# Patient Record
Sex: MALE | Race: WHITE | NOT HISPANIC OR LATINO | Employment: OTHER | URBAN - METROPOLITAN AREA
[De-identification: names, ages, dates, MRNs, and addresses within clinical notes are randomized per-mention and may not be internally consistent; named-entity substitution may affect disease eponyms.]

---

## 2017-02-02 DIAGNOSIS — I10 ESSENTIAL HYPERTENSION, BENIGN: Primary | ICD-10-CM

## 2017-02-02 DIAGNOSIS — E11.9 TYPE 2 DIABETES MELLITUS WITHOUT COMPLICATION, WITHOUT LONG-TERM CURRENT USE OF INSULIN (H): ICD-10-CM

## 2017-02-02 NOTE — TELEPHONE ENCOUNTER
losartan      Last Written Prescription Date: 12/29/16  Last Fill Quantity: 30, # refills: 0  Last Office Visit with G, P or [facility] prescribing provider: 6/23/16   Next 5 appointments (look out 90 days)     Feb 09, 2017 10:00 AM   PHYSICAL with Jolynn Phillips MD   [facility]    [address] [facility] [address] [address]   [phone]                   POTASSIUM   Date Value Ref Range Status   08/10/2016 4.3 3.4 - 5.3 mmol/L Final     CREATININE   Date Value Ref Range Status   08/10/2016 0.91 0.66 - 1.25 mg/dL Final     BP Readings from Last 3 Encounters:   06/23/16 136/97   09/22/15 128/73   06/19/15 132/80   Routing refill request to provider for review/approval because:  Juliet given x1 and patient did not follow up, please advise

## 2017-02-03 RX ORDER — LOSARTAN POTASSIUM 50 MG/1
50 TABLET ORAL DAILY
Qty: 60 TABLET | Refills: 0 | Status: SHIPPED | OUTPATIENT
Start: 2017-02-03 | End: 2017-02-09

## 2017-02-03 NOTE — TELEPHONE ENCOUNTER
I saw him last 6/2016 - due for diabetes visit.    I will fill his losartan x 2 months and he should make a clinic appointment to see me    melib

## 2017-02-09 ENCOUNTER — OFFICE VISIT (OUTPATIENT)
Dept: FAMILY MEDICINE | Facility: CLINIC | Age: 62
End: 2017-02-09
Payer: COMMERCIAL

## 2017-02-09 ENCOUNTER — RADIANT APPOINTMENT (OUTPATIENT)
Dept: GENERAL RADIOLOGY | Facility: CLINIC | Age: 62
End: 2017-02-09
Attending: FAMILY MEDICINE
Payer: COMMERCIAL

## 2017-02-09 VITALS
BODY MASS INDEX: 37.52 KG/M2 | HEIGHT: 69 IN | TEMPERATURE: 97.7 F | WEIGHT: 253.3 LBS | DIASTOLIC BLOOD PRESSURE: 85 MMHG | SYSTOLIC BLOOD PRESSURE: 131 MMHG | HEART RATE: 77 BPM

## 2017-02-09 DIAGNOSIS — I10 ESSENTIAL HYPERTENSION, BENIGN: ICD-10-CM

## 2017-02-09 DIAGNOSIS — Z13.220 NEED FOR LIPID SCREENING: ICD-10-CM

## 2017-02-09 DIAGNOSIS — M25.512 ACUTE PAIN OF LEFT SHOULDER: ICD-10-CM

## 2017-02-09 DIAGNOSIS — E11.9 TYPE 2 DIABETES MELLITUS WITHOUT COMPLICATION, WITHOUT LONG-TERM CURRENT USE OF INSULIN (H): ICD-10-CM

## 2017-02-09 DIAGNOSIS — Z11.59 NEED FOR HEPATITIS C SCREENING TEST: ICD-10-CM

## 2017-02-09 DIAGNOSIS — E78.2 MIXED HYPERLIPIDEMIA: ICD-10-CM

## 2017-02-09 DIAGNOSIS — Z12.11 SCREEN FOR COLON CANCER: ICD-10-CM

## 2017-02-09 DIAGNOSIS — M67.912 DYSFUNCTION OF LEFT ROTATOR CUFF: ICD-10-CM

## 2017-02-09 DIAGNOSIS — Z00.01 ENCOUNTER FOR ROUTINE ADULT MEDICAL EXAM WITH ABNORMAL FINDINGS: Primary | ICD-10-CM

## 2017-02-09 DIAGNOSIS — J44.1 OBSTRUCTIVE CHRONIC BRONCHITIS WITH EXACERBATION (H): ICD-10-CM

## 2017-02-09 LAB
CHOLEST SERPL-MCNC: 164 MG/DL
CREAT UR-MCNC: 348 MG/DL
HBA1C MFR BLD: 6.7 % (ref 4.3–6)
HDLC SERPL-MCNC: 53 MG/DL
LDLC SERPL CALC-MCNC: 84 MG/DL
MICROALBUMIN UR-MCNC: 23 MG/L
MICROALBUMIN/CREAT UR: 6.67 MG/G CR (ref 0–17)
NONHDLC SERPL-MCNC: 111 MG/DL
TRIGL SERPL-MCNC: 137 MG/DL

## 2017-02-09 PROCEDURE — 83036 HEMOGLOBIN GLYCOSYLATED A1C: CPT | Performed by: FAMILY MEDICINE

## 2017-02-09 PROCEDURE — 82043 UR ALBUMIN QUANTITATIVE: CPT | Performed by: FAMILY MEDICINE

## 2017-02-09 PROCEDURE — 36415 COLL VENOUS BLD VENIPUNCTURE: CPT | Performed by: FAMILY MEDICINE

## 2017-02-09 PROCEDURE — 99213 OFFICE O/P EST LOW 20 MIN: CPT | Mod: 25 | Performed by: FAMILY MEDICINE

## 2017-02-09 PROCEDURE — 86803 HEPATITIS C AB TEST: CPT | Performed by: FAMILY MEDICINE

## 2017-02-09 PROCEDURE — 99396 PREV VISIT EST AGE 40-64: CPT | Performed by: FAMILY MEDICINE

## 2017-02-09 PROCEDURE — 80061 LIPID PANEL: CPT | Performed by: FAMILY MEDICINE

## 2017-02-09 PROCEDURE — 73030 X-RAY EXAM OF SHOULDER: CPT | Mod: LT

## 2017-02-09 RX ORDER — ALBUTEROL SULFATE 90 UG/1
2 AEROSOL, METERED RESPIRATORY (INHALATION) EVERY 6 HOURS PRN
Qty: 1 INHALER | Refills: 3 | Status: SHIPPED | OUTPATIENT
Start: 2017-02-09 | End: 2018-01-03

## 2017-02-09 RX ORDER — METFORMIN HCL 500 MG
500 TABLET, EXTENDED RELEASE 24 HR ORAL DAILY
Qty: 90 TABLET | Refills: 3 | Status: SHIPPED | OUTPATIENT
Start: 2017-02-09 | End: 2018-02-27

## 2017-02-09 RX ORDER — LOSARTAN POTASSIUM 100 MG/1
100 TABLET ORAL DAILY
Qty: 90 TABLET | Refills: 1 | Status: SHIPPED | OUTPATIENT
Start: 2017-02-09 | End: 2017-10-03

## 2017-02-09 RX ORDER — ATORVASTATIN CALCIUM 20 MG/1
20 TABLET, FILM COATED ORAL DAILY
Qty: 90 TABLET | Refills: 3 | Status: SHIPPED | OUTPATIENT
Start: 2017-02-09 | End: 2018-02-27

## 2017-02-09 NOTE — PROGRESS NOTES
Answers for HPI/ROS submitted by the patient on 2/8/2017   Annual Exam:  Getting at least 3 servings of Calcium per day:: Yes  Bi-annual eye exam:: NO  Dental care twice a year:: NO  Sleep apnea or symptoms of sleep apnea:: None  Diet:: Low salt, Low fat/cholesterol, Diabetic, Carbohydrate counting  Frequency of exercise:: 1 day/week  Duration of exercise:: 15-30 minutes  Taking medications regularly:: Yes  Medication side effects:: None  Additional concerns today:: No  PHQ-2 Depressed: Not at all, Not at all  PHQ-2 Score: 0

## 2017-02-09 NOTE — MR AVS SNAPSHOT
After Visit Summary   2/9/2017    Joe Mas    MRN: [id number]           Patient Information     Date Of Birth          1955        Visit Information        Provider Department      2/9/2017 10:00 AM Jolynn Phillips MD [facility]        Today's Diagnoses     Encounter for routine adult medical exam with abnormal findings    -  1     Type 2 diabetes mellitus without complication, without long-term current use of insulin (H)         Mixed hyperlipidemia         BENIGN HYPERTENSION         Screen for colon cancer         Need for hepatitis C screening test         Need for lipid screening         Obstructive chronic bronchitis with exacerbation (H)         Acute pain of left shoulder         Dysfunction of left rotator cuff           Care Instructions      Preventive Health Recommendations  Male Ages 50 - 64    Yearly exam:             See your health care provider every year in order to  o   Review health changes.   o   Discuss preventive care.    o   Review your medicines if your doctor has prescribed any.     Have a cholesterol test every 5 years, or more frequently if you are at risk for high cholesterol/heart disease.     Have a diabetes test (fasting glucose) every three years. If you are at risk for diabetes, you should have this test more often.     Have a colonoscopy at age 50, or have a yearly FIT test (stool test). These exams will check for colon cancer.      Talk with your health care provider about whether or not a prostate cancer screening test (PSA) is right for you.    You should be tested each year for STDs (sexually transmitted diseases), if you re at risk.     Shots: Get a flu shot each year. Get a tetanus shot every 10 years.     Nutrition:    Eat at least 5 servings of fruits and vegetables daily.     Eat whole-grain bread, whole-wheat pasta and brown rice instead of white grains and rice.     Talk to your provider about Calcium and Vitamin D.      Lifestyle    Exercise for at least 150 minutes a week (30 minutes a day, 5 days a week). This will help you control your weight and prevent disease.     Limit alcohol to one drink per day.     No smoking.     Wear sunscreen to prevent skin cancer.     See your dentist every six months for an exam and cleaning.     See your eye doctor every 1 to 2 years.            Follow-ups after your visit        Additional Services     GASTROENTEROLOGY ADULT REF PROCEDURE ONLY       Last Lab Result: CREATININE (mg/dL)       Date                     Value                 08/10/2016               0.91             ----------  Body mass index is 37.69 kg/(m^2).     Needed:  No  Language:  English    Patient will be contacted to schedule procedure.     Please be aware that coverage of these services is subject to the terms and limitations of your health insurance plan.  Call member services at your health plan with any benefit or coverage questions.  Any procedures must be performed at a [facility] facility OR coordinated by your clinic's referral office.    Please bring the following with you to your appointment:    (1) Any X-Rays, CTs or MRIs which have been performed.  Contact the facility where they were done to arrange for  prior to your scheduled appointment.    (2) List of current medications   (3) This referral request   (4) Any documents/labs given to you for this referral            [facility] PT, HAND, AND CHIROPRACTIC REFERRAL       **This order will print in the [facility] Scheduling Office**    Physical Therapy, Hand Therapy and Chiropractic Care are available through:    *[facility]  *[facility]  *[facility]    Call one number to schedule at any of the above locations: [phone].    Your provider has referred you to: Physical Therapy at [facility] or [facility]    Indication/Reason for Referral: Shoulder Pain  Onset of Illness: one month  Therapy Orders: Evaluate and  Treat  Special Programs: None  Special Request: None    Doris George      Additional Comments for the Therapist or Chiropractor:     Please be aware that coverage of these services is subject to the terms and limitations of your health insurance plan.  Call member services at your health plan with any benefit or coverage questions.      Please bring the following to your appointment:    *Your personal calendar for scheduling future appointments  *Comfortable clothing                  Future tests that were ordered for you today     Open Future Orders        Priority Expected Expires Ordered    XR Shoulder Left 2 Views Routine 2/9/2017 2/9/2018 2/9/2017            Who to contact     Normal or non-critical lab and imaging results will be communicated to you by [employer], letter or phone within 4 business days after the clinic has received the results. If you do not hear from us within 7 days, please contact the clinic through [employer] or phone. If you have a critical or abnormal lab result, we will notify you by phone as soon as possible.  Submit refill requests through [employer] or call your pharmacy and they will forward the refill request to us. Please allow 3 business days for your refill to be completed.          If you need to speak with a  for additional information , please call: [phone]             Additional Information About Your Visit        [employer] Information     [employer] gives you secure access to your electronic health record. If you see a primary care provider, you can also send messages to your care team and make appointments. If you have questions, please call your primary care clinic.  If you do not have a primary care provider, please call [phone] and they will assist you.        Care EveryWhere ID     This is your Care EveryWhere ID. This could be used by other organizations to access your [facility] medical records  [id number]        Your Vitals Were     Pulse Temperature  "Height BMI (Body Mass Index)          77 97.7  F (36.5  C) (Tympanic) 5' 8.75\" (1.746 m) 37.69 kg/m2         Blood Pressure from Last 3 Encounters:   02/09/17 131/85   06/23/16 136/97   09/22/15 128/73    Weight from Last 3 Encounters:   02/09/17 253 lb 4.8 oz (114.896 kg)   06/23/16 247 lb 1.6 oz (112.084 kg)   09/22/15 245 lb 1.6 oz (111.177 kg)              We Performed the Following     Albumin Random Urine Quantitative     GASTROENTEROLOGY ADULT REF PROCEDURE ONLY     HEMOGLOBIN A1C     Hepatitis C Screen Reflex to HCV RNA Quant and Genotype     [facility] PT, HAND, AND CHIROPRACTIC REFERRAL     Lipid panel reflex to direct LDL          Today's Medication Changes          These changes are accurate as of: 2/9/17 10:52 AM.  If you have any questions, ask your nurse or doctor.               These medicines have changed or have updated prescriptions.        Dose/Directions    losartan 100 MG tablet   Commonly known as:  COZAAR   This may have changed:  Another medication with the same name was removed. Continue taking this medication, and follow the directions you see here.   Used for:  Essential hypertension, benign   Changed by:  Jolynn Phillips MD        Dose:  100 mg   Take 1 tablet (100 mg) by mouth daily   Quantity:  90 tablet   Refills:  1            Where to get your medicines      These medications were sent to [facility] [address] [facility]  [address] [facility] [address]     Phone:  [phone]    - albuterol 108 (90 BASE) MCG/ACT Inhaler  - atorvastatin 20 MG tablet  - losartan 100 MG tablet  - metFORMIN 500 MG 24 hr tablet             Primary Care Provider Office Phone # Fax #    Jolynn Phillips [phone]       [facility] [id number] [place] [address]        Thank you!     Thank you for choosing [facility]  for your care. Our goal is always to provide you with excellent care. Hearing back from our patients is " one way we can continue to improve our services. Please take a few minutes to complete the written survey that you may receive in the mail after your visit with us. Thank you!             Your Updated Medication List - Protect others around you: Learn how to safely use, store and throw away your medicines at www.disposemymeds.org.          This list is accurate as of: 2/9/17 10:52 AM.  Always use your most recent med list.                   Brand Name Dispense Instructions for use    albuterol 108 (90 BASE) MCG/ACT Inhaler    PROAIR HFA/PROVENTIL HFA/VENTOLIN HFA    1 Inhaler    Inhale 2 puffs into the lungs every 6 hours as needed for shortness of breath / dyspnea       aspirin 81 MG tablet     100    ONE DAILY       atorvastatin 20 MG tablet    LIPITOR    90 tablet    Take 1 tablet (20 mg) by mouth daily       blood glucose monitoring lancets     3 Box    1 each daily       blood glucose monitoring test strip    ACCU-CHEK SMARTVIEW    300 each    Test blood sugars daily       losartan 100 MG tablet    COZAAR    90 tablet    Take 1 tablet (100 mg) by mouth daily       metFORMIN 500 MG 24 hr tablet    GLUCOPHAGE-XR    90 tablet    Take 1 tablet (500 mg) by mouth daily

## 2017-02-09 NOTE — NURSING NOTE
"Initial /85 mmHg  Pulse 77  Temp(Src) 97.7  F (36.5  C) (Tympanic)  Ht 5' 8.75\" (1.746 m)  Wt 253 lb 4.8 oz (114.896 kg)  BMI 37.69 kg/m2 Estimated body mass index is 37.69 kg/(m^2) as calculated from the following:    Height as of this encounter: 5' 8.75\" (1.746 m).    Weight as of this encounter: 253 lb 4.8 oz (114.896 kg). .    "

## 2017-02-09 NOTE — PROGRESS NOTES
SUBJECTIVE:     CC: Joe Mas is an 61 year old male who presents for preventative health visit.   Answers for HPI/ROS submitted by the patient on 2/8/2017   Annual Exam:  Getting at least 3 servings of Calcium per day:: Yes  Bi-annual eye exam:: NO  Dental care twice a year:: NO  Sleep apnea or symptoms of sleep apnea:: None  Diet:: Low salt, Low fat/cholesterol, Diabetic, Carbohydrate counting  Frequency of exercise:: 1 day/week  Duration of exercise:: 15-30 minutes - watches grandson 3 d /week   Taking medications regularly:: Yes  Medication side effects:: None  Additional concerns today:: No  PHQ-2 Depressed: Not at all, Not at all  PHQ-2 Score: 0        Diabetes Follow-up      Patient is checking blood sugars: rarely.  Results range from 105 to 135    Diabetic concerns: None     Symptoms of hypoglycemia (low blood sugar): none     Paresthesias (numbness or burning in feet) or sores: No     Date of last diabetic eye exam: 12/2016         Hasn't been monitoring very often   On metformin 500 daily       Hyperlipidemia Follow-Up      Rate your low fat/cholesterol diet?: fair    Taking statin?  Yes, no muscle aches from statin    Other lipid medications/supplements?:  Omega 3     Hypertension Follow-up      Outpatient blood pressures are not being checked.    Low Salt Diet: not monitoring salt     Having left shoulder pain  No injury  Pain off and on for 4 years - worsened for about a month  Ice helps  It is getting better overall     Today's PHQ-2 Score: 0  PHQ-2 ( 1999 [employer]) 2/8/2017 6/17/2014   Q1: Little interest or pleasure in doing things - 0   Q2: Feeling down, depressed or hopeless - 0   PHQ-2 Score - 0   Little interest or pleasure in doing things Not at all -   Feeling down, depressed or hopeless Not at all -   PHQ-2 Score 0 -       Abuse: Current or Past(Physical, Sexual or Emotional)- No  Do you feel safe in your environment - Yes    Social History   Substance Use Topics     Smoking status: Former  "Smoker -- 1.00 packs/day for 10 years     Types: Cigarettes     Smokeless tobacco: Never Used      Comment: quit smoking 2010     Alcohol Use: 0.0 oz/week     0 Standard drinks or equivalent per week      Comment: 2-5 beers per month     The patient does not drink >3 drinks per day nor >7 drinks per week.    Last PSA: No results found for: PSA    Recent Labs   Lab Test  03/15/16   0850  02/17/15   0759  06/17/14   0857   CHOL  158  203*  163   HDL  45  65  42   LDL  81  99  101   TRIG  162*  193*  100   CHOLHDLRATIO   --   3.1  4.0   NHDL  113   --    --        Reviewed orders with patient. Reviewed health maintenance and updated orders accordingly - Yes    All Histories reviewed and updated in Epic.      ROS:  C: NEGATIVE for fever, chills, change in weight  I: NEGATIVE for worrisome rashes, moles or lesions  E: NEGATIVE for vision changes or irritation  ENT: NEGATIVE for ear, mouth and throat problems  R: NEGATIVE for significant cough or SOB  CV: NEGATIVE for chest pain, palpitations or peripheral edema  GI: NEGATIVE for nausea, abdominal pain, heartburn, or change in bowel habits   male: negative for dysuria, hematuria, decreased urinary stream, erectile dysfunction, urethral discharge  MUSCULOSKELETAL:left shoulder pain   N: NEGATIVE for weakness, dizziness or paresthesias  P: NEGATIVE for changes in mood or affect      OBJECTIVE:     /85 mmHg  Pulse 77  Temp(Src) 97.7  F (36.5  C) (Tympanic)  Ht 5' 8.75\" (1.746 m)  Wt 253 lb 4.8 oz (114.896 kg)  BMI 37.69 kg/m2  EXAM:  GENERAL: healthy, alert and no distress  EYES: Eyes grossly normal to inspection, PERRL and conjunctivae and sclerae normal  HENT: ear canals and TM's normal, nose and mouth without ulcers or lesions  NECK: no adenopathy, no asymmetry, masses, or scars and thyroid normal to palpation  RESP: lungs clear to auscultation - no rales, rhonchi or wheezes  CV: regular rate and rhythm, normal S1 S2, no S3 or S4, no murmur, click or rub, no " peripheral edema and peripheral pulses strong  ABDOMEN: soft, nontender, no hepatosplenomegaly, no masses and bowel sounds normal   (male): normal male genitalia without lesions or urethral discharge, no hernia  MS: no gross musculoskeletal defects noted, no edema  SKIN: no suspicious lesions or rashes  NEURO: Normal strength and tone, mentation intact and speech normal  PSYCH: mentation appears normal, affect normal/bright    Left shoulder xray  I independently visualized the xray and my findings were mild joint arthritis, otw normal .   Radiology review pending.      ASSESSMENT/PLAN:     (Z00.01) Encounter for routine adult medical exam with abnormal findings  (primary encounter diagnosis)  Comment: We discussed self testicular exams, exercise 30mins/day, and calcium with vitamin D at 1200mg/day, preferably from dietary sources.  Diet, Weight loss, and Exercise were discussed as well.       Discussed that yearly prostate screening ( psa) is not recommended by USPSTF  Discussed risks/benefits of screening including that no screening could miss cancer and that screening could cause unnecessary procedures.   He understands and agrees and chooses not to do psa testing.    Plan:     (E11.9) Type 2 diabetes mellitus without complication, without long-term current use of insulin (H)  Comment: meds refilled. Try to exercise daily and diet discussed. A1c rising but still within goal range.   Plan: HEMOGLOBIN A1C, metFORMIN (GLUCOPHAGE-XR) 500         MG 24 hr tablet, Albumin Random Urine         Quantitative            (E78.2) Mixed hyperlipidemia  Comment: meds refilled. Labs today   Plan: Lipid panel reflex to direct LDL, atorvastatin         (LIPITOR) 20 MG tablet            (I10) BENIGN HYPERTENSION  Comment:   Plan: losartan (COZAAR) 100 MG tablet            (Z12.11) Screen for colon cancer  Comment:   Plan: GASTROENTEROLOGY ADULT REF PROCEDURE ONLY            (Z11.59) Need for hepatitis C screening test  Comment:  "  Plan: Hepatitis C Screen Reflex to HCV RNA Quant and         Genotype            (Z13.220) Need for lipid screening  Comment:   Plan:     (J44.1) Obstructive chronic bronchitis with exacerbation (H)  Comment:   Plan: albuterol (PROAIR HFA/PROVENTIL HFA/VENTOLIN         HFA) 108 (90 BASE) MCG/ACT Inhaler            (M25.512) Acute pain of left shoulder  Comment:   Plan: XR Shoulder Left 2 Views            (M67.912) Dysfunction of left rotator cuff  Comment: discussed diagnosis and treatment. Referral to PT given. Consider shoulder injection if symptoms persist. The patient indicates understanding of these issues and agrees with the plan.   Plan: [facility] PT, HAND, AND CHIROPRACTIC REFERRAL              COUNSELING:  Reviewed preventive health counseling, as reflected in patient instructions       Regular exercise       Healthy diet/nutrition    BP Screening:   Last 3 BP Readings:    BP Readings from Last 3 Encounters:   02/09/17 131/85   06/23/16 136/97   09/22/15 128/73       The following was recommended to the patient:  Re-screen BP within a year and recommended lifestyle modifications       reports that he has quit smoking. His smoking use included Cigarettes. He has a 10 pack-year smoking history. He has never used smokeless tobacco.    Estimated body mass index is 36.47 kg/(m^2) as calculated from the following:    Height as of 6/23/16: 5' 9\" (1.753 m).    Weight as of 6/23/16: 247 lb 1.6 oz (112.084 kg).   Weight management plan: Discussed healthy diet and exercise guidelines and patient will follow up in 3 months in clinic to re-evaluate.    Counseling Resources:  ATP IV Guidelines  Pooled Cohorts Equation Calculator  FRAX Risk Assessment  ICSI Preventive Guidelines  Dietary Guidelines for Americans, 2010  USDA's MyPlate  ASA Prophylaxis  Lung CA Screening    Jolynn Phillips MD  [facility]  "

## 2017-02-10 LAB — HCV AB SERPL QL IA: NORMAL

## 2017-03-03 ENCOUNTER — OFFICE VISIT (OUTPATIENT)
Dept: FAMILY MEDICINE | Facility: CLINIC | Age: 62
End: 2017-03-03
Payer: COMMERCIAL

## 2017-03-03 VITALS
WEIGHT: 255 LBS | HEART RATE: 80 BPM | TEMPERATURE: 97.4 F | BODY MASS INDEX: 37.77 KG/M2 | SYSTOLIC BLOOD PRESSURE: 145 MMHG | HEIGHT: 69 IN | DIASTOLIC BLOOD PRESSURE: 83 MMHG | OXYGEN SATURATION: 94 %

## 2017-03-03 DIAGNOSIS — R05.9 COUGH: ICD-10-CM

## 2017-03-03 DIAGNOSIS — J44.1 OBSTRUCTIVE CHRONIC BRONCHITIS WITH EXACERBATION (H): Primary | ICD-10-CM

## 2017-03-03 PROCEDURE — 99213 OFFICE O/P EST LOW 20 MIN: CPT | Performed by: FAMILY MEDICINE

## 2017-03-03 RX ORDER — CODEINE PHOSPHATE AND GUAIFENESIN 10; 100 MG/5ML; MG/5ML
1 SOLUTION ORAL EVERY 4 HOURS PRN
Qty: 240 ML | Refills: 0 | Status: SHIPPED | OUTPATIENT
Start: 2017-03-03 | End: 2017-08-01

## 2017-03-03 RX ORDER — PREDNISONE 20 MG/1
40 TABLET ORAL DAILY
Qty: 10 TABLET | Refills: 0 | Status: SHIPPED | OUTPATIENT
Start: 2017-03-03 | End: 2017-03-08

## 2017-03-03 RX ORDER — AZITHROMYCIN 250 MG/1
TABLET, FILM COATED ORAL
Qty: 6 TABLET | Refills: 0 | Status: SHIPPED | OUTPATIENT
Start: 2017-03-03 | End: 2017-04-14

## 2017-03-03 NOTE — MR AVS SNAPSHOT
"              After Visit Summary   3/3/2017    Joe Mas    MRN: [id number]           Patient Information     Date Of Birth          1955        Visit Information        Provider Department      3/3/2017 1:00 PM Betty Dubon MD [facility]        Today's Diagnoses     Obstructive chronic bronchitis with exacerbation (H)    -  1    Cough           Follow-ups after your visit        Follow-up notes from your care team     Return if symptoms worsen or fail to improve.      Who to contact     Normal or non-critical lab and imaging results will be communicated to you by [employer], letter or phone within 4 business days after the clinic has received the results. If you do not hear from us within 7 days, please contact the clinic through [employer] or phone. If you have a critical or abnormal lab result, we will notify you by phone as soon as possible.  Submit refill requests through [employer] or call your pharmacy and they will forward the refill request to us. Please allow 3 business days for your refill to be completed.          If you need to speak with a  for additional information , please call: [phone]             Additional Information About Your Visit        MyChart Information     [employer] gives you secure access to your electronic health record. If you see a primary care provider, you can also send messages to your care team and make appointments. If you have questions, please call your primary care clinic.  If you do not have a primary care provider, please call [phone] and they will assist you.        Care EveryWhere ID     This is your Care EveryWhere ID. This could be used by other organizations to access your [place] medical records  [id number]        Your Vitals Were     Pulse Temperature Height Pulse Oximetry BMI (Body Mass Index)       80 97.4  F (36.3  C) (Tympanic) 5' 8.75\" (1.746 m) 94% 37.93 kg/m2        Blood Pressure from Last 3 Encounters:   03/03/17 " 145/83   02/09/17 131/85   06/23/16 (!) 136/97    Weight from Last 3 Encounters:   03/03/17 255 lb (115.7 kg)   02/09/17 253 lb 4.8 oz (114.9 kg)   06/23/16 247 lb 1.6 oz (112.1 kg)              Today, you had the following     No orders found for display         Today's Medication Changes          These changes are accurate as of: 3/3/17  1:31 PM.  If you have any questions, ask your nurse or doctor.               Start taking these medicines.        Dose/Directions    azithromycin 250 MG tablet   Commonly known as:  ZITHROMAX   Used for:  Obstructive chronic bronchitis with exacerbation (H)   Started by:  Betty Dubon MD        Two tablets first day, then one tablet daily for four days.   Quantity:  6 tablet   Refills:  0       guaiFENesin-codeine 100-10 MG/5ML Soln solution   Commonly known as:  ROBITUSSIN AC   Used for:  Cough   Started by:  Betty Dubon MD        Dose:  1 tsp.   Take 5 mLs by mouth every 4 hours as needed for cough   Quantity:  240 mL   Refills:  0       predniSONE 20 MG tablet   Commonly known as:  DELTASONE   Used for:  Obstructive chronic bronchitis with exacerbation (H)   Started by:  Betty Dubon MD        Dose:  40 mg   Take 2 tablets (40 mg) by mouth daily for 5 days   Quantity:  10 tablet   Refills:  0            Where to get your medicines      These medications were sent to [facility] [address]  [address] [facility] [address]     Phone:  [phone]     azithromycin 250 MG tablet    predniSONE 20 MG tablet         Some of these will need a paper prescription and others can be bought over the counter.  Ask your nurse if you have questions.     Bring a paper prescription for each of these medications     guaiFENesin-codeine 100-10 MG/5ML Soln solution                Primary Care Provider Office Phone # Fax #    Jolynn Phillips [phone]       [facility] [address] [facility]  [address]        Thank you!     Thank you for choosing [facility]  for your care. Our goal is always to provide you with excellent care. Hearing back from our patients is one way we can continue to improve our services. Please take a few minutes to complete the written survey that you may receive in the mail after your visit with us. Thank you!             Your Updated Medication List - Protect others around you: Learn how to safely use, store and throw away your medicines at www.disposemymeds.org.          This list is accurate as of: 3/3/17  1:31 PM.  Always use your most recent med list.                   Brand Name Dispense Instructions for use    albuterol 108 (90 BASE) MCG/ACT Inhaler    PROAIR HFA/PROVENTIL HFA/VENTOLIN HFA    1 Inhaler    Inhale 2 puffs into the lungs every 6 hours as needed for shortness of breath / dyspnea       aspirin 81 MG tablet     100    ONE DAILY       atorvastatin 20 MG tablet    LIPITOR    90 tablet    Take 1 tablet (20 mg) by mouth daily       azithromycin 250 MG tablet    ZITHROMAX    6 tablet    Two tablets first day, then one tablet daily for four days.       blood glucose monitoring lancets     3 Box    1 each daily       blood glucose monitoring test strip    ACCU-CHEK SMARTVIEW    300 each    Test blood sugars daily       guaiFENesin-codeine 100-10 MG/5ML Soln solution    ROBITUSSIN AC    240 mL    Take 5 mLs by mouth every 4 hours as needed for cough       losartan 100 MG tablet    COZAAR    90 tablet    Take 1 tablet (100 mg) by mouth daily       metFORMIN 500 MG 24 hr tablet    GLUCOPHAGE-XR    90 tablet    Take 1 tablet (500 mg) by mouth daily       predniSONE 20 MG tablet    DELTASONE    10 tablet    Take 2 tablets (40 mg) by mouth daily for 5 days

## 2017-03-03 NOTE — PROGRESS NOTES
SUBJECTIVE:                                                    Joe Mas is a 61 year old male who presents to clinic today for the following health issues:    Chief Complaint   Patient presents with     Cough     cold in the second week of feb, still having cough, can't sleep at night. Having SOB at times, Feel congestion in chest.      Problem list and histories reviewed & adjusted, as indicated.  Additional history: as above he has been coughing for a while he has been not able to sleep   He has been coughing for a few weeks no w he is not sleeping he cont to cough   Lots of sputum that is from the lungs he has a history of  Copd he has been using the inhaler more and this does help some         Patient Active Problem List   Diagnosis     Obstructive chronic bronchitis with exacerbation (H)     Essential hypertension, benign     Advanced directives, counseling/discussion     Anxiety     Moderate major depression (H)     Type 2 diabetes mellitus without complications (H)     CARDIOVASCULAR SCREENING; LDL GOAL LESS THAN 100     Hyperlipidemia with target LDL less than 100     Hypertension goal BP (blood pressure) < 140/90     Obesity (BMI 30-39.9)     Past Surgical History   Procedure Laterality Date     Hernia repair, umbilical  2001     Knee surgery  1960's     Left     Colonoscopy  11/12/2004     Follow up colonoscopy in 5 years     Abdomen surgery         Social History   Substance Use Topics     Smoking status: Former Smoker     Packs/day: 1.00     Years: 10.00     Types: Cigarettes     Smokeless tobacco: Never Used      Comment: quit smoking 2010     Alcohol use 0.0 oz/week      Comment: 2-5 beers per month     Family History   Problem Relation Age of Onset     OSTEOPOROSIS Mother      HEART DISEASE Mother      Neurologic Disorder Father      passed away from a brain tumor age 48     Neurologic Disorder Maternal Grandmother      parkinsons     C.A.D. Maternal Grandmother      DIABETES Maternal Grandmother   "    Alcohol/Drug Maternal Grandfather      CANCER Maternal Grandfather      esphogus     CANCER Paternal Grandfather      lung     DIABETES Sister      Hypertension Sister      Asthma No family hx of      Hypertension No family hx of      CEREBROVASCULAR DISEASE No family hx of      Breast Cancer No family hx of      Cancer - colorectal No family hx of          Everyone in the house is sick   ROS:  Constitutional, HEENT, cardiovascular, pulmonary, gi and gu systems are negative, except as otherwise noted.    OBJECTIVE:                                                    /83 (BP Location: Right arm, Cuff Size: Adult Large)  Pulse 80  Temp 97.4  F (36.3  C) (Tympanic)  Ht 5' 8.75\" (1.746 m)  Wt 255 lb (115.7 kg)  SpO2 94%  BMI 37.93 kg/m2 Body mass index is 37.93 kg/(m^2).   GENERAL APPEARANCE: alert and no distress  HENT: ear canals and TM's with clear fluid bilaterally ormal and nose and mouth without ulcers or lesions  NECK: no adenopathy, no asymmetry, masses, or scars and thyroid normal to palpation  RESP: rhonchi bibasilar  CV: regular rates and rhythm, normal S1 S2, no S3 or S4 and no murmur, click or rub  ABDOMEN: soft, nontender, without hepatosplenomegaly or masses, bowel sounds normal and obese  SKIN: no suspicious lesions or rashes       ASSESSMENT/PLAN:                                                      1. Obstructive chronic bronchitis with exacerbation (H)    - azithromycin (ZITHROMAX) 250 MG tablet; Two tablets first day, then one tablet daily for four days.  Dispense: 6 tablet; Refill: 0  - predniSONE (DELTASONE) 20 MG tablet; Take 2 tablets (40 mg) by mouth daily for 5 days  Dispense: 10 tablet; Refill: 0    2. Cough    - guaiFENesin-codeine (ROBITUSSIN AC) 100-10 MG/5ML SOLN solution; Take 5 mLs by mouth every 4 hours as needed for cough  Dispense: 240 mL; Refill: 0     reports that he has quit smoking. His smoking use included Cigarettes. He has a 10.00 pack-year smoking history. He has " never used smokeless tobacco.      Weight management plan: Discussed healthy diet and exercise guidelines and patient will follow up in 12 months in clinic to re-evaluate.  Patient instructed to return to the office in 3-5 days for reevaluation unless improving. Signs and symptoms of worsening are reviewed with the patient. If symptoms acutely change and condition worsens patient is instructed to seek medical evaluation through the office or urgent care department or if during non business hours through the emergency department.    Betty Dubon M.D.  [facility]

## 2017-03-24 ENCOUNTER — TELEPHONE (OUTPATIENT)
Dept: FAMILY MEDICINE | Facility: CLINIC | Age: 62
End: 2017-03-24

## 2017-03-24 NOTE — TELEPHONE ENCOUNTER
Panel Management Review      Patient has the following on his problem list:     Depression / Dysthymia review  PHQ-9 SCORE 10/29/2013 1/15/2015 6/23/2016   Total Score 0 0 -   Total Score - - 0      Patient is due for:  None    Diabetes    ASA: Passed    Last A1C  Lab Results   Component Value Date    A1C 6.7 02/09/2017    A1C 6.5 06/23/2016    A1C 6.5 03/15/2016    A1C 6.4 09/22/2015    A1C 6.2 02/17/2015     A1C tested: Passed    Last LDL:    Lab Results   Component Value Date    CHOL 164 02/09/2017     Lab Results   Component Value Date    HDL 53 02/09/2017     Lab Results   Component Value Date    LDL 84 02/09/2017     Lab Results   Component Value Date    TRIG 137 02/09/2017     Lab Results   Component Value Date    CHOLHDLRATIO 3.1 02/17/2015     Lab Results   Component Value Date    NHDL 111 02/09/2017       Is the patient on a Statin? YES             Is the patient on Aspirin? YES    Medications     HMG CoA Reductase Inhibitors    atorvastatin (LIPITOR) 20 MG tablet    Salicylates    ASPIRIN 81 MG OR TABS          Last three blood pressure readings:  BP Readings from Last 3 Encounters:   03/03/17 145/83   02/09/17 131/85   06/23/16 (!) 136/97       Date of last diabetes office visit: 06/2016     Tobacco History:     History   Smoking Status     Former Smoker     Packs/day: 1.00     Years: 10.00     Types: Cigarettes   Smokeless Tobacco     Never Used     Comment: quit smoking 2010         Hypertension   Last three blood pressure readings:  BP Readings from Last 3 Encounters:   03/03/17 145/83   02/09/17 131/85   06/23/16 (!) 136/97     Blood pressure: FAILED    HTN Guidelines:  Age 18-59 BP range:  Less than 140/90  Age 60-85 with Diabetes:  Less than 140/90  Age 60-85 without Diabetes:  less than 150/90      Composite cancer screening  Chart review shows that this patient is due/due soon for the following Colonoscopy  Summary:    Patient is due/failing the following:   BP CHECK and COLONOSCOPY    Action  needed:   Patient needs nurse only appointment. and to make colonoscopy appoitment     Type of outreach:    Sent letter.    Questions for provider review:    None                                                                                                                                    Natasha Riley LPN       Chart routed to none .

## 2017-04-03 DIAGNOSIS — I10 ESSENTIAL HYPERTENSION, BENIGN: ICD-10-CM

## 2017-04-03 DIAGNOSIS — E11.9 TYPE 2 DIABETES MELLITUS WITHOUT COMPLICATION, WITHOUT LONG-TERM CURRENT USE OF INSULIN (H): ICD-10-CM

## 2017-04-03 RX ORDER — LOSARTAN POTASSIUM 50 MG/1
TABLET ORAL
Qty: 1 TABLET | Refills: 0 | Status: SHIPPED | OUTPATIENT
Start: 2017-04-03 | End: 2017-04-14

## 2017-04-03 NOTE — TELEPHONE ENCOUNTER
losartan (COZAAR) 100 MG tablet      Last Written Prescription Date: 2/9/17  Last Fill Quantity: 90, # refills: 1  Last Office Visit with G, P or [facility] prescribing provider: 3/3/17       Potassium   Date Value Ref Range Status   08/10/2016 4.3 3.4 - 5.3 mmol/L Final     Creatinine   Date Value Ref Range Status   08/10/2016 0.91 0.66 - 1.25 mg/dL Final     BP Readings from Last 3 Encounters:   03/03/17 145/83   02/09/17 131/85   06/23/16 (!) 136/97

## 2017-07-05 DIAGNOSIS — E11.9 DIABETES MELLITUS (H): Primary | ICD-10-CM

## 2017-07-05 NOTE — TELEPHONE ENCOUNTER
Insurance no longer covers the accu chek test strips so we need to change to a covered product which is now lalo contour next     [facility]   [phone]

## 2017-07-05 NOTE — TELEPHONE ENCOUNTER
Prescription approved per [facility] Refill Protocol.    Yazmin Souza, PharmD  [facility]  [phone]

## 2017-08-01 ENCOUNTER — OFFICE VISIT (OUTPATIENT)
Dept: FAMILY MEDICINE | Facility: CLINIC | Age: 62
End: 2017-08-01
Payer: COMMERCIAL

## 2017-08-01 VITALS
SYSTOLIC BLOOD PRESSURE: 127 MMHG | DIASTOLIC BLOOD PRESSURE: 88 MMHG | HEIGHT: 69 IN | WEIGHT: 257.6 LBS | TEMPERATURE: 96.9 F | BODY MASS INDEX: 38.16 KG/M2 | HEART RATE: 73 BPM | OXYGEN SATURATION: 94 %

## 2017-08-01 DIAGNOSIS — E78.5 HYPERLIPIDEMIA WITH TARGET LDL LESS THAN 100: ICD-10-CM

## 2017-08-01 DIAGNOSIS — L57.0 ACTINIC KERATOSIS: ICD-10-CM

## 2017-08-01 DIAGNOSIS — I10 ESSENTIAL HYPERTENSION, BENIGN: ICD-10-CM

## 2017-08-01 DIAGNOSIS — E11.9 TYPE 2 DIABETES MELLITUS WITHOUT COMPLICATION, WITHOUT LONG-TERM CURRENT USE OF INSULIN (H): Primary | ICD-10-CM

## 2017-08-01 LAB
ANION GAP SERPL CALCULATED.3IONS-SCNC: 6 MMOL/L (ref 3–14)
BUN SERPL-MCNC: 16 MG/DL (ref 7–30)
CALCIUM SERPL-MCNC: 9.3 MG/DL (ref 8.5–10.1)
CHLORIDE SERPL-SCNC: 102 MMOL/L (ref 94–109)
CO2 SERPL-SCNC: 29 MMOL/L (ref 20–32)
CREAT SERPL-MCNC: 0.99 MG/DL (ref 0.66–1.25)
GFR SERPL CREATININE-BSD FRML MDRD: 77 ML/MIN/1.7M2
GLUCOSE SERPL-MCNC: 150 MG/DL (ref 70–99)
HBA1C MFR BLD: 6.8 % (ref 4.3–6)
POTASSIUM SERPL-SCNC: 4.3 MMOL/L (ref 3.4–5.3)
SODIUM SERPL-SCNC: 137 MMOL/L (ref 133–144)

## 2017-08-01 PROCEDURE — 99214 OFFICE O/P EST MOD 30 MIN: CPT | Mod: 25 | Performed by: FAMILY MEDICINE

## 2017-08-01 PROCEDURE — 83036 HEMOGLOBIN GLYCOSYLATED A1C: CPT | Performed by: FAMILY MEDICINE

## 2017-08-01 PROCEDURE — 17110 DESTRUCTION B9 LES UP TO 14: CPT | Performed by: FAMILY MEDICINE

## 2017-08-01 PROCEDURE — 80048 BASIC METABOLIC PNL TOTAL CA: CPT | Performed by: FAMILY MEDICINE

## 2017-08-01 PROCEDURE — 36415 COLL VENOUS BLD VENIPUNCTURE: CPT | Performed by: FAMILY MEDICINE

## 2017-08-01 NOTE — MR AVS SNAPSHOT
After Visit Summary   8/1/2017    Joe Mas    MRN: [id number]           Patient Information     Date Of Birth          1955        Visit Information        Provider Department      8/1/2017 10:00 AM [facility]        Today's Diagnoses     Type 2 diabetes mellitus without complication, without long-term current use of insulin (H)    -  1    Actinic keratosis           Follow-ups after your visit        Your next 10 appointments already scheduled     Aug 11, 2017   Procedure with Trevin Menendez MD   [facility]    [address] [facility] [address]   [phone]           The medical center is located at [address] [facility]. (between [address] in [place], [place] north of [place]).              Future tests that were ordered for you today     Open Future Orders        Priority Expected Expires Ordered    Basic metabolic panel Routine  8/1/2018 8/1/2017    Hemoglobin A1c Routine  8/1/2018 8/1/2017            Who to contact     Normal or non-critical lab and imaging results will be communicated to you by ozukehart, letter or phone within 4 business days after the clinic has received the results. If you do not hear from us within 7 days, please contact the clinic through [employer] or phone. If you have a critical or abnormal lab result, we will notify you by phone as soon as possible.  Submit refill requests through [employer] or call your pharmacy and they will forward the refill request to us. Please allow 3 business days for your refill to be completed.          If you need to speak with a  for additional information , please call: [phone]             Additional Information About Your Visit        [employer]a Information     [employer] gives you secure access to your electronic health record. If you see a primary care provider, you can also send messages to your care team and make  "appointments. If you have questions, please call your primary care clinic.  If you do not have a primary care provider, please call [phone] and they will assist you.        Care EveryWhere ID     This is your Care EveryWhere ID. This could be used by other organizations to access your [place] medical records  [id number]        Your Vitals Were     Pulse Temperature Height Pulse Oximetry BMI (Body Mass Index)       73 96.9  F (36.1  C) (Tympanic) 5' 8.75\" (1.746 m) 94% 38.32 kg/m2        Blood Pressure from Last 3 Encounters:   08/01/17 127/88   03/03/17 145/83   02/09/17 131/85    Weight from Last 3 Encounters:   08/01/17 257 lb 9.6 oz (116.8 kg)   03/03/17 255 lb (115.7 kg)   02/09/17 253 lb 4.8 oz (114.9 kg)              We Performed the Following     DESTRUCT BENIGN LESION, UP TO 14          Today's Medication Changes          These changes are accurate as of: 8/1/17 10:40 AM.  If you have any questions, ask your nurse or doctor.               Stop taking these medicines if you haven't already. Please contact your care team if you have questions.     guaiFENesin-codeine 100-10 MG/5ML Soln solution   Commonly known as:  ROBITUSSIN AC   Stopped by:  Jolynn Phillips MD                    Primary Care Provider Office Phone # Fax #    Jolynn Phillips [phone]       [facility] [address] [facility] [address]        Equal Access to Services     ANDREW MOROCHO AH: Hadii chetna apple hadasho Soomaali, waaxda luqadaha, qaybta kaalmada viadl, catherine patel. So [facility] [phone].    ATENCIÓN: Si habla español, tiene a rhodes disposición servicios gratuitos de asistencia lingüística. Llame al [phone].    We comply with applicable federal civil rights laws and [place] laws. We do not discriminate on the basis of race, color, national origin, age, disability sex, sexual orientation or gender identity.            Thank you!     Thank you for choosing " [facility]  for your care. Our goal is always to provide you with excellent care. Hearing back from our patients is one way we can continue to improve our services. Please take a few minutes to complete the written survey that you may receive in the mail after your visit with us. Thank you!             Your Updated Medication List - Protect others around you: Learn how to safely use, store and throw away your medicines at www.disposemymeds.org.          This list is accurate as of: 8/1/17 10:40 AM.  Always use your most recent med list.                   Brand Name Dispense Instructions for use Diagnosis    albuterol 108 (90 BASE) MCG/ACT Inhaler    PROAIR HFA/PROVENTIL HFA/VENTOLIN HFA    1 Inhaler    Inhale 2 puffs into the lungs every 6 hours as needed for shortness of breath / dyspnea    Obstructive chronic bronchitis with exacerbation (H)       aspirin 81 MG tablet     100    ONE DAILY    Mixed hyperlipidemia       atorvastatin 20 MG tablet    LIPITOR    90 tablet    Take 1 tablet (20 mg) by mouth daily    Mixed hyperlipidemia       blood glucose monitoring lancets     3 Box    1 each daily    Type 2 diabetes, HbA1c goal < 7% (H)       blood glucose monitoring meter device kit     1 kit    Use to test blood sugar daily    Diabetes mellitus (H)       blood glucose monitoring test strip    ACCU-CHEK SMARTVIEW    300 each    Test blood sugars daily    Type 2 diabetes, HbA1c goal < 7% (H)       losartan 100 MG tablet    COZAAR    90 tablet    Take 1 tablet (100 mg) by mouth daily    Essential hypertension, benign       metFORMIN 500 MG 24 hr tablet    GLUCOPHAGE-XR    90 tablet    Take 1 tablet (500 mg) by mouth daily    Type 2 diabetes mellitus without complication, without long-term current use of insulin (H)

## 2017-08-01 NOTE — NURSING NOTE
"Chief Complaint   Patient presents with     Dibetes     follow up        Initial /88 (BP Location: Right arm, Patient Position: Sitting, Cuff Size: Adult Large)  Pulse 73  Temp 96.9  F (36.1  C) (Tympanic)  Ht 5' 8.75\" (1.746 m)  Wt 257 lb 9.6 oz (116.8 kg)  SpO2 94%  BMI 38.32 kg/m2 Estimated body mass index is 38.32 kg/(m^2) as calculated from the following:    Height as of this encounter: 5' 8.75\" (1.746 m).    Weight as of this encounter: 257 lb 9.6 oz (116.8 kg).  Medication Reconciliation: complete   Natasha Riley LPN    "

## 2017-08-01 NOTE — PROGRESS NOTES
SUBJECTIVE:                                                    Joe Mas is a 61 year old male who presents to clinic today for the following health issues:      Diabetes Follow-up      Patient is checking blood sugars: not at all    Diabetic concerns: None     Symptoms of hypoglycemia (low blood sugar): none     Paresthesias (numbness or burning in feet) or sores: No     Date of last diabetic eye exam: feb 2017     Lab Results   Component Value Date    A1C 6.7 02/09/2017    A1C 6.5 06/23/2016    A1C 6.5 03/15/2016    A1C 6.4 09/22/2015    A1C 6.2 02/17/2015     Wt Readings from Last 4 Encounters:   08/01/17 257 lb 9.6 oz (116.8 kg)   03/03/17 255 lb (115.7 kg)   02/09/17 253 lb 4.8 oz (114.9 kg)   06/23/16 247 lb 1.6 oz (112.1 kg)       Hyperlipidemia Follow-Up      Rate your low fat/cholesterol diet?: fair    Taking statin?  No    Other lipid medications/supplements?:  None    LDL Cholesterol Calculated   Date Value Ref Range Status   02/09/2017 84 <100 mg/dL Final     Comment:     Desirable:       <100 mg/dl   ]      Hypertension Follow-up      Outpatient blood pressures are being checked at home.  Results are 120/90 today typically 125/85     Low Salt Diet: low salt    BP Readings from Last 6 Encounters:   08/01/17 127/88   03/03/17 145/83   02/09/17 131/85   06/23/16 (!) 136/97   09/22/15 128/73   06/19/15 132/80           Amount of exercise or physical activity: 2-3 days/week for an average of 30-45 minutes    Problems taking medications regularly: No    Medication side effects: none  Diet: regular (no restrictions)    Spot on left forearm, slightly flaky  No itch/bleed   No increase in size     Review of systems:  No polydipsia or polyuria  No f/c   Normal appetite and energy level  No n/v   No d/c     Problem list and histories reviewed & adjusted, as indicated.  Additional history: as documented    Patient Active Problem List   Diagnosis     Obstructive chronic bronchitis with exacerbation (H)      "Essential hypertension, benign     Advanced directives, counseling/discussion     Anxiety     Moderate major depression (H)     Type 2 diabetes mellitus without complications (H)     CARDIOVASCULAR SCREENING; LDL GOAL LESS THAN 100     Hyperlipidemia with target LDL less than 100     Hypertension goal BP (blood pressure) < 140/90     Obesity (BMI 30-39.9)     Current Outpatient Prescriptions   Medication     metFORMIN (GLUCOPHAGE-XR) 500 MG 24 hr tablet     atorvastatin (LIPITOR) 20 MG tablet     losartan (COZAAR) 100 MG tablet     albuterol (PROAIR HFA/PROVENTIL HFA/VENTOLIN HFA) 108 (90 BASE) MCG/ACT Inhaler     ASPIRIN 81 MG OR TABS     blood glucose monitoring ([employer] CONTOUR NEXT MONITOR) meter device kit     blood glucose monitoring (ACCU-CHEK SMARTVIEW) test strip     blood glucose monitoring (ACCU-CHEK FASTCLIX) lancets     No current facility-administered medications for this visit.         Allergies   Allergen Reactions     Lisinopril Cough     /88 (BP Location: Right arm, Patient Position: Sitting, Cuff Size: Adult Large)  Pulse 73  Temp 96.9  F (36.1  C) (Tympanic)  Ht 5' 8.75\" (1.746 m)  Wt 257 lb 9.6 oz (116.8 kg)  SpO2 94%  BMI 38.32 kg/m2    GENERAL - Pt is alert and oriented in no acute distress.  Affect is appropriate. Good eye contact.  NECK - Neck is supple w/o LA or thyromegaly  RESPIRATORY - Clear to auscultation bilaterally.  No wheezing noted  CV - RRR, no murmurs, rubs, gallops.   Skin - on the left forearm there is a flat erythematous scaling lesion approximately 1cm in diameter   Feet - no wounds. Normal capillary refill. Warm to touch. Intact microfilament testing. Normal DP and PT pulses    Procedure - With his permission, and after discussion of possible side effects including infection, hypo and hyperpigmentation, and lesion persistence, the lesion was frozen using liquid nitrogen and qtips.  She tolerated the procedure well.  After-cares were discussed.      Assessment/Plan " -     (E11.9) Type 2 diabetes mellitus without complication, without long-term current use of insulin (H)  (primary encounter diagnosis)  Comment: He is doing well overall. He will work on getting more exercise.  meds are up to date.   Plan: Basic metabolic panel, Hemoglobin A1c,                   (L57.0) Actinic keratosis  Comment: if lesion persist, he will return for biopsy.   Plan: DESTRUCT BENIGN LESION, UP TO 14           (I10) Essential hypertension, benign  Comment: stable  Plan:     (E78.5) Hyperlipidemia with target LDL less than 100  Comment: stable   Plan:         TESFAYE Phillips MD

## 2017-08-09 ENCOUNTER — ANESTHESIA EVENT (OUTPATIENT)
Dept: GASTROENTEROLOGY | Facility: CLINIC | Age: 62
End: 2017-08-09
Payer: COMMERCIAL

## 2017-08-09 ASSESSMENT — LIFESTYLE VARIABLES: TOBACCO_USE: 1

## 2017-08-09 ASSESSMENT — COPD QUESTIONNAIRES: COPD: 1

## 2017-08-09 NOTE — ANESTHESIA PREPROCEDURE EVALUATION
Anesthesia Evaluation     . Pt has had prior anesthetic. Type: MAC and General           ROS/MED HX    ENT/Pulmonary:     (+)tobacco use, Past use COPD, , . .    Neurologic:       Cardiovascular:     (+) Dyslipidemia, hypertension----. : . . . :. .       METS/Exercise Tolerance:     Hematologic:         Musculoskeletal:         GI/Hepatic:         Renal/Genitourinary:         Endo:     (+) type II DM Last HgA1c: 6.7 date: 2/2017 Obesity, .      Psychiatric:     (+) psychiatric history depression and anxiety      Infectious Disease:         Malignancy:         Other:                     Physical Exam  Normal systems: cardiovascular, pulmonary and dental    Airway   Mallampati: II  TM distance: >3 FB  Neck ROM: full    Dental     Cardiovascular       Pulmonary                     Anesthesia Plan      History & Physical Review  History and physical reviewed and following examination; no interval change.    ASA Status:  3 .    NPO Status:  > 8 hours    Plan for MAC with Propofol and Intravenous induction. Reason for MAC:  Deep or markedly invasive procedure (G8)  PONV prophylaxis:  Ondansetron (or other 5HT-3) and Dexamethasone or Solumedrol       Postoperative Care  Postoperative pain management:  IV analgesics, Oral pain medications and Multi-modal analgesia.      Consents  Anesthetic plan, risks, benefits and alternatives discussed with:  Patient..                          .

## 2017-08-11 ENCOUNTER — ANESTHESIA (OUTPATIENT)
Dept: GASTROENTEROLOGY | Facility: CLINIC | Age: 62
End: 2017-08-11
Payer: COMMERCIAL

## 2017-08-11 ENCOUNTER — HOSPITAL ENCOUNTER (OUTPATIENT)
Facility: CLINIC | Age: 62
Discharge: HOME | End: 2017-08-11
Attending: SURGERY | Admitting: SURGERY
Payer: COMMERCIAL

## 2017-08-11 ENCOUNTER — SURGERY (OUTPATIENT)
Age: 62
End: 2017-08-11

## 2017-08-11 VITALS
RESPIRATION RATE: 16 BRPM | WEIGHT: 257 LBS | OXYGEN SATURATION: 94 % | TEMPERATURE: 98.5 F | BODY MASS INDEX: 38.06 KG/M2 | SYSTOLIC BLOOD PRESSURE: 128 MMHG | HEIGHT: 69 IN | DIASTOLIC BLOOD PRESSURE: 93 MMHG

## 2017-08-11 LAB
COLONOSCOPY: NORMAL
GLUCOSE BLDC GLUCOMTR-MCNC: 143 MG/DL (ref 70–99)

## 2017-08-11 PROCEDURE — 25000125 ZZHC RX 250: Performed by: SURGERY

## 2017-08-11 PROCEDURE — 45385 COLONOSCOPY W/LESION REMOVAL: CPT | Performed by: SURGERY

## 2017-08-11 PROCEDURE — 25000128 H RX IP 250 OP 636: Performed by: NURSE ANESTHETIST, CERTIFIED REGISTERED

## 2017-08-11 PROCEDURE — 88305 TISSUE EXAM BY PATHOLOGIST: CPT | Performed by: SURGERY

## 2017-08-11 PROCEDURE — 37000009 ZZH ANESTHESIA TECHNICAL FEE, EACH ADDTL 15 MIN: Performed by: SURGERY

## 2017-08-11 PROCEDURE — 82962 GLUCOSE BLOOD TEST: CPT

## 2017-08-11 PROCEDURE — 25000128 H RX IP 250 OP 636: Performed by: SURGERY

## 2017-08-11 PROCEDURE — 45385 COLONOSCOPY W/LESION REMOVAL: CPT | Mod: PT | Performed by: SURGERY

## 2017-08-11 PROCEDURE — 88305 TISSUE EXAM BY PATHOLOGIST: CPT | Mod: 26 | Performed by: SURGERY

## 2017-08-11 PROCEDURE — 25000125 ZZHC RX 250: Performed by: NURSE ANESTHETIST, CERTIFIED REGISTERED

## 2017-08-11 PROCEDURE — 37000008 ZZH ANESTHESIA TECHNICAL FEE, 1ST 30 MIN: Performed by: SURGERY

## 2017-08-11 RX ORDER — LIDOCAINE 40 MG/G
CREAM TOPICAL
Status: DISCONTINUED | OUTPATIENT
Start: 2017-08-11 | End: 2017-08-11 | Stop reason: HOSPADM

## 2017-08-11 RX ORDER — LIDOCAINE HYDROCHLORIDE 10 MG/ML
INJECTION, SOLUTION INFILTRATION; PERINEURAL PRN
Status: DISCONTINUED | OUTPATIENT
Start: 2017-08-11 | End: 2017-08-11

## 2017-08-11 RX ORDER — PROPOFOL 10 MG/ML
INJECTION, EMULSION INTRAVENOUS CONTINUOUS PRN
Status: DISCONTINUED | OUTPATIENT
Start: 2017-08-11 | End: 2017-08-11

## 2017-08-11 RX ORDER — PROPOFOL 10 MG/ML
INJECTION, EMULSION INTRAVENOUS PRN
Status: DISCONTINUED | OUTPATIENT
Start: 2017-08-11 | End: 2017-08-11

## 2017-08-11 RX ORDER — SODIUM CHLORIDE, SODIUM LACTATE, POTASSIUM CHLORIDE, CALCIUM CHLORIDE 600; 310; 30; 20 MG/100ML; MG/100ML; MG/100ML; MG/100ML
INJECTION, SOLUTION INTRAVENOUS CONTINUOUS
Status: DISCONTINUED | OUTPATIENT
Start: 2017-08-11 | End: 2017-08-11 | Stop reason: HOSPADM

## 2017-08-11 RX ORDER — ONDANSETRON 2 MG/ML
4 INJECTION INTRAMUSCULAR; INTRAVENOUS
Status: DISCONTINUED | OUTPATIENT
Start: 2017-08-11 | End: 2017-08-11 | Stop reason: HOSPADM

## 2017-08-11 RX ADMIN — LIDOCAINE HYDROCHLORIDE 50 MG: 10 INJECTION, SOLUTION INFILTRATION; PERINEURAL at 11:20

## 2017-08-11 RX ADMIN — LIDOCAINE HYDROCHLORIDE 1 ML: 10 INJECTION, SOLUTION EPIDURAL; INFILTRATION; INTRACAUDAL; PERINEURAL at 10:09

## 2017-08-11 RX ADMIN — SODIUM CHLORIDE, POTASSIUM CHLORIDE, SODIUM LACTATE AND CALCIUM CHLORIDE: 600; 310; 30; 20 INJECTION, SOLUTION INTRAVENOUS at 10:09

## 2017-08-11 RX ADMIN — PROPOFOL 100 MCG/KG/MIN: 10 INJECTION, EMULSION INTRAVENOUS at 11:20

## 2017-08-11 RX ADMIN — PROPOFOL 100 MG: 10 INJECTION, EMULSION INTRAVENOUS at 11:20

## 2017-08-11 NOTE — H&P
61 year old year old male here for colonoscopy for screening.    Patient Active Problem List   Diagnosis     Obstructive chronic bronchitis with exacerbation (H)     Essential hypertension, benign     Advanced directives, counseling/discussion     Anxiety     Moderate major depression (H)     Type 2 diabetes mellitus without complications (H)     CARDIOVASCULAR SCREENING; LDL GOAL LESS THAN 100     Hyperlipidemia with target LDL less than 100     Hypertension goal BP (blood pressure) < 140/90     Obesity (BMI 30-39.9)       Past Medical History:   Diagnosis Date     COPD (chronic obstructive pulmonary disease) (H)      Diabetes (H)      Hypertension      NO ACTIVE PROBLEMS        Past Surgical History:   Procedure Laterality Date     ABDOMEN SURGERY       COLONOSCOPY  11/12/2004    Follow up colonoscopy in 5 years     HERNIA REPAIR, UMBILICAL  2001     KNEE SURGERY  1960's    Left       [unfilled]    No current outpatient prescriptions on file.       Allergies   Allergen Reactions     Lisinopril Cough       Pt reports that he has quit smoking. His smoking use included Cigarettes. He has a 10.00 pack-year smoking history. He has never used smokeless tobacco. He reports that he drinks alcohol. He reports that he does not use illicit drugs.    Exam:  There were no vitals taken for this visit.    Awake, Alert OX3  Lungs - CTA bilaterally  CV - RRR, no murmurs, distal pulses intact  Abd - soft, non-distended, non-tender, +BS  Extr - No cyanosis or edema    A/P 61 year old year old male in need of colonoscopy for screening. Risks, benefits, alternatives, and complications were discussed including the possibility of perforation and the patient agreed to proceed    Trevin Menendez MD

## 2017-08-11 NOTE — BRIEF OP NOTE
[facility]   Brief Operative Note    Pre-operative diagnosis: screening   Post-operative diagnosis multiple polyps   Procedure: Procedure(s):  Colonoscopy - Wound Class: II-Clean Contaminated   Surgeon(s): Surgeon(s) and Role:     * Trevin Menendez MD - Primary   Estimated blood loss: * No values recorded between 8/11/2017 12:00 AM and 8/11/2017 11:55 AM *    Specimens:   ID Type Source Tests Collected by Time Destination   A : colon polyps at 20cm Polyp Colon SURGICAL PATHOLOGY EXAM Trevin Menendez MD 8/11/2017 11:40 AM    B : ascending colon polyp Polyp Large Intestine, Right/Ascending SURGICAL PATHOLOGY EXAM Trevin Menendez MD 8/11/2017 11:41 AM       Findings: 1. Multiple polyps noted (ascending colon and 20 cm)  2. Mild diverticulosis  3. Colon otherwise normal

## 2017-08-11 NOTE — ANESTHESIA CARE TRANSFER NOTE
Patient: Joe Mas    Procedure(s):  Colonoscopy - Wound Class: II-Clean Contaminated    Diagnosis: screening  Diagnosis Additional Information: No value filed.    Anesthesia Type:   MAC     Note:  Airway :Room Air  Patient transferred to:Phase II        Vitals: (Last set prior to Anesthesia Care Transfer)    CRNA VITALS  8/11/2017 1126 - 8/11/2017 1157      8/11/2017             Pulse: 66    SpO2: 94 %                Electronically Signed By: J LUIS Lockett CRNA  August 11, 2017  11:57 AM

## 2017-08-11 NOTE — ANESTHESIA POSTPROCEDURE EVALUATION
Patient: Joe Mas    Procedure(s):  Colonoscopy - Wound Class: II-Clean Contaminated    Diagnosis:screening  Diagnosis Additional Information: No value filed.    Anesthesia Type:  MAC    Note:  Anesthesia Post Evaluation    Patient location during evaluation: Phase 2 and Bedside  Patient participation: Able to fully participate in evaluation  Level of consciousness: awake and alert  Pain management: adequate  Airway patency: patent  Cardiovascular status: acceptable and hemodynamically stable  Respiratory status: acceptable and room air  Hydration status: acceptable  PONV: none     Anesthetic complications: None          Last vitals:  Vitals:    08/11/17 0947   BP: 122/89   Resp: 16   Temp: 36.9  C (98.5  F)   SpO2: 94%         Electronically Signed By: J LUIS Lockett CRNA  August 11, 2017  11:57 AM

## 2017-08-15 LAB — COPATH REPORT: NORMAL

## 2017-08-16 PROBLEM — D12.6 TUBULAR ADENOMA OF COLON: Status: ACTIVE | Noted: 2017-08-16

## 2017-10-03 DIAGNOSIS — I10 ESSENTIAL HYPERTENSION, BENIGN: ICD-10-CM

## 2017-10-03 RX ORDER — LOSARTAN POTASSIUM 100 MG/1
TABLET ORAL
Qty: 90 TABLET | Refills: 2 | Status: SHIPPED | OUTPATIENT
Start: 2017-10-03 | End: 2018-06-19

## 2017-10-03 NOTE — TELEPHONE ENCOUNTER
LOSARTAN POTASSIUM 100MG TABS        Last Written Prescription Date: 2/9/17  Last Fill Quantity: 90, # refills: 1  Last Office Visit with G, P or [facility] prescribing provider: 8/1/17       Potassium   Date Value Ref Range Status   08/01/2017 4.3 3.4 - 5.3 mmol/L Final     Creatinine   Date Value Ref Range Status   08/01/2017 0.99 0.66 - 1.25 mg/dL Final     BP Readings from Last 3 Encounters:   08/11/17 (!) 128/93   08/01/17 127/88   03/03/17 145/83

## 2017-12-12 ENCOUNTER — TRANSFERRED RECORDS (OUTPATIENT)
Dept: HEALTH INFORMATION MANAGEMENT | Facility: CLINIC | Age: 62
End: 2017-12-12

## 2018-01-03 DIAGNOSIS — J44.1 OBSTRUCTIVE CHRONIC BRONCHITIS WITH EXACERBATION (H): ICD-10-CM

## 2018-01-03 NOTE — TELEPHONE ENCOUNTER
VENTOLIN HFA 108MCG/ACT AERS        Last Written Prescription Date:  2/9/17  Last Fill Quantity: 1,   # refills: 3  Last Office Visit: 8/1/17  Future Office visit:       Requested Prescriptions   Pending Prescriptions Disp Refills     VENTOLIN  (90 BASE) MCG/ACT Inhaler [Pharmacy Med Name: VENTOLIN HFA 108MCG/ACT AERS] 18 g 3     Sig: INHALE TWO PUFFS BY MOUTH EVERY 6 HOURS AS NEEDED FOR SHORTNESS OF BREATH    Asthma Maintenance Inhalers - Anticholinergics Passed    1/3/2018  9:34 AM       Passed - Patient is age 12 years or older       Passed - Recent or future visit with authorizing provider's specialty    Patient had office visit in the last year or has a visit in the next 30 days with authorizing provider.  See chart review.

## 2018-01-05 RX ORDER — ALBUTEROL SULFATE 90 UG/1
AEROSOL, METERED RESPIRATORY (INHALATION)
Qty: 18 G | Refills: 7 | Status: SHIPPED | OUTPATIENT
Start: 2018-01-05 | End: 2018-09-19

## 2018-01-31 ENCOUNTER — TRANSFERRED RECORDS (OUTPATIENT)
Dept: HEALTH INFORMATION MANAGEMENT | Facility: CLINIC | Age: 63
End: 2018-01-31

## 2018-02-14 ENCOUNTER — TELEPHONE (OUTPATIENT)
Dept: FAMILY MEDICINE | Facility: CLINIC | Age: 63
End: 2018-02-14

## 2018-02-14 DIAGNOSIS — F32.1 MODERATE MAJOR DEPRESSION (H): Primary | ICD-10-CM

## 2018-02-14 NOTE — LETTER
My Depression Action Plan  Name: Joe Mas   Date of Birth 1955  Date: 2/14/2018    My doctor: Jolynn Phillips   My clinic: [address] [address] [phone]          GREEN    ZONE   Good Control    What it looks like:     Things are going generally well. You have normal up s and down s. You may even feel depressed from time to time, but bad moods usually last less than a day.   What you need to do:  1. Continue to care for yourself (see self care plan)  2. Check your depression survival kit and update it as needed  3. Follow your physician s recommendations including any medication.  4. Do not stop taking medication unless you consult with your physician first.           YELLOW         ZONE Getting Worse    What it looks like:     Depression is starting to interfere with your life.     It may be hard to get out of bed; you may be starting to isolate yourself from others.    Symptoms of depression are starting to last most all day and this has happened for several days.     You may have suicidal thoughts but they are not constant.   What you need to do:     1. Call your care team, your response to treatment will improve if you keep your care team informed of your progress. Yellow periods are signs an adjustment may need to be made.     2. Continue your self-care, even if you have to fake it!    3. Talk to someone in your support network    4. Open up your depression survival kit           RED    ZONE Medical Alert - Get Help    What it looks like:     Depression is seriously interfering with your life.     You may experience these or other symptoms: You can t get out of bed most days, can t work or engage in other necessary activities, you have trouble taking care of basic hygiene, or basic responsibilities, thoughts of suicide or death that will not go away, self-injurious behavior.     What you need to do:  1. Call your care team and request  a same-day appointment. If they are not available (weekends or after hours) call your local crisis line, emergency room or 911.          Depression Self Care Plan / Survival Kit    Self-Care for Depression  Here s the deal. Your body and mind are really not as separate as most people think.  What you do and think affects how you feel and how you feel influences what you do and think. This means if you do things that people who feel good do, it will help you feel better.  Sometimes this is all it takes.  There is also a place for medication and therapy depending on how severe your depression is, so be sure to consult with your medical provider and/ or Behavioral Health Consultant if your symptoms are worsening or not improving.     In order to better manage my stress, I will:    Exercise  Get some form of exercise, every day. This will help reduce pain and release endorphins, the  feel good  chemicals in your brain. This is almost as good as taking antidepressants!  This is not the same as joining a gym and then never going! (they count on that by the way ) It can be as simple as just going for a walk or doing some gardening, anything that will get you moving.      Hygiene   Maintain good hygiene (Get out of bed in the morning, Make your bed, Brush your teeth, Take a shower, and Get dressed like you were going to work, even if you are unemployed).  If your clothes don't fit try to get ones that do.    Diet  I will strive to eat foods that are good for me, drink plenty of water, and avoid excessive sugar, caffeine, alcohol, and other mood-altering substances.  Some foods that are helpful in depression are: complex carbohydrates, B vitamins, flaxseed, fish or fish oil, fresh fruits and vegetables.    Psychotherapy  I agree to participate in Individual Therapy (if recommended).    Medication  If prescribed medications, I agree to take them.  Missing doses can result in serious side effects.  I understand that drinking  alcohol, or other illicit drug use, may cause potential side effects.  I will not stop my medication abruptly without first discussing it with my provider.    Staying Connected With Others  I will stay in touch with my friends, family members, and my primary care provider/team.    Use your imagination  Be creative.  We all have a creative side; it doesn t matter if it s oil painting, sand castles, or mud pies! This will also kick up the endorphins.    Witness Beauty  (AKA stop and smell the roses) Take a look outside, even in mid-winter. Notice colors, textures. Watch the squirrels and birds.     Service to others  Be of service to others.  There is always someone else in need.  By helping others we can  get out of ourselves  and remember the really important things.  This also provides opportunities for practicing all the other parts of the program.    Humor  Laugh and be silly!  Adjust your TV habits for less news and crime-drama and more comedy.    Control your stress  Try breathing deep, massage therapy, biofeedback, and meditation. Find time to relax each day.     My support system    Clinic Contact:  Phone number:    Contact 1:  Phone number:    Contact 2:  Phone number:    Hindu/:  Phone number:    Therapist:  Phone number:    Local crisis center:    Phone number:    Other community support:  Phone number:

## 2018-02-14 NOTE — TELEPHONE ENCOUNTER
Panel Management Review      Patient has the following on his problem list:     Depression / Dysthymia review    Measure:  Needs PHQ-9 score of 4 or less during index window.  Administer PHQ-9 and if score is 5 or more, send encounter to provider for next steps.    5 - 7 month window range: none    PHQ-9 SCORE 10/29/2013 1/15/2015 6/23/2016   Total Score 0 0 -   Total Score - - 0       If PHQ-9 recheck is 5 or more, route to provider for next steps.    Patient is due for:  PHQ9    Diabetes    ASA: Passed    Last A1C  Lab Results   Component Value Date    A1C 6.8 08/01/2017    A1C 6.7 02/09/2017    A1C 6.5 06/23/2016    A1C 6.5 03/15/2016    A1C 6.4 09/22/2015     A1C tested: Passed, but is due now for another A1c    Last LDL:    Lab Results   Component Value Date    CHOL 164 02/09/2017     Lab Results   Component Value Date    HDL 53 02/09/2017     Lab Results   Component Value Date    LDL 84 02/09/2017     Lab Results   Component Value Date    TRIG 137 02/09/2017     Lab Results   Component Value Date    CHOLHDLRATIO 3.1 02/17/2015     Lab Results   Component Value Date    NHDL 111 02/09/2017       Is the patient on a Statin? YES             Is the patient on Aspirin? YES    Medications     HMG CoA Reductase Inhibitors    atorvastatin (LIPITOR) 20 MG tablet    Salicylates    ASPIRIN 81 MG OR TABS          Last three blood pressure readings:  BP Readings from Last 3 Encounters:   08/11/17 (!) 128/93   08/01/17 127/88   03/03/17 145/83       Date of last diabetes office visit: 8/1/17, due for visit     Tobacco History:     History   Smoking Status     Former Smoker     Packs/day: 1.00     Years: 10.00     Types: Cigarettes   Smokeless Tobacco     Never Used     Comment: quit smoking 2010         Hypertension   Last three blood pressure readings:  BP Readings from Last 3 Encounters:   08/11/17 (!) 128/93   08/01/17 127/88   03/03/17 145/83     Blood pressure: FAILED    HTN Guidelines:  Age 18-59 BP range:  Less than  140/90  Age 60-85 with Diabetes:  Less than 140/90  Age 60-85 without Diabetes:  less than 150/90      Composite cancer screening  Chart review shows that this patient is due/due soon for the following None  Summary:    Patient is due/failing the following:   Foot exam, DM eye exam, A1c, lipid, microalbumin, PHQ9, DAP    Action needed:   Patient needs office visit for DM w/ fasting labs and depression f/u  Referral for DM eye exam.    Type of outreach:    Sent letter. with DAP letter    Questions for provider review:    None                                                                                                                                    Mraisel ZAPATA       Chart routed to none .

## 2018-02-14 NOTE — LETTER
February 14, 2018      Joe Mas  [address] [place] [id number]        Dear Joe,     As part of [facility]'s commitment to health and wellness we have recently reviewed your chart and your medical record indicates that you are due for one or more of the following:    -- A diabetic / depression follow up appointment with fasting blood work. The last diabetic lab results we have for you were from 8/1/17. We need to see you every 6 months to be sure we are doing everything we can to keep your diabetes under control and refill medications. Please call to schedule an appointment. Also, plan to come fasting at least 8-10 hours prior to your appointment for lab work.    -- Diabetic eye exam. Blood sugar levels can negatively affect your eyesight, this is why it is recommended to have an eye exam once a year. Please call your eye clinic to schedule an appointment. If you have already completed your eye exam for the year please call us with the date so we can update your chart.     -- Depression Action Plan. Enclosed you will see a Depression Action Plan. Please read through it and refer back to it if you need to. We typically give you this once a year.     Please try to schedule and/or complete the tests above within the next 2-4 weeks.   The number to call to schedule an appointment at [facility] is [phone].    While we work hard to maintain accurate records on all our patients, it is always possible that this notice does not accurately reflect tests that you may have had. To ensure that we do not continue to send you notices please verify, at your next visit or by a [employer] message, that we have accurate dates of your tests, even if these were done many years ago.     Working together for your health is our goal.    Thank you for choosing [facility] for your healthcare needs.      Sincerely,     Jolynn Phillips MD / rickey

## 2018-02-27 ENCOUNTER — ANTICOAGULATION THERAPY VISIT (OUTPATIENT)
Dept: ANTICOAGULATION | Facility: CLINIC | Age: 63
End: 2018-02-27

## 2018-02-27 ENCOUNTER — TELEPHONE (OUTPATIENT)
Dept: ANTICOAGULATION | Facility: CLINIC | Age: 63
End: 2018-02-27

## 2018-02-27 ENCOUNTER — OFFICE VISIT (OUTPATIENT)
Dept: FAMILY MEDICINE | Facility: CLINIC | Age: 63
End: 2018-02-27
Payer: COMMERCIAL

## 2018-02-27 ENCOUNTER — TELEPHONE (OUTPATIENT)
Dept: FAMILY MEDICINE | Facility: CLINIC | Age: 63
End: 2018-02-27

## 2018-02-27 VITALS
BODY MASS INDEX: 38.42 KG/M2 | OXYGEN SATURATION: 95 % | WEIGHT: 259.4 LBS | SYSTOLIC BLOOD PRESSURE: 140 MMHG | TEMPERATURE: 96.8 F | HEIGHT: 69 IN | DIASTOLIC BLOOD PRESSURE: 84 MMHG | HEART RATE: 65 BPM

## 2018-02-27 DIAGNOSIS — Z79.01 LONG TERM CURRENT USE OF ANTICOAGULANT THERAPY: ICD-10-CM

## 2018-02-27 DIAGNOSIS — I48.91 ATRIAL FIBRILLATION, UNSPECIFIED TYPE (H): ICD-10-CM

## 2018-02-27 DIAGNOSIS — E78.2 MIXED HYPERLIPIDEMIA: ICD-10-CM

## 2018-02-27 DIAGNOSIS — J44.1 OBSTRUCTIVE CHRONIC BRONCHITIS WITH EXACERBATION (H): ICD-10-CM

## 2018-02-27 DIAGNOSIS — E66.9 OBESITY (BMI 30-39.9): ICD-10-CM

## 2018-02-27 DIAGNOSIS — Z71.89 ADVANCED DIRECTIVES, COUNSELING/DISCUSSION: ICD-10-CM

## 2018-02-27 DIAGNOSIS — I49.9 IRREGULAR HEART RATE: ICD-10-CM

## 2018-02-27 DIAGNOSIS — I48.91 A-FIB (H): Primary | ICD-10-CM

## 2018-02-27 DIAGNOSIS — E11.9 TYPE 2 DIABETES MELLITUS WITHOUT COMPLICATION, WITHOUT LONG-TERM CURRENT USE OF INSULIN (H): Primary | ICD-10-CM

## 2018-02-27 DIAGNOSIS — I10 HYPERTENSION GOAL BP (BLOOD PRESSURE) < 140/90: ICD-10-CM

## 2018-02-27 DIAGNOSIS — I10 ESSENTIAL HYPERTENSION, BENIGN: ICD-10-CM

## 2018-02-27 DIAGNOSIS — I48.91 ATRIAL FIBRILLATION, UNSPECIFIED TYPE (H): Primary | ICD-10-CM

## 2018-02-27 DIAGNOSIS — F32.1 MODERATE MAJOR DEPRESSION (H): ICD-10-CM

## 2018-02-27 DIAGNOSIS — E78.5 HYPERLIPIDEMIA WITH TARGET LDL LESS THAN 100: ICD-10-CM

## 2018-02-27 PROBLEM — E66.01 MORBID OBESITY (H): Status: ACTIVE | Noted: 2018-02-27

## 2018-02-27 LAB
ANION GAP SERPL CALCULATED.3IONS-SCNC: 7 MMOL/L (ref 3–14)
BUN SERPL-MCNC: 20 MG/DL (ref 7–30)
CALCIUM SERPL-MCNC: 9.2 MG/DL (ref 8.5–10.1)
CHLORIDE SERPL-SCNC: 102 MMOL/L (ref 94–109)
CHOLEST SERPL-MCNC: 156 MG/DL
CO2 SERPL-SCNC: 29 MMOL/L (ref 20–32)
CREAT SERPL-MCNC: 0.98 MG/DL (ref 0.66–1.25)
CREAT UR-MCNC: 297 MG/DL
GFR SERPL CREATININE-BSD FRML MDRD: 77 ML/MIN/1.7M2
GLUCOSE SERPL-MCNC: 161 MG/DL (ref 70–99)
HBA1C MFR BLD: 6.9 % (ref 4.3–6)
HDLC SERPL-MCNC: 53 MG/DL
LDLC SERPL CALC-MCNC: 69 MG/DL
MICROALBUMIN UR-MCNC: 30 MG/L
MICROALBUMIN/CREAT UR: 10 MG/G CR (ref 0–17)
NONHDLC SERPL-MCNC: 103 MG/DL
POTASSIUM SERPL-SCNC: 3.9 MMOL/L (ref 3.4–5.3)
SODIUM SERPL-SCNC: 138 MMOL/L (ref 133–144)
TRIGL SERPL-MCNC: 168 MG/DL
TSH SERPL DL<=0.005 MIU/L-ACNC: 2.66 MU/L (ref 0.4–4)

## 2018-02-27 PROCEDURE — 93000 ELECTROCARDIOGRAM COMPLETE: CPT | Performed by: FAMILY MEDICINE

## 2018-02-27 PROCEDURE — 84443 ASSAY THYROID STIM HORMONE: CPT | Performed by: FAMILY MEDICINE

## 2018-02-27 PROCEDURE — 99207 ZZC NO CHARGE NURSE ONLY: CPT

## 2018-02-27 PROCEDURE — 99215 OFFICE O/P EST HI 40 MIN: CPT | Performed by: FAMILY MEDICINE

## 2018-02-27 PROCEDURE — 36415 COLL VENOUS BLD VENIPUNCTURE: CPT | Performed by: FAMILY MEDICINE

## 2018-02-27 PROCEDURE — 83036 HEMOGLOBIN GLYCOSYLATED A1C: CPT | Performed by: FAMILY MEDICINE

## 2018-02-27 PROCEDURE — 82043 UR ALBUMIN QUANTITATIVE: CPT | Performed by: FAMILY MEDICINE

## 2018-02-27 PROCEDURE — 80061 LIPID PANEL: CPT | Performed by: FAMILY MEDICINE

## 2018-02-27 PROCEDURE — 80048 BASIC METABOLIC PNL TOTAL CA: CPT | Performed by: FAMILY MEDICINE

## 2018-02-27 RX ORDER — METOPROLOL SUCCINATE 50 MG/1
50 TABLET, EXTENDED RELEASE ORAL DAILY
Qty: 90 TABLET | Refills: 3 | Status: SHIPPED | OUTPATIENT
Start: 2018-02-27 | End: 2019-01-29

## 2018-02-27 RX ORDER — ATORVASTATIN CALCIUM 20 MG/1
20 TABLET, FILM COATED ORAL DAILY
Qty: 90 TABLET | Refills: 3 | Status: SHIPPED | OUTPATIENT
Start: 2018-02-27 | End: 2019-01-29

## 2018-02-27 RX ORDER — WARFARIN SODIUM 5 MG/1
5 TABLET ORAL DAILY
Qty: 30 TABLET | Refills: 1 | Status: SHIPPED | OUTPATIENT
Start: 2018-02-27 | End: 2018-03-01

## 2018-02-27 RX ORDER — CHLORTHALIDONE 25 MG/1
12.5 TABLET ORAL DAILY
Qty: 45 TABLET | Refills: 1 | Status: SHIPPED | OUTPATIENT
Start: 2018-02-27 | End: 2018-02-27

## 2018-02-27 RX ORDER — METFORMIN HCL 500 MG
500 TABLET, EXTENDED RELEASE 24 HR ORAL DAILY
Qty: 90 TABLET | Refills: 3 | Status: SHIPPED | OUTPATIENT
Start: 2018-02-27 | End: 2018-12-02

## 2018-02-27 ASSESSMENT — ANXIETY QUESTIONNAIRES
2. NOT BEING ABLE TO STOP OR CONTROL WORRYING: NOT AT ALL
GAD7 TOTAL SCORE: 0
5. BEING SO RESTLESS THAT IT IS HARD TO SIT STILL: NOT AT ALL
7. FEELING AFRAID AS IF SOMETHING AWFUL MIGHT HAPPEN: NOT AT ALL
1. FEELING NERVOUS, ANXIOUS, OR ON EDGE: NOT AT ALL
3. WORRYING TOO MUCH ABOUT DIFFERENT THINGS: NOT AT ALL
6. BECOMING EASILY ANNOYED OR IRRITABLE: NOT AT ALL

## 2018-02-27 ASSESSMENT — PATIENT HEALTH QUESTIONNAIRE - PHQ9: 5. POOR APPETITE OR OVEREATING: NOT AT ALL

## 2018-02-27 NOTE — TELEPHONE ENCOUNTER
"Message        Joe Mas is due for an INR test on 2/27/18.   \"INR goal of 2-3\"     Above message was sent by Lizbet Paiz RN      I checked with Maura in lab and INR cannot be processed from the lab that was drawn this morning.   Vern Conley RN    "

## 2018-02-27 NOTE — MR AVS SNAPSHOT
After Visit Summary   2/27/2018    Joe Mas    MRN: [id number]           Patient Information     Date Of Birth          1955        Visit Information        Provider Department      2/27/2018 10:30 AM Jolynn Phillips MD [facility]        Today's Diagnoses     Type 2 diabetes mellitus without complication, without long-term current use of insulin (H)    -  1    Atrial fibrillation, unspecified type (H)        Hyperlipidemia with target LDL less than 100        Hypertension goal BP (blood pressure) < 140/90        Essential hypertension, benign        Irregular heart rate        Obstructive chronic bronchitis with exacerbation (H)        Moderate major depression (H)        Obesity (BMI 30-39.9)        Advanced directives, counseling/discussion        Mixed hyperlipidemia           Follow-ups after your visit        Additional Services     CARDIOLOGY EVAL ADULT REFERRAL       Preferred location:  [facility] [phone]   [email or web address]    Please be aware that coverage of these services is subject to the terms and limitations of your health insurance plan.  Call member services at your health plan with any benefit or coverage questions.      Please bring the following to your appointment:  Any x-rays, CTs or MRIs which have been performed. Contact the facility where they were done to arrange for  prior to your scheduled appointment.    List of current medications  This referral request   Any documents/labs given to you for this referral            INR CLINIC REFERRAL       Your provider has referred you to INR Services.    Please be aware that coverage of these services is subject to the terms and limitations of your health insurance plan.  Call member services at your health plan with any benefit or coverage questions.    Indication for Anticoagulation: Atrial Fibrillation  If nonstandard  "INR is desired, indicate goal range and explanation:   Expected Duration of Therapy: Other: unsure                  Future tests that were ordered for you today     Open Future Orders        Priority Expected Expires Ordered    Echocardiogram Complete Routine  2/27/2019 2/27/2018    **Basic metabolic panel FUTURE 1yr Routine 1/28/2019 2/27/2019 2/27/2018            Who to contact     Normal or non-critical lab and imaging results will be communicated to you by [employer], letter or phone within 4 business days after the clinic has received the results. If you do not hear from us within 7 days, please contact the clinic through [employer] or phone. If you have a critical or abnormal lab result, we will notify you by phone as soon as possible.  Submit refill requests through [employer] or call your pharmacy and they will forward the refill request to us. Please allow 3 business days for your refill to be completed.          If you need to speak with a  for additional information , please call: [phone]             Additional Information About Your Visit        [employer] Information     [employer] gives you secure access to your electronic health record. If you see a primary care provider, you can also send messages to your care team and make appointments. If you have questions, please call your primary care clinic.  If you do not have a primary care provider, please call [phone] and they will assist you.        Care EveryWhere ID     This is your Care EveryWhere ID. This could be used by other organizations to access your [place] medical records  [id number]        Your Vitals Were     Pulse Temperature Height Pulse Oximetry BMI (Body Mass Index)       65 96.8  F (36  C) (Tympanic) 5' 8.75\" (1.746 m) 95% 38.59 kg/m2        Blood Pressure from Last 3 Encounters:   02/27/18 140/84   08/11/17 (!) 128/93   08/01/17 127/88    Weight from Last 3 Encounters:   02/27/18 259 lb 6.4 oz (117.7 kg)   08/11/17 257 lb " (116.6 kg)   08/01/17 257 lb 9.6 oz (116.8 kg)              We Performed the Following     Albumin Random Urine Quantitative with Creat Ratio     Basic metabolic panel     CARDIOLOGY EVAL ADULT REFERRAL     EKG 12-lead complete w/read - Clinics     Hemoglobin A1c     INR CLINIC REFERRAL     Lipid panel reflex to direct LDL Fasting     TSH with free T4 reflex          Today's Medication Changes          These changes are accurate as of 2/27/18 12:20 PM.  If you have any questions, ask your nurse or doctor.               Start taking these medicines.        Dose/Directions    metoprolol succinate 50 MG 24 hr tablet   Commonly known as:  TOPROL-XL   Used for:  Atrial fibrillation, unspecified type (H)   Started by:  Jolynn Phillips MD        Dose:  50 mg   Take 1 tablet (50 mg) by mouth daily   Quantity:  90 tablet   Refills:  3       warfarin 5 MG tablet   Commonly known as:  COUMADIN   Used for:  Atrial fibrillation, unspecified type (H)   Started by:  Jolynn Phillips MD        Dose:  5 mg   Take 1 tablet (5 mg) by mouth daily   Quantity:  30 tablet   Refills:  1            Where to get your medicines      These medications were sent to [facility] [place], [place] - [address]  [address] [facility] [address]     Phone:  [phone]     atorvastatin 20 MG tablet    metFORMIN 500 MG 24 hr tablet    metoprolol succinate 50 MG 24 hr tablet    warfarin 5 MG tablet                Primary Care Provider Office Phone # Fax #    Jolynn Phillips [phone] [address] [facility] [address]        Equal Access to Services     [facility] Center[facility]ARGARET AH: Hadii aad ku hadasho Soomaali, waaxda luqadaha, qaybta kaalmada adeegyada, catherine galeas hayadonis burciaga . So [facility] [phone].    ATENCIÓN: Si habla jes, tiene a rhodes disposición servicios gratuitos de asistencia lingüística. Rosie al [phone].    We comply with applicable federal civil rights laws  and [place] laws. We do not discriminate on the basis of race, color, national origin, age, disability, sex, sexual orientation, or gender identity.            Thank you!     Thank you for choosing [facility]  for your care. Our goal is always to provide you with excellent care. Hearing back from our patients is one way we can continue to improve our services. Please take a few minutes to complete the written survey that you may receive in the mail after your visit with us. Thank you!             Your Updated Medication List - Protect others around you: Learn how to safely use, store and throw away your medicines at www.disposemymeds.org.          This list is accurate as of 2/27/18 12:20 PM.  Always use your most recent med list.                   Brand Name Dispense Instructions for use Diagnosis    aspirin 81 MG tablet     100    ONE DAILY    Mixed hyperlipidemia       atorvastatin 20 MG tablet    LIPITOR    90 tablet    Take 1 tablet (20 mg) by mouth daily    Mixed hyperlipidemia       blood glucose monitoring lancets     3 Box    1 each daily    Type 2 diabetes, HbA1c goal < 7% (H)       blood glucose monitoring meter device kit     1 kit    Use to test blood sugar daily    Diabetes mellitus (H)       blood glucose monitoring test strip    ACCU-CHEK SMARTVIEW    300 each    Test blood sugars daily    Type 2 diabetes, HbA1c goal < 7% (H)       losartan 100 MG tablet    COZAAR    90 tablet    TAKE ONE TABLET BY MOUTH EVERY DAY    Essential hypertension, benign       metFORMIN 500 MG 24 hr tablet    GLUCOPHAGE-XR    90 tablet    Take 1 tablet (500 mg) by mouth daily    Type 2 diabetes mellitus without complication, without long-term current use of insulin (H)       metoprolol succinate 50 MG 24 hr tablet    TOPROL-XL    90 tablet    Take 1 tablet (50 mg) by mouth daily    Atrial fibrillation, unspecified type (H)       VENTOLIN  (90 BASE) MCG/ACT Inhaler   Generic drug:  albuterol     18 g     INHALE TWO PUFFS BY MOUTH EVERY 6 HOURS AS NEEDED FOR SHORTNESS OF BREATH    Obstructive chronic bronchitis with exacerbation (H)       warfarin 5 MG tablet    COUMADIN    30 tablet    Take 1 tablet (5 mg) by mouth daily    Atrial fibrillation, unspecified type (H)

## 2018-02-27 NOTE — PROGRESS NOTES
ANTICOAGULATION FOLLOW-UP CLINIC VISIT    Patient Name:  Joe Mas  Date:  2/27/2018  Contact Type:  Telephone/ writer spoke with Joe on phone    SUBJECTIVE:     Patient Findings     Positives Initiation of therapy (2-27-18 for atrial fib)    Comments New patient consult scheduled on 3-1-18 at [facility]. He might want future INRs drawn at [facility]. Please discuss further at 3-1 visit.           OBJECTIVE    No results found for: INR, [id number], [id number], 2AGN, F2    ASSESSMENT / PLAN  No question data found.  Anticoagulation Summary as of 2/27/2018     INR goal 2.0-3.0   Today's INR No new INR was available at the time of this encounter.   Maintenance plan No maintenance plan   Full instructions 2/27: 5 mg; 2/28: 5 mg   Plan last modified Halie Birmingham RN (2/27/2018)   Next INR check 3/1/2018   Target end date     Indications   Atrial fibrillation  unspecified type (H) [I48.91]  Long term current use of anticoagulant therapy [Z79.01]         Anticoagulation Episode Summary     INR check location     Preferred lab     Send INR reminders to [facility]    Comments *      Anticoagulation Care Providers     Provider Role Specialty Phone number    Jolynn Phillips MD Responsible Family Practice [phone]            See the Encounter Report to view Anticoagulation Flowsheet and Dosing Calendar (Go to Encounters tab in chart review, and find the Anticoagulation Therapy Visit)        Halie Birmingham RN

## 2018-02-27 NOTE — TELEPHONE ENCOUNTER
Dr. Phillips and Vern,    Thank you for the updated contact numbers. This patient needs a face to face education visit with the [facility] if he is to be enrolled in our services and managed by the [facility] via your referral. He needs to meet with a [facility] RN not just go to lab in the begining. The ACC establishes care and assessment by these face to face visit during the initiation and titration of warfarin dosing. This is standard per the [facility] Agreement. Writer placed a call to the patient asking for a return call to set up this appt. If he contacts you first please have him call [phone].    Thank you,  Maria Elena Richard. RN

## 2018-02-27 NOTE — TELEPHONE ENCOUNTER
Writer spoke with patient. He will have new patient consult at [facility] on 3-1-18.    SABINO GomezN, RN

## 2018-02-27 NOTE — TELEPHONE ENCOUNTER
He is just starting coumadin today.   INR test is for Thursday. I will make a future order :)   TESFAYE Phillips MD

## 2018-02-27 NOTE — TELEPHONE ENCOUNTER
Attempted to call patient at the number provided [phone] and a male answered and stated it was the wrong number. Writer attempted to then call the daughter who is listed under contacts and that number is not working as well.     Lizbet Paiz

## 2018-02-27 NOTE — TELEPHONE ENCOUNTER
Called patient and left non-detailed message to call the [facility] to set up an appt.    Maria Elena Richard RN

## 2018-02-27 NOTE — TELEPHONE ENCOUNTER
We contacted his sister and got his correct number : [phone]  Will update his demographics too    Thanks, TESFAYE Phillips MD

## 2018-02-27 NOTE — NURSING NOTE
"Chief Complaint   Patient presents with     Diabetes       Initial /84 (BP Location: Left arm, Patient Position: Sitting, Cuff Size: Adult Large)  Pulse 65  Temp 96.8  F (36  C) (Tympanic)  Ht 5' 8.75\" (1.746 m)  Wt 259 lb 6.4 oz (117.7 kg)  SpO2 95%  BMI 38.59 kg/m2 Estimated body mass index is 38.59 kg/(m^2) as calculated from the following:    Height as of this encounter: 5' 8.75\" (1.746 m).    Weight as of this encounter: 259 lb 6.4 oz (117.7 kg).  Medication Reconciliation: complete   Natasha Riley LPN    "

## 2018-02-27 NOTE — PROGRESS NOTES
SUBJECTIVE:   Joe Mas is a 62 year old male who presents to clinic today for the following health issues:      Diabetes Follow-up      Patient is checking blood sugars: sometimes. They have been higher. Has been juicing more. 130-150s.     Diabetic concerns: None     Symptoms of hypoglycemia (low blood sugar): none     Paresthesias (numbness or burning in feet) or sores: No     Date of last diabetic eye exam: January 2018    Hyperlipidemia Follow-Up      Rate your low fat/cholesterol diet?: fair    Taking statin?  Yes, no muscle aches from statin    Other lipid medications/supplements?:  None. Eating more fish.     Hypertension Follow-up      Outpatient blood pressures are being checked at home.  Results are 120s/80-90s.    Low Salt Diet: watches salt intake     On losartan 100mg only for blood pressure  Control     Blood pressure  Running 130-140/80-90 at home       BP Readings from Last 2 Encounters:   08/11/17 (!) 128/93   08/01/17 127/88     Hemoglobin A1C (%)   Date Value   08/01/2017 6.8 (H)   02/09/2017 6.7 (H)     LDL Cholesterol Calculated (mg/dL)   Date Value   02/09/2017 84   03/15/2016 81       Amount of exercise or physical activity: 1 day/week for an average of 30-45 minutes - walking     Problems taking medications regularly: No    Medication side effects: none    Diet: trying to eat low calorie    Walking two miles 1-3x/week  Plans to increase when weather improves     Trying to eat 1200kcal/day - says this is bmr    Drinks every other night - water with whiskey -4-5 shots     Has lost 5-6pounds since tanner   Frustrated not to be losing weight faster       Wt Readings from Last 10 Encounters:   02/27/18 259 lb 6.4 oz (117.7 kg)   08/11/17 257 lb (116.6 kg)   08/01/17 257 lb 9.6 oz (116.8 kg)   03/03/17 255 lb (115.7 kg)   02/09/17 253 lb 4.8 oz (114.9 kg)   06/23/16 247 lb 1.6 oz (112.1 kg)   09/22/15 245 lb 1.6 oz (111.2 kg)   06/19/15 244 lb 14.4 oz (111.1 kg)   02/17/15 255 lb (115.7 kg)  "  06/17/14 241 lb 11.2 oz (109.6 kg)     ROS -   No fever/chills  No weight change  Normal appetite and energy level  No malaise or fatigue  No runny nose, sore throat  No trouble chewing, talking, swallowing  No neck pain or stiffness  No SOB, wheezing, cough  No chest pain or pressure  No dysphagia, nausea, vomiting  No diarrhea, constipation  No dysuria, urgency, frequency   No arthalgias or joint swelling   No myalgias  No easy bleeding or bruising   No neuro sxs  No rashes  No depression/anxiety  No dizziness/lightheadedness         Problem list and histories reviewed & adjusted, as indicated.  Additional history: as documented    Patient Active Problem List   Diagnosis     Obstructive chronic bronchitis with exacerbation (H)     Essential hypertension, benign     Advanced directives, counseling/discussion     Anxiety     Moderate major depression (H)     Type 2 diabetes mellitus without complications (H)     CARDIOVASCULAR SCREENING; LDL GOAL LESS THAN 100     Hyperlipidemia with target LDL less than 100     Hypertension goal BP (blood pressure) < 140/90     Obesity (BMI 30-39.9)     Tubular adenoma of colon     Current Outpatient Prescriptions   Medication     VENTOLIN  (90 BASE) MCG/ACT Inhaler     losartan (COZAAR) 100 MG tablet     metFORMIN (GLUCOPHAGE-XR) 500 MG 24 hr tablet     atorvastatin (LIPITOR) 20 MG tablet     ASPIRIN 81 MG OR TABS     blood glucose monitoring (BELA CONTOUR NEXT MONITOR) meter device kit     blood glucose monitoring (ACCU-CHEK SMARTVIEW) test strip     blood glucose monitoring (ACCU-CHEK FASTCLIX) lancets     No current facility-administered medications for this visit.         Allergies   Allergen Reactions     Lisinopril Cough       /84 (BP Location: Left arm, Patient Position: Sitting, Cuff Size: Adult Large)  Pulse 65  Temp 96.8  F (36  C) (Tympanic)  Ht 5' 8.75\" (1.746 m)  Wt 259 lb 6.4 oz (117.7 kg)  SpO2 95%  BMI 38.59 kg/m2  GENERAL - Pt is alert and " oriented in no acute distress.  Affect is appropriate. Good eye contact.  HEET - Head is normocephalic, atraumatic.    Oropharynx free of masses and lesions, no tonsillar exudate or petechiae.    NECK - Neck is supple w/o LA or thyromegaly  RESPIRATORY - Clear to auscultation bilaterally.  No wheezing noted  CV - irreg irreg rate   EXTREM - No edema.  Feet - no wounds. Normal capillary refill. Warm to touch. Intact microfilament testing. Normal DP and PT pulses        Results for orders placed or performed in visit on 02/27/18   Hemoglobin A1c   Result Value Ref Range    Hemoglobin A1C 6.9 (H) 4.3 - 6.0 %     Component      Latest Ref Rng & Units 6/23/2016 2/9/2017 8/1/2017 2/27/2018   Hemoglobin A1C      4.3 - 6.0 % 6.5 (H) 6.7 (H) 6.8 (H) 6.9 (H)      I personally ordered and reviewed the ECG and found a-fib       Assessment/Plan -      (E11.9) Type 2 diabetes mellitus without complication, without long-term current use of insulin (H)  (primary encounter diagnosis)  Comment: continue current dose of metformin. Eyes examined in jan 2018. Feet look good. Add in metoprolol for blood pressure control. The patient indicates understanding of these issues and agrees with the plan.   Plan: Albumin Random Urine Quantitative with Creat         Ratio, Hemoglobin A1c, TSH with free T4 reflex,        **Basic metabolic panel FUTURE 1yr, metFORMIN         (GLUCOPHAGE-XR) 500 MG 24 hr tablet,         DISCONTINUED: chlorthalidone (HYGROTON) 25 MG         tablet            (I48.91) Atrial fibrillation, unspecified type (H)  Comment: NEW diagnosis today. Discussed at length. Reviewed CHADs2 score and warfarin vs aspirin. Since CHADS score is 2, we discussed starting coumadin. INR clinic consult placed. Check INR in 2 days. We discussed rate control and will start metoprolol 50mg/day.  We reviewed risks/benefits/side effects of this med.  Echo ordered today.  If symptoms arise , mahin chest pain/sob/or other new symptoms, he needs to  go to ER immediately. He understands. Cardiology consult ordered. The patient indicates understanding of these issues and agrees with the plan.   Plan: Echocardiogram Complete, INR CLINIC REFERRAL,         warfarin (COUMADIN) 5 MG tablet, metoprolol         succinate (TOPROL-XL) 50 MG 24 hr tablet,         CARDIOLOGY EVAL ADULT REFERRAL            (E78.5) Hyperlipidemia with target LDL less than 100  Comment:  Plan: Lipid panel reflex to direct LDL Fasting            (I10) Hypertension goal BP (blood pressure) < 140/90  Comment:   Plan: Basic metabolic panel, **Basic metabolic panel         FUTURE 1yr, DISCONTINUED: chlorthalidone         (HYGROTON) 25 MG tablet            (I10) Essential hypertension, benign  Comment: started metoprolol today due to afib, and that should help with blood pressure as well  Plan:     (I49.9) Irregular heart rate  Comment:   Plan: EKG 12-lead complete w/read - Clinics            (J44.1) Obstructive chronic bronchitis with exacerbation (H)  Comment:   Plan:     (F32.1) Moderate major depression (H)  Comment:   Plan:     (E66.9) Obesity (BMI 30-39.9)  Comment: Discussed increasing fruits/vegies/fiber/water. Three meals a day - with reasonable portion sizes. Do not eat within 3 hours of bed. Increase activity to at least 30 minutes/day. He is using TAKO pal. I recommend cutting out etoh and nightime snacking    Plan:     (Z71.89) Advanced directives, counseling/discussion  Comment:   Plan:     (E78.2) Mixed hyperlipidemia  Comment: refilled med   Plan: atorvastatin (LIPITOR) 20 MG tablet            TESFAYE Phillips MD

## 2018-02-27 NOTE — MR AVS SNAPSHOT
Joe Mas   2/27/2018   Anticoagulation Therapy Visit    Description:  62 year old male   Provider:  Halie Birmingham, RN   [facility]           INR as of 2/27/2018     Today's INR No new INR was available at the time of this encounter.      Anticoagulation Summary as of 2/27/2018     INR goal 2.0-3.0   Today's INR No new INR was available at the time of this encounter.   Full instructions 2/27: 5 mg; 2/28: 5 mg   Next INR check 3/1/2018    Indications   Atrial fibrillation  unspecified type (H) [I48.91]  Long term current use of anticoagulant therapy [Z79.01]         Your next Anticoagulation Clinic appointment(s)     Mar 01, 2018  2:45 PM CST   Anticoagulation Visit with [place] ANTI COAG   [facility]    [address] [address]   [phone]              February 2018 Details    Sun Mon Tue Wed Thu Fri Sat         1               2               3                 4               5               6               7               8               9               10                 11               12               13               14               15               16               17                 18               19               20               21               22               23               24                 25               26               27      5 mg   See details      28      5 mg             Date Details   02/27 This INR check               How to take your warfarin dose     To take:  5 mg Take 1 of the 5 mg tablets.           March 2018 Details    Sun Mon Tue Wed Thu Fri Sat         1            2               3                 4               5               6               7               8               9               10                 11               12               13               14               15               16               17                 18               19               20               21                22               23               24                 25               26               27               28               29               30               31                Date Details   No additional details    Date of next INR:  3/1/2018

## 2018-02-28 ASSESSMENT — PATIENT HEALTH QUESTIONNAIRE - PHQ9: SUM OF ALL RESPONSES TO PHQ QUESTIONS 1-9: 0

## 2018-02-28 ASSESSMENT — ANXIETY QUESTIONNAIRES: GAD7 TOTAL SCORE: 0

## 2018-03-01 ENCOUNTER — ANTICOAGULATION THERAPY VISIT (OUTPATIENT)
Dept: ANTICOAGULATION | Facility: CLINIC | Age: 63
End: 2018-03-01
Payer: COMMERCIAL

## 2018-03-01 DIAGNOSIS — Z79.01 LONG TERM CURRENT USE OF ANTICOAGULANT THERAPY: ICD-10-CM

## 2018-03-01 DIAGNOSIS — I48.91 ATRIAL FIBRILLATION, UNSPECIFIED TYPE (H): ICD-10-CM

## 2018-03-01 LAB — INR POINT OF CARE: 1.2 (ref 0.86–1.14)

## 2018-03-01 PROCEDURE — 99207 ZZC NO CHARGE NURSE ONLY: CPT

## 2018-03-01 PROCEDURE — 85610 PROTHROMBIN TIME: CPT | Mod: QW

## 2018-03-01 PROCEDURE — 36416 COLLJ CAPILLARY BLOOD SPEC: CPT

## 2018-03-01 RX ORDER — WARFARIN SODIUM 5 MG/1
TABLET ORAL
Qty: 30 TABLET | Refills: 1 | COMMUNITY
Start: 2018-03-01 | End: 2018-03-22

## 2018-03-01 NOTE — PROGRESS NOTES
ANTICOAGULATION INITIAL CLINIC VISIT    Patient Name:  Joe Mas  Date:  3/1/2018  Referred by: Dr. Jolynn Phillips  Contact Type:  Face to Face    SUBJECTIVE:  Coumadin education was completed today.  Topics covered include:  -Introduction to coumadin  -Proper Administration  -INR Testing  -Sign/Symptoms of Bleeding  -Signs/Symptoms of Clot Formation or Stroke  -Dietary Intake of Vitamin K  -Drug Interactions  -Anticoagulation Identification (bracelet, necklace or wallet card)  -Future Surgery  -Effects of Alcohol, Tobacco, and Exercise on Coumadin    Coumadin Education Booklet and Coumadin Identification Wallet Card were given to the patient.       Patient Findings     Positives Initiation of therapy (pt started Coumadin on 2/27/18 (has had 2 doses))          OBJECTIVE    INR Protime   Date Value Ref Range Status   03/01/2018 1.2 (A) 0.86 - 1.14 Final       ASSESSMENT / PLAN  INR assessment SUB    Recheck INR In: 4 DAYS    INR Location Outside lab      Anticoagulation Summary as of 3/1/2018     INR goal 2.0-3.0   Today's INR 1.2!   Maintenance plan No maintenance plan   Full instructions 3/1: 7.5 mg; 3/2: 7.5 mg; 3/3: 5 mg; 3/4: 5 mg   Plan last modified Halie Birmingham, RN (2/27/2018)   Next INR check 3/5/2018   Target end date     Indications   Atrial fibrillation  unspecified type (H) [I48.91]  Long term current use of anticoagulant therapy [Z79.01]         Anticoagulation Episode Summary     INR check location     Preferred lab     Send INR reminders to [facility]    Comments *      Anticoagulation Care Providers     Provider Role Specialty Phone number    Jolynn Phillips MD [facility] [phone]            See the Encounter Report to view Anticoagulation Flowsheet and Dosing Calendar (Go to Encounters tab in chart review, and find the Anticoagulation Therapy Visit)    Dosage adjustment made based on physician directed care plan.    Marisel Bahena RP

## 2018-03-01 NOTE — MR AVS SNAPSHOT
Joe Mas   3/1/2018 2:45 PM   Anticoagulation Therapy Visit    Description:  62 year old male   Provider:  [facility]   Department:  [facility]           INR as of 3/1/2018     Today's INR 1.2!      Anticoagulation Summary as of 3/1/2018     INR goal 2.0-3.0   Today's INR 1.2!   Full instructions 3/1: 7.5 mg; 3/2: 7.5 mg; 3/3: 5 mg; 3/4: 5 mg   Next INR check 3/5/2018    Indications   Atrial fibrillation  unspecified type (H) [I48.91]  Long term current use of anticoagulant therapy [Z79.01]         Description     Take 7.5 mg on Thurs & Fri and 5 mg on Sat & Sun. Recheck INR on Monday, 3/5 at [facility] before 10 am. Someone from the Anticoagulation Clinic will call you Monday afternoon with the results and with further dosing instructions.      Contact Numbers     Please call [phone] with any problems or questions regarding your therapy.    If you need to cancel and/or reschedule your appointment please call one of the following numbers:  [facility] - [phone]  [facility] [phone]  [place] - [phone]  [facility] [phone]  [place] - [phone]            March 2018 Details    Sun Mon Tue Wed Thu Fri Sat         1      7.5 mg   See details      2      7.5 mg         3      5 mg           4      5 mg         5            6               7               8               9               10                 11               12               13               14               15               16               17                 18               19               20               21               22               23               24                 25               26               27               28               29               30               31                Date Details   03/01 This INR check       Date of next INR:  3/5/2018         How to take your warfarin dose     To take:  5 mg Take 1 of the 5 mg tablets.    To take:  7.5 mg Take 1.5 of the 5 mg tablets.

## 2018-03-05 ENCOUNTER — ANTICOAGULATION THERAPY VISIT (OUTPATIENT)
Dept: ANTICOAGULATION | Facility: CLINIC | Age: 63
End: 2018-03-05
Payer: COMMERCIAL

## 2018-03-05 DIAGNOSIS — Z79.01 LONG TERM CURRENT USE OF ANTICOAGULANT THERAPY: ICD-10-CM

## 2018-03-05 DIAGNOSIS — I48.91 ATRIAL FIBRILLATION, UNSPECIFIED TYPE (H): ICD-10-CM

## 2018-03-05 LAB — INR PPP: 2.97 (ref 0.86–1.14)

## 2018-03-05 PROCEDURE — 99207 ZZC NO CHARGE NURSE ONLY: CPT

## 2018-03-05 PROCEDURE — 85610 PROTHROMBIN TIME: CPT | Performed by: FAMILY MEDICINE

## 2018-03-05 PROCEDURE — 36415 COLL VENOUS BLD VENIPUNCTURE: CPT | Performed by: FAMILY MEDICINE

## 2018-03-05 NOTE — MR AVS SNAPSHOT
Joe Mas   3/5/2018   Anticoagulation Therapy Visit    Description:  62 year old male   Provider:  Lizbet Paiz, RN   Department:  [facility]           INR as of 3/5/2018     Today's INR 2.97      Anticoagulation Summary as of 3/5/2018     INR goal 2.0-3.0   Today's INR 2.97   Full instructions 3/5: 2.5 mg; 3/6: 5 mg; 3/7: 2.5 mg   Next INR check 3/8/2018    Indications   Atrial fibrillation  unspecified type (H) [I48.91]  Long term current use of anticoagulant therapy [Z79.01]         Your next Anticoagulation Clinic appointment(s)     Mar 08, 2018  9:15 AM CST   [facility]agulation Visit with [facility]   [facility]    [address] [address]   [phone]              March 2018 Details    Sun Mon Tue Wed Thu Fri Sat         1               2               3                 4               5      2.5 mg   See details      6      5 mg         7      2.5 mg         8            9               10                 11               12               13               14               15               16               17                 18               19               20               21               22               23               24                 25               26               27               28               29               30               31                Date Details   03/05 This INR check       Date of next INR:  3/8/2018         How to take your warfarin dose     To take:  2.5 mg Take 0.5 of a 5 mg tablet.    To take:  5 mg Take 1 of the 5 mg tablets.

## 2018-03-05 NOTE — PROGRESS NOTES
ANTICOAGULATION FOLLOW-UP CLINIC VISIT    Patient Name:  Joe Mas  Date:  3/5/2018  Contact Type:  Telephone/ Detailed VM    SUBJECTIVE:     Patient Findings     Comments Left detailed VM on Joe's phone. Instructed him to take 2.5 mg today, 5 mg tomorrow, and 2.5 mg on Wednesday. Recheck will be on Thursday and I scheduled him at 915 after his ECHO. Stated for patient to call [facility] back if that time does not work.            OBJECTIVE    INR   Date Value Ref Range Status   03/05/2018 2.97 (H) 0.86 - 1.14 Final       ASSESSMENT / PLAN  No question data found.  Anticoagulation Summary as of 3/5/2018     INR goal 2.0-3.0   Today's INR 2.97   Maintenance plan No maintenance plan   Full instructions 3/5: 2.5 mg; 3/6: 5 mg; 3/7: 2.5 mg   Plan last modified Halie Birmingham RN (2/27/2018)   Next INR check 3/8/2018   Target end date     Indications   Atrial fibrillation  unspecified type (H) [I48.91]  Long term current use of anticoagulant therapy [Z79.01]         Anticoagulation Episode Summary     INR check location     Preferred lab     Send INR reminders to [facility]    Comments *      Anticoagulation Care Providers     Provider Role Specialty Phone number    Jolynn Phillips MD [facility] [phone]            See the Encounter Report to view Anticoagulation Flowsheet and Dosing Calendar (Go to Encounters tab in chart review, and find the Anticoagulation Therapy Visit)        Lizbet Paiz RN

## 2018-03-08 ENCOUNTER — HOSPITAL ENCOUNTER (OUTPATIENT)
Dept: CARDIOLOGY | Facility: CLINIC | Age: 63
Discharge: HOME | End: 2018-03-08
Attending: FAMILY MEDICINE | Admitting: FAMILY MEDICINE
Payer: COMMERCIAL

## 2018-03-08 ENCOUNTER — ANTICOAGULATION THERAPY VISIT (OUTPATIENT)
Dept: ANTICOAGULATION | Facility: CLINIC | Age: 63
End: 2018-03-08
Payer: COMMERCIAL

## 2018-03-08 ENCOUNTER — OFFICE VISIT (OUTPATIENT)
Dept: CARDIOLOGY | Facility: CLINIC | Age: 63
End: 2018-03-08
Attending: FAMILY MEDICINE
Payer: COMMERCIAL

## 2018-03-08 VITALS
BODY MASS INDEX: 38.23 KG/M2 | DIASTOLIC BLOOD PRESSURE: 81 MMHG | HEART RATE: 68 BPM | SYSTOLIC BLOOD PRESSURE: 118 MMHG | OXYGEN SATURATION: 93 % | WEIGHT: 257 LBS

## 2018-03-08 DIAGNOSIS — I48.91 ATRIAL FIBRILLATION, UNSPECIFIED TYPE (H): ICD-10-CM

## 2018-03-08 DIAGNOSIS — I48.0 PAROXYSMAL ATRIAL FIBRILLATION (H): Primary | ICD-10-CM

## 2018-03-08 DIAGNOSIS — Z79.01 LONG TERM CURRENT USE OF ANTICOAGULANT THERAPY: ICD-10-CM

## 2018-03-08 LAB — INR POINT OF CARE: 2.2 (ref 0.86–1.14)

## 2018-03-08 PROCEDURE — 36416 COLLJ CAPILLARY BLOOD SPEC: CPT

## 2018-03-08 PROCEDURE — 99207 ZZC NO CHARGE NURSE ONLY: CPT

## 2018-03-08 PROCEDURE — 25500064 ZZH RX 255 OP 636: Performed by: FAMILY MEDICINE

## 2018-03-08 PROCEDURE — 85610 PROTHROMBIN TIME: CPT | Mod: QW

## 2018-03-08 PROCEDURE — 99204 OFFICE O/P NEW MOD 45 MIN: CPT | Mod: 25 | Performed by: INTERNAL MEDICINE

## 2018-03-08 PROCEDURE — 93306 TTE W/DOPPLER COMPLETE: CPT | Mod: 26 | Performed by: INTERNAL MEDICINE

## 2018-03-08 PROCEDURE — 40000264 ECHO COMPLETE WITH OPTISON

## 2018-03-08 RX ADMIN — HUMAN ALBUMIN MICROSPHERES AND PERFLUTREN 4 ML: 10; .22 INJECTION, SOLUTION INTRAVENOUS at 08:18

## 2018-03-08 NOTE — MR AVS SNAPSHOT
oJe Mas   3/8/2018 9:15 AM   Anticoagulation Therapy Visit    Description:  62 year old male   Provider:  [facility]   Department:  [facility]           INR as of 3/8/2018     Today's INR 2.2      Anticoagulation Summary as of 3/8/2018     INR goal 2.0-3.0   Today's INR 2.2   Full instructions 3/8: 7.5 mg; 3/9: 5 mg; 3/10: 7.5 mg; 3/11: 5 mg   Next INR check 3/12/2018    Indications   Atrial fibrillation  unspecified type (H) [I48.91]  Long term current use of anticoagulant therapy [Z79.01]         Your next Anticoagulation Clinic appointment(s)     Mar 08, 2018  9:15 AM CST   [facility]agulation Visit with [facility]   [facility]    [address] [address]   [phone]            Mar 12, 2018  9:30 AM CDT   Anticoagulation Visit with [facility]   [facility]    [address] [address]   [phone]              Contact Numbers     Please call [phone] with any problems or questions regarding your therapy.    If you need to cancel and/or reschedule your appointment please call one of the following numbers:  [facility] - [phone]  [place] - [phone]  [place] - [phone]  [facility] [phone]  [place] - [phone]            March 2018 Details    Sun Mon Tue Wed Thu Fri Sat         1               2               3                 4               5               6               7               8      7.5 mg   See details      9      5 mg         10      7.5 mg           11      5 mg         12            13               14               15               16               17                 18               19               20               21               22               23               24                 25               26               27               28               29               30               31                Date Details   03/08 This  INR check       Date of next INR:  3/12/2018         How to take your warfarin dose     To take:  5 mg Take 1 of the 5 mg tablets.    To take:  7.5 mg Take 1.5 of the 5 mg tablets.

## 2018-03-08 NOTE — MR AVS SNAPSHOT
After Visit Summary   3/8/2018    Joe Mas    MRN: [id number]           Patient Information     Date Of Birth          1955        Visit Information        Provider Department      3/8/2018 1:00 PM Breezy Daniel MD [facility]        Today's Diagnoses     Paroxysmal atrial fibrillation (H)    -  1      Care Instructions    Zio patch  Will order a Zio monitor.  This is a heart monitor that continuously records all your heartbeats.  If you have any symptoms, there is a button you can push that records when you had your symptoms.  The monitor is returned to the company in the mail and the report is sent to us.  This will show what your average heart rate is and if there are any rhythm issues with the heart including when you may have reported any symptoms.  You are encouraged to do all your regular activities while wearing this monitor.    Recommend meeting with sleep medicine in [facility] to evaluate for sleep apnea.  This has been linked to afib.  Their number is [phone].          Follow-ups after your visit        Additional Services     SLEEP EVALUATION & MANAGEMENT REFERRAL - ADULT -[facility]  [phone] (Age 2 and up)       Please be aware that coverage of these services is subject to the terms and limitations of your health insurance plan.  Call member services at your health plan with any benefit or coverage questions.      Please bring the following to your appointment:    >>   List of current medications   >>   This referral request   >>   Any documents/labs given to you for this referral                Follow-Up with Cardiologist                 Your next 10 appointments already scheduled     Mar 12, 2018  9:30 AM CDT   Anticoagulation Visit with [facility]   [facility]    [address] [address] [phone]              [facility]  tests that were ordered for you today     Open Future Orders        Priority Expected Expires Ordered    Follow-Up with Cardiologist Routine 4/7/2018 3/8/2019 3/8/2018    Zio Patch Monitor Routine 3/15/2018 3/8/2019 3/8/2018    SLEEP EVALUATION & MANAGEMENT REFERRAL - ADULT [facility]  [phone] (Age 2 and up) Routine 3/15/2018 3/8/2019 3/8/2018    ECHO COMPLETE WITH [facility] Routine  2/27/2019 2/27/2018            Who to contact     If you have questions or need follow up information about today's clinic visit or your schedule please contact [facility] directly at [phone].  Normal or non-critical lab and imaging results will be communicated to you by MyChart, letter or phone within 4 business days after the clinic has received the results. If you do not hear from us within 7 days, please contact the clinic through [employer] or phone. If you have a critical or abnormal lab result, we will notify you by phone as soon as possible.  Submit refill requests through [email or web address] or call your pharmacy and they will forward the refill request to us. Please allow 3 business days for your refill to be completed.          Additional Information About Your Visit        [employer] Information     [email or web address] gives you secure access to your electronic health record. If you see a primary care provider, you can also send messages to your care team and make appointments. If you have questions, please call your primary care clinic.  If you do not have a primary care provider, please call [phone] and they will assist you.        Care EveryWhere ID     This is your Care EveryWhere ID. This could be used by other organizations to access your [facility] medical records  [id number]        Your Vitals Were     Pulse Pulse Oximetry BMI (Body Mass Index)             68 93% 38.23 kg/m2          Blood Pressure from Last 3 Encounters:   03/08/18 118/81   02/27/18 140/84   08/11/17  (!) 128/93    Weight from Last 3 Encounters:   03/08/18 116.6 kg (257 lb)   02/27/18 117.7 kg (259 lb 6.4 oz)   08/11/17 116.6 kg (257 lb)               Primary Care Provider Office Phone # Fax #    Jolynn Kenneth Phillips [phone] [address] [facility] [address]        Equal Access to Services     KEL MOROCHO : Hadii aad ku hadasho Soomaali, waaxda luqadaha, qaybta kaalmada adeegyada, waxay idiin hayaan adeeg kharash la'shaynen . So [facility] [phone].    ATENCIÓN: Si herila jes, tiene a rhodes disposición servicios gratuitos de asistencia lingüística. Yuniorame al [phone].    We comply with applicable federal civil rights laws and [place] laws. We do not discriminate on the basis of race, color, national origin, age, disability, sex, sexual orientation, or gender identity.            Thank you!     Thank you for choosing [facility]  for your care. Our goal is always to provide you with excellent care. Hearing back from our patients is one way we can continue to improve our services. Please take a few minutes to complete the written survey that you may receive in the mail after your visit with us. Thank you!             Your Updated Medication List - Protect others around you: Learn how to safely use, store and throw away your medicines at www.disposemymeds.org.          This list is accurate as of 3/8/18  1:19 PM.  Always use your most recent med list.                   Brand Name Dispense Instructions for use Diagnosis    aspirin 81 MG tablet     100    ONE DAILY    Mixed hyperlipidemia       atorvastatin 20 MG tablet    LIPITOR    90 tablet    Take 1 tablet (20 mg) by mouth daily    Mixed hyperlipidemia       blood glucose monitoring lancets     3 Box    1 each daily    Type 2 diabetes, HbA1c goal < 7% (H)       blood glucose monitoring meter device kit     1 kit    Use to test blood sugar daily    Diabetes mellitus (H)       blood glucose  monitoring test strip    ACCU-CHEK SMARTVIEW    300 each    Test blood sugars daily    Type 2 diabetes, HbA1c goal < 7% (H)       losartan 100 MG tablet    COZAAR    90 tablet    TAKE ONE TABLET BY MOUTH EVERY DAY    Essential hypertension, benign       metFORMIN 500 MG 24 hr tablet    GLUCOPHAGE-XR    90 tablet    Take 1 tablet (500 mg) by mouth daily    Type 2 diabetes mellitus without complication, without long-term current use of insulin (H)       metoprolol succinate 50 MG 24 hr tablet    TOPROL-XL    90 tablet    Take 1 tablet (50 mg) by mouth daily    Atrial fibrillation, unspecified type (H)       VENTOLIN  (90 BASE) MCG/ACT Inhaler   Generic drug:  albuterol     18 g    INHALE TWO PUFFS BY MOUTH EVERY 6 HOURS AS NEEDED FOR SHORTNESS OF BREATH    Obstructive chronic bronchitis with exacerbation (H)       warfarin 5 MG tablet    COUMADIN    30 tablet    Take 5 mg by mouth daily or as directed by Anticoagulation Clinic (maintenance dose not yet established)    Atrial fibrillation, unspecified type (H)

## 2018-03-08 NOTE — PATIENT INSTRUCTIONS
Zio patch  Will order a Zio monitor.  This is a heart monitor that continuously records all your heartbeats.  If you have any symptoms, there is a button you can push that records when you had your symptoms.  The monitor is returned to the company in the mail and the report is sent to us.  This will show what your average heart rate is and if there are any rhythm issues with the heart including when you may have reported any symptoms.  You are encouraged to do all your regular activities while wearing this monitor.    Recommend meeting with sleep medicine in [facility] to evaluate for sleep apnea.  This has been linked to afib.  Their number is [phone].

## 2018-03-08 NOTE — LETTER
3/8/2018    Jolynn Phillips MD  [address] [address]    RE: Joe Chandleruse       Dear Colleague,    I had the pleasure of seeing Joe Mas in the [facility].    CARDIOLOGY CONSULT    REASON FOR CONSULT: afib    PRIMARY CARE PHYSICIAN:  Jolynn Phillips    HISTORY OF PRESENT ILLNESS:  62-year-old male seen for new A. fib.  He has hypertension, type 2 diabetes, and dyslipidemia.     He has a history of hypertension and has been on medications for about one year.  Systolic blood pressure has been borderline controlled.  In the past few months he has been increasing exercise trying to weight lose weight.  He's been walking on a treadmill up to 3 miles per hour at a 2-3% incline 5 days per week, he has some minimal dyspnea, but no chest pain.  He denies any palpitations, lightheadedness, dizziness, or lower extremity edema.    A. fib was diagnosed in February 2018 at a primary care appointment, HR of 97.  Warfarin and metoprolol were started.     In the past few weeks heart rate has been between 60 and 90 with blood pressure between 110 and 140 on his home machine.    Echo 3/2018 (sinus rhythm) showed EF 60%, mild LVH, normal RV, normal LA size, no valve disease.    PAST MEDICAL HISTORY:  Past Medical History:   Diagnosis Date     A-fib (H) 2/27/2018     COPD (chronic obstructive pulmonary disease) (H)      Diabetes (H)      Hypertension      NO ACTIVE PROBLEMS        MEDICATIONS:  Current Outpatient Prescriptions   Medication     warfarin (COUMADIN) 5 MG tablet     metFORMIN (GLUCOPHAGE-XR) 500 MG 24 hr tablet     atorvastatin (LIPITOR) 20 MG tablet     metoprolol succinate (TOPROL-XL) 50 MG 24 hr tablet     VENTOLIN  (90 BASE) MCG/ACT Inhaler     losartan (COZAAR) 100 MG tablet     blood glucose monitoring (BELA CONTOUR NEXT MONITOR) meter device kit     blood glucose monitoring (ACCU-CHEK SMARTVIEW) test strip     blood glucose monitoring (ACCU-CHEK  FASTCLIX) lancets     ASPIRIN 81 MG OR TABS     No current facility-administered medications for this visit.      ALLERGIES:  Allergies   Allergen Reactions     Lisinopril Cough     SOCIAL HISTORY:  I have reviewed this patient's social history and updated it with pertinent information if needed. Joe Mas  reports that he quit smoking about 18 years ago. His smoking use included Cigarettes. He started smoking about 43 years ago. He has a 25.00 pack-year smoking history. He has never used smokeless tobacco. He reports that he drinks alcohol. He reports that he does not use illicit drugs.    FAMILY HISTORY:  I have reviewed this patient's family history and updated it with pertinent information if needed.   Family History   Problem Relation Age of Onset     OSTEOPOROSIS Mother      HEART DISEASE Mother      Neurologic Disorder Father      passed away from a brain tumor age 48     Neurologic Disorder Maternal Grandmother      parkinsons     C.A.D. Maternal Grandmother      DIABETES Maternal Grandmother      Alcohol/Drug Maternal Grandfather      CANCER Maternal Grandfather      esphogus     CANCER Paternal Grandfather      lung     DIABETES Sister      Hypertension Sister      Asthma Daughter      Asthma No family hx of      CEREBROVASCULAR DISEASE No family hx of      Breast Cancer No family hx of      Cancer - colorectal No family hx of        REVIEW OF SYSTEMS:  Constitutional:  No weight loss, fever, chills, weakness or fatigue.  HEENT:  Eyes:  No visual loss, blurred vision, double vision or yellow sclerae. No hearing loss, sneezing, congestion, runny nose or sore throat.  Skin:  No rash or itching.  Cardiovascular: per HPI  Respiratory: per HPI  GI:  No anorexia, nausea, vomiting or diarrhea. No abdominal pain or blood.  :  No dysurea, hematuria  Neurologic:  No headache, dizziness, syncope, paralysis, ataxia, numbness or tingling in the extremities. No change in bowel or bladder control.  Musculoskeletal:   No muscle, back pain, joint pain or stiffness.  Hematologic:  No anemia, bleeding or bruising.  Lymphatics:  No enlarged nodes. No history of splenectomy.  Psychiatric:  No history of depression or anxiety.  Endocrine:  No reports of sweating, cold or heat intolerance. No polyuria or polydipsia.  Allergies:  No history of asthma, hives, eczema or rhinitis.    PHYSICAL EXAM:  /81  Pulse 68  Wt 116.6 kg (257 lb)  SpO2 93%  BMI 38.23 kg/m2  Constitutional: awake, alert, no distress  Eyes: PERRL, sclera nonicteric  ENT: trachea midline  Respiratory: Lungs clear  Cardiovascular: Regular rate and rhythm, no murmurs  GI: nondistended, nontender, bowel sounds present  Lymph/Hematologic: no lymphadenopathy  Skin: dry, no rash  Musculoskeletal: good muscle tone, strength 5/5 in upper and lower extremities  Neurologic: no focal deficits  Neuropsychiatric: appropriate affact    ASSESSMENT:  62-year-old male seen for newly diagnosed atrial fibrillation.  He is back in sinus rhythm today.  The duration and frequency of the A. fib is unknown based on history and symptoms.  His heart rate does not seem to vary that much between sinus and A. fib, therefore it is hard to tell from his home readings.  7 day Zio patch will be ordered to hopefully capture any paroxysmal A. fib.    He's been started on metoprolol and warfarin, which will be continued for now.  It is hard to say how long he will need to be on warfarin until we have more information.  Echo today was normal.  Sleep apnea referral was recommended, he is quite hesitant, but will think about it.    RECOMMENDATIONS:  1.  Paroxysmal atrial fibrillation   - 7 day Zio patch  - continue warfarin and metoprolol  - Further recommendations based on results of Zio patch  - Recommended referral to sleep medicine    Follow-up in about 4 weeks.    Breezy Daniel MD  Cardiology - [facility]  Pager:  [phone]  Text Page  March 8, 2018      Thank you for allowing me to  participate in the care of your patient.    Sincerely,     Breezy Daniel MD     [facility]

## 2018-03-08 NOTE — PROGRESS NOTES
CARDIOLOGY CONSULT    REASON FOR CONSULT: afib    PRIMARY CARE PHYSICIAN:  Jolynn Phillips    HISTORY OF PRESENT ILLNESS:  62-year-old male seen for new A. fib.  He has hypertension, type 2 diabetes, and dyslipidemia.     He has a history of hypertension and has been on medications for about one year.  Systolic blood pressure has been borderline controlled.  In the past few months he has been increasing exercise trying to weight lose weight.  He's been walking on a treadmill up to 3 miles per hour at a 2-3% incline 5 days per week, he has some minimal dyspnea, but no chest pain.  He denies any palpitations, lightheadedness, dizziness, or lower extremity edema.    A. fib was diagnosed in February 2018 at a primary care appointment, HR of 97.  Warfarin and metoprolol were started.     In the past few weeks heart rate has been between 60 and 90 with blood pressure between 110 and 140 on his home machine.    Echo 3/2018 (sinus rhythm) showed EF 60%, mild LVH, normal RV, normal LA size, no valve disease.    PAST MEDICAL HISTORY:  Past Medical History:   Diagnosis Date     A-fib (H) 2/27/2018     COPD (chronic obstructive pulmonary disease) (H)      Diabetes (H)      Hypertension      NO ACTIVE PROBLEMS        MEDICATIONS:  Current Outpatient Prescriptions   Medication     warfarin (COUMADIN) 5 MG tablet     metFORMIN (GLUCOPHAGE-XR) 500 MG 24 hr tablet     atorvastatin (LIPITOR) 20 MG tablet     metoprolol succinate (TOPROL-XL) 50 MG 24 hr tablet     VENTOLIN  (90 BASE) MCG/ACT Inhaler     losartan (COZAAR) 100 MG tablet     blood glucose monitoring (BELA CONTOUR NEXT MONITOR) meter device kit     blood glucose monitoring (ACCU-CHEK SMARTVIEW) test strip     blood glucose monitoring (ACCU-CHEK FASTCLIX) lancets     ASPIRIN 81 MG OR TABS     No current facility-administered medications for this visit.      ALLERGIES:  Allergies   Allergen Reactions     Lisinopril Cough     SOCIAL HISTORY:  I have reviewed this  patient's social history and updated it with pertinent information if needed. Joe Mas  reports that he quit smoking about 18 years ago. His smoking use included Cigarettes. He started smoking about 43 years ago. He has a 25.00 pack-year smoking history. He has never used smokeless tobacco. He reports that he drinks alcohol. He reports that he does not use illicit drugs.    FAMILY HISTORY:  I have reviewed this patient's family history and updated it with pertinent information if needed.   Family History   Problem Relation Age of Onset     OSTEOPOROSIS Mother      HEART DISEASE Mother      Neurologic Disorder Father      passed away from a brain tumor age 48     Neurologic Disorder Maternal Grandmother      parkinsons     C.A.D. Maternal Grandmother      DIABETES Maternal Grandmother      Alcohol/Drug Maternal Grandfather      CANCER Maternal Grandfather      esphogus     CANCER Paternal Grandfather      lung     DIABETES Sister      Hypertension Sister      Asthma Daughter      Asthma No family hx of      CEREBROVASCULAR DISEASE No family hx of      Breast Cancer No family hx of      Cancer - colorectal No family hx of        REVIEW OF SYSTEMS:  Constitutional:  No weight loss, fever, chills, weakness or fatigue.  HEENT:  Eyes:  No visual loss, blurred vision, double vision or yellow sclerae. No hearing loss, sneezing, congestion, runny nose or sore throat.  Skin:  No rash or itching.  Cardiovascular: per HPI  Respiratory: per HPI  GI:  No anorexia, nausea, vomiting or diarrhea. No abdominal pain or blood.  :  No dysurea, hematuria  Neurologic:  No headache, dizziness, syncope, paralysis, ataxia, numbness or tingling in the extremities. No change in bowel or bladder control.  Musculoskeletal:  No muscle, back pain, joint pain or stiffness.  Hematologic:  No anemia, bleeding or bruising.  Lymphatics:  No enlarged nodes. No history of splenectomy.  Psychiatric:  No history of depression or anxiety.  Endocrine:   No reports of sweating, cold or heat intolerance. No polyuria or polydipsia.  Allergies:  No history of asthma, hives, eczema or rhinitis.    PHYSICAL EXAM:  /81  Pulse 68  Wt 116.6 kg (257 lb)  SpO2 93%  BMI 38.23 kg/m2  Constitutional: awake, alert, no distress  Eyes: PERRL, sclera nonicteric  ENT: trachea midline  Respiratory: Lungs clear  Cardiovascular: Regular rate and rhythm, no murmurs  GI: nondistended, nontender, bowel sounds present  Lymph/Hematologic: no lymphadenopathy  Skin: dry, no rash  Musculoskeletal: good muscle tone, strength 5/5 in upper and lower extremities  Neurologic: no focal deficits  Neuropsychiatric: appropriate affact    ASSESSMENT:  62-year-old male seen for newly diagnosed atrial fibrillation.  He is back in sinus rhythm today.  The duration and frequency of the A. fib is unknown based on history and symptoms.  His heart rate does not seem to vary that much between sinus and A. fib, therefore it is hard to tell from his home readings.  7 day Zio patch will be ordered to hopefully capture any paroxysmal A. fib.    He's been started on metoprolol and warfarin, which will be continued for now.  It is hard to say how long he will need to be on warfarin until we have more information.  Echo today was normal.  Sleep apnea referral was recommended, he is quite hesitant, but will think about it.    RECOMMENDATIONS:  1.  Paroxysmal atrial fibrillation   - 7 day Zio patch  - continue warfarin and metoprolol  - Further recommendations based on results of Zio patch  - Recommended referral to sleep medicine    Follow-up in about 4 weeks.    Breezy Daniel MD  Cardiology - [facility]  Pager:  [phone]  Text Page  March 8, 2018

## 2018-03-08 NOTE — PROGRESS NOTES
ANTICOAGULATION FOLLOW-UP CLINIC VISIT    Patient Name:  Joe Mas  Date:  3/8/2018  Contact Type:  Face to Face    SUBJECTIVE:     Patient Findings     Positives No Problem Findings    Comments Patient had 35 mg in the last 7 days, will adjust dose to to keep weekly mg total the same by next INR check on Monday.  No changes in medications, activity, or diet noted. No bleeding or increased bruising noted. Took warfarin as prescribed.  Patient verbalizes understanding and agrees to plan. No further questions or concerns.             OBJECTIVE    INR Protime   Date Value Ref Range Status   03/08/2018 2.2 (A) 0.86 - 1.14 Final       ASSESSMENT / PLAN  INR assessment THER    Recheck INR In: 4 DAYS    INR Location Clinic      Anticoagulation Summary as of 3/8/2018     INR goal 2.0-3.0   Today's INR 2.2   Maintenance plan No maintenance plan   Full instructions 3/8: 7.5 mg; 3/9: 5 mg; 3/10: 7.5 mg; 3/11: 5 mg   Plan last modified Halie Birmingham RN (2/27/2018)   Next INR check 3/12/2018   Priority INR   Target end date     Indications   Atrial fibrillation  unspecified type (H) [I48.91]  Long term current use of anticoagulant therapy [Z79.01]         Anticoagulation Episode Summary     INR check location     Preferred lab     Send INR reminders to [facility]    Comments *      Anticoagulation Care Providers     Provider Role Specialty Phone number    Jolynn Phillips MD [facility] [phone]            See the Encounter Report to view Anticoagulation Flowsheet and Dosing Calendar (Go to Encounters tab in chart review, and find the Anticoagulation Therapy Visit)      Sofia Tai RN

## 2018-03-09 ENCOUNTER — HOSPITAL ENCOUNTER (OUTPATIENT)
Dept: CARDIOLOGY | Facility: CLINIC | Age: 63
Discharge: HOME | End: 2018-03-09
Attending: INTERNAL MEDICINE | Admitting: INTERNAL MEDICINE
Payer: COMMERCIAL

## 2018-03-09 DIAGNOSIS — I48.0 PAROXYSMAL ATRIAL FIBRILLATION (H): ICD-10-CM

## 2018-03-09 PROCEDURE — 0296T ZIO PATCH HOLTER: CPT

## 2018-03-09 PROCEDURE — 0298T ZZC EXT ECG > 48HR TO 21 DAY REVIEW AND INTERPRETATN: CPT | Performed by: INTERNAL MEDICINE

## 2018-03-12 ENCOUNTER — ANTICOAGULATION THERAPY VISIT (OUTPATIENT)
Dept: ANTICOAGULATION | Facility: CLINIC | Age: 63
End: 2018-03-12
Payer: COMMERCIAL

## 2018-03-12 DIAGNOSIS — I48.91 ATRIAL FIBRILLATION, UNSPECIFIED TYPE (H): ICD-10-CM

## 2018-03-12 DIAGNOSIS — Z79.01 LONG TERM CURRENT USE OF ANTICOAGULANT THERAPY: ICD-10-CM

## 2018-03-12 LAB — INR POINT OF CARE: 3.8 (ref 0.86–1.14)

## 2018-03-12 PROCEDURE — 85610 PROTHROMBIN TIME: CPT | Mod: QW

## 2018-03-12 PROCEDURE — 36416 COLLJ CAPILLARY BLOOD SPEC: CPT

## 2018-03-12 PROCEDURE — 99207 ZZC NO CHARGE NURSE ONLY: CPT

## 2018-03-12 NOTE — PROGRESS NOTES
ANTICOAGULATION FOLLOW-UP CLINIC VISIT    Patient Name:  Joe Mas  Date:  3/12/2018  Contact Type:  Face to Face    SUBJECTIVE:     Patient Findings     Positives Change in diet/appetite (Pt reports he had 4 whisky and rodriges last night)    Comments Pt denies changes in medications, activity or health, reports taking warfarin as directed.  Writer instructed pt regarding limiting alcohol to 2 4 oz drinks in 24 hours, with understanding voiced. Writer instructed pt to hold warfarin today, kept pt at 35 mg warfarin over 7 days, will recheck in 3 days.              OBJECTIVE    INR Protime   Date Value Ref Range Status   03/12/2018 3.8 (A) 0.86 - 1.14 Final       ASSESSMENT / PLAN  INR assessment SUPRA    Recheck INR In: 3 DAYS    INR Location Clinic      Anticoagulation Summary as of 3/12/2018     INR goal 2.0-3.0   Today's INR 3.8!   Maintenance plan No maintenance plan   Full instructions 3/12: Hold; 3/13: 5 mg; 3/14: 5 mg   Plan last modified Halie Birmingham RN (2/27/2018)   Next INR check 3/15/2018   Priority INR   Target end date     Indications   Atrial fibrillation  unspecified type (H) [I48.91]  Long term current use of anticoagulant therapy [Z79.01]         Anticoagulation Episode Summary     INR check location     Preferred lab     Send INR reminders to [facility]    Comments *      Anticoagulation Care Providers     Provider Role Specialty Phone number    Jolynn Phillips MD [facility] [phone]            See the Encounter Report to view Anticoagulation Flowsheet and Dosing Calendar (Go to Encounters tab in chart review, and find the Anticoagulation Therapy Visit)        Dara Hodges RN

## 2018-03-12 NOTE — MR AVS SNAPSHOT
Joe Mas   3/12/2018 9:30 AM   Anticoagulation Therapy Visit    Description:  62 year old male   Provider:  [facility]   Department:  [facility]           INR as of 3/12/2018     Today's INR 3.8!      Anticoagulation Summary as of 3/12/2018     INR goal 2.0-3.0   Today's INR 3.8!   Full instructions 3/12: Hold; 3/13: 5 mg; 3/14: 5 mg   Next INR check 3/15/2018    Indications   Atrial fibrillation  unspecified type (H) [I48.91]  Long term current use of anticoagulant therapy [Z79.01]         Your next Anticoagulation Clinic appointment(s)     Mar 15, 2018  9:00 AM CDT   [facility]agulation Visit with [facility]   [facility]    [address] [address] [phone]              Contact Numbers     Please call [phone] with any problems or questions regarding your therapy.    If you need to cancel and/or reschedule your appointment please call one of the following numbers:  [facility] [phone]  [place] - [phone]  [place] - [phone]  [facility] [phone]  [place] - [phone]            March 2018 Details    Sun Mon Tue Wed Thu Fri Sat         1               2               3                 4               5               6               7               8               9               10                 11               12      Hold   See details      13      5 mg         14      5 mg         15            16               17                 18               19               20               21               22               23               24                 25               26               27               28               29               30               31                Date Details   03/12 This INR check       Date of next INR:  3/15/2018         How to take your warfarin dose     To take:  5 mg Take 1 of the 5 mg tablets.    Hold Do not take your warfarin dose. See the Details table to the right for  additional instructions.

## 2018-03-15 ENCOUNTER — ANTICOAGULATION THERAPY VISIT (OUTPATIENT)
Dept: ANTICOAGULATION | Facility: CLINIC | Age: 63
End: 2018-03-15
Payer: COMMERCIAL

## 2018-03-15 DIAGNOSIS — Z79.01 LONG TERM CURRENT USE OF ANTICOAGULANT THERAPY: ICD-10-CM

## 2018-03-15 DIAGNOSIS — I48.91 ATRIAL FIBRILLATION, UNSPECIFIED TYPE (H): ICD-10-CM

## 2018-03-15 LAB — INR POINT OF CARE: 2.8 (ref 0.86–1.14)

## 2018-03-15 PROCEDURE — 36416 COLLJ CAPILLARY BLOOD SPEC: CPT

## 2018-03-15 PROCEDURE — 85610 PROTHROMBIN TIME: CPT | Mod: QW

## 2018-03-15 PROCEDURE — 99207 ZZC NO CHARGE NURSE ONLY: CPT

## 2018-03-15 NOTE — MR AVS SNAPSHOT
Joe Mas   3/15/2018 9:00 AM   Anticoagulation Therapy Visit    Description:  62 year old male   Provider:  [facility]   Department:  [facility]           INR as of 3/15/2018     Today's INR 2.8      Anticoagulation Summary as of 3/15/2018     INR goal 2.0-3.0   Today's INR 2.8   Full instructions 3/15: 5 mg; 3/16: 5 mg; 3/17: 5 mg; 3/18: 5 mg; 3/19: 5 mg; 3/20: 5 mg; 3/21: 5 mg   Next INR check 3/22/2018    Indications   Atrial fibrillation  unspecified type (H) [I48.91]  Long term current use of anticoagulant therapy [Z79.01]         Your next Anticoagulation Clinic appointment(s)     Mar 22, 2018  9:00 AM CDT   [facility] Visit with [facility] [facility]   [facility]    [address] [id number] [phone]              Contact Numbers     Please call [phone] with any problems or questions regarding your therapy.    If you need to cancel and/or reschedule your appointment please call one of the following numbers:  [facility] - [phone]  [place] - [phone]  [place] - [phone]  [facility] [phone]  [place] - [phone]            March 2018 Details    Sun Mon Tue Wed Thu Fri Sat         1               2               3                 4               5               6               7               8               9               10                 11               12               13               14               15      5 mg   See details      16      5 mg         17      5 mg           18      5 mg         19      5 mg         20      5 mg         21      5 mg         22            23               24                 25               26               27               28               29               30               31                Date Details   03/15 This INR check       Date of next INR:  3/22/2018         How to take your warfarin dose     To take:  5 mg Take 1 of the 5 mg tablets.

## 2018-03-15 NOTE — PROGRESS NOTES
ANTICOAGULATION FOLLOW-UP CLINIC VISIT    Patient Name:  Joe Mas  Date:  3/15/2018  Contact Type:  Face to Face    SUBJECTIVE:     Patient Findings     Positives No Problem Findings    Comments Patient denies any changes. Denies any alcohol use. Instructed to stay at 5 mg daily. Patient will follow up in 1 week.           OBJECTIVE    INR Protime   Date Value Ref Range Status   03/15/2018 2.8 (A) 0.86 - 1.14 Final       ASSESSMENT / PLAN  INR assessment THER    Recheck INR In: 1 WEEK    INR Location Clinic      Anticoagulation Summary as of 3/15/2018     INR goal 2.0-3.0   Today's INR 2.8   Maintenance plan No maintenance plan   Full instructions 3/15: 5 mg; 3/16: 5 mg; 3/17: 5 mg; 3/18: 5 mg; 3/19: 5 mg; 3/20: 5 mg; 3/21: 5 mg   Plan last modified Halie Birmingham RN (2/27/2018)   Next INR check 3/22/2018   Priority INR   Target end date     Indications   Atrial fibrillation  unspecified type (H) [I48.91]  Long term current use of anticoagulant therapy [Z79.01]         Anticoagulation Episode Summary     INR check location     Preferred lab     Send INR reminders to [facility]    Comments *      Anticoagulation Care Providers     Provider Role Specialty Phone number    Jolynn Phillips MD [facility] [phone]            See the Encounter Report to view Anticoagulation Flowsheet and Dosing Calendar (Go to Encounters tab in chart review, and find the Anticoagulation Therapy Visit)        Lizbet Paiz RN

## 2018-03-22 ENCOUNTER — ANTICOAGULATION THERAPY VISIT (OUTPATIENT)
Dept: ANTICOAGULATION | Facility: CLINIC | Age: 63
End: 2018-03-22
Payer: COMMERCIAL

## 2018-03-22 DIAGNOSIS — I48.91 ATRIAL FIBRILLATION, UNSPECIFIED TYPE (H): ICD-10-CM

## 2018-03-22 DIAGNOSIS — Z79.01 LONG TERM CURRENT USE OF ANTICOAGULANT THERAPY: ICD-10-CM

## 2018-03-22 LAB — INR POINT OF CARE: 3.5 (ref 0.86–1.14)

## 2018-03-22 PROCEDURE — 36416 COLLJ CAPILLARY BLOOD SPEC: CPT

## 2018-03-22 PROCEDURE — 85610 PROTHROMBIN TIME: CPT | Mod: QW

## 2018-03-22 PROCEDURE — 99207 ZZC NO CHARGE NURSE ONLY: CPT

## 2018-03-22 RX ORDER — WARFARIN SODIUM 5 MG/1
TABLET ORAL
Qty: 30 TABLET | Refills: 1 | Status: SHIPPED | OUTPATIENT
Start: 2018-03-22 | End: 2018-03-29

## 2018-03-22 NOTE — MR AVS SNAPSHOT
Joe Mas   3/22/2018 9:00 AM   Anticoagulation Therapy Visit    Description:  62 year old male   Provider:  [facility]   Department:  [facility]           INR as of 3/22/2018     Today's INR 3.5!      Anticoagulation Summary as of 3/22/2018     INR goal 2.0-3.0   Today's INR 3.5!   Full instructions 3/22: 2.5 mg; 3/23: 5 mg; 3/24: 5 mg; Otherwise 2.5 mg on Mon, Thu; 5 mg all other days   Next INR check 3/29/2018    Indications   Atrial fibrillation  unspecified type (H) [I48.91]  Long term current use of anticoagulant therapy [Z79.01]         Your next Anticoagulation Clinic appointment(s)     Mar 29, 2018 11:00 AM CDT   An[facility]agulation Visit with [facility]   [facility]    [address] [address] [phone]              Contact Numbers     Please call [phone] with any problems or questions regarding your therapy.    If you need to cancel and/or reschedule your appointment please call one of the following numbers:  [facility] [phone]  [place] - [phone]  [facility] [phone]  [facility] [phone]  [place] - [phone]            March 2018 Details    Sun Mon Tue Wed Thu Fri Sat         1               2               3                 4               5               6               7               8               9               10                 11               12               13               14               15               16               17                 18               19               20               21               22      2.5 mg   See details      23      5 mg         24      5 mg           25      5 mg         26      2.5 mg         27      5 mg         28      5 mg         29            30               31                Date Details   03/22 This INR check       Date of next INR:  3/29/2018         How to take your warfarin dose     To take:  2.5 mg Take 0.5 of a 5 mg tablet.    To  take:  5 mg Take 1 of the 5 mg tablets.

## 2018-03-22 NOTE — PROGRESS NOTES
ANTICOAGULATION FOLLOW-UP CLINIC VISIT    Patient Name:  Joe Mas  Date:  3/22/2018  Contact Type:  Face to Face    SUBJECTIVE:     Patient Findings     Positives Initiation of therapy, No Problem Findings    Comments Pt denies changes in diet, activity, meds or health, reports taking warfarin as instructed, states he had not had any alcohol this past week. Pt reports he occasionally drinks about once/week, usually on the weekend. Writer reviewed recommended limits of alcohol when on warfarin with understanding voiced.    Patient had 35 mg in the previous 7 days, will decrease dose to 30 mg by next INR check in 7 days (~14% decrease).             OBJECTIVE    INR Protime   Date Value Ref Range Status   03/22/2018 3.5 (A) 0.86 - 1.14 Final       ASSESSMENT / PLAN  INR assessment SUPRA    Recheck INR In: 1 WEEK    INR Location Clinic      Anticoagulation Summary as of 3/22/2018     INR goal 2.0-3.0   Today's INR 3.5!   Maintenance plan 2.5 mg (5 mg x 0.5) on Mon, Thu; 5 mg (5 mg x 1) all other days   Full instructions 2.5 mg on Mon, Thu; 5 mg all other days   Weekly total 30 mg   Plan last modified Dara Hodges RN (3/22/2018)   Next INR check 3/29/2018   Priority INR   Target end date     Indications   Atrial fibrillation  unspecified type (H) [I48.91]  Long term current use of anticoagulant therapy [Z79.01]         Anticoagulation Episode Summary     INR check location     Preferred lab     Send INR reminders to [facility]    Comments *      Anticoagulation Care Providers     Provider Role Specialty Phone number    Jolynn Phillips MD [facility] [phone]            See the Encounter Report to view Anticoagulation Flowsheet and Dosing Calendar (Go to Encounters tab in chart review, and find the Anticoagulation Therapy Visit)        Dara Hodges, TRINI

## 2018-03-28 ENCOUNTER — TELEPHONE (OUTPATIENT)
Dept: CARDIOLOGY | Facility: CLINIC | Age: 63
End: 2018-03-28

## 2018-03-28 NOTE — TELEPHONE ENCOUNTER
He needs a f/u, no urgency.  For now cont metoprolol and warfarin.    Breezy Daniel MD  Cardiology - [facility]  Pager: [phone]  Text Page  March 28, 2018    ADDENDUM: Noted above. Message out to patient to return call. Marisel Cardenas RN Cardiology at [facility] March 28, 2018, 4:09 PM  ADDENDUM: Discussed result with patient. Return appt made 4/19/18. Marisel Cardenas RN Cardiology at [facility] March 29, 2018, 8:05 AM

## 2018-03-28 NOTE — TELEPHONE ENCOUNTER
Received message below. Result reviewed.   5 day zio patch monitor result as follows: Preliminary Findings: Predominant underlying rhythm=Sinus Rhythm. Avg HR=77 bpm (Min=36; Max=203) Final Read: 1. Paroxysmal AF with periods of RVR. AF burden=~20% 2. Occasional PVC's burden ~1.2%.  Reviewed Dr. Daniel consult note 3/8/18. Pt referred to cardiology with new Dx: AF. Was in NSR when presented for cardiology consult. Pt is on warfarin and Toprol XL 50 mg QD. Pt was referred to sleep medicine, no appointment pending. Pt was to follow up first part of April with Dr. Daniel, no appt pending. Will have Dr. Daniel review the result and advise. Marisel Cardenas RN Cardiology at [facility] March 28, 2018, 2:19 PM      ----- Message from Christine Dorsey sent at 3/28/2018  1:15 PM CDT -----  Please review the ziopatch results -  Something happened and a message was sent to review results- but there were no results at that time.     Christine

## 2018-03-29 ENCOUNTER — ANTICOAGULATION THERAPY VISIT (OUTPATIENT)
Dept: ANTICOAGULATION | Facility: CLINIC | Age: 63
End: 2018-03-29
Payer: COMMERCIAL

## 2018-03-29 DIAGNOSIS — I48.91 ATRIAL FIBRILLATION, UNSPECIFIED TYPE (H): ICD-10-CM

## 2018-03-29 DIAGNOSIS — Z79.01 LONG TERM CURRENT USE OF ANTICOAGULANT THERAPY: ICD-10-CM

## 2018-03-29 LAB — INR POINT OF CARE: 3.7 (ref 0.86–1.14)

## 2018-03-29 PROCEDURE — 36416 COLLJ CAPILLARY BLOOD SPEC: CPT

## 2018-03-29 PROCEDURE — 85610 PROTHROMBIN TIME: CPT | Mod: QW

## 2018-03-29 PROCEDURE — 99207 ZZC NO CHARGE NURSE ONLY: CPT

## 2018-03-29 RX ORDER — WARFARIN SODIUM 5 MG/1
TABLET ORAL
Qty: 30 TABLET | Refills: 1 | COMMUNITY
Start: 2018-03-29 | End: 2018-05-17

## 2018-03-29 NOTE — PROGRESS NOTES
ANTICOAGULATION FOLLOW-UP CLINIC VISIT    Patient Name:  Joe Mas  Date:  3/29/2018  Contact Type:  Face to Face    SUBJECTIVE:     Patient Findings     Positives Change in diet/appetite (Pt states that he most likely is eating less greens)    Comments No changes in medications, activity, or diet noted. No bleeding or increased bruising noted. Took warfarin as prescribed.  Patient had 30 mg in the last 7 days, will adjust dose to 25 mg by next INR check in 1 week (~16.7% decrease).  Also discussed vitamin K foods, pt considered increasing vitamin K and will notify [facility] if so. Pt discussed adding spinach back into his daily smoothies - pt informed that if so, coumadin maintenance dose may need to be adjusted.  Patient verbalizes understanding and agrees to plan. No further questions or concerns.           OBJECTIVE    INR Protime   Date Value Ref Range Status   03/29/2018 3.7 (A) 0.86 - 1.14 Final       ASSESSMENT / PLAN  INR assessment SUPRA    Recheck INR In: 1 WEEK    INR Location Clinic      Anticoagulation Summary as of 3/29/2018     INR goal 2.0-3.0   Today's INR 3.7!   Maintenance plan 5 mg (5 mg x 1) on Mon, Wed, Fri; 2.5 mg (5 mg x 0.5) all other days   Full instructions 5 mg on Mon, Wed, Fri; 2.5 mg all other days   Weekly total 25 mg   Plan last modified Sofia Tai, RN (3/29/2018)   Next INR check 4/5/2018   Priority INR   Target end date     Indications   Atrial fibrillation  unspecified type (H) [I48.91]  Long term current use of anticoagulant therapy [Z79.01]         Anticoagulation Episode Summary     INR check location     Preferred lab     Send INR reminders to [facility]    Comments *      Anticoagulation Care Providers     Provider Role Specialty Phone number    Jolynn Phillips MD [facility] [phone]            See the Encounter Report to view Anticoagulation Flowsheet and Dosing Calendar (Go to Encounters tab in chart review, and find the  Anticoagulation Therapy Visit)        Sofia Tai RN

## 2018-03-29 NOTE — MR AVS SNAPSHOT
Joe Mas   3/29/2018 11:00 AM   Anticoagulation Therapy Visit    Description:  62 year old male   Provider:  [facility]   Department:  [facility]           INR as of 3/29/2018     Today's INR 3.7!      Anticoagulation Summary as of 3/29/2018     INR goal 2.0-3.0   Today's INR 3.7!   Full instructions 5 mg on Mon, Wed, Fri; 2.5 mg all other days   Next INR check 4/5/2018    Indications   Atrial fibrillation  unspecified type (H) [I48.91]  Long term current use of anticoagulant therapy [Z79.01]         Your next Anticoagulation Clinic appointment(s)     Apr 05, 2018 10:00 AM CDT   Anticoagulation Visit with [facility]   [facility]    [address] [address] [phone]              Contact Numbers     Please call [phone] with any problems or questions regarding your therapy.    If you need to cancel and/or reschedule your appointment please call one of the following numbers:  [facility] [phone]  [place] - [phone]  [place] -[phone]  [facility] [phone]  [place] - [phone]            March 2018 Details    Sun Mon Tue Wed Thu Fri Sat         1               2               3                 4               5               6               7               8               9               10                 11               12               13               14               15               16               17                 18               19               20               21               22               23               24                 25               26               27               28               29      2.5 mg   See details      30      5 mg         31      2.5 mg          Date Details   03/29 This INR check               How to take your warfarin dose     To take:  2.5 mg Take 0.5 of a 5 mg tablet.    To take:  5 mg Take 1 of the 5 mg tablets.           April 2018 Details    Sun Mon Tue Wed  Thu Fri Sat     1      2.5 mg         2      5 mg         3      2.5 mg         4      5 mg         5            6               7                 8               9               10               11               12               13               14                 15               16               17               18               19               20               21                 22               23               24               25               26               27               28                 29               30                     Date Details   No additional details    Date of next INR:  4/5/2018         How to take your warfarin dose     To take:  2.5 mg Take 0.5 of a 5 mg tablet.    To take:  5 mg Take 1 of the 5 mg tablets.

## 2018-04-05 ENCOUNTER — TELEPHONE (OUTPATIENT)
Dept: FAMILY MEDICINE | Facility: CLINIC | Age: 63
End: 2018-04-05

## 2018-04-05 ENCOUNTER — ANTICOAGULATION THERAPY VISIT (OUTPATIENT)
Dept: ANTICOAGULATION | Facility: CLINIC | Age: 63
End: 2018-04-05
Payer: COMMERCIAL

## 2018-04-05 DIAGNOSIS — I48.91 ATRIAL FIBRILLATION, UNSPECIFIED TYPE (H): ICD-10-CM

## 2018-04-05 DIAGNOSIS — Z79.01 LONG TERM CURRENT USE OF ANTICOAGULANT THERAPY: ICD-10-CM

## 2018-04-05 LAB — INR POINT OF CARE: 2 (ref 0.86–1.14)

## 2018-04-05 PROCEDURE — 36416 COLLJ CAPILLARY BLOOD SPEC: CPT

## 2018-04-05 PROCEDURE — 99207 ZZC NO CHARGE NURSE ONLY: CPT

## 2018-04-05 PROCEDURE — 85610 PROTHROMBIN TIME: CPT | Mod: QW

## 2018-04-05 NOTE — MR AVS SNAPSHOT
Joe Mas   4/5/2018 10:00 AM   Anticoagulation Therapy Visit    Description:  62 year old male   Provider:  [facility]   Department:  [facility]           INR as of 4/5/2018     Today's INR 2.0      Anticoagulation Summary as of 4/5/2018     INR goal 2.0-3.0   Today's INR 2.0   Full instructions 5 mg on Mon, Wed, Fri; 2.5 mg all other days   Next INR check 4/12/2018    Indications   Atrial fibrillation  unspecified type (H) [I48.91]  Long term current use of anticoagulant therapy [Z79.01]         Your next Anticoagulation Clinic appointment(s)     Apr 12, 2018 10:00 AM CDT   Anticoagulation Visit with [facility] [facility]   [facility]    [address] [address] [phone]              Contact Numbers     Please call [phone] with any problems or questions regarding your therapy.    If you need to cancel and/or reschedule your appointment please call one of the following numbers:  [facility] [phone]  [place] - [phone]  [place] - [phone]  hospitals [phone]  [place] - [phone]            April 2018 Details    Sun Mon Tue Wed Thu Fri Sat     1               2               3               4               5      2.5 mg   See details      6      5 mg         7      2.5 mg           8      2.5 mg         9      5 mg         10      2.5 mg         11      5 mg         12            13               14                 15               16               17               18               19               20               21                 22               23               24               25               26               27               28                 29               30                     Date Details   04/05 This INR check       Date of next INR:  4/12/2018         How to take your warfarin dose     To take:  2.5 mg Take 0.5 of a 5 mg tablet.    To take:  5 mg Take 1 of the 5 mg tablets.

## 2018-04-05 NOTE — PROGRESS NOTES
ANTICOAGULATION FOLLOW-UP CLINIC VISIT    Patient Name:  Joe Mas  Date:  4/5/2018  Contact Type:  Face to Face    SUBJECTIVE:     Patient Findings     Positives No Problem Findings    Comments Pt denies changes in medications, diet, activity or health, reports taking warfarin as directed.     Pt reports he had 4 glasses of champagne on Sunday, but no other alcohol during the week.             OBJECTIVE    INR Protime   Date Value Ref Range Status   04/05/2018 2.0 (A) 0.86 - 1.14 Final       ASSESSMENT / PLAN  INR assessment THER    Recheck INR In: 1 WEEK    INR Location Clinic      Anticoagulation Summary as of 4/5/2018     INR goal 2.0-3.0   Today's INR 2.0   Maintenance plan 5 mg (5 mg x 1) on Mon, Wed, Fri; 2.5 mg (5 mg x 0.5) all other days   Full instructions 5 mg on Mon, Wed, Fri; 2.5 mg all other days   Weekly total 25 mg   No change documented Dara Hodges RN   Plan last modified Sofia Tai RN (3/29/2018)   Next INR check 4/12/2018   Priority INR   Target end date     Indications   Atrial fibrillation  unspecified type (H) [I48.91]  Long term current use of anticoagulant therapy [Z79.01]         Anticoagulation Episode Summary     INR check location     Preferred lab     Send INR reminders to [facility]    Comments *      Anticoagulation Care Providers     Provider Role Specialty Phone number    Jolynn Phillips MD [facility] [phone]            See the Encounter Report to view Anticoagulation Flowsheet and Dosing Calendar (Go to Encounters tab in chart review, and find the Anticoagulation Therapy Visit)        Dara Hodges RN

## 2018-04-05 NOTE — TELEPHONE ENCOUNTER
Patient was seen on 2/27/18. He was prescribed chlorthalidone and it was discontinued as well. For some reason, he received the prescription and had been taking 1/2 tablet since then. He takes this in addition to metoprolol 50 mg. His BP have been averaging 120/80 P 60-70. He is wondering if he should still be on this medication. Advised patient that the chlorthalidone was discontinued and he should not be on it. Please advise if you would like to have patient continue or stop medication. Thank you.  Mila Roland RN

## 2018-04-05 NOTE — TELEPHONE ENCOUNTER
Reason for Call:  Other prescription    Detailed comments: Joe was in to see Dr. Phillips on 2/27/18.  She had prescribed him 3 medications -  Metformin, atrovastatin and metoprolol.  When picking up scripts at pharmacy there was also Clorthalidone 25mg and he states that wasn't on his list of medications.  Wondering if he is suppose to take that one also?  Please call and assess.  Thank you..Reba Henning    Phone Number Patient can be reached at: Home number on file [phone] (home)    Best Time: any time    Can we leave a detailed message on this number? YES    Call taken on 4/5/2018 at 10:34 AM by Reba Henning

## 2018-04-12 ENCOUNTER — ANTICOAGULATION THERAPY VISIT (OUTPATIENT)
Dept: ANTICOAGULATION | Facility: CLINIC | Age: 63
End: 2018-04-12
Payer: COMMERCIAL

## 2018-04-12 DIAGNOSIS — Z79.01 LONG TERM CURRENT USE OF ANTICOAGULANT THERAPY: ICD-10-CM

## 2018-04-12 DIAGNOSIS — I48.91 ATRIAL FIBRILLATION, UNSPECIFIED TYPE (H): ICD-10-CM

## 2018-04-12 LAB — INR POINT OF CARE: 2.2 (ref 0.86–1.14)

## 2018-04-12 PROCEDURE — 36416 COLLJ CAPILLARY BLOOD SPEC: CPT

## 2018-04-12 PROCEDURE — 85610 PROTHROMBIN TIME: CPT | Mod: QW

## 2018-04-12 PROCEDURE — 99207 ZZC NO CHARGE NURSE ONLY: CPT

## 2018-04-12 NOTE — MR AVS SNAPSHOT
Joe Mas   4/12/2018 10:00 AM   Anticoagulation Therapy Visit    Description:  62 year old male   Provider:  [facility]   Department:  [facility]           INR as of 4/12/2018     Today's INR 2.2      Anticoagulation Summary as of 4/12/2018     INR goal 2.0-3.0   Today's INR 2.2   Full instructions 5 mg on Mon, Wed, Fri; 2.5 mg all other days   Next INR check 4/19/2018    Indications   Atrial fibrillation  unspecified type (H) [I48.91]  Long term current use of anticoagulant therapy [Z79.01]         Your next Anticoagulation Clinic appointment(s)     Apr 19, 2018 11:00 AM CDT   Anticoagulation Visit with [facility]   [facility]    [address] [address] [phone]              Contact Numbers     Please call [phone] with any problems or questions regarding your therapy.    If you need to cancel and/or reschedule your appointment please call one of the following numbers:  [facility] [phone]  [place] - [phone]  [place] - [phone]  [facility] [phone]  [place] - [phone]            April 2018 Details    Sun Mon Tue Wed Thu Fri Sat     1               2               3               4               5               6               7                 8               9               10               11               12      2.5 mg   See details      13      5 mg         14      2.5 mg           15      2.5 mg         16      5 mg         17      2.5 mg         18      5 mg         19            20               21                 22               23               24               25               26               27               28                 29               30                     Date Details   04/12 This INR check       Date of next INR:  4/19/2018         How to take your warfarin dose     To take:  2.5 mg Take 0.5 of a 5 mg tablet.    To take:  5 mg Take 1 of the 5 mg tablets.

## 2018-04-12 NOTE — PROGRESS NOTES
ANTICOAGULATION FOLLOW-UP CLINIC VISIT    Patient Name:  Joe Mas  Date:  4/12/2018  Contact Type:  Face to Face    SUBJECTIVE:     Patient Findings     Positives No Problem Findings    Comments No changes in medications, diet, or activity noted. Took warfarin as instructed, denies any missed doses.    Will plan to check INR in 1 week since patient will be here anyway for cardiology. If INR is good, will extend INR checks to 2 weeks.            OBJECTIVE    INR Protime   Date Value Ref Range Status   04/12/2018 2.2 (A) 0.86 - 1.14 Final       ASSESSMENT / PLAN  INR assessment THER    Recheck INR In: 1 WEEK    INR Location Clinic      Anticoagulation Summary as of 4/12/2018     INR goal 2.0-3.0   Today's INR 2.2   Maintenance plan 5 mg (5 mg x 1) on Mon, Wed, Fri; 2.5 mg (5 mg x 0.5) all other days   Full instructions 5 mg on Mon, Wed, Fri; 2.5 mg all other days   Weekly total 25 mg   No change documented Beth De La Rosa RN   Plan last modified Sofia Tai RN (3/29/2018)   Next INR check 4/19/2018   Priority INR   Target end date     Indications   Atrial fibrillation  unspecified type (H) [I48.91]  Long term current use of anticoagulant therapy [Z79.01]         Anticoagulation Episode Summary     INR check location     Preferred lab     Send INR reminders to [facility]    Comments *      Anticoagulation Care Providers     Provider Role Specialty Phone number    Jolynn Phillips MD [facility] [phone]            See the Encounter Report to view Anticoagulation Flowsheet and Dosing Calendar (Go to Encounters tab in chart review, and find the Anticoagulation Therapy Visit)        Beth De La Rosa RN, CACP

## 2018-04-16 ENCOUNTER — OFFICE VISIT (OUTPATIENT)
Dept: SLEEP MEDICINE | Facility: CLINIC | Age: 63
End: 2018-04-16
Payer: COMMERCIAL

## 2018-04-16 VITALS
WEIGHT: 261 LBS | OXYGEN SATURATION: 93 % | BODY MASS INDEX: 38.66 KG/M2 | HEIGHT: 69 IN | SYSTOLIC BLOOD PRESSURE: 133 MMHG | DIASTOLIC BLOOD PRESSURE: 90 MMHG | HEART RATE: 78 BPM

## 2018-04-16 DIAGNOSIS — R06.83 SNORING: Primary | ICD-10-CM

## 2018-04-16 DIAGNOSIS — I10 ESSENTIAL HYPERTENSION: ICD-10-CM

## 2018-04-16 DIAGNOSIS — E66.812 CLASS 2 OBESITY DUE TO EXCESS CALORIES WITH BODY MASS INDEX (BMI) OF 35.0 TO 35.9 IN ADULT, UNSPECIFIED WHETHER SERIOUS COMORBIDITY PRESENT: ICD-10-CM

## 2018-04-16 DIAGNOSIS — E66.09 CLASS 2 OBESITY DUE TO EXCESS CALORIES WITH BODY MASS INDEX (BMI) OF 35.0 TO 35.9 IN ADULT, UNSPECIFIED WHETHER SERIOUS COMORBIDITY PRESENT: ICD-10-CM

## 2018-04-16 DIAGNOSIS — E11.8 TYPE 2 DIABETES MELLITUS WITH COMPLICATION, WITHOUT LONG-TERM CURRENT USE OF INSULIN (H): ICD-10-CM

## 2018-04-16 DIAGNOSIS — I48.0 PAROXYSMAL ATRIAL FIBRILLATION (H): ICD-10-CM

## 2018-04-16 PROCEDURE — 99205 OFFICE O/P NEW HI 60 MIN: CPT | Performed by: FAMILY MEDICINE

## 2018-04-16 NOTE — NURSING NOTE
"Chief Complaint   Patient presents with     Consult     Was referred by Dr. Phillips. Complains of A-Fib.        Initial /90  Pulse 78  Ht 1.746 m (5' 8.75\")  Wt 118.4 kg (261 lb)  SpO2 93%  BMI 38.82 kg/m2 Estimated body mass index is 38.82 kg/(m^2) as calculated from the following:    Height as of this encounter: 1.746 m (5' 8.75\").    Weight as of this encounter: 118.4 kg (261 lb).  Medication Reconciliation: complete   "

## 2018-04-16 NOTE — MR AVS SNAPSHOT
After Visit Summary   4/16/2018    Joe Mas    MRN: [id number]           Patient Information     Date Of Birth          1955        Visit Information        Provider Department      4/16/2018 11:00 AM Manan Day MD [facility]        Today's Diagnoses     Snoring    -  1    Class 2 obesity due to excess calories with body mass index (BMI) of 35.0 to 35.9 in adult, unspecified whether serious comorbidity present        Essential hypertension        Type 2 diabetes mellitus with complication, without long-term current use of insulin (H)        Paroxysmal atrial fibrillation (H)          Care Instructions      Your BMI is Body mass index is 38.82 kg/(m^2).  Weight management is a personal decision.  If you are interested in exploring weight loss strategies, the following discussion covers the approaches that may be successful. Body mass index (BMI) is one way to tell whether you are at a healthy weight, overweight, or obese. It measures your weight in relation to your height.  A BMI of 18.5 to 24.9 is in the healthy range. A person with a BMI of 25 to 29.9 is considered overweight, and someone with a BMI of 30 or greater is considered obese. More than two-thirds of American adults are considered overweight or obese.  Being overweight or obese increases the risk for further weight gain. Excess weight may lead to heart disease and diabetes.  Creating and following plans for healthy eating and physical activity may help you improve your health.  Weight control is part of healthy lifestyle and includes exercise, emotional health, and healthy eating habits. Careful eating habits lifelong are the mainstay of weight control. Though there are significant health benefits from weight loss, long-term weight loss with diet alone may be very difficult to achieve- studies show long-term success with dietary management in less than 10% of people. Attaining a healthy weight may be  especially difficult to achieve in those with severe obesity. In some cases, medications, devices and surgical management might be considered.  What can you do?  If you are overweight or obese and are interested in methods for weight loss, you should discuss this with your provider.     Consider reducing daily calorie intake by 500 calories.     Keep a food journal.     Avoiding skipping meals, consider cutting portions instead.    Diet combined with exercise helps maintain muscle while optimizing fat loss. Strength training is particularly important for building and maintaining muscle mass. Exercise helps reduce stress, increase energy, and improves fitness. Increasing exercise without diet control, however, may not burn enough calories to loose weight.       Start walking three days a week 10-20 minutes at a time    Work towards walking thirty minutes five days a week     Eventually, increase the speed of your walking for 1-2 minutes at time    In addition, we recommend that you review healthy lifestyles and methods for weight loss available through the National Institutes of Health patient information sites:  http://win.niddk.nih.gov/publications/index.htm    And look into health and wellness programs that may be available through your health insurance provider, employer, local community center, or zara club.    Weight management plan: Patient was referred to their PCP to discuss a diet and exercise plan.             Follow-ups after your visit        Follow-up notes from your care team     Return if symptoms worsen or fail to improve.      Your next 10 appointments already scheduled     Apr 19, 2018 11:00 AM CDT   Anticoagulation Visit with [facility]   [facility]    [address] [address]   [phone]            Apr 19, 2018 11:30 AM CDT   Return Visit with Breezy Daniel MD   [facility]  ([facility])    [address]  [facility] [address]   [phone]            May 01, 2018 10:00 AM CDT   HST  with SLEEP LAB, BED FIVE   [facility] ([facility])    [address]  [facility] [address]   [phone]            May 03, 2018 10:00 AM CDT   HST Drop Off with SLEEP LAB, BED FIVE   [facility] ([facility])    [address]  [facility] [address]   [phone]            May 10, 2018  3:30 PM CDT   Return Sleep Patient with Manan Day MD   [facility] ([facility])    [address]  [facility] [address]   [phone]              Future tests that were ordered for you today     Open Future Orders        Priority Expected Expires Ordered    HST-Home Sleep Apnea Test Routine  10/16/2018 4/16/2018            Who to contact     If you have questions or need follow up information about today's clinic visit or your schedule please contact [facility] directly at [phone].  Normal or non-critical lab and imaging results will be communicated to you by MyChart, letter or phone within 4 business days after the clinic has received the results. If you do not hear from us within 7 days, please contact the clinic through MyChart or phone. If you have a critical or abnormal lab result, we will notify you by phone as soon as possible.  Submit refill requests through [facility] or call your pharmacy and they will forward the refill request to us. Please allow 3 business days for your refill to be completed.          Additional Information About Your Visit        [facility] Information     [facility] gives you secure access to your electronic health record. If you see a primary care provider, you can also send messages to your care team and make appointments. If you have questions, please call your primary care clinic.  If you  "do not have a primary care provider, please call [phone] and they will assist you.        Care EveryWhere ID     This is your Care EveryWhere ID. This could be used by other organizations to access your [facility] medical records  [id number]        Your Vitals Were     Pulse Height Pulse Oximetry BMI (Body Mass Index)          78 1.746 m (5' 8.75\") 93% 38.82 kg/m2         Blood Pressure from Last 3 Encounters:   04/16/18 133/90   03/08/18 118/81   02/27/18 140/84    Weight from Last 3 Encounters:   04/16/18 118.4 kg (261 lb)   03/08/18 116.6 kg (257 lb)   02/27/18 117.7 kg (259 lb 6.4 oz)               Primary Care Provider Office Phone # Fax #    Jolynn Phillips [phone] [address] [facility] [address]        Equal Access to Services     KEL MOROCHO AH: Hadii aad ku hadasho Soomaali, waaxda luqadaha, qaybta kaalmada adeegyada, waxay garettin hayshaynen parag burciaga . So [facility] [phone].    ATENCIÓN: Si megha andre, tiene a rhodes disposición servicios gratuitos de asistencia lingüística. Yuniorame al [phone].    We comply with applicable federal civil rights laws and [place] laws. We do not discriminate on the basis of race, color, national origin, age, disability, sex, sexual orientation, or gender identity.            Thank you!     Thank you for choosing [facility]  for your care. Our goal is always to provide you with excellent care. Hearing back from our patients is one way we can continue to improve our services. Please take a few minutes to complete the written survey that you may receive in the mail after your visit with us. Thank you!             Your Updated Medication List - Protect others around you: Learn how to safely use, store and throw away your medicines at www.disposemymeds.org.          This list is accurate as of 4/16/18 11:48 AM.  Always use your most recent med list.                   Brand Name Dispense Instructions for use " Diagnosis    aspirin 81 MG tablet     100    ONE DAILY    Mixed hyperlipidemia       atorvastatin 20 MG tablet    LIPITOR    90 tablet    Take 1 tablet (20 mg) by mouth daily    Mixed hyperlipidemia       blood glucose monitoring lancets     3 Box    1 each daily    Type 2 diabetes, HbA1c goal < 7% (H)       blood glucose monitoring meter device kit     1 kit    Use to test blood sugar daily    Diabetes mellitus (H)       blood glucose monitoring test strip    ACCU-CHEK SMARTVIEW    300 each    Test blood sugars daily    Type 2 diabetes, HbA1c goal < 7% (H)       losartan 100 MG tablet    COZAAR    90 tablet    TAKE ONE TABLET BY MOUTH EVERY DAY    Essential hypertension, benign       metFORMIN 500 MG 24 hr tablet    GLUCOPHAGE-XR    90 tablet    Take 1 tablet (500 mg) by mouth daily    Type 2 diabetes mellitus without complication, without long-term current use of insulin (H)       metoprolol succinate 50 MG 24 hr tablet    TOPROL-XL    90 tablet    Take 1 tablet (50 mg) by mouth daily    Atrial fibrillation, unspecified type (H)       VENTOLIN  (90 Base) MCG/ACT Inhaler   Generic drug:  albuterol     18 g    INHALE TWO PUFFS BY MOUTH EVERY 6 HOURS AS NEEDED FOR SHORTNESS OF BREATH    Obstructive chronic bronchitis with exacerbation (H)       warfarin 5 MG tablet    COUMADIN    30 tablet    Take 5 mg on Mon, Wed, Fri; 2.5 mg all other days or as directed by Anticoagulation Clinic    Atrial fibrillation, unspecified type (H)

## 2018-04-16 NOTE — PATIENT INSTRUCTIONS

## 2018-04-16 NOTE — PROGRESS NOTES
"  Sleep Consultation:    Date on this visit: 4/16/2018    Joe Mas is sent by Dr. Daniel for a sleep consultation regarding high likelihood of sleep disordered breathing.    Primary Physician: Jolynn Phillips     CC: \"I was recommended to come.\"    HPI:  Joe Mas is a very pleasant 63 yo M with history of HTN, DM II, obesity, recent diagnosis of paroxysmal atrial fibrillation who presents for high likelihood of sleep disordered breathing.  He is alone for this visit today.    I was able to review the excellent summary by Dr. Daniel in regards to his recent diagnosis of paroxysmal atrial fibrillation.  Echocardiogram on 3/8/2018 was WNL.  Ziopatch 3/9 - 3/14/2018 with ~20% atrial fib.  He is on rate control and anti-coagulation.  He has minimal sxs from atrial fib.    He does note he was recommended to be evaluated for JUNG in the past.  He does have snoring, but sleeps alone so is unsure in regards to observed apnea.  Some daytime fatigue, but denies sleepiness.  No known family history of JUNG.    Most nights, in bed ~10pm and asleep in < 30 minutes.  Will awaken 4-5x, usually brief.  Up by alarm around 6am.  Will take a nap 3-4x / week.    Joe denies any sleep walking and dream enactment.    He mentions a history of COPD, but does not recall having pulmonary function testing.    Patient's [place] Sleepiness score 5/24 consistent with no daytime sleepiness.      Allergies:    Allergies   Allergen Reactions     Lisinopril Cough       Medications:    Current Outpatient Prescriptions   Medication Sig Dispense Refill     warfarin (COUMADIN) 5 MG tablet Take 5 mg on Mon, Wed, Fri; 2.5 mg all other days or as directed by Anticoagulation Clinic 30 tablet 1     metFORMIN (GLUCOPHAGE-XR) 500 MG 24 hr tablet Take 1 tablet (500 mg) by mouth daily 90 tablet 3     atorvastatin (LIPITOR) 20 MG tablet Take 1 tablet (20 mg) by mouth daily 90 tablet 3     metoprolol succinate (TOPROL-XL) 50 MG 24 hr tablet Take 1 " tablet (50 mg) by mouth daily 90 tablet 3     VENTOLIN  (90 BASE) MCG/ACT Inhaler INHALE TWO PUFFS BY MOUTH EVERY 6 HOURS AS NEEDED FOR SHORTNESS OF BREATH 18 g 7     losartan (COZAAR) 100 MG tablet TAKE ONE TABLET BY MOUTH EVERY DAY 90 tablet 2     blood glucose monitoring (BELA CONTOUR NEXT MONITOR) meter device kit Use to test blood sugar daily 1 kit 0     blood glucose monitoring (ACCU-CHEK SMARTVIEW) test strip Test blood sugars daily 300 each 1     blood glucose monitoring (ACCU-CHEK FASTCLIX) lancets 1 each daily 3 Box 3     ASPIRIN 81 MG OR TABS ONE DAILY 100 3       Problem List:  Patient Active Problem List    Diagnosis Date Noted     Long term current use of anticoagulant therapy 02/27/2018     Priority: Medium     Atrial fibrillation, unspecified type (H) 02/27/2018     Priority: Medium     Morbid obesity (H) 02/27/2018     Priority: Medium     Tubular adenoma of colon 08/16/2017     Priority: Medium     Collected: 8/11/2017   Received: 8/14/2017   Reported: 8/15/2017 17:28   Ordering Phy(s): AL MORELAND     For improved result formatting, select 'View Enhanced Report Format'   under Linked Documents section.     SPECIMEN(S):   A: Colon polyps at 20 cm   B: Colon polyp, ascending     FINAL DIAGNOSIS:   A.  Colon at 20 cm, grossly biopsies:   -Tubular adenoma and fragments of hyperplastic polyp   - Negative for high-grade dysplasia and malignancy.     B.  Right colon, mucosal biopsy:   - Tubular adenoma.   - Negative for high-grade dysplasia and malignancy.        Obesity (BMI 30-39.9) 10/24/2015     Priority: Medium     Hypertension goal BP (blood pressure) < 140/90 07/07/2015     Priority: Medium     Hyperlipidemia with target LDL less than 100 02/14/2014     Priority: Medium     Diagnosis updated by automated process. Provider to review and confirm.       Type 2 diabetes mellitus without complications (H) 10/29/2013     Priority: Medium     CARDIOVASCULAR SCREENING; LDL GOAL LESS THAN 100  10/29/2013     Priority: Medium     Moderate major depression (H) 02/20/2013     Priority: Medium     Advanced directives, counseling/discussion 11/03/2011     Priority: Medium     Advance Directive Problem List Overview:   Name Relationship Phone    Primary Health Care Agent            Alternative Health Care Agent          Discussed advance care planning with patient; information given to patient to review.     Flavia Duggan CMA, HC Facilitator    11/3/2011          Obstructive chronic bronchitis with exacerbation (H) 02/22/2007     Priority: Medium     Essential hypertension, benign 02/22/2007     Priority: Medium        Past Medical/Surgical History:  Past Medical History:   Diagnosis Date     A-fib (H) 2/27/2018     COPD (chronic obstructive pulmonary disease) (H)      Diabetes (H)      Hypertension      NO ACTIVE PROBLEMS      Past Surgical History:   Procedure Laterality Date     ABDOMEN SURGERY       COLONOSCOPY  11/12/2004    Follow up colonoscopy in 5 years     HERNIA REPAIR, UMBILICAL  2001     KNEE SURGERY  1960's    Left       Social History:  Social History     Social History     Marital status: Single     Spouse name: N/A     Number of children: 2     Years of education: 12+     Occupational History      [employer]     Social History Main Topics     Smoking status: Former Smoker     Packs/day: 1.00     Years: 25.00     Types: Cigarettes     Start date: 1/1/1975     Quit date: 1/1/2000     Smokeless tobacco: Never Used      Comment: quit smoking 2010     Alcohol use 0.0 oz/week      Comment: 2-5 beers per month     Drug use: No     Sexual activity: Not Currently     Partners: Female     Birth control/ protection: Condom     Other Topics Concern     Parent/Sibling W/ Cabg, Mi Or Angioplasty Before 65f 55m? No     Social History Narrative       Family History:  Family History   Problem Relation Age of Onset     OSTEOPOROSIS Mother      HEART DISEASE Mother      Neurologic Disorder Father      passed away  "from a brain tumor age 48     Neurologic Disorder Maternal Grandmother      parkinsons     C.A.D. Maternal Grandmother      DIABETES Maternal Grandmother      Alcohol/Drug Maternal Grandfather      CANCER Maternal Grandfather      esphogus     CANCER Paternal Grandfather      lung     DIABETES Sister      Hypertension Sister      Asthma Daughter      Asthma No family hx of      CEREBROVASCULAR DISEASE No family hx of      Breast Cancer No family hx of      Cancer - colorectal No family hx of        Review of Systems:  A complete review of systems reviewed by me is negative with the exeption of what has been mentioned in the history of present illness.  CONSTITUTIONAL:  POSITIVE for  weight gain  EYES: NEGATIVE for changes in vision, blind spots, double vision.  ENT: NEGATIVE for ear pain, sore throat, sinus pain, post-nasal drip, runny nose, bloody nose  CARDIAC:  POSITIVE for  fast heart beats, fluttering in chest, breathlessness when lying flat and high blood pressure and heart disease  NEUROLOGIC:  POSITIVE for  weakness or numbness in the arms or legs  DERMATOLOGIC: NEGATIVE for rashes, new moles or change in mole(s)  PULMONARY:  POSITIVE for  SOB with activity, dry cough, coughing up blood and wheezing   GASTROINTESTINAL: NEGATIVE for nausea or vomitting, loose or watery stools, fat or grease in stools, constipation, abdominal pain, bowel movements black in color or blood noted.  GENITOURINARY:  POSITIVE for  urinating more frequently than usual  MUSCULOSKELETAL: NEGATIVE for muscle pain, bone or joint pain, swollen joints.  ENDOCRINE:  POSITIVE for  increased thirst, increased urination and diabetes: morning glucose 125; afternoon glucose 150  LYMPHATIC: NEGATIVE for swollen lymph nodes, lumps or bumps in the breasts or nipple discharge.    Physical Examination:  Vitals: /90  Pulse 78  Ht 1.746 m (5' 8.75\")  Wt 118.4 kg (261 lb)  SpO2 93%  BMI 38.82 kg/m2  BMI= Body mass index is 38.82 " kg/(m^2).    [place] Total Score 4/16/2018   Total score - [place] 5       GENERAL APPEARANCE: healthy, alert, no distress and over weight  HENT: ear canals and TM's normal and nose and mouth without ulcers or lesions  RESP: lungs clear to auscultation - no rales, rhonchi or wheezes  CV: regular rates and rhythm, normal S1 S2, no S3 or S4 and no murmur, click or rub  PSYCH: mentation appears normal and affect normal/bright      Impression/Plan:    Joe Mas is a 62 year old male whoJoe Mas is a very pleasant 61 yo M with history of HTN, DM II, obesity, recent diagnosis of paroxysmal atrial fibrillation who presents for high likelihood of sleep disordered breathing.    1.)  High probability of JUNG.     - STOP-BANG score of 7.   - Will schedule home sleep study to fully evaluate for JUNG and possible treatment.  Will see patient after the study.    Plan as given to patient as above.    I have spent 60 minutes with this patient today in which greater than 50% of this time was spent in the counseling / coordination of care regarding JUNG.    Polysomnography reviewed.  Obstructive sleep apnea reviewed.  Complications of untreated sleep apnea were reviewed.    Manan Day MD    CC: No ref. provider found

## 2018-04-19 ENCOUNTER — ANTICOAGULATION THERAPY VISIT (OUTPATIENT)
Dept: ANTICOAGULATION | Facility: CLINIC | Age: 63
End: 2018-04-19
Payer: COMMERCIAL

## 2018-04-19 ENCOUNTER — OFFICE VISIT (OUTPATIENT)
Dept: CARDIOLOGY | Facility: CLINIC | Age: 63
End: 2018-04-19
Attending: INTERNAL MEDICINE
Payer: COMMERCIAL

## 2018-04-19 VITALS
OXYGEN SATURATION: 97 % | HEART RATE: 82 BPM | DIASTOLIC BLOOD PRESSURE: 90 MMHG | WEIGHT: 262 LBS | BODY MASS INDEX: 38.97 KG/M2 | SYSTOLIC BLOOD PRESSURE: 125 MMHG

## 2018-04-19 DIAGNOSIS — I48.91 ATRIAL FIBRILLATION, UNSPECIFIED TYPE (H): ICD-10-CM

## 2018-04-19 DIAGNOSIS — Z79.01 LONG TERM CURRENT USE OF ANTICOAGULANT THERAPY: ICD-10-CM

## 2018-04-19 DIAGNOSIS — I48.0 PAROXYSMAL ATRIAL FIBRILLATION (H): ICD-10-CM

## 2018-04-19 LAB — INR POINT OF CARE: 2.2 (ref 0.86–1.14)

## 2018-04-19 PROCEDURE — 99207 ZZC NO CHARGE NURSE ONLY: CPT

## 2018-04-19 PROCEDURE — 85610 PROTHROMBIN TIME: CPT | Mod: QW

## 2018-04-19 PROCEDURE — 36416 COLLJ CAPILLARY BLOOD SPEC: CPT

## 2018-04-19 PROCEDURE — 99214 OFFICE O/P EST MOD 30 MIN: CPT | Performed by: INTERNAL MEDICINE

## 2018-04-19 NOTE — PROGRESS NOTES
CARDIOLOGY VISIT    REASON FOR VISIT: f/u afib    SUBJECTIVE:  62-year-old male seen for A. fib.  He has hypertension, type 2 diabetes, and dyslipidemia.      He has a history of hypertension and has been on medications for about one year.  Systolic blood pressure has been borderline controlled.  In the past few months he has been increasing exercise trying to weight lose weight.  He's been walking on a treadmill up to 3 miles per hour at a 2-3% incline 5 days per week, he has some minimal dyspnea, but no chest pain.  He denies any palpitations, lightheadedness, dizziness, or lower extremity edema.     A. fib was diagnosed in February 2018 at a primary care appointment, HR of 97.  Warfarin and metoprolol were started.      In the past few weeks heart rate has been between 60 and 90 with blood pressure between 110 and 140 on his home machine.     Echo 3/2018 (sinus rhythm) showed EF 60%, mild LVH, normal RV, normal LA size, no valve disease.     7 day Zio patch March 2018 showed sinus rhythm, 20% paroxysmal A. fib with average heart rate 108, up to 12 hours, rare ectopy.     He has been feeling about the same.  Blood pressure at home has been from 120 up to 150.  His monitor detects intermittent A. fib, he estimates less than 20% of the time.  He is scheduled for a home overnight oximeter study on May 1.    MEDICATIONS:  Current Outpatient Prescriptions   Medication     ASPIRIN 81 MG OR TABS     atorvastatin (LIPITOR) 20 MG tablet     blood glucose monitoring (ACCU-CHEK FASTCLIX) lancets     blood glucose monitoring (ACCU-CHEK SMARTVIEW) test strip     blood glucose monitoring (BELA CONTOUR NEXT MONITOR) meter device kit     losartan (COZAAR) 100 MG tablet     metFORMIN (GLUCOPHAGE-XR) 500 MG 24 hr tablet     metoprolol succinate (TOPROL-XL) 50 MG 24 hr tablet     VENTOLIN  (90 BASE) MCG/ACT Inhaler     warfarin (COUMADIN) 5 MG tablet     No current facility-administered medications for this visit.         ALLERGIES:  Allergies   Allergen Reactions     Lisinopril Cough       REVIEW OF SYSTEMS:  Constitutional:  No weight loss, fever, chills, weakness or fatigue.  HEENT:  Eyes:  No visual loss, blurred vision, double vision or yellow sclerae. No hearing loss, sneezing, congestion, runny nose or sore throat.  Skin:  No rash or itching.  Cardiovascular: per HPI  Respiratory: per HPI  GI:  No anorexia, nausea, vomiting or diarrhea. No abdominal pain or blood.  :  No dysurea, hematuria  Neurologic:  No headache, dizziness, syncope, paralysis, ataxia, numbness or tingling in the extremities. No change in bowel or bladder control.  Musculoskeletal:  No muscle, back pain, joint pain or stiffness.  Hematologic:  No anemia, bleeding or bruising.  Lymphatics:  No enlarged nodes. No history of splenectomy.  Psychiatric:  No history of depression or anxiety.  Endocrine:  No reports of sweating, cold or heat intolerance. No polyuria or polydipsia.  Allergies:  No history of asthma, hives, eczema or rhinitis.    PHYSICAL EXAM:      BP: 125/90 Pulse: 82     SpO2: 97 %      Vital Signs with Ranges  Pulse:  [82] 82  BP: (125)/(90) 125/90  SpO2:  [97 %] 97 %  262 lbs 0 oz    Constitutional: awake, alert, no distress  Eyes: PERRL, sclera nonicteric  ENT: trachea midline  Respiratory: Lungs clear  Cardiovascular: Regular rate and rhythm, no murmurs  GI: nondistended, nontender, bowel sounds present  Lymph/Hematologic: no lymphadenopathy  Skin: dry, no rash  Musculoskeletal: good muscle tone, strength 5/5 in upper and lower extremities  Neurologic: no focal deficits  Neuropsychiatric: appropriate affact    ASSESSMENT:  62-year-old male seen for follow-up of paroxysmal A. fib.  Over the 5 days Zio patch, he had 2 episodes of A. fib, both occurring late in the evening and resolving by morning.  There is likely a strong correlation in his case between sleep apnea and the A. fib.    For now he will be kept on warfarin and metoprolol.   Zio monitor can be repeated as he likely has sleep apnea and will be worked up for it soon.  He will likely need CPAP.  If he continues to have paroxysmal A. fib, he may be a good candidate for antiarrhythmic therapy.      RECOMMENDATIONS:  1.  Paroxysmal atrial fibrillation  - Continue metoprolol and warfarin  -Patient will call after there is a plan in place for his sleep apnea workup.  If he starts CPAP, repeat a 7 day Zio patch after he has been on CPAP a few weeks    Follow-up in 2 months.    Breezy Daniel MD  Cardiology - [facility]  Pager:  [phone]  Text Page  April 19, 2018

## 2018-04-19 NOTE — LETTER
4/19/2018    Jolynn Phillips MD  [address] [facility] [address]    RE: Joe Chandleruse       Dear Colleague,    I had the pleasure of seeing Joe Mas in the [facility].    CARDIOLOGY VISIT    REASON FOR VISIT: f/u afib    SUBJECTIVE:  62-year-old male seen for A. fib.  He has hypertension, type 2 diabetes, and dyslipidemia.      He has a history of hypertension and has been on medications for about one year.  Systolic blood pressure has been borderline controlled.  In the past few months he has been increasing exercise trying to weight lose weight.  He's been walking on a treadmill up to 3 miles per hour at a 2-3% incline 5 days per week, he has some minimal dyspnea, but no chest pain.  He denies any palpitations, lightheadedness, dizziness, or lower extremity edema.     A. fib was diagnosed in February 2018 at a primary care appointment, HR of 97.  Warfarin and metoprolol were started.      In the past few weeks heart rate has been between 60 and 90 with blood pressure between 110 and 140 on his home machine.     Echo 3/2018 (sinus rhythm) showed EF 60%, mild LVH, normal RV, normal LA size, no valve disease.     7 day Zio patch March 2018 showed sinus rhythm, 20% paroxysmal A. fib with average heart rate 108, up to 12 hours, rare ectopy.     He has been feeling about the same.  Blood pressure at home has been from 120 up to 150.  His monitor detects intermittent A. fib, he estimates less than 20% of the time.  He is scheduled for a home overnight oximeter study on May 1.    MEDICATIONS:  Current Outpatient Prescriptions   Medication     ASPIRIN 81 MG OR TABS     atorvastatin (LIPITOR) 20 MG tablet     blood glucose monitoring (ACCU-CHEK FASTCLIX) lancets     blood glucose monitoring (ACCU-CHEK SMARTVIEW) test strip     blood glucose monitoring (BELA CONTOUR NEXT MONITOR) meter device kit     losartan (COZAAR) 100 MG tablet     metFORMIN (GLUCOPHAGE-XR) 500 MG 24 hr  tablet     metoprolol succinate (TOPROL-XL) 50 MG 24 hr tablet     VENTOLIN  (90 BASE) MCG/ACT Inhaler     warfarin (COUMADIN) 5 MG tablet     No current facility-administered medications for this visit.        ALLERGIES:  Allergies   Allergen Reactions     Lisinopril Cough       REVIEW OF SYSTEMS:  Constitutional:  No weight loss, fever, chills, weakness or fatigue.  HEENT:  Eyes:  No visual loss, blurred vision, double vision or yellow sclerae. No hearing loss, sneezing, congestion, runny nose or sore throat.  Skin:  No rash or itching.  Cardiovascular: per HPI  Respiratory: per HPI  GI:  No anorexia, nausea, vomiting or diarrhea. No abdominal pain or blood.  :  No dysurea, hematuria  Neurologic:  No headache, dizziness, syncope, paralysis, ataxia, numbness or tingling in the extremities. No change in bowel or bladder control.  Musculoskeletal:  No muscle, back pain, joint pain or stiffness.  Hematologic:  No anemia, bleeding or bruising.  Lymphatics:  No enlarged nodes. No history of splenectomy.  Psychiatric:  No history of depression or anxiety.  Endocrine:  No reports of sweating, cold or heat intolerance. No polyuria or polydipsia.  Allergies:  No history of asthma, hives, eczema or rhinitis.    PHYSICAL EXAM:      BP: 125/90 Pulse: 82     SpO2: 97 %      Vital Signs with Ranges  Pulse:  [82] 82  BP: (125)/(90) 125/90  SpO2:  [97 %] 97 %  262 lbs 0 oz    Constitutional: awake, alert, no distress  Eyes: PERRL, sclera nonicteric  ENT: trachea midline  Respiratory: Lungs clear  Cardiovascular: Regular rate and rhythm, no murmurs  GI: nondistended, nontender, bowel sounds present  Lymph/Hematologic: no lymphadenopathy  Skin: dry, no rash  Musculoskeletal: good muscle tone, strength 5/5 in upper and lower extremities  Neurologic: no focal deficits  Neuropsychiatric: appropriate affact    ASSESSMENT:  62-year-old male seen for follow-up of paroxysmal A. fib.  Over the 5 days Zio patch, he had 2 episodes of  A. fib, both occurring late in the evening and resolving by morning.  There is likely a strong correlation in his case between sleep apnea and the A. fib.    For now he will be kept on warfarin and metoprolol.  Zio monitor can be repeated as he likely has sleep apnea and will be worked up for it soon.  He will likely need CPAP.  If he continues to have paroxysmal A. fib, he may be a good candidate for antiarrhythmic therapy.      RECOMMENDATIONS:  1.  Paroxysmal atrial fibrillation  - Continue metoprolol and warfarin  -Patient will call after there is a plan in place for his sleep apnea workup.  If he starts CPAP, repeat a 7 day Zio patch after he has been on CPAP a few weeks    Follow-up in 2 months.    Breezy Daniel MD  Cardiology - [facility]  Pager:  [phone]  Text Page  April 19, 2018      Thank you for allowing me to participate in the care of your patient.      Sincerely,     Breezy Daniel MD     [facility]    cc:   Breezy Daniel MD  [facility]  [address] [place] [address]

## 2018-04-19 NOTE — PROGRESS NOTES
ANTICOAGULATION FOLLOW-UP CLINIC VISIT    Patient Name:  Joe Mas  Date:  4/19/2018  Contact Type:  Face to Face    SUBJECTIVE:     Patient Findings     Positives No Problem Findings    Comments Pt denies changes in medications, diet, activity or health, reports taking warfarin as directed.             OBJECTIVE    INR Protime   Date Value Ref Range Status   04/19/2018 2.2 (A) 0.86 - 1.14 Final       ASSESSMENT / PLAN  INR assessment THER    Recheck INR In: 2 WEEKS    INR Location Clinic      Anticoagulation Summary as of 4/19/2018     INR goal 2.0-3.0   Today's INR 2.2   Maintenance plan 5 mg (5 mg x 1) on Mon, Wed, Fri; 2.5 mg (5 mg x 0.5) all other days   Full instructions 5 mg on Mon, Wed, Fri; 2.5 mg all other days   Weekly total 25 mg   No change documented Dara Hodges RN   Plan last modified Sofia Tai RN (3/29/2018)   Next INR check 5/3/2018   Priority INR   Target end date     Indications   Atrial fibrillation  unspecified type (H) [I48.91]  Long term current use of anticoagulant therapy [Z79.01]         Anticoagulation Episode Summary     INR check location     Preferred lab     Send INR reminders to [facility]    Comments *      Anticoagulation Care Providers     Provider Role Specialty Phone number    Jolynn Phillips MD [facility] [phone]            See the Encounter Report to view Anticoagulation Flowsheet and Dosing Calendar (Go to Encounters tab in chart review, and find the Anticoagulation Therapy Visit)        Dara Hodges, TRINI

## 2018-04-19 NOTE — MR AVS SNAPSHOT
After Visit Summary   4/19/2018    Joe Mas    MRN: [id number]           Patient Information     Date Of Birth          1955        Visit Information        Provider Department      4/19/2018 11:30 AM Breezy Daniel MD [facility]        Today's Diagnoses     Paroxysmal atrial fibrillation (H)           Follow-ups after your visit        Additional Services     Follow-Up with Cardiologist                 Your next 10 appointments already scheduled     May 01, 2018 10:00 AM CDT   HST  with SLEEP LAB, BED FIVE   [facility] ([facility])    [address] [address]   [phone]            May 03, 2018  9:00 AM CDT   Anticoagulation Visit with [facility] ([facility])    [address] [address]   [phone]            May 03, 2018 10:00 AM CDT   HST Drop Off with SLEEP LAB, BED FIVE   [facility] ([facility])    [address] [address]   [phone]            May 10, 2018  3:30 PM CDT   Return Sleep Patient with Manan Day MD   [facility] ([facility])    [address] [address]   [phone]            Jun 19, 2018  9:30 AM CDT   Return Visit with Breezy Daniel MD   [facility] ([facility])    [address] [address]   [phone]              Future tests that were ordered for you today     Open Future Orders        Priority Expected Expires Ordered    Follow-Up with Cardiologist Routine 6/20/2018 4/19/2019 4/19/2018            Who to contact     If you have questions or need follow up information about today's clinic visit or your schedule please contact [facility]  [facility]   [place] directly at [phone].  Normal or non-critical lab and imaging results will be communicated to you by MyChart, letter or phone within 4 business days after the clinic has received the results. If you do not hear from us within 7 days, please contact the clinic through [email or web address] or phone. If you have a critical or abnormal lab result, we will notify you by phone as soon as possible.  Submit refill requests through BlockTrail or call your pharmacy and they will forward the refill request to us. Please allow 3 business days for your refill to be completed.          Additional Information About Your Visit        [email or web address] Information     BlockTrail gives you secure access to your electronic health record. If you see a primary care provider, you can also send messages to your care team and make appointments. If you have questions, please call your primary care clinic.  If you do not have a primary care provider, please call [phone] and they will assist you.        Care EveryWhere ID     This is your Care EveryWhere ID. This could be used by other organizations to access your [place] medical records  [id number]        Your Vitals Were     Pulse Pulse Oximetry BMI (Body Mass Index)             82 97% 38.97 kg/m2          Blood Pressure from Last 3 Encounters:   04/19/18 125/90   04/16/18 133/90   03/08/18 118/81    Weight from Last 3 Encounters:   04/19/18 118.8 kg (262 lb)   04/16/18 118.4 kg (261 lb)   03/08/18 116.6 kg (257 lb)              We Performed the Following     Follow-Up with Cardiologist        Primary Care Provider Office Phone # Fax #    Jolynn Phillips [phone] [address]  [place] [address]        Equal Access to Services     [facility]: Hadii chetna Delvalle, waaxda luqadaha, qaybta kaalcatherine mata . So [facility] [phone].    ATENCIÓN: Si habla español, tiene a rhodes disposición servicios gratuitos  de asistencia lingüística. Rosie simmons [phone].    We comply with applicable federal civil rights laws and [place] laws. We do not discriminate on the basis of race, color, national origin, age, disability, sex, sexual orientation, or gender identity.            Thank you!     Thank you for choosing [facility]  for your care. Our goal is always to provide you with excellent care. Hearing back from our patients is one way we can continue to improve our services. Please take a few minutes to complete the written survey that you may receive in the mail after your visit with us. Thank you!             Your Updated Medication List - Protect others around you: Learn how to safely use, store and throw away your medicines at www.disposemymeds.org.          This list is accurate as of 4/19/18 11:48 AM.  Always use your most recent med list.                   Brand Name Dispense Instructions for use Diagnosis    aspirin 81 MG tablet     100    ONE DAILY    Mixed hyperlipidemia       atorvastatin 20 MG tablet    LIPITOR    90 tablet    Take 1 tablet (20 mg) by mouth daily    Mixed hyperlipidemia       blood glucose monitoring lancets     3 Box    1 each daily    Type 2 diabetes, HbA1c goal < 7% (H)       blood glucose monitoring meter device kit     1 kit    Use to test blood sugar daily    Diabetes mellitus (H)       blood glucose monitoring test strip    ACCU-CHEK SMARTVIEW    300 each    Test blood sugars daily    Type 2 diabetes, HbA1c goal < 7% (H)       losartan 100 MG tablet    COZAAR    90 tablet    TAKE ONE TABLET BY MOUTH EVERY DAY    Essential hypertension, benign       metFORMIN 500 MG 24 hr tablet    GLUCOPHAGE-XR    90 tablet    Take 1 tablet (500 mg) by mouth daily    Type 2 diabetes mellitus without complication, without long-term current use of insulin (H)       metoprolol succinate 50 MG 24 hr tablet    TOPROL-XL    90 tablet    Take 1 tablet (50 mg) by mouth daily     Atrial fibrillation, unspecified type (H)       VENTOLIN  (90 Base) MCG/ACT Inhaler   Generic drug:  albuterol     18 g    INHALE TWO PUFFS BY MOUTH EVERY 6 HOURS AS NEEDED FOR SHORTNESS OF BREATH    Obstructive chronic bronchitis with exacerbation (H)       warfarin 5 MG tablet    COUMADIN    30 tablet    Take 5 mg on Mon, Wed, Fri; 2.5 mg all other days or as directed by Anticoagulation Clinic    Atrial fibrillation, unspecified type (H)

## 2018-04-19 NOTE — LETTER
4/19/2018    Jolynn Phillips MD  [address] [facility] [address]    RE: Joe Chandleruse       Dear Colleague,    I had the pleasure of seeing Joe Mas in the [facility].    CARDIOLOGY VISIT    REASON FOR VISIT: f/u afib    SUBJECTIVE:  62-year-old male seen for A. fib.  He has hypertension, type 2 diabetes, and dyslipidemia.      He has a history of hypertension and has been on medications for about one year.  Systolic blood pressure has been borderline controlled.  In the past few months he has been increasing exercise trying to weight lose weight.  He's been walking on a treadmill up to 3 miles per hour at a 2-3% incline 5 days per week, he has some minimal dyspnea, but no chest pain.  He denies any palpitations, lightheadedness, dizziness, or lower extremity edema.     A. fib was diagnosed in February 2018 at a primary care appointment, HR of 97.  Warfarin and metoprolol were started.      In the past few weeks heart rate has been between 60 and 90 with blood pressure between 110 and 140 on his home machine.     Echo 3/2018 (sinus rhythm) showed EF 60%, mild LVH, normal RV, normal LA size, no valve disease.     7 day Zio patch March 2018 showed sinus rhythm, 20% paroxysmal A. fib with average heart rate 108, up to 12 hours, rare ectopy.     He has been feeling about the same.  Blood pressure at home has been from 120 up to 150.  His monitor detects intermittent A. fib, he estimates less than 20% of the time.  He is scheduled for a home overnight oximeter study on May 1.    MEDICATIONS:  Current Outpatient Prescriptions   Medication     ASPIRIN 81 MG OR TABS     atorvastatin (LIPITOR) 20 MG tablet     blood glucose monitoring (ACCU-CHEK FASTCLIX) lancets     blood glucose monitoring (ACCU-CHEK SMARTVIEW) test strip     blood glucose monitoring (BELA CONTOUR NEXT MONITOR) meter device kit     losartan (COZAAR) 100 MG tablet     metFORMIN (GLUCOPHAGE-XR) 500 MG 24 hr  tablet     metoprolol succinate (TOPROL-XL) 50 MG 24 hr tablet     VENTOLIN  (90 BASE) MCG/ACT Inhaler     warfarin (COUMADIN) 5 MG tablet     No current facility-administered medications for this visit.        ALLERGIES:  Allergies   Allergen Reactions     Lisinopril Cough       REVIEW OF SYSTEMS:  Constitutional:  No weight loss, fever, chills, weakness or fatigue.  HEENT:  Eyes:  No visual loss, blurred vision, double vision or yellow sclerae. No hearing loss, sneezing, congestion, runny nose or sore throat.  Skin:  No rash or itching.  Cardiovascular: per HPI  Respiratory: per HPI  GI:  No anorexia, nausea, vomiting or diarrhea. No abdominal pain or blood.  :  No dysurea, hematuria  Neurologic:  No headache, dizziness, syncope, paralysis, ataxia, numbness or tingling in the extremities. No change in bowel or bladder control.  Musculoskeletal:  No muscle, back pain, joint pain or stiffness.  Hematologic:  No anemia, bleeding or bruising.  Lymphatics:  No enlarged nodes. No history of splenectomy.  Psychiatric:  No history of depression or anxiety.  Endocrine:  No reports of sweating, cold or heat intolerance. No polyuria or polydipsia.  Allergies:  No history of asthma, hives, eczema or rhinitis.    PHYSICAL EXAM:      BP: 125/90 Pulse: 82     SpO2: 97 %      Vital Signs with Ranges  Pulse:  [82] 82  BP: (125)/(90) 125/90  SpO2:  [97 %] 97 %  262 lbs 0 oz    Constitutional: awake, alert, no distress  Eyes: PERRL, sclera nonicteric  ENT: trachea midline  Respiratory: Lungs clear  Cardiovascular: Regular rate and rhythm, no murmurs  GI: nondistended, nontender, bowel sounds present  Lymph/Hematologic: no lymphadenopathy  Skin: dry, no rash  Musculoskeletal: good muscle tone, strength 5/5 in upper and lower extremities  Neurologic: no focal deficits  Neuropsychiatric: appropriate affact    ASSESSMENT:  62-year-old male seen for follow-up of paroxysmal A. fib.  Over the 5 days Zio patch, he had 2 episodes of  A. fib, both occurring late in the evening and resolving by morning.  There is likely a strong correlation in his case between sleep apnea and the A. fib.    For now he will be kept on warfarin and metoprolol.  Zio monitor can be repeated as he likely has sleep apnea and will be worked up for it soon.  He will likely need CPAP.  If he continues to have paroxysmal A. fib, he may be a good candidate for antiarrhythmic therapy.      RECOMMENDATIONS:  1.  Paroxysmal atrial fibrillation  - Continue metoprolol and warfarin  -Patient will call after there is a plan in place for his sleep apnea workup.  If he starts CPAP, repeat a 7 day Zio patch after he has been on CPAP a few weeks    Follow-up in 2 months.    Breezy Daniel MD  Cardiology - [facility]  Pager:  [phone]  Text Page  April 19, 2018      Thank you for allowing me to participate in the care of your patient.    Sincerely,     Breezy Daniel MD     [facility]

## 2018-04-19 NOTE — MR AVS SNAPSHOT
Joe Mas   4/19/2018 11:00 AM   Anticoagulation Therapy Visit    Description:  62 year old male   Provider:  [place] [facility]   Department:  [place] [facility]           INR as of 4/19/2018     Today's INR 2.2      Anticoagulation Summary as of 4/19/2018     INR goal 2.0-3.0   Today's INR 2.2   Full instructions 5 mg on Mon, Wed, Fri; 2.5 mg all other days   Next INR check 5/3/2018    Indications   Atrial fibrillation  unspecified type (H) [I48.91]  Long term current use of anticoagulant therapy [Z79.01]         Your next Anticoagulation Clinic appointment(s)     Apr 19, 2018 11:00 AM CDT   [facility] [place] [facility]    [address] [address] [phone]            May 03, 2018  9:00 AM CDT   [facility]agulation [facility] with [place] [facility]    [address] [address]   [phone]              Contact Numbers     Please call [phone] with any problems or questions regarding your therapy.    If you need to cancel and/or reschedule your appointment please call one of the following numbers:  [facility] - [phone]  [place] - [phone]  [place] - [phone]  [facility] [place] [facility] [phone]  [place] - [phone]            April 2018 Details    Sun Mon Tue Wed Thu Fri Sat     1               2               3               4               5               6               7                 8               9               10               11               12               13               14                 15               16               17               18               19      2.5 mg   See details      20      5 mg         21      2.5 mg           22      2.5 mg         23      5 mg         24      2.5 mg         25      5 mg         26      2.5 mg         27      5 mg         28      2.5 mg           29      2.5 mg         30      5 mg                Date Details   04/19 This INR check               How to take your warfarin dose     To take:  2.5 mg Take 0.5 of a 5 mg tablet.    To take:  5 mg Take 1 of the 5 mg tablets.           May 2018 Details    Sun Mon Tue Wed Thu Fri Sat       1      2.5 mg         2      5 mg         3            4               5                 6               7               8               9               10               11               12                 13               14               15               16               17               18               19                 20               21               22               23               24               25               26                 27               28               29               30               31                  Date Details   No additional details    Date of next INR:  5/3/2018         How to take your warfarin dose     To take:  2.5 mg Take 0.5 of a 5 mg tablet.    To take:  5 mg Take 1 of the 5 mg tablets.

## 2018-05-01 ENCOUNTER — OFFICE VISIT (OUTPATIENT)
Dept: SLEEP MEDICINE | Facility: CLINIC | Age: 63
End: 2018-05-01
Payer: COMMERCIAL

## 2018-05-01 DIAGNOSIS — E66.812 CLASS 2 OBESITY DUE TO EXCESS CALORIES WITH BODY MASS INDEX (BMI) OF 35.0 TO 35.9 IN ADULT, UNSPECIFIED WHETHER SERIOUS COMORBIDITY PRESENT: ICD-10-CM

## 2018-05-01 DIAGNOSIS — I48.0 PAROXYSMAL ATRIAL FIBRILLATION (H): ICD-10-CM

## 2018-05-01 DIAGNOSIS — I10 ESSENTIAL HYPERTENSION: ICD-10-CM

## 2018-05-01 DIAGNOSIS — E66.09 CLASS 2 OBESITY DUE TO EXCESS CALORIES WITH BODY MASS INDEX (BMI) OF 35.0 TO 35.9 IN ADULT, UNSPECIFIED WHETHER SERIOUS COMORBIDITY PRESENT: ICD-10-CM

## 2018-05-01 DIAGNOSIS — R06.83 SNORING: ICD-10-CM

## 2018-05-01 DIAGNOSIS — E11.8 TYPE 2 DIABETES MELLITUS WITH COMPLICATION, WITHOUT LONG-TERM CURRENT USE OF INSULIN (H): ICD-10-CM

## 2018-05-01 PROCEDURE — G0399 HOME SLEEP TEST/TYPE 3 PORTA: HCPCS | Performed by: FAMILY MEDICINE

## 2018-05-01 NOTE — PROGRESS NOTES
SUBJECTIVE:  Chief Complaint   Patient presents with     Sleep Study      home sleep test         OBJECTIVE:  home sleep apnea test ordered.    Instructions were reviewed with Joe and were also printed for Joe to take with him.   Joe verbalized understanding and demonstrated use very well.     ASSESSMENT/PLAN:  Joe received home sleep apnea test today May 1, 2018. Pt will use device and will return on 5/3/18    This HST performed using a Noxturnal T3 device which recorded snore sound, movement activity, body position, nasal pressure, oronasal thermal airflow, pulse, oximetry, both chest and abdominal respiratory effort. Patient was scored using 1B 4% rule.

## 2018-05-01 NOTE — MR AVS SNAPSHOT
After Visit Summary   5/1/2018    Joe Mas    MRN: [id number]           Patient Information     Date Of Birth          1955        Visit Information        Provider Department      5/1/2018 10:00 AM SLEEP LAB, BED FIVE [facility]        Today's Diagnoses     Paroxysmal atrial fibrillation (H)        Type 2 diabetes mellitus with complication, without long-term current use of insulin (H)        Essential hypertension        Class 2 obesity due to excess calories with body mass index (BMI) of 35.0 to 35.9 in adult, unspecified whether serious comorbidity present        Snoring           Follow-ups after your visit        Your next 10 appointments already scheduled     May 03, 2018  9:00 AM CDT   Anticoagulation Visit with [facility])    [address] [address]   [phone]            May 03, 2018 10:00 AM CDT   HST Drop Off with SLEEP LAB, BED FIVE   [facility]    [address] [address]   [phone]            May 10, 2018  3:30 PM CDT   Return Sleep Patient with Manna Day MD   [facility] ([facility]    [address] [address]   [phone]            Jun 19, 2018  9:30 AM CDT   Return Visit with Breezy Daniel MD   [facility] ([facility])    [address] [address]   [phone]              Who to contact     If you have questions or need follow up information about today's clinic visit or your schedule please contact [facility] directly at [phone].  Normal or non-critical lab and imaging results will be communicated to you by MyChart, letter or phone within 4 business days after the clinic has received the results. If you do not hear  from us within 7 days, please contact the clinic through Viadeo or phone. If you have a critical or abnormal lab result, we will notify you by phone as soon as possible.  Submit refill requests through Viadeo or call your pharmacy and they will forward the refill request to us. Please allow 3 business days for your refill to be completed.          Additional Information About Your Visit        Acornshart Information     Viadeo gives you secure access to your electronic health record. If you see a primary care provider, you can also send messages to your care team and make appointments. If you have questions, please call your primary care clinic.  If you do not have a primary care provider, please call [phone] and they will assist you.        Care EveryWhere ID     This is your Care EveryWhere ID. This could be used by other organizations to access your [place] medical records  [id number]         Blood Pressure from Last 3 Encounters:   04/19/18 125/90   04/16/18 133/90   03/08/18 118/81    Weight from Last 3 Encounters:   04/19/18 118.8 kg (262 lb)   04/16/18 118.4 kg (261 lb)   03/08/18 116.6 kg (257 lb)              We Performed the Following     HST-Home Sleep Apnea Test        Primary Care Provider Office Phone # Fax #    Jolynn Phillips [phone] [address] [facility] [address]        Equal Access to Services     [facility]: Hadii aad ku hadasho Soomaali, waaxda luqadaha, qaybta kaalmada vidal, catherine patel. So [facility] [phone].    ATENCIÓN: Si habla español, tiene a rhodes disposición servicios gratuitos de asistencia lingüística. Rosie al [phone].    We comply with applicable federal civil rights laws and [place] laws. We do not discriminate on the basis of race, color, national origin, age, disability, sex, sexual orientation, or gender identity.            Thank you!     Thank you for choosing [facility]   for your care. Our goal is always to provide you with excellent care. Hearing back from our patients is one way we can continue to improve our services. Please take a few minutes to complete the written survey that you may receive in the mail after your visit with us. Thank you!             Your Updated Medication List - Protect others around you: Learn how to safely use, store and throw away your medicines at www.disposemymeds.org.          This list is accurate as of 5/1/18 10:20 AM.  Always use your most recent med list.                   Brand Name Dispense Instructions for use Diagnosis    aspirin 81 MG tablet     100    ONE DAILY    Mixed hyperlipidemia       atorvastatin 20 MG tablet    LIPITOR    90 tablet    Take 1 tablet (20 mg) by mouth daily    Mixed hyperlipidemia       blood glucose monitoring lancets     3 Box    1 each daily    Type 2 diabetes, HbA1c goal < 7% (H)       blood glucose monitoring meter device kit     1 kit    Use to test blood sugar daily    Diabetes mellitus (H)       blood glucose monitoring test strip    ACCU-CHEK SMARTVIEW    300 each    Test blood sugars daily    Type 2 diabetes, HbA1c goal < 7% (H)       losartan 100 MG tablet    COZAAR    90 tablet    TAKE ONE TABLET BY MOUTH EVERY DAY    Essential hypertension, benign       metFORMIN 500 MG 24 hr tablet    GLUCOPHAGE-XR    90 tablet    Take 1 tablet (500 mg) by mouth daily    Type 2 diabetes mellitus without complication, without long-term current use of insulin (H)       metoprolol succinate 50 MG 24 hr tablet    TOPROL-XL    90 tablet    Take 1 tablet (50 mg) by mouth daily    Atrial fibrillation, unspecified type (H)       VENTOLIN  (90 Base) MCG/ACT Inhaler   Generic drug:  albuterol     18 g    INHALE TWO PUFFS BY MOUTH EVERY 6 HOURS AS NEEDED FOR SHORTNESS OF BREATH    Obstructive chronic bronchitis with exacerbation (H)       warfarin 5 MG tablet    COUMADIN    30 tablet    Take 5 mg on Mon, Wed, Fri; 2.5 mg all  other days or as directed by Anticoagulation Clinic    Atrial fibrillation, unspecified type (H)

## 2018-05-03 ENCOUNTER — ANTICOAGULATION THERAPY VISIT (OUTPATIENT)
Dept: ANTICOAGULATION | Facility: CLINIC | Age: 63
End: 2018-05-03
Payer: COMMERCIAL

## 2018-05-03 DIAGNOSIS — I48.91 ATRIAL FIBRILLATION, UNSPECIFIED TYPE (H): ICD-10-CM

## 2018-05-03 DIAGNOSIS — Z79.01 LONG TERM CURRENT USE OF ANTICOAGULANT THERAPY: ICD-10-CM

## 2018-05-03 LAB — INR POINT OF CARE: 2.3 (ref 0.86–1.14)

## 2018-05-03 PROCEDURE — 85610 PROTHROMBIN TIME: CPT | Mod: QW

## 2018-05-03 PROCEDURE — 36416 COLLJ CAPILLARY BLOOD SPEC: CPT

## 2018-05-03 PROCEDURE — 99207 ZZC NO CHARGE NURSE ONLY: CPT

## 2018-05-03 NOTE — PROGRESS NOTES
ANTICOAGULATION FOLLOW-UP CLINIC VISIT    Patient Name:  Joe Mas  Date:  5/3/2018  Contact Type:  Face to Face    SUBJECTIVE:     Patient Findings     Positives No Problem Findings    Comments Pt denies changes in medications, diet, activity or health, reports taking warfarin as directed.             OBJECTIVE    INR Protime   Date Value Ref Range Status   05/03/2018 2.3 (A) 0.86 - 1.14 Final       ASSESSMENT / PLAN  INR assessment THER    Recheck INR In: 2 WEEKS    INR Location Clinic      Anticoagulation Summary as of 5/3/2018     INR goal 2.0-3.0   Today's INR 2.3   Maintenance plan 5 mg (5 mg x 1) on Mon, Wed, Fri; 2.5 mg (5 mg x 0.5) all other days   Full instructions 5 mg on Mon, Wed, Fri; 2.5 mg all other days   Weekly total 25 mg   Plan last modified Sofia Tai, RN (3/29/2018)   Next INR check 5/17/2018   Priority INR   Target end date     Indications   Atrial fibrillation  unspecified type (H) [I48.91]  Long term current use of anticoagulant therapy [Z79.01]         Anticoagulation Episode Summary     INR check location     Preferred lab     Send INR reminders to [facility]    Comments *      Anticoagulation Care Providers     Provider Role Specialty Phone number    Jolynn Phillips MD [facility] [phone]            See the Encounter Report to view Anticoagulation Flowsheet and Dosing Calendar (Go to Encounters tab in chart review, and find the Anticoagulation Therapy Visit)        Dara Hodges RN

## 2018-05-03 NOTE — MR AVS SNAPSHOT
Joe Mas   5/3/2018 9:00 AM   Anticoagulation Therapy Visit    Description:  62 year old male   Provider:  [facility] [facility]   Department:  [facility]           INR as of 5/3/2018     Today's INR 2.3      Anticoagulation Summary as of 5/3/2018     INR goal 2.0-3.0   Today's INR 2.3   Full instructions 5 mg on Mon, Wed, Fri; 2.5 mg all other days   Next INR check 5/17/2018    Indications   Atrial fibrillation  unspecified type (H) [I48.91]  Long term current use of anticoagulant therapy [Z79.01]         Your next Anticoagulation Clinic appointment(s)     May 03, 2018  9:00 AM CDT   [facility] with [facility] [facility]   [facility]    [address] [address] [phone]            May 17, 2018  9:45 AM CDT   Anticoagulation Visit with [place] [facility]   [facility]    [address] [address]   [phone]              Contact Numbers     Please call [phone] with any problems or questions regarding your therapy.    If you need to cancel and/or reschedule your appointment please call one of the following numbers:  [facility] - [phone]  [place] - [phone]  [place] - [phone]  [facility] [place] [phone]  [place] - [phone]            May 2018 Details    Sun Mon Tue Wed Thu Fri Sat       1               2               3      2.5 mg   See details      4      5 mg         5      2.5 mg           6      2.5 mg         7      5 mg         8      2.5 mg         9      5 mg         10      2.5 mg         11      5 mg         12      2.5 mg           13      2.5 mg         14      5 mg         15      2.5 mg         16      5 mg         17            18               19                 20               21               22               23               24               25               26                 27               28               29               30                31                  Date Details   05/03 This INR check       Date of next INR:  5/17/2018         How to take your warfarin dose     To take:  2.5 mg Take 0.5 of a 5 mg tablet.    To take:  5 mg Take 1 of the 5 mg tablets.

## 2018-05-04 RX ORDER — WARFARIN SODIUM 5 MG/1
TABLET ORAL
Qty: 65 TABLET | Refills: 0 | Status: SHIPPED | OUTPATIENT
Start: 2018-05-04 | End: 2018-07-19

## 2018-05-04 NOTE — TELEPHONE ENCOUNTER
"Requested Prescriptions   Pending Prescriptions Disp Refills     JANTOVEN 5 MG tablet [Pharmacy Med Name: JANTOVEN 5MG TABS] 30 tablet 1    Last Written Prescription Date:  3/29/18  Last Fill Quantity: 30,  # refills: 1   Last office visit: 2/27/2018 with prescribing provider:  2/27/18   Future Office Visit:   Next 5 appointments (look out 90 days)     Jun 19, 2018  9:30 AM CDT   Return Visit with Breezy Daniel MD   [facility] ([facility])    [address] [place] [address]   [phone]                  Sig: TAKE ONE TABLET BY MOUTH EVERY DAY    Vitamin K Antagonists Failed    5/3/2018  4:58 PM       Failed - INR is within goal in the past 6 weeks    Confirm INR is within goal in the past 6 weeks.     Recent Labs   Lab Test 05/03/18   INR  2.3*                      Passed - Recent (12 mo) or future (30 days) visit within the authorizing provider's specialty    Patient had office visit in the last 12 months or has a visit in the next 30 days with authorizing provider or within the authorizing provider's specialty.  See \"Patient Info\" tab in inbasket, or \"Choose Columns\" in Meds & Orders section of the refill encounter.           Passed - Patient is 18 years of age or older          "

## 2018-05-04 NOTE — TELEPHONE ENCOUNTER
Signed Prescriptions:                        Disp   Refills    warfarin (JANTOVEN) 5 MG tablet            65 tab*0        Si mg MWF; 2.5 mg all other days or as directed  Authorizing Provider: AISHA MAGDALENO  Ordering User: DIYA BLAND RN refilled medication per [facility] refill protocol.  Diya Bland RN

## 2018-05-07 ENCOUNTER — DOCUMENTATION ONLY (OUTPATIENT)
Dept: SLEEP MEDICINE | Facility: CLINIC | Age: 63
End: 2018-05-07

## 2018-05-08 ENCOUNTER — DOCUMENTATION ONLY (OUTPATIENT)
Dept: SLEEP MEDICINE | Facility: CLINIC | Age: 63
End: 2018-05-08

## 2018-05-08 NOTE — PROCEDURES
"HOME SLEEP STUDY INTERPRETATION    Patient: Joe Mas  MRN: [id number]  YOB: 1955  Study Date: 2018  Referring Provider: Jolynn Phillips  Ordering Provider: Manan Day MD     Indications for Home Study: Joe Mas is a 62 year old male with a history of HTN, DM II, obesity, paroxysmal atrial fibrillation who presents with symptoms suggestive of obstructive sleep apnea.    Estimated body mass index is 38.97 kg/(m^2) as calculated from the following:    Height as of 18: 1.746 m (5' 8.75\").    Weight as of 18: 118.8 kg (262 lb).  Total score - [place]: 5 (2018 10:00 AM)  STOP-BAN/8    Data: A full night home sleep study was performed recording the standard physiologic parameters including body position, movement, sound, nasal pressure, thermal oral airflow, chest and abdominal movements with respiratory inductance plethysmography, and oxygen saturation by pulse oximetry. Pulse rate was estimated by oximetry recording. This study was considered adequate based on > 4 hours of quality oximetry and respiratory recording. As specified by the AASM Manual for the Scoring of Sleep and Associated events, version 2.3, Rule VIII.D 1B, 4% oxygen desaturation scoring for hypopneas is used as a standard of care on all home sleep apnea testing.    Analysis Time:  483.1 minutes    Respiration:   Sleep Associated Hypoxemia: sustained hypoxemia was present. Baseline oxygen saturation was 88.5%.  Time with saturation less than or equal to 88% for majority of recording. The lowest oxygen saturation was 81%.   Snoring: Snoring was present.  Respiratory events: The home study revealed a presence of 13 obstructive apneas and 0 mixed and central apneas. There were 107 hypopneas resulting in a combined apnea/hypopnea index [AHI] of 15.6 events per hour.  AHI was 15.6 per hour supine, 0 per hour prone, 9.7 per hour on left side, and 16.1 per hour on right side.   Pattern: Excluding events " noted above, respiratory rate and pattern was Normal.    Position: Percent of time spent: supine - 11.9%, prone - 0%, on left - 5.8%, on right - 72.8%.    Heart Rate: By pulse oximetry normal rate was noted, though with periods of high irregularity that would be consistent with atrial fibrillation.     Assessment:   Moderate obstructive sleep apnea.  Sleep associated hypoxemia was present.  Concern for sustained hypoxemia.    Recommendations:  Consider polysomnography with full night PAP titration with potential indication for bilevel PAP and / or nocturnal supplemental oxygen.  Suggest optimizing sleep hygiene and avoiding sleep deprivation.  Weight management.    Diagnosis Code(s): Obstructive Sleep Apnea G47.33, Hypoxemia G47.36    Manan Day MD, MD, May 8, 2018   Diplomate, American Board of Family Medicine, Sleep Medicine

## 2018-05-10 ENCOUNTER — OFFICE VISIT (OUTPATIENT)
Dept: SLEEP MEDICINE | Facility: CLINIC | Age: 63
End: 2018-05-10
Payer: COMMERCIAL

## 2018-05-10 VITALS
RESPIRATION RATE: 18 BRPM | BODY MASS INDEX: 38.36 KG/M2 | SYSTOLIC BLOOD PRESSURE: 140 MMHG | WEIGHT: 259 LBS | DIASTOLIC BLOOD PRESSURE: 90 MMHG | OXYGEN SATURATION: 92 % | HEIGHT: 69 IN | HEART RATE: 74 BPM

## 2018-05-10 DIAGNOSIS — G47.33 OSA (OBSTRUCTIVE SLEEP APNEA): Primary | ICD-10-CM

## 2018-05-10 PROCEDURE — 99214 OFFICE O/P EST MOD 30 MIN: CPT | Performed by: FAMILY MEDICINE

## 2018-05-10 NOTE — MR AVS SNAPSHOT
After Visit Summary   5/10/2018    Joe Mas    MRN: [id number]           Patient Information     Date Of Birth          1955        Visit Information        Provider Department      5/10/2018 3:30 PM Manan Day MD [facility]        Care Instructions    Here is a summary of our plan today:    Question #1)  We now know there is obstructive sleep apnea (JUNG), and we will get this treated by starting the CPAP machine.  This is not another overnight test, just getting the machine and mask for home.  If this is going well, we will perform a simple finger oxygen recording overnight while you wear your CPAP.  If there is any concerns for residual low oxygen, then we may consider another sleep test.    Question #2)  Is the history of COPD.  To help us make sure we have the right diagnosis, we often will perform the full pulmonary function tests (PFT's).  This includes the spirometry (looking at how quickly your lungs can move air), measuring lung volumes, and measuring how well your lungs can transport oxygen.  This is not urgent, but would like to get it done in the next 3-6 months.          Follow-ups after your visit        Your next 10 appointments already scheduled     May 17, 2018  9:45 AM CDT   [place]agulation Visit with [place] [facility]   [facility]    [address] [address]   [phone]            Jun 19, 2018  9:30 AM CDT   Return Visit with Breezy Daniel MD   [facility] ([facility])    [address] [address]   [phone]              Who to contact     If you have questions or need follow up information about today's clinic visit or your schedule please contact [facility] directly at [phone].  Normal or non-critical lab and imaging results will be communicated to you by MyChart, letter or  "phone within 4 business days after the clinic has received the results. If you do not hear from us within 7 days, please contact the clinic through [employer] or phone. If you have a critical or abnormal lab result, we will notify you by phone as soon as possible.  Submit refill requests through [employer] or call your pharmacy and they will forward the refill request to us. Please allow 3 business days for your refill to be completed.          Additional Information About Your Visit        [employer] Information     [employer] gives you secure access to your electronic health record. If you see a primary care provider, you can also send messages to your care team and make appointments. If you have questions, please call your primary care clinic.  If you do not have a primary care provider, please call [phone] and they will assist you.        Care EveryWhere ID     This is your Care EveryWhere ID. This could be used by other organizations to access your [place] medical records  [id number]        Your Vitals Were     Pulse Respirations Height Pulse Oximetry BMI (Body Mass Index)       74 18 1.746 m (5' 8.75\") 92% 38.53 kg/m2        Blood Pressure from Last 3 Encounters:   05/10/18 140/90   04/19/18 125/90   04/16/18 133/90    Weight from Last 3 Encounters:   05/10/18 117.5 kg (259 lb)   04/19/18 118.8 kg (262 lb)   04/16/18 118.4 kg (261 lb)              Today, you had the following     No orders found for display       Primary Care Provider Office Phone # Fax #    Jolynn Phillips [phone] [address] [facility] [address]        Equal Access to Services     [facility] AH: Hadii aad ku hadasho Somichaelali, waaxda luqadaha, qaybta kaalmada catherine drake. So [facility] [phone].    ATENCIÓN: Si habla español, tiene a rhodes disposición servicios gratuitos de asistencia lingüística. Rosie simmons [phone].    We comply with applicable federal civil rights laws " and [place] laws. We do not discriminate on the basis of race, color, national origin, age, disability, sex, sexual orientation, or gender identity.            Thank you!     Thank you for choosing [facility]  for your care. Our goal is always to provide you with excellent care. Hearing back from our patients is one way we can continue to improve our services. Please take a few minutes to complete the written survey that you may receive in the mail after your visit with us. Thank you!             Your Updated Medication List - Protect others around you: Learn how to safely use, store and throw away your medicines at www.disposemymeds.org.          This list is accurate as of 5/10/18  3:37 PM.  Always use your most recent med list.                   Brand Name Dispense Instructions for use Diagnosis    aspirin 81 MG tablet     100    ONE DAILY    Mixed hyperlipidemia       atorvastatin 20 MG tablet    LIPITOR    90 tablet    Take 1 tablet (20 mg) by mouth daily    Mixed hyperlipidemia       blood glucose monitoring lancets     3 Box    1 each daily    Type 2 diabetes, HbA1c goal < 7% (H)       blood glucose monitoring meter device kit     1 kit    Use to test blood sugar daily    Diabetes mellitus (H)       blood glucose monitoring test strip    ACCU-CHEK SMARTVIEW    300 each    Test blood sugars daily    Type 2 diabetes, HbA1c goal < 7% (H)       losartan 100 MG tablet    COZAAR    90 tablet    TAKE ONE TABLET BY MOUTH EVERY DAY    Essential hypertension, benign       metFORMIN 500 MG 24 hr tablet    GLUCOPHAGE-XR    90 tablet    Take 1 tablet (500 mg) by mouth daily    Type 2 diabetes mellitus without complication, without long-term current use of insulin (H)       metoprolol succinate 50 MG 24 hr tablet    TOPROL-XL    90 tablet    Take 1 tablet (50 mg) by mouth daily    Atrial fibrillation, unspecified type (H)       VENTOLIN  (90 Base) MCG/ACT Inhaler   Generic drug:  albuterol      18 g    INHALE TWO PUFFS BY MOUTH EVERY 6 HOURS AS NEEDED FOR SHORTNESS OF BREATH    Obstructive chronic bronchitis with exacerbation (H)       * warfarin 5 MG tablet    COUMADIN    30 tablet    Take 5 mg on Mon, Wed, Fri; 2.5 mg all other days or as directed by Anticoagulation Clinic    Atrial fibrillation, unspecified type (H)       * warfarin 5 MG tablet    JANTOVEN    65 tablet    5 mg MWF; 2.5 mg all other days or as directed    Atrial fibrillation, unspecified type (H)       * Notice:  This list has 2 medication(s) that are the same as other medications prescribed for you. Read the directions carefully, and ask your doctor or other care provider to review them with you.

## 2018-05-10 NOTE — PATIENT INSTRUCTIONS
Here is a summary of our plan today:    Question #1)  We now know there is obstructive sleep apnea (JUNG), and we will get this treated by starting the CPAP machine.  This is not another overnight test, just getting the machine and mask for home.  If this is going well, we will perform a simple finger oxygen recording overnight while you wear your CPAP.  If there is any concerns for residual low oxygen, then we may consider another sleep test.    Question #2)  Is the history of COPD.  To help us make sure we have the right diagnosis, we often will perform the full pulmonary function tests (PFT's).  This includes the spirometry (looking at how quickly your lungs can move air), measuring lung volumes, and measuring how well your lungs can transport oxygen.  This is not urgent, but would like to get it done in the next 3-6 months.      Your Body mass index is 38.53 kg/(m^2).  Weight management is a personal decision.  If you are interested in exploring weight loss strategies, the following discussion covers the approaches that may be successful. Body mass index (BMI) is one way to tell whether you are at a healthy weight, overweight, or obese. It measures your weight in relation to your height.  A BMI of 18.5 to 24.9 is in the healthy range. A person with a BMI of 25 to 29.9 is considered overweight, and someone with a BMI of 30 or greater is considered obese. More than two-thirds of American adults are considered overweight or obese.  Being overweight or obese increases the risk for further weight gain. Excess weight may lead to heart disease and diabetes.  Creating and following plans for healthy eating and physical activity may help you improve your health.  Weight control is part of healthy lifestyle and includes exercise, emotional health, and healthy eating habits. Careful eating habits lifelong are the mainstay of weight control. Though there are significant health benefits from weight loss, long-term weight loss  with diet alone may be very difficult to achieve- studies show long-term success with dietary management in less than 10% of people. Attaining a healthy weight may be especially difficult to achieve in those with severe obesity. In some cases, medications, devices and surgical management might be considered.  What can you do?  If you are overweight or obese and are interested in methods for weight loss, you should discuss this with your provider.     Consider reducing daily calorie intake by 500 calories.     Keep a food journal.     Avoiding skipping meals, consider cutting portions instead.    Diet combined with exercise helps maintain muscle while optimizing fat loss. Strength training is particularly important for building and maintaining muscle mass. Exercise helps reduce stress, increase energy, and improves fitness. Increasing exercise without diet control, however, may not burn enough calories to loose weight.       Start walking three days a week 10-20 minutes at a time    Work towards walking thirty minutes five days a week     Eventually, increase the speed of your walking for 1-2 minutes at time    In addition, we recommend that you review healthy lifestyles and methods for weight loss available through the National Institutes of Health patient information sites:  http://win.niddk.nih.gov/publications/index.htm    And look into health and wellness programs that may be available through your health insurance provider, employer, local community center, or zara club.    Weight management plan: Patient was referred to their PCP to discuss a diet and exercise plan.

## 2018-05-10 NOTE — NURSING NOTE
"Chief Complaint   Patient presents with     Study Results     discuss HST results      Initial /90  Pulse 74  Resp 18  Ht 1.746 m (5' 8.75\")  Wt 117.5 kg (259 lb)  SpO2 92%  BMI 38.53 kg/m2 Estimated body mass index is 38.53 kg/(m^2) as calculated from the following:    Height as of this encounter: 1.746 m (5' 8.75\").    Weight as of this encounter: 117.5 kg (259 lb).    Medication Reconciliation: complete    "

## 2018-05-10 NOTE — PROGRESS NOTES
"    Chief Complaint   Patient presents with     Study Results     discuss HST results        Joe Mas is a 62 year old male who returns to [facility] for review of sleep testing results. He presented with symptoms suggestive of obstructive sleep apnea.    Pertinent PMHx of paroxysmal atrial fibrillation, HTN, DM II, obesity.    Estimated body mass index is 38.53 kg/(m^2) as calculated from the following:    Height as of this encounter: 1.746 m (5' 8.75\").    Weight as of this encounter: 117.5 kg (259 lb).  Total score - [place]: 5 (2018 10:00 AM)  STOP-BAN/8    Home Sleep Apnea Testing - 2018: 259 lbs 0 oz: AHI 15.6/hr. Supine AHI 15.6/hr.   Oxygen Codey of 81%.  Baseline 88.5%.  Sp02 =< 88% for majority of recording  He slept on his back (11.9%), prone (0%), left (5.8) and right (72.8%) sides.   Analysis time: 483.1 minutes.     Signal quality of Oxymeter 99% Good  Nasal Cannula 100% Good  RIP belts 100% Good.     I reviewed his follow-up with Dr. Daniel on 2018.  Plan to repeat holter monitor if JUNG seen and starting PAP therapy.  If no appreciable improvement on holter or if no significant JUNG seen, consideration for anti-arrhythmic therapy.      Problem List:  Patient Active Problem List    Diagnosis Date Noted     Long term current use of anticoagulant therapy 2018     Priority: Medium     Atrial fibrillation, unspecified type (H) 2018     Priority: Medium     Morbid obesity (H) 2018     Priority: Medium     Tubular adenoma of colon 2017     Priority: Medium     Collected: 2017   Received: 2017   Reported: 8/15/2017 17:28   Ordering Phy(s): AL MORELAND     For improved result formatting, select 'View Enhanced Report Format'   under Linked Documents section.     SPECIMEN(S):   A: Colon polyps at 20 cm   B: Colon polyp, ascending     FINAL DIAGNOSIS:   A.  Colon at 20 cm, grossly biopsies:   -Tubular adenoma and fragments of " "hyperplastic polyp   - Negative for high-grade dysplasia and malignancy.     B.  Right colon, mucosal biopsy:   - Tubular adenoma.   - Negative for high-grade dysplasia and malignancy.        Obesity (BMI 30-39.9) 10/24/2015     Priority: Medium     Hypertension goal BP (blood pressure) < 140/90 07/07/2015     Priority: Medium     Hyperlipidemia with target LDL less than 100 02/14/2014     Priority: Medium     Diagnosis updated by automated process. Provider to review and confirm.       Type 2 diabetes mellitus without complications (H) 10/29/2013     Priority: Medium     CARDIOVASCULAR SCREENING; LDL GOAL LESS THAN 100 10/29/2013     Priority: Medium     Moderate major depression (H) 02/20/2013     Priority: Medium     Advanced directives, counseling/discussion 11/03/2011     Priority: Medium     Advance Directive Problem List Overview:   Name Relationship Phone    Primary Health Care Agent            Alternative Health Care Agent          Discussed advance care planning with patient; information given to patient to review.     Flavia Duggan [facility], HC Facilitator    11/3/2011          Obstructive chronic bronchitis with exacerbation (H) 02/22/2007     Priority: Medium     Essential hypertension, benign 02/22/2007     Priority: Medium        /90  Pulse 74  Resp 18  Ht 1.746 m (5' 8.75\")  Wt 117.5 kg (259 lb)  SpO2 92%  BMI 38.53 kg/m2    Impression/Plan:    1.)  Moderate to possible severe JUNG, potential for significant hypoxemia and borderline sustained hypoxemia   - Suspect AHI was actually under-reported given suspected sleep onset was not until ~4am (recording from [place] to 9am).   - Given this, potential significant hypoxemia, paroxysmal atrial fib, we will proceed with treatment.   - I recommend in-lab titration PSG given hypoxemia concern, but he is hesitant given potential cost.  I agreed to proceed with auto-titrate CPAP 5-15 cm H2O, with follow-up overnight oximetry and PSG if residual " hypoxemia.    2.)  Unclear history of COPD   - Given for potential sustained hypoxemia on sleep testing, do recommend full baseline PFT's.    He will follow up with me in about 1 month(s).     Twenty-five minutes spent with patient, all of which were spent face-to-face counseling, consulting, coordinating plan of care.      Manan Day MD, MD    CC:  Jolynn Phillips (only for reference, does not automatically route).

## 2018-05-11 PROBLEM — G47.33 OSA (OBSTRUCTIVE SLEEP APNEA): Status: ACTIVE | Noted: 2018-05-11

## 2018-05-16 ENCOUNTER — DOCUMENTATION ONLY (OUTPATIENT)
Dept: SLEEP MEDICINE | Facility: CLINIC | Age: 63
End: 2018-05-16

## 2018-05-16 DIAGNOSIS — G47.33 OSA (OBSTRUCTIVE SLEEP APNEA): Primary | ICD-10-CM

## 2018-05-16 NOTE — PROGRESS NOTES
Patient was offered choice of vendor and chose [facility].  Patient Joe Mas was set up at [facility]  on May 16, 2018. Patient received a Resmed AirSense 10 Auto. Pressures were set at 5-15 cm H2O.   Patient s ramp is 5 cm H2O for Off and FLEX/EPR is EPR, 2.  Patient received a Resmed Mask name: AIRFIT F20  Full Face mask Size Large, heated tubing and heated humidifier.  Patient is enrolled in the [employer] Program and does need to meet compliance.   Nieves Singh

## 2018-05-17 ENCOUNTER — ANTICOAGULATION THERAPY VISIT (OUTPATIENT)
Dept: ANTICOAGULATION | Facility: CLINIC | Age: 63
End: 2018-05-17
Payer: COMMERCIAL

## 2018-05-17 DIAGNOSIS — Z79.01 LONG TERM CURRENT USE OF ANTICOAGULANT THERAPY: ICD-10-CM

## 2018-05-17 DIAGNOSIS — I48.91 ATRIAL FIBRILLATION, UNSPECIFIED TYPE (H): ICD-10-CM

## 2018-05-17 LAB — INR POINT OF CARE: 2.4 (ref 0.86–1.14)

## 2018-05-17 PROCEDURE — 85610 PROTHROMBIN TIME: CPT | Mod: QW

## 2018-05-17 PROCEDURE — 99207 ZZC NO CHARGE NURSE ONLY: CPT

## 2018-05-17 PROCEDURE — 36416 COLLJ CAPILLARY BLOOD SPEC: CPT

## 2018-05-17 NOTE — PROGRESS NOTES
ANTICOAGULATION FOLLOW-UP CLINIC VISIT    Patient Name:  Joe Mas  Date:  5/17/2018  Contact Type:  Face to Face    SUBJECTIVE:     Patient Findings     Positives No Problem Findings    Comments Pt denies changes in medications, diet, activity or health, reports taking warfarin as directed.    Pt reports he had sleep study, dx with sleep apnea, states he is borderline, but will try a cpap machine to see if it makes a difference.             OBJECTIVE    INR Protime   Date Value Ref Range Status   05/17/2018 2.4 (A) 0.86 - 1.14 Final       ASSESSMENT / PLAN  INR assessment THER    Recheck INR In: 3 WEEKS    INR Location Clinic      Anticoagulation Summary as of 5/17/2018     INR goal 2.0-3.0   Today's INR 2.4   Maintenance plan 5 mg (5 mg x 1) on Mon, Wed, Fri; 2.5 mg (5 mg x 0.5) all other days   Full instructions 5 mg on Mon, Wed, Fri; 2.5 mg all other days   Weekly total 25 mg   No change documented Dara Hodges RN   Plan last modified Sofia Tai RN (3/29/2018)   Next INR check 6/7/2018   Priority INR   Target end date     Indications   Atrial fibrillation  unspecified type (H) [I48.91]  Long term current use of anticoagulant therapy [Z79.01]         Anticoagulation Episode Summary     INR check location     Preferred lab     Send INR reminders to [facility]    Comments *      Anticoagulation Care Providers     Provider Role Specialty Phone number    Jolynn Phillips MD [facility] [phone]            See the Encounter Report to view Anticoagulation Flowsheet and Dosing Calendar (Go to Encounters tab in chart review, and find the Anticoagulation Therapy Visit)        Dara Hodges, TRINI

## 2018-05-17 NOTE — MR AVS SNAPSHOT
Joe Mas   5/17/2018 9:45 AM   Anticoagulation Therapy Visit    Description:  62 year old male   Provider:  [facility]   Department:  [facility]           INR as of 5/17/2018     Today's INR 2.4      Anticoagulation Summary as of 5/17/2018     INR goal 2.0-3.0   Today's INR 2.4   Full instructions 5 mg on Mon, Wed, Fri; 2.5 mg all other days   Next INR check 6/7/2018    Indications   Atrial fibrillation  unspecified type (H) [I48.91]  Long term current use of anticoagulant therapy [Z79.01]         Your next Anticoagulation Clinic appointment(s)     Jun 07, 2018 10:15 AM CDT   Anticoagulation Visit with [facility]   [facility]    [address] [address] [phone]              Contact Numbers     Please call [phone] with any problems or questions regarding your therapy.    If you need to cancel and/or reschedule your appointment please call one of the following numbers:  [facility] [phone]  [place] - [phone]  [place] - [phone]  [facility] [phone]  [place] - [phone]            May 2018 Details    Sun Mon Tue Wed Thu Fri Sat       1               2               3               4               5                 6               7               8               9               10               11               12                 13               14               15               16               17      2.5 mg   See details      18      5 mg         19      2.5 mg           20      2.5 mg         21      5 mg         22      2.5 mg         23      5 mg         24      2.5 mg         25      5 mg         26      2.5 mg           27      2.5 mg         28      5 mg         29      2.5 mg         30      5 mg         31      2.5 mg            Date Details   05/17 This INR check               How to take your warfarin dose     To take:  2.5 mg Take 0.5 of a 5 mg tablet.    To take:  5 mg Take 1 of the 5 mg  tablets.           June 2018 Details    Sun Mon Tue Wed Thu Fri Sat          1      5 mg         2      2.5 mg           3      2.5 mg         4      5 mg         5      2.5 mg         6      5 mg         7            8               9                 10               11               12               13               14               15               16                 17               18               19               20               21               22               23                 24               25               26               27               28               29               30                Date Details   No additional details    Date of next INR:  6/7/2018         How to take your warfarin dose     To take:  2.5 mg Take 0.5 of a 5 mg tablet.    To take:  5 mg Take 1 of the 5 mg tablets.

## 2018-05-21 ENCOUNTER — DOCUMENTATION ONLY (OUTPATIENT)
Dept: SLEEP MEDICINE | Facility: CLINIC | Age: 63
End: 2018-05-21

## 2018-05-21 NOTE — PROGRESS NOTES
3 DAY STM VISIT    Diagnostic AHI:  15.6    Patient contacted for 3 day STM visit  Message left for patient to return call     Device type: Auto-CPAP  PAP settings from order::  CPAP min 5 cm  H20       CPAP max 15 cm  H20  Device settings from machine      Min CPAP 5.0            Max CPAP 15.0      Assessment: Nightly usage, most nights under four hours.  Action plan: Pt to have f/u 14 day STM visit.

## 2018-05-31 ENCOUNTER — DOCUMENTATION ONLY (OUTPATIENT)
Dept: SLEEP MEDICINE | Facility: CLINIC | Age: 63
End: 2018-05-31

## 2018-05-31 NOTE — PROGRESS NOTES
14 DAY STM VISIT    Diagnostic AHI:  15.6    Message left for patient to return call.     Assessment: Pt not meeting objective benchmarks for compliance.  Action plan: Waiting for patient to return call and pt to have 30 day STM visit.    Device type: Auto-CPAP  PAP settings:  CPAP min 5 cm  H20      CPAP max 15 cm  H20     95th% pressure 11.2 cm   Mask type:  Full Face Mask  Objective measures: 14 day rolling measures      Compliance  21 %      Leak  34.25 lpm  last  upload      AHI 1.66   last  upload      Average number of minutes 171     Average hours of usage 2.9          Objective measure goal  Compliance   Goal >70%  Leak   Goal < 24 lpm  AHI  Goal < 5  Usage  Goal >240

## 2018-06-07 ENCOUNTER — ANTICOAGULATION THERAPY VISIT (OUTPATIENT)
Dept: ANTICOAGULATION | Facility: CLINIC | Age: 63
End: 2018-06-07
Payer: COMMERCIAL

## 2018-06-07 DIAGNOSIS — I48.91 ATRIAL FIBRILLATION, UNSPECIFIED TYPE (H): ICD-10-CM

## 2018-06-07 DIAGNOSIS — Z79.01 LONG TERM CURRENT USE OF ANTICOAGULANT THERAPY: ICD-10-CM

## 2018-06-07 LAB — INR POINT OF CARE: 1.9 (ref 0.86–1.14)

## 2018-06-07 PROCEDURE — 85610 PROTHROMBIN TIME: CPT | Mod: QW

## 2018-06-07 PROCEDURE — 99207 ZZC NO CHARGE NURSE ONLY: CPT

## 2018-06-07 PROCEDURE — 36416 COLLJ CAPILLARY BLOOD SPEC: CPT

## 2018-06-07 NOTE — PROGRESS NOTES
ANTICOAGULATION FOLLOW-UP CLINIC VISIT    Patient Name:  Joe Mas  Date:  6/7/2018  Contact Type:  Face to Face    SUBJECTIVE:     Patient Findings     Positives No Problem Findings    Comments Pt denies changes in medications, diet, activity or health, reports taking warfarin as directed.    Pt started on c-pap, is not sure he will continue, but is going to try it for awhile.             OBJECTIVE    INR Protime   Date Value Ref Range Status   06/07/2018 1.9 (A) 0.86 - 1.14 Final       ASSESSMENT / PLAN  INR assessment THER    Recheck INR In: 3 WEEKS    INR Location Clinic      Anticoagulation Summary as of 6/7/2018     INR goal 2.0-3.0   Today's INR 1.9!   Warfarin maintenance plan 5 mg (5 mg x 1) on Mon, Wed, Fri; 2.5 mg (5 mg x 0.5) all other days   Full warfarin instructions 5 mg on Mon, Wed, Fri; 2.5 mg all other days   Weekly warfarin total 25 mg   No change documented Dara Hodges RN   Plan last modified Sofia Tai RN (3/29/2018)   Next INR check 6/28/2018   Priority INR   Target end date     Indications   Atrial fibrillation  unspecified type (H) [I48.91]  Long term current use of anticoagulant therapy [Z79.01]         Anticoagulation Episode Summary     INR check location     Preferred lab     Send INR reminders to [facility]    Comments *      Anticoagulation Care Providers     Provider Role Specialty Phone number    Jolynn Phillips MD [facility] [phone]            See the Encounter Report to view Anticoagulation Flowsheet and Dosing Calendar (Go to Encounters tab in chart review, and find the Anticoagulation Therapy Visit)        Dara Hodges, TRINI

## 2018-06-07 NOTE — MR AVS SNAPSHOT
Joe Mas   6/7/2018 10:15 AM   Anticoagulation Therapy Visit    Description:  62 year old male   Provider:  [facility]   Department:  [facility]           INR as of 6/7/2018     Today's INR 1.9!      Anticoagulation Summary as of 6/7/2018     INR goal 2.0-3.0   Today's INR 1.9!   Full warfarin instructions 5 mg on Mon, Wed, Fri; 2.5 mg all other days   Next INR check 6/28/2018    Indications   Atrial fibrillation  unspecified type (H) [I48.91]  Long term current use of anticoagulant therapy [Z79.01]         Your next Anticoagulation Clinic appointment(s)     Jun 07, 2018 10:15 AM CDT   [facility]    [address] [address]   [phone]            Jun 28, 2018  9:45 AM CDT   Anticoagulation Visit with [facility]   [facility]    [address] [address]   [phone]              Contact Numbers     Please call [phone] with any problems or questions regarding your therapy.    If you need to cancel and/or reschedule your appointment please call one of the following numbers:  [facility] - [phone]  [place] - [phone]  [place] - [phone]  [place] - [phone]  [place] - [phone]            June 2018 Details    Sun Mon Tue Wed Thu Fri Sat          1               2                 3               4               5               6               7      2.5 mg   See details      8      5 mg         9      2.5 mg           10      2.5 mg         11      5 mg         12      2.5 mg         13      5 mg         14      2.5 mg         15      5 mg         16      2.5 mg           17      2.5 mg         18      5 mg         19      2.5 mg         20      5 mg         21      2.5 mg         22      5 mg         23      2.5 mg           24      2.5 mg         25      5 mg         26      2.5 mg         27      5 mg          28            29               30                Date Details   06/07 This INR check       Date of next INR:  6/28/2018         How to take your warfarin dose     To take:  2.5 mg Take 0.5 of a 5 mg tablet.    To take:  5 mg Take 1 of the 5 mg tablets.

## 2018-06-15 ENCOUNTER — DOCUMENTATION ONLY (OUTPATIENT)
Dept: SLEEP MEDICINE | Facility: CLINIC | Age: 63
End: 2018-06-15

## 2018-06-15 NOTE — PROGRESS NOTES
30 DAY STM VISIT    Diagnostic AHI:  15.6    Subjective measures:   Patient called and is worried about how his data is recording.     Assessment: Pt not meeting objective benchmarks for compliance  Patient failing following subjective benchmarks: A-Fib issues.   Action plan: 2 week STM recheck appt scheduled and pt to have 6 month STM visit.  Patient does not have a follow visit.   Device type: Auto-CPAP  PAP settings: CPAP min 5 cm  H20     CPAP max 15 cm  H20    95th% pressure 13.2 cm  H20   Mask type:  Full Face Mask  Objective measures: 14 day rolling measures      Compliance  64 %      Leak  26.08 lpm  last  upload      AHI 0.75   last  upload      Average number of minutes 249      Objective measure goal  Compliance   Goal >70%  Leak   Goal < 24 lpm  AHI  Goal < 5  Usage  Goal >240

## 2018-06-19 ENCOUNTER — HOSPITAL ENCOUNTER (OUTPATIENT)
Dept: CARDIOLOGY | Facility: CLINIC | Age: 63
Discharge: HOME | End: 2018-06-19
Attending: INTERNAL MEDICINE | Admitting: INTERNAL MEDICINE
Payer: COMMERCIAL

## 2018-06-19 ENCOUNTER — OFFICE VISIT (OUTPATIENT)
Dept: CARDIOLOGY | Facility: CLINIC | Age: 63
End: 2018-06-19
Attending: INTERNAL MEDICINE
Payer: COMMERCIAL

## 2018-06-19 VITALS
DIASTOLIC BLOOD PRESSURE: 86 MMHG | WEIGHT: 261.2 LBS | HEART RATE: 66 BPM | OXYGEN SATURATION: 95 % | BODY MASS INDEX: 38.85 KG/M2 | SYSTOLIC BLOOD PRESSURE: 127 MMHG

## 2018-06-19 DIAGNOSIS — I10 ESSENTIAL HYPERTENSION, BENIGN: ICD-10-CM

## 2018-06-19 DIAGNOSIS — I48.0 PAROXYSMAL ATRIAL FIBRILLATION (H): ICD-10-CM

## 2018-06-19 PROCEDURE — 99214 OFFICE O/P EST MOD 30 MIN: CPT | Mod: 25 | Performed by: INTERNAL MEDICINE

## 2018-06-19 PROCEDURE — 93010 ELECTROCARDIOGRAM REPORT: CPT | Performed by: INTERNAL MEDICINE

## 2018-06-19 PROCEDURE — 93005 ELECTROCARDIOGRAM TRACING: CPT

## 2018-06-19 RX ORDER — FLECAINIDE ACETATE 100 MG/1
100 TABLET ORAL 2 TIMES DAILY
Qty: 60 TABLET | Refills: 6 | Status: SHIPPED | OUTPATIENT
Start: 2018-06-19 | End: 2019-01-15

## 2018-06-19 NOTE — TELEPHONE ENCOUNTER
"LOSARTAN POTASSIUM 100MG TABS        Last Written Prescription Date:  10/3/17  Last Fill Quantity: 90,   # refills: 2  Last Office Visit: 2/27/18  Future Office visit:       Requested Prescriptions   Pending Prescriptions Disp Refills     losartan (COZAAR) 100 MG tablet [Pharmacy Med Name: LOSARTAN POTASSIUM 100MG TABS] 90 tablet 2     Sig: TAKE ONE TABLET BY MOUTH EVERY DAY    Angiotensin-II Receptors Passed    6/19/2018  3:11 PM       Passed - Blood pressure under 140/90 in past 12 months    BP Readings from Last 3 Encounters:   06/19/18 127/86   05/10/18 140/90   04/19/18 125/90                Passed - Recent (12 mo) or future (30 days) visit within the authorizing provider's specialty    Patient had office visit in the last 12 months or has a visit in the next 30 days with authorizing provider or within the authorizing provider's specialty.  See \"Patient Info\" tab in inbasket, or \"Choose Columns\" in Meds & Orders section of the refill encounter.           Passed - Patient is age 18 or older       Passed - Normal serum creatinine on file in past 12 months    Recent Labs   Lab Test  02/27/18   1030   CR  0.98            Passed - Normal serum potassium on file in past 12 months    Recent Labs   Lab Test  02/27/18   1030   POTASSIUM  3.9                      "

## 2018-06-19 NOTE — MR AVS SNAPSHOT
After Visit Summary   6/19/2018    Joe Mas    MRN: [id number]           Patient Information     Date Of Birth          1955        Visit Information        Provider Department      6/19/2018 9:30 AM Breezy Daniel MD [facility]        Today's Diagnoses     Paroxysmal atrial fibrillation (H)          Care Instructions    1. Start flecainide 100mg twice daily.  Take the first dose three days before your stress test.  This will try to keep your heart in a normal rhythm and reduce how much afib you are having.    2. Will schedule a treadmill stress test.  We need to rule out blockages in the heart arteries when you are taking flecainide.    3. If you keep having afib despite medications, then would need to consider an ablation procedure.    4. Johnson metoprolol the day of your stress test          Follow-ups after your visit        Additional Services     Follow-Up with Electrophysiologist                 Your next 10 appointments already scheduled     Jun 28, 2018  9:45 AM CDT   Anticoagulation Visit with [facility]   [facility]    [address] [address] [phone]              Future tests that were ordered for you today     Open Future Orders        Priority Expected Expires Ordered    Follow-Up with Electrophysiologist Routine 12/16/2018 8/7/2019 6/19/2018    Treadmill Stress test Routine 6/22/2018 8/3/2018 6/19/2018            Who to contact     If you have questions or need follow up information about today's clinic visit or your schedule please contact [facility] directly at [phone].  Normal or non-critical lab and imaging results will be communicated to you by MyChart, letter or phone within 4 business days after the clinic has received the results. If you do not hear from us within 7 days, please contact the clinic  through [employer] or phone. If you have a critical or abnormal lab result, we will notify you by phone as soon as possible.  Submit refill requests through [employer] or call your pharmacy and they will forward the refill request to us. Please allow 3 business days for your refill to be completed.          Additional Information About Your Visit        [employer] Information     [employer] gives you secure access to your electronic health record. If you see a primary care provider, you can also send messages to your care team and make appointments. If you have questions, please call your primary care clinic.  If you do not have a primary care provider, please call [phone] and they will assist you.        Care EveryWhere ID     This is your Care EveryWhere ID. This could be used by other organizations to access your [place] medical records  [id number]        Your Vitals Were     Pulse Pulse Oximetry BMI (Body Mass Index)             66 95% 38.85 kg/m2          Blood Pressure from Last 3 Encounters:   06/19/18 127/86   05/10/18 140/90   04/19/18 125/90    Weight from Last 3 Encounters:   06/19/18 118.5 kg (261 lb 3.2 oz)   05/10/18 117.5 kg (259 lb)   04/19/18 118.8 kg (262 lb)              We Performed the Following     Follow-Up with Cardiologist          Today's Medication Changes          These changes are accurate as of 6/19/18 10:00 AM.  If you have any questions, ask your nurse or doctor.               Start taking these medicines.        Dose/Directions    flecainide 100 MG tablet   Commonly known as:  TAMBOCOR   Used for:  Paroxysmal atrial fibrillation (H)   Started by:  Breezy Daniel MD        Dose:  100 mg   Take 1 tablet (100 mg) by mouth 2 times daily Take the first dose three days before your scheduled stress test.   Quantity:  60 tablet   Refills:  6            Where to get your medicines      These medications were sent to [facility] - [address]  [address]  [address] [facility] [address]     Phone:  [phone]     flecainide 100 MG tablet                Primary Care Provider Office Phone # Fax #    Jolynn Phillips [phone] [address]  [facility] [address]        Equal Access to Services     ANDREW RIVEROMARGARET : Hadii aad ku hadasho Soomaali, waaxda luqadaha, qaybta kaalmada adeegyada, waxay idiin hayaan adeeg kharash la'aan ah. So [facility] [phone].    ATENCIÓN: Si habla español, tiene a rhodes disposición servicios gratuitos de asistencia lingüística. [facility] [phone].    We comply with applicable federal civil rights laws and [place] laws. We do not discriminate on the basis of race, color, national origin, age, disability, sex, sexual orientation, or gender identity.            Thank you!     Thank you for choosing [facility]  for your care. Our goal is always to provide you with excellent care. Hearing back from our patients is one way we can continue to improve our services. Please take a few minutes to complete the written survey that you may receive in the mail after your visit with us. Thank you!             Your Updated Medication List - Protect others around you: Learn how to safely use, store and throw away your medicines at www.disposemymeds.org.          This list is accurate as of 6/19/18 10:00 AM.  Always use your most recent med list.                   Brand Name Dispense Instructions for use Diagnosis    aspirin 81 MG tablet     100    ONE DAILY    Mixed hyperlipidemia       atorvastatin 20 MG tablet    LIPITOR    90 tablet    Take 1 tablet (20 mg) by mouth daily    Mixed hyperlipidemia       blood glucose monitoring lancets     3 Box    1 each daily    Type 2 diabetes, HbA1c goal < 7% (H)       blood glucose monitoring meter device kit     1 kit    Use to test blood sugar daily    Diabetes mellitus (H)       blood glucose monitoring test strip    ACCU-CHEK SMARTVIEW    300  each    Test blood sugars daily    Type 2 diabetes, HbA1c goal < 7% (H)       flecainide 100 MG tablet    TAMBOCOR    60 tablet    Take 1 tablet (100 mg) by mouth 2 times daily Take the first dose three days before your scheduled stress test.    Paroxysmal atrial fibrillation (H)       losartan 100 MG tablet    COZAAR    90 tablet    TAKE ONE TABLET BY MOUTH EVERY DAY    Essential hypertension, benign       metFORMIN 500 MG 24 hr tablet    GLUCOPHAGE-XR    90 tablet    Take 1 tablet (500 mg) by mouth daily    Type 2 diabetes mellitus without complication, without long-term current use of insulin (H)       metoprolol succinate 50 MG 24 hr tablet    TOPROL-XL    90 tablet    Take 1 tablet (50 mg) by mouth daily    Atrial fibrillation, unspecified type (H)       VENTOLIN  (90 Base) MCG/ACT Inhaler   Generic drug:  albuterol     18 g    INHALE TWO PUFFS BY MOUTH EVERY 6 HOURS AS NEEDED FOR SHORTNESS OF BREATH    Obstructive chronic bronchitis with exacerbation (H)       warfarin 5 MG tablet    JANTOVEN    65 tablet    5 mg MWF; 2.5 mg all other days or as directed    Atrial fibrillation, unspecified type (H)

## 2018-06-19 NOTE — LETTER
6/19/2018    Jolynn Phillips MD  [address] [address]    RE: Joe Chandleruse       Dear Colleague,    I had the pleasure of seeing Joe Mas in the [facility].    CARDIOLOGY VISIT    REASON FOR VISIT: f/u afib    SUBJECTIVE:  62-year-old male seen for paroxysmal afib.  He has hypertension, type 2 diabetes, and dyslipidemia.       A. fib was diagnosed in February 2018 at a primary care appointment, HR of 97.  Warfarin and metoprolol were started.       Echo 3/2018 (sinus rhythm) showed EF 60%, mild LVH, normal RV, normal LA size, no valve disease.      7 day Zio patch March 2018 showed sinus rhythm, 20% paroxysmal A. fib with average heart rate 108, up to 12 hours, rare ectopy.      In May he started using CPAP.  He has been compliant with it, but still having hard time sleeping.    He checks his vital signs frequently at home.  Blood pressure has been well controlled.  His machine tells him when his heart rate is irregular, he estimates 30-40% of the time.  A. fib heart rate tends to be , sinus rate in the 60s.  He has some mild dyspnea and fatigue when he is in A. fib.    MEDICATIONS:  Current Outpatient Prescriptions   Medication     ASPIRIN 81 MG OR TABS     atorvastatin (LIPITOR) 20 MG tablet     blood glucose monitoring (ACCU-CHEK FASTCLIX) lancets     blood glucose monitoring (ACCU-CHEK SMARTVIEW) test strip     blood glucose monitoring (BELA CONTOUR NEXT MONITOR) meter device kit     flecainide (TAMBOCOR) 100 MG tablet     losartan (COZAAR) 100 MG tablet     metFORMIN (GLUCOPHAGE-XR) 500 MG 24 hr tablet     metoprolol succinate (TOPROL-XL) 50 MG 24 hr tablet     VENTOLIN  (90 BASE) MCG/ACT Inhaler     warfarin (JANTOVEN) 5 MG tablet     No current facility-administered medications for this visit.        ALLERGIES:  Allergies   Allergen Reactions     Lisinopril Cough       REVIEW OF SYSTEMS:  Constitutional:  No weight loss, fever, chills,  weakness or fatigue.  HEENT:  Eyes:  No visual loss, blurred vision, double vision or yellow sclerae. No hearing loss, sneezing, congestion, runny nose or sore throat.  Skin:  No rash or itching.  Cardiovascular: per HPI  Respiratory: per HPI  GI:  No anorexia, nausea, vomiting or diarrhea. No abdominal pain or blood.  :  No dysurea, hematuria  Neurologic:  No headache, dizziness, syncope, paralysis, ataxia, numbness or tingling in the extremities. No change in bowel or bladder control.  Musculoskeletal:  No muscle, back pain, joint pain or stiffness.  Hematologic:  No anemia, bleeding or bruising.  Lymphatics:  No enlarged nodes. No history of splenectomy.  Psychiatric:  No history of depression or anxiety.  Endocrine:  No reports of sweating, cold or heat intolerance. No polyuria or polydipsia.  Allergies:  No history of asthma, hives, eczema or rhinitis.    PHYSICAL EXAM:      BP: 127/86 Pulse: 66     SpO2: 95 %      Vital Signs with Ranges  Pulse:  [66] 66  BP: (127)/(86) 127/86  SpO2:  [95 %] 95 %  261 lbs 3.2 oz    Constitutional: awake, alert, no distress  Eyes: PERRL, sclera nonicteric  ENT: trachea midline  Respiratory: Lungs clear  Cardiovascular: Regular rate and rhythm, no murmurs  GI: nondistended, nontender, bowel sounds present  Lymph/Hematologic: no lymphadenopathy  Skin: dry, no rash  Musculoskeletal: good muscle tone, strength 5/5 in upper and lower extremities  Neurologic: no focal deficits  Neuropsychiatric: appropriate affact    DATA:  EKG: June 19: Sinus rhythm, rate 55,  ms    ASSESSMENT:  62-year-old male seen for follow-up of paroxysmal A. fib.  He continues to have a moderate A. fib burden with mild symptoms.  CPAP has not really helped much yet.  Talked about options of antiarrhythmic therapy and ablation.  He is agreeable to starting flecainide.  Treadmill stress test will need to be done.  Baseline QTc interval is acceptable.  He had no LVH on his previous echo, wall thickness  was 1.1 cm.    He could be a reasonable candidate for ablation if he continues to have afib and symptoms on flecainide.  He is willing to meet with electrophysiology.    RECOMMENDATIONS:  1.  Paroxysmal atrial fibrillation  -Continue metoprolol and warfarin  -Start flecainide 100 mg twice daily, take first dose 2-3 days before stress test  -Schedule appointment with EP, consider ablation if he continues to have A. fib on flecainide    Follow-up with PCP.  Follow-up with general cardiology after that.    Breezy Daniel MD  Cardiology - [facility]  Pager:  [phone]  Text Page  June 19, 2018      Thank you for allowing me to participate in the care of your patient.      Sincerely,     Breezy Daniel MD     [facility]    cc:   Breezy Daniel MD  [facility]  [address] [address]

## 2018-06-19 NOTE — PROGRESS NOTES
CARDIOLOGY VISIT    REASON FOR VISIT: f/u afib    SUBJECTIVE:  62-year-old male seen for paroxysmal afib.  He has hypertension, type 2 diabetes, and dyslipidemia.       A. fib was diagnosed in February 2018 at a primary care appointment, HR of 97.  Warfarin and metoprolol were started.       Echo 3/2018 (sinus rhythm) showed EF 60%, mild LVH, normal RV, normal LA size, no valve disease.      7 day Zio patch March 2018 showed sinus rhythm, 20% paroxysmal A. fib with average heart rate 108, up to 12 hours, rare ectopy.      In May he started using CPAP.  He has been compliant with it, but still having hard time sleeping.    He checks his vital signs frequently at home.  Blood pressure has been well controlled.  His machine tells him when his heart rate is irregular, he estimates 30-40% of the time.  A. fib heart rate tends to be , sinus rate in the 60s.  He has some mild dyspnea and fatigue when he is in A. fib.    MEDICATIONS:  Current Outpatient Prescriptions   Medication     ASPIRIN 81 MG OR TABS     atorvastatin (LIPITOR) 20 MG tablet     blood glucose monitoring (ACCU-CHEK FASTCLIX) lancets     blood glucose monitoring (ACCU-CHEK SMARTVIEW) test strip     blood glucose monitoring (BELA CONTOUR NEXT MONITOR) meter device kit     flecainide (TAMBOCOR) 100 MG tablet     losartan (COZAAR) 100 MG tablet     metFORMIN (GLUCOPHAGE-XR) 500 MG 24 hr tablet     metoprolol succinate (TOPROL-XL) 50 MG 24 hr tablet     VENTOLIN  (90 BASE) MCG/ACT Inhaler     warfarin (JANTOVEN) 5 MG tablet     No current facility-administered medications for this visit.        ALLERGIES:  Allergies   Allergen Reactions     Lisinopril Cough       REVIEW OF SYSTEMS:  Constitutional:  No weight loss, fever, chills, weakness or fatigue.  HEENT:  Eyes:  No visual loss, blurred vision, double vision or yellow sclerae. No hearing loss, sneezing, congestion, runny nose or sore throat.  Skin:  No rash or itching.  Cardiovascular: per  HPI  Respiratory: per HPI  GI:  No anorexia, nausea, vomiting or diarrhea. No abdominal pain or blood.  :  No dysurea, hematuria  Neurologic:  No headache, dizziness, syncope, paralysis, ataxia, numbness or tingling in the extremities. No change in bowel or bladder control.  Musculoskeletal:  No muscle, back pain, joint pain or stiffness.  Hematologic:  No anemia, bleeding or bruising.  Lymphatics:  No enlarged nodes. No history of splenectomy.  Psychiatric:  No history of depression or anxiety.  Endocrine:  No reports of sweating, cold or heat intolerance. No polyuria or polydipsia.  Allergies:  No history of asthma, hives, eczema or rhinitis.    PHYSICAL EXAM:      BP: 127/86 Pulse: 66     SpO2: 95 %      Vital Signs with Ranges  Pulse:  [66] 66  BP: (127)/(86) 127/86  SpO2:  [95 %] 95 %  261 lbs 3.2 oz    Constitutional: awake, alert, no distress  Eyes: PERRL, sclera nonicteric  ENT: trachea midline  Respiratory: Lungs clear  Cardiovascular: Regular rate and rhythm, no murmurs  GI: nondistended, nontender, bowel sounds present  Lymph/Hematologic: no lymphadenopathy  Skin: dry, no rash  Musculoskeletal: good muscle tone, strength 5/5 in upper and lower extremities  Neurologic: no focal deficits  Neuropsychiatric: appropriate affact    DATA:  EKG: June 19: Sinus rhythm, rate 55,  ms    ASSESSMENT:  62-year-old male seen for follow-up of paroxysmal A. fib.  He continues to have a moderate A. fib burden with mild symptoms.  CPAP has not really helped much yet.  Talked about options of antiarrhythmic therapy and ablation.  He is agreeable to starting flecainide.  Treadmill stress test will need to be done.  Baseline QTc interval is acceptable.  He had no LVH on his previous echo, wall thickness was 1.1 cm.    He could be a reasonable candidate for ablation if he continues to have afib and symptoms on flecainide.  He is willing to meet with electrophysiology.    RECOMMENDATIONS:  1.  Paroxysmal atrial  fibrillation  -Continue metoprolol and warfarin  -Start flecainide 100 mg twice daily, take first dose 2-3 days before stress test  -Schedule appointment with EP, consider ablation if he continues to have A. fib on flecainide    Follow-up with PCP.  Follow-up with general cardiology after that.    Breezy Daniel MD  Cardiology - [facility]  Pager:  [phone]  Text Page  June 19, 2018

## 2018-06-19 NOTE — PATIENT INSTRUCTIONS
1. Start flecainide 100mg twice daily.  Take the first dose three days before your stress test.  This will try to keep your heart in a normal rhythm and reduce how much afib you are having.    2. Will schedule a treadmill stress test.  We need to rule out blockages in the heart arteries when you are taking flecainide.    3. If you keep having afib despite medications, then would need to consider an ablation procedure.    4. Johnson metoprolol the day of your stress test

## 2018-06-19 NOTE — LETTER
6/19/2018    Jolynn Phillips MD  [address] [facility] [address]    RE: Joe Chandleruse       Dear Colleague,    I had the pleasure of seeing Joe Mas in the [facility].    CARDIOLOGY VISIT    REASON FOR VISIT: f/u afib    SUBJECTIVE:  62-year-old male seen for paroxysmal afib.  He has hypertension, type 2 diabetes, and dyslipidemia.       A. fib was diagnosed in February 2018 at a primary care appointment, HR of 97.  Warfarin and metoprolol were started.       Echo 3/2018 (sinus rhythm) showed EF 60%, mild LVH, normal RV, normal LA size, no valve disease.      7 day Zio patch March 2018 showed sinus rhythm, 20% paroxysmal A. fib with average heart rate 108, up to 12 hours, rare ectopy.      In May he started using CPAP.  He has been compliant with it, but still having hard time sleeping.    He checks his vital signs frequently at home.  Blood pressure has been well controlled.  His machine tells him when his heart rate is irregular, he estimates 30-40% of the time.  A. fib heart rate tends to be , sinus rate in the 60s.  He has some mild dyspnea and fatigue when he is in A. fib.    MEDICATIONS:  Current Outpatient Prescriptions   Medication     ASPIRIN 81 MG OR TABS     atorvastatin (LIPITOR) 20 MG tablet     blood glucose monitoring (ACCU-CHEK FASTCLIX) lancets     blood glucose monitoring (ACCU-CHEK SMARTVIEW) test strip     blood glucose monitoring (BELA CONTOUR NEXT MONITOR) meter device kit     flecainide (TAMBOCOR) 100 MG tablet     losartan (COZAAR) 100 MG tablet     metFORMIN (GLUCOPHAGE-XR) 500 MG 24 hr tablet     metoprolol succinate (TOPROL-XL) 50 MG 24 hr tablet     VENTOLIN  (90 BASE) MCG/ACT Inhaler     warfarin (JANTOVEN) 5 MG tablet     No current facility-administered medications for this visit.        ALLERGIES:  Allergies   Allergen Reactions     Lisinopril Cough       REVIEW OF SYSTEMS:  Constitutional:  No weight loss, fever, chills,  weakness or fatigue.  HEENT:  Eyes:  No visual loss, blurred vision, double vision or yellow sclerae. No hearing loss, sneezing, congestion, runny nose or sore throat.  Skin:  No rash or itching.  Cardiovascular: per HPI  Respiratory: per HPI  GI:  No anorexia, nausea, vomiting or diarrhea. No abdominal pain or blood.  :  No dysurea, hematuria  Neurologic:  No headache, dizziness, syncope, paralysis, ataxia, numbness or tingling in the extremities. No change in bowel or bladder control.  Musculoskeletal:  No muscle, back pain, joint pain or stiffness.  Hematologic:  No anemia, bleeding or bruising.  Lymphatics:  No enlarged nodes. No history of splenectomy.  Psychiatric:  No history of depression or anxiety.  Endocrine:  No reports of sweating, cold or heat intolerance. No polyuria or polydipsia.  Allergies:  No history of asthma, hives, eczema or rhinitis.    PHYSICAL EXAM:      BP: 127/86 Pulse: 66     SpO2: 95 %      Vital Signs with Ranges  Pulse:  [66] 66  BP: (127)/(86) 127/86  SpO2:  [95 %] 95 %  261 lbs 3.2 oz    Constitutional: awake, alert, no distress  Eyes: PERRL, sclera nonicteric  ENT: trachea midline  Respiratory: Lungs clear  Cardiovascular: Regular rate and rhythm, no murmurs  GI: nondistended, nontender, bowel sounds present  Lymph/Hematologic: no lymphadenopathy  Skin: dry, no rash  Musculoskeletal: good muscle tone, strength 5/5 in upper and lower extremities  Neurologic: no focal deficits  Neuropsychiatric: appropriate affact    DATA:  EKG: June 19: Sinus rhythm, rate 55,  ms    ASSESSMENT:  62-year-old male seen for follow-up of paroxysmal A. fib.  He continues to have a moderate A. fib burden with mild symptoms.  CPAP has not really helped much yet.  Talked about options of antiarrhythmic therapy and ablation.  He is agreeable to starting flecainide.  Treadmill stress test will need to be done.  Baseline QTc interval is acceptable.  He had no LVH on his previous echo, wall thickness  was 1.1 cm.    He could be a reasonable candidate for ablation if he continues to have afib and symptoms on flecainide.  He is willing to meet with electrophysiology.    RECOMMENDATIONS:  1.  Paroxysmal atrial fibrillation  -Continue metoprolol and warfarin  -Start flecainide 100 mg twice daily, take first dose 2-3 days before stress test  -Schedule appointment with EP, consider ablation if he continues to have A. fib on flecainide    Follow-up with PCP.  Follow-up with general cardiology after that.    Breezy Daniel MD  Cardiology - [facility]  Pager:  [phone]  Text Page  June 19, 2018      Thank you for allowing me to participate in the care of your patient.    Sincerely,     Breezy Daniel MD     [facility]

## 2018-06-20 RX ORDER — LOSARTAN POTASSIUM 100 MG/1
TABLET ORAL
Qty: 90 TABLET | Refills: 2 | Status: SHIPPED | OUTPATIENT
Start: 2018-06-20 | End: 2019-01-29

## 2018-06-20 NOTE — TELEPHONE ENCOUNTER
Prescription approved per [facility] Refill Protocol.  Vern Conley RN

## 2018-06-29 ENCOUNTER — HOSPITAL ENCOUNTER (OUTPATIENT)
Dept: CARDIOLOGY | Facility: CLINIC | Age: 63
Discharge: HOME | End: 2018-06-29
Attending: INTERNAL MEDICINE | Admitting: INTERNAL MEDICINE
Payer: COMMERCIAL

## 2018-06-29 DIAGNOSIS — I48.0 PAROXYSMAL ATRIAL FIBRILLATION (H): ICD-10-CM

## 2018-06-29 PROCEDURE — 93016 CV STRESS TEST SUPVJ ONLY: CPT | Performed by: FAMILY MEDICINE

## 2018-06-29 PROCEDURE — 93018 CV STRESS TEST I&R ONLY: CPT | Performed by: INTERNAL MEDICINE

## 2018-06-29 PROCEDURE — 93017 CV STRESS TEST TRACING ONLY: CPT

## 2018-07-02 ENCOUNTER — ANTICOAGULATION THERAPY VISIT (OUTPATIENT)
Dept: ANTICOAGULATION | Facility: CLINIC | Age: 63
End: 2018-07-02
Payer: COMMERCIAL

## 2018-07-02 DIAGNOSIS — Z79.01 LONG TERM CURRENT USE OF ANTICOAGULANT THERAPY: ICD-10-CM

## 2018-07-02 DIAGNOSIS — I48.91 ATRIAL FIBRILLATION, UNSPECIFIED TYPE (H): ICD-10-CM

## 2018-07-02 LAB — INR POINT OF CARE: 1.8 (ref 0.86–1.14)

## 2018-07-02 PROCEDURE — 36416 COLLJ CAPILLARY BLOOD SPEC: CPT

## 2018-07-02 PROCEDURE — 85610 PROTHROMBIN TIME: CPT | Mod: QW

## 2018-07-02 PROCEDURE — 99207 ZZC NO CHARGE NURSE ONLY: CPT

## 2018-07-02 NOTE — MR AVS SNAPSHOT
Joe Mas   7/2/2018 8:45 AM   Anticoagulation Therapy Visit    Description:  62 year old male   Provider:  [facility]   Department:  [facility]           INR as of 7/2/2018     Today's INR 1.8!      Anticoagulation Summary as of 7/2/2018     INR goal 2.0-3.0   Today's INR 1.8!   Full warfarin instructions 2.5 mg on Mon, Wed, Fri; 5 mg all other days   Next INR check 7/19/2018    Indications   Atrial fibrillation  unspecified type (H) [I48.91]  Long term current use of anticoagulant therapy [Z79.01]         Your next Anticoagulation Clinic appointment(s)     Jul 02, 2018  8:45 AM CDT   [facility]agulation Visit with [facility]   [facility]    [address] [address]   [phone]            Jul 19, 2018 10:00 AM CDT   Anticoagulation Visit with [facility]   [facility]    [address] [address]   [phone]              Contact Numbers     Please call [phone] with any problems or questions regarding your therapy.    If you need to cancel and/or reschedule your appointment please call one of the following numbers:  [facility] - [phone]  [place] - [phone]  [place] - [phone]  [place] - [phone]  [place] - [phone]            July 2018 Details    Sun Mon Tue Wed Thu Fri Sat     1               2      2.5 mg   See details      3      5 mg         4      2.5 mg         5      5 mg         6      2.5 mg         7      5 mg           8      5 mg         9      2.5 mg         10      5 mg         11      2.5 mg         12      5 mg         13      2.5 mg         14      5 mg           15      5 mg         16      2.5 mg         17      5 mg         18      2.5 mg         19            20               21                 22               23               24               25               26               27               28                 29                30               31                    Date Details   07/02 This INR check       Date of next INR:  7/19/2018         How to take your warfarin dose     To take:  2.5 mg Take 0.5 of a 5 mg tablet.    To take:  5 mg Take 1 of the 5 mg tablets.

## 2018-07-02 NOTE — PROGRESS NOTES
ANTICOAGULATION FOLLOW-UP CLINIC VISIT    Patient Name:  Joe Mas  Date:  7/2/2018  Contact Type:  Face to Face    SUBJECTIVE:     Patient Findings     Positives Change in diet/appetite (Has eaten more greens, lettuce (vitamin K))    Comments No changes in medications or activity noted. No bleeding or increased bruising noted. Took warfarin as prescribed.  Patient had 25 mg in the last 7 days, will adjust dose to 27.5mg by next INR check in two weeks (~10% increase).  Education and consistency discussed about vitamin K. Pt is going to incorporate 2 servings of greens per week into his diet, he enjoys and misses eating greens (vitamin K).  Patient verbalizes understanding and agrees to plan. No further questions or concerns.           OBJECTIVE    INR Protime   Date Value Ref Range Status   07/02/2018 1.8 (A) 0.86 - 1.14 Final       ASSESSMENT / PLAN  INR assessment SUB    Recheck INR In: 2 WEEKS    INR Location Clinic      Anticoagulation Summary as of 7/2/2018     INR goal 2.0-3.0   Today's INR 1.8!   Warfarin maintenance plan 2.5 mg (5 mg x 0.5) on Mon, Wed, Fri; 5 mg (5 mg x 1) all other days   Full warfarin instructions 2.5 mg on Mon, Wed, Fri; 5 mg all other days   Weekly warfarin total 27.5 mg   Plan last modified Sofia Tai RN (7/2/2018)   Next INR check 7/19/2018   Priority INR   Target end date     Indications   Atrial fibrillation  unspecified type (H) [I48.91]  Long term current use of anticoagulant therapy [Z79.01]         Anticoagulation Episode Summary     INR check location     Preferred lab     Send INR reminders to [facility]    Comments *      Anticoagulation Care Providers     Provider Role Specialty Phone number    Jolynn Phillips MD [facility] [phone]            See the Encounter Report to view Anticoagulation Flowsheet and Dosing Calendar (Go to Encounters tab in chart review, and find the Anticoagulation Therapy Visit)        Sofia Tai  RN

## 2018-07-19 ENCOUNTER — ANTICOAGULATION THERAPY VISIT (OUTPATIENT)
Dept: ANTICOAGULATION | Facility: CLINIC | Age: 63
End: 2018-07-19
Payer: COMMERCIAL

## 2018-07-19 DIAGNOSIS — I48.91 ATRIAL FIBRILLATION, UNSPECIFIED TYPE (H): ICD-10-CM

## 2018-07-19 DIAGNOSIS — Z79.01 LONG TERM CURRENT USE OF ANTICOAGULANT THERAPY: ICD-10-CM

## 2018-07-19 LAB — INR POINT OF CARE: 2.9 (ref 0.86–1.14)

## 2018-07-19 PROCEDURE — 36416 COLLJ CAPILLARY BLOOD SPEC: CPT

## 2018-07-19 PROCEDURE — 99207 ZZC NO CHARGE NURSE ONLY: CPT

## 2018-07-19 PROCEDURE — 85610 PROTHROMBIN TIME: CPT | Mod: QW

## 2018-07-19 RX ORDER — WARFARIN SODIUM 5 MG/1
TABLET ORAL
Qty: 76 TABLET | Refills: 0 | Status: SHIPPED | OUTPATIENT
Start: 2018-07-19 | End: 2018-10-15

## 2018-07-19 NOTE — MR AVS SNAPSHOT
Joe Mas   7/19/2018 10:00 AM   Anticoagulation Therapy Visit    Description:  62 year old male   Provider:  [facility]   Department:  [facility]           INR as of 7/19/2018     Today's INR 2.9      Anticoagulation Summary as of 7/19/2018     INR goal 2.0-3.0   Today's INR 2.9   Full warfarin instructions 2.5 mg on Mon, Wed, Fri; 5 mg all other days   Next INR check 8/9/2018    Indications   Atrial fibrillation  unspecified type (H) [I48.91]  Long term current use of anticoagulant therapy [Z79.01]         Your next Anticoagulation Clinic appointment(s)     Aug 09, 2018 10:15 AM CDT   [facility]agulation [facility] with [facility]   [facility]    [address] [address] [phone]              Contact Numbers     Please call [phone] with any problems or questions regarding your therapy.    If you need to cancel and/or reschedule your appointment please call one of the following numbers:  [facility] [phone]  [place] - [phone]  [place] - [phone]  [facility] [phone]  [place] - [phone]            July 2018 Details    Sun Mon Tue Wed Thu Fri Sat     1               2               3               4               5               6               7                 8               9               10               11               12               13               14                 15               16               17               18               19      5 mg   See details      20      2.5 mg         21      5 mg           22      5 mg         23      2.5 mg         24      5 mg         25      2.5 mg         26      5 mg         27      2.5 mg         28      5 mg           29      5 mg         30      2.5 mg         31      5 mg              Date Details   07/19 This INR check               How to take your warfarin dose     To take:  2.5 mg Take 0.5 of a 5 mg tablet.    To take:  5 mg Take 1 of the 5 mg  tablets.           August 2018 Details    Sun Mon Tue Wed Thu Fri Sat        1      2.5 mg         2      5 mg         3      2.5 mg         4      5 mg           5      5 mg         6      2.5 mg         7      5 mg         8      2.5 mg         9            10               11                 12               13               14               15               16               17               18                 19               20               21               22               23               24               25                 26               27               28               29               30               31                 Date Details   No additional details    Date of next INR:  8/9/2018         How to take your warfarin dose     To take:  2.5 mg Take 0.5 of a 5 mg tablet.    To take:  5 mg Take 1 of the 5 mg tablets.

## 2018-07-19 NOTE — PROGRESS NOTES
ANTICOAGULATION FOLLOW-UP CLINIC VISIT    Patient Name:  Joe Mas  Date:  7/19/2018  Contact Type:  Face to Face    SUBJECTIVE:     Patient Findings     Positives Change in diet/appetite (patient did add in a salad, and plans to every week)    Comments No changes in medications or activity noted. No bleeding or increased bruising noted. Took warfarin as prescribed.  Patient is to continue new maintenance warfarin plan, and check INR in 3 weeks.  Patient verbalizes understanding and agrees to plan. No further questions or concerns.           OBJECTIVE    INR Protime   Date Value Ref Range Status   07/19/2018 2.9 (A) 0.86 - 1.14 Final       ASSESSMENT / PLAN  INR assessment THER    Recheck INR In: 3 WEEKS    INR Location Clinic      Anticoagulation Summary as of 7/19/2018     INR goal 2.0-3.0   Today's INR 2.9   Warfarin maintenance plan 2.5 mg (5 mg x 0.5) on Mon, Wed, Fri; 5 mg (5 mg x 1) all other days   Full warfarin instructions 2.5 mg on Mon, Wed, Fri; 5 mg all other days   Weekly warfarin total 27.5 mg   No change documented Sofia Tai RN   Plan last modified Sofia Tai RN (7/2/2018)   Next INR check 8/9/2018   Priority INR   Target end date     Indications   Atrial fibrillation  unspecified type (H) [I48.91]  Long term current use of anticoagulant therapy [Z79.01]         Anticoagulation Episode Summary     INR check location     Preferred lab     Send INR reminders to [facility]    Comments *      Anticoagulation Care Providers     Provider Role Specialty Phone number    Jolynn Phillips MD [facility] [phone]            See the Encounter Report to view Anticoagulation Flowsheet and Dosing Calendar (Go to Encounters tab in chart review, and find the Anticoagulation Therapy Visit)        Sofia Tai RN

## 2018-07-31 ENCOUNTER — TELEPHONE (OUTPATIENT)
Dept: FAMILY MEDICINE | Facility: CLINIC | Age: 63
End: 2018-07-31

## 2018-07-31 ENCOUNTER — OFFICE VISIT (OUTPATIENT)
Dept: FAMILY MEDICINE | Facility: CLINIC | Age: 63
End: 2018-07-31
Payer: COMMERCIAL

## 2018-07-31 VITALS
BODY MASS INDEX: 38.95 KG/M2 | SYSTOLIC BLOOD PRESSURE: 123 MMHG | OXYGEN SATURATION: 92 % | DIASTOLIC BLOOD PRESSURE: 73 MMHG | HEART RATE: 59 BPM | HEIGHT: 69 IN | WEIGHT: 263 LBS | TEMPERATURE: 97.2 F

## 2018-07-31 DIAGNOSIS — G47.33 OSA (OBSTRUCTIVE SLEEP APNEA): ICD-10-CM

## 2018-07-31 DIAGNOSIS — J44.1 OBSTRUCTIVE CHRONIC BRONCHITIS WITH EXACERBATION (H): Primary | ICD-10-CM

## 2018-07-31 DIAGNOSIS — I48.91 ATRIAL FIBRILLATION, UNSPECIFIED TYPE (H): ICD-10-CM

## 2018-07-31 PROCEDURE — 99214 OFFICE O/P EST MOD 30 MIN: CPT | Performed by: PHYSICIAN ASSISTANT

## 2018-07-31 RX ORDER — IPRATROPIUM BROMIDE AND ALBUTEROL SULFATE 2.5; .5 MG/3ML; MG/3ML
1 SOLUTION RESPIRATORY (INHALATION) EVERY 6 HOURS PRN
Qty: 30 VIAL | Refills: 1 | Status: SHIPPED | OUTPATIENT
Start: 2018-07-31 | End: 2018-08-16

## 2018-07-31 NOTE — TELEPHONE ENCOUNTER
Charissa,   Advair is non-formulary. Do you want to do a prior authorization or change medication.     Prior Authorization Required on: Advair 250-50g  Insurance: [employer]  Insurance Phone: [phone]  Patient ID: [id number]  Please Contact the Pharmacy with Prior Auth Status (approval/denial).   Thank You!    See Robby  Pharmacy Technician  [facility]  [phone]

## 2018-07-31 NOTE — TELEPHONE ENCOUNTER
These two are the only ones that are cover with his copay  symbicort $135  dulera $129    See Robby  Pharmacy Technician, [facility]  Phone: [phone]  Fax: [phone]

## 2018-07-31 NOTE — PROGRESS NOTES
SUBJECTIVE:   Joe Mas is a 62 year old male who presents to clinic today for the following health issues:      Medication Followup of Flecainide 100 mg     Taking Medication as prescribed: yes    Side Effects:  Shortness of breath. He started getting short of breath when he started this medication. He has realized in the past though when he is in A-fib he will feel short of breath.      Medication Helping Symptoms:  yes     -Wanting to see if he can get on Advair again. Has been using his rescue inhaler a lot more recently.      Problem list and histories reviewed & adjusted, as indicated.  Additional history: as documented    Current Outpatient Prescriptions   Medication Sig Dispense Refill     ASPIRIN 81 MG OR TABS ONE DAILY 100 3     atorvastatin (LIPITOR) 20 MG tablet Take 1 tablet (20 mg) by mouth daily 90 tablet 3     flecainide (TAMBOCOR) 100 MG tablet Take 1 tablet (100 mg) by mouth 2 times daily Take the first dose three days before your scheduled stress test. 60 tablet 6     fluticasone-salmeterol (ADVAIR) 250-50 MCG/DOSE diskus inhaler Inhale 1 puff into the lungs 2 times daily 3 Inhaler 1     ipratropium - albuterol 0.5 mg/2.5 mg/3 mL (DUONEB) 0.5-2.5 (3) MG/3ML neb solution Take 1 vial (3 mLs) by nebulization every 6 hours as needed for shortness of breath / dyspnea or wheezing 30 vial 1     losartan (COZAAR) 100 MG tablet TAKE ONE TABLET BY MOUTH EVERY DAY 90 tablet 2     metFORMIN (GLUCOPHAGE-XR) 500 MG 24 hr tablet Take 1 tablet (500 mg) by mouth daily 90 tablet 3     metoprolol succinate (TOPROL-XL) 50 MG 24 hr tablet Take 1 tablet (50 mg) by mouth daily 90 tablet 3     order for DME Equipment being ordered: Nebulizer 1 Device 0     VENTOLIN  (90 BASE) MCG/ACT Inhaler INHALE TWO PUFFS BY MOUTH EVERY 6 HOURS AS NEEDED FOR SHORTNESS OF BREATH 18 g 7     warfarin (JANTOVEN) 5 MG tablet Take 2.5 mg on Mon, Wed, Fri; 5 mg all other days or As directed by Anticoagulation clinic 76 tablet 0  "    blood glucose monitoring (ACCU-CHEK FASTCLIX) lancets 1 each daily 100 each 5     blood glucose monitoring (ACCU-CHEK SMARTVIEW) test strip Test blood sugars daily DUE FOR 6 MONTH VISIT AUG 2018 100 each 0     blood glucose monitoring (Top[employer]alks CONTOUR NEXT MONITOR) meter device kit Use to test blood sugar daily (Patient not taking: Reported on 6/19/2018) 1 kit 0     mometasone-formoterol (DULERA) 200-5 MCG/ACT oral inhaler Inhale 2 puffs into the lungs 2 times daily 13 g 1     mometasone-formoterol (DULERA) 200-5 MCG/ACT oral inhaler Inhale 2 puffs into the lungs 2 times daily 39 g 1     Allergies   Allergen Reactions     Lisinopril Cough     BP Readings from Last 3 Encounters:   07/31/18 123/73   06/19/18 127/86   05/10/18 140/90    Wt Readings from Last 3 Encounters:   07/31/18 263 lb (119.3 kg)   06/19/18 261 lb 3.2 oz (118.5 kg)   05/10/18 259 lb (117.5 kg)                    Reviewed and updated as needed this visit by clinical staff       Reviewed and updated as needed this visit by Provider         ROS:  Remainder of ROS obtained and found to be negative other than that which was documented above      OBJECTIVE:     /73  Pulse 59  Temp 97.2  F (36.2  C) (Tympanic)  Ht 5' 8.75\" (1.746 m)  Wt 263 lb (119.3 kg)  SpO2 92%  BMI 39.12 kg/m2  Body mass index is 39.12 kg/(m^2).  GENERAL: healthy, alert and no distress  EYES: Eyes grossly normal to inspection  RESP: lungs tight throughout with wheezing, no crackles  CV: regular rates and rhythm, normal S1 S2, no S3 or S4, no murmur, click or rub and no peripheral edema    Diagnostic Test Results:  none     ASSESSMENT/PLAN:         ICD-10-CM    1. Obstructive chronic bronchitis with exacerbation (H) J44.1 fluticasone-salmeterol (ADVAIR) 250-50 MCG/DOSE diskus inhaler     ipratropium - albuterol 0.5 mg/2.5 mg/3 mL (DUONEB) 0.5-2.5 (3) MG/3ML neb solution     order for DME   2. JUNG (obstructive sleep apnea) G47.33    3. Atrial fibrillation, unspecified type " (H) I48.91      Would like to avoid prednisone if possible so will have him get a neb machine to try some nebs at home as well as get back on a daily maintenance inhaler  Follow up if not improving      Patient Instructions   RESTART Advair - use two times daily    TRY the duoneb IN PLACE OF ALBUTEROL as needed during the day up to every 6 hours. If not at home, bring the inhaler with you            Charissa Segal PA-C  [facility]

## 2018-07-31 NOTE — MR AVS SNAPSHOT
After Visit Summary   7/31/2018    Joe Mas    MRN: [id number]           Patient Information     Date Of Birth          1955        Visit Information        Provider Department      7/31/2018 10:20 AM Charisas Segal PA-C [facility]        Today's Diagnoses     Obstructive chronic bronchitis with exacerbation (H)    -  1    JUNG (obstructive sleep apnea)        Atrial fibrillation, unspecified type (H)          Care Instructions    RESTART Advair - use two times daily    TRY the duoneb IN PLACE OF ALBUTEROL as needed during the day up to every 6 hours. If not at home, bring the inhaler with you          Follow-ups after your visit        Your next 10 appointments already scheduled     Aug 09, 2018 10:15 AM CDT   [facility]    [address] [address]   [phone]            Aug 14, 2018 11:30 AM CDT   EP NEW with Jay Barrientos MD   [facility] ([facility])    [address] [address]   [phone]            Aug 14, 2018  4:00 PM CDT   Return Sleep Patient with Manan Day MD   [facility]    [address] [address]   [phone]              Who to contact     Normal or non-critical lab and imaging results will be communicated to you by MyChart, letter or phone within 4 business days after the clinic has received the results. If you do not hear from us within 7 days, please contact the clinic through MyChart or phone. If you have a critical or abnormal lab result, we will notify you by phone as soon as possible.  Submit refill requests through [employer] or call your pharmacy and they will forward the refill request to us. Please allow 3 business days for your refill to be completed.          If you need to  "speak with a  for additional information , please call: [phone]             Additional Information About Your Visit        [employer] Information     [employer] gives you secure access to your electronic health record. If you see a primary care provider, you can also send messages to your care team and make appointments. If you have questions, please call your primary care clinic.  If you do not have a primary care provider, please call [phone] and they will assist you.        Care EveryWhere ID     This is your Care EveryWhere ID. This could be used by other organizations to access your [facility] medical records  [id number]        Your Vitals Were     Pulse Temperature Height Pulse Oximetry BMI (Body Mass Index)       59 97.2  F (36.2  C) (Tympanic) 5' 8.75\" (1.746 m) 92% 39.12 kg/m2        Blood Pressure from Last 3 Encounters:   07/31/18 123/73   06/19/18 127/86   05/10/18 140/90    Weight from Last 3 Encounters:   07/31/18 263 lb (119.3 kg)   06/19/18 261 lb 3.2 oz (118.5 kg)   05/10/18 259 lb (117.5 kg)              Today, you had the following     No orders found for display         Today's Medication Changes          These changes are accurate as of 7/31/18 10:44 AM.  If you have any questions, ask your nurse or doctor.               Start taking these medicines.        Dose/Directions    fluticasone-salmeterol 250-50 MCG/DOSE diskus inhaler   Commonly known as:  ADVAIR   Used for:  Obstructive chronic bronchitis with exacerbation (H)   Started by:  Charissa Segal PA-C        Dose:  1 puff   Inhale 1 puff into the lungs 2 times daily   Quantity:  3 Inhaler   Refills:  1       ipratropium - albuterol 0.5 mg/2.5 mg/3 mL 0.5-2.5 (3) MG/3ML neb solution   Commonly known as:  DUONEB   Used for:  Obstructive chronic bronchitis with exacerbation (H)   Started by:  Charissa Segal PA-C        Dose:  1 vial   Take 1 vial (3 mLs) by nebulization every 6 hours as needed for " shortness of breath / dyspnea or wheezing   Quantity:  30 vial   Refills:  1       order for DME   Used for:  Obstructive chronic bronchitis with exacerbation (H)   Started by:  Charissa Segal PA-C        Equipment being ordered: Nebulizer   Quantity:  1 Device   Refills:  0            Where to get your medicines      These medications were sent to [facility] [place], [place] - [address]  [address] [facility] [address]     Phone:  [phone]     fluticasone-salmeterol 250-50 MCG/DOSE diskus inhaler    ipratropium - albuterol 0.5 mg/2.5 mg/3 mL 0.5-2.5 (3) MG/3ML neb solution         Some of these will need a paper prescription and others can be bought over the counter.  Ask your nurse if you have questions.     Bring a paper prescription for each of these medications     order for DME                Primary Care Provider Office Phone # Fax #    Jolynn Phillips [phone]       [address] [address] [facility] [address]        Equal Access to Services     [facility]: Hadii chetna apple hadasho Soomaali, waaxda luqadaha, qaybta kaalmada adeegyaangy, catherine burciaga . So [facility] [phone].    ATENCIÓN: Si habla español, tiene a rhodes disposición servicios gratuitos de asistencia lingüística. Llame al [phone].    We comply with applicable federal civil rights laws and [place] laws. We do not discriminate on the basis of race, color, national origin, age, disability, sex, sexual orientation, or gender identity.            Thank you!     Thank you for choosing [facility]  [facility] your care. Our goal is always to provide you with excellent care. Hearing back from our patients is one way we can continue to improve our services. Please take a few minutes to complete the written survey that you may receive in the mail after your visit with us. Thank you!             Your Updated Medication List - Protect others around you: Learn how  to safely use, store and throw away your medicines at www.disposemymeds.org.          This list is accurate as of 7/31/18 10:44 AM.  Always use your most recent med list.                   Brand Name Dispense Instructions for use Diagnosis    aspirin 81 MG tablet     100    ONE DAILY    Mixed hyperlipidemia       atorvastatin 20 MG tablet    LIPITOR    90 tablet    Take 1 tablet (20 mg) by mouth daily    Mixed hyperlipidemia       blood glucose monitoring lancets     3 Box    1 each daily    Type 2 diabetes, HbA1c goal < 7% (H)       blood glucose monitoring meter device kit     1 kit    Use to test blood sugar daily    Diabetes mellitus (H)       blood glucose monitoring test strip    ACCU-CHEK SMARTVIEW    300 each    Test blood sugars daily    Type 2 diabetes, HbA1c goal < 7% (H)       flecainide 100 MG tablet    TAMBOCOR    60 tablet    Take 1 tablet (100 mg) by mouth 2 times daily Take the first dose three days before your scheduled stress test.    Paroxysmal atrial fibrillation (H)       fluticasone-salmeterol 250-50 MCG/DOSE diskus inhaler    ADVAIR    3 Inhaler    Inhale 1 puff into the lungs 2 times daily    Obstructive chronic bronchitis with exacerbation (H)       ipratropium - albuterol 0.5 mg/2.5 mg/3 mL 0.5-2.5 (3) MG/3ML neb solution    DUONEB    30 vial    Take 1 vial (3 mLs) by nebulization every 6 hours as needed for shortness of breath / dyspnea or wheezing    Obstructive chronic bronchitis with exacerbation (H)       losartan 100 MG tablet    COZAAR    90 tablet    TAKE ONE TABLET BY MOUTH EVERY DAY    Essential hypertension, benign       metFORMIN 500 MG 24 hr tablet    GLUCOPHAGE-XR    90 tablet    Take 1 tablet (500 mg) by mouth daily    Type 2 diabetes mellitus without complication, without long-term current use of insulin (H)       metoprolol succinate 50 MG 24 hr tablet    TOPROL-XL    90 tablet    Take 1 tablet (50 mg) by mouth daily    Atrial fibrillation, unspecified type (H)       order  for DME     1 Device    Equipment being ordered: Nebulizer    Obstructive chronic bronchitis with exacerbation (H)       VENTOLIN  (90 Base) MCG/ACT Inhaler   Generic drug:  albuterol     18 g    INHALE TWO PUFFS BY MOUTH EVERY 6 HOURS AS NEEDED FOR SHORTNESS OF BREATH    Obstructive chronic bronchitis with exacerbation (H)       warfarin 5 MG tablet    JANTOVEN    76 tablet    Take 2.5 mg on Mon, Wed, Fri; 5 mg all other days or As directed by Anticoagulation clinic    Atrial fibrillation, unspecified type (H)

## 2018-08-01 ENCOUNTER — TELEPHONE (OUTPATIENT)
Dept: FAMILY MEDICINE | Facility: CLINIC | Age: 63
End: 2018-08-01

## 2018-08-01 DIAGNOSIS — J44.1 OBSTRUCTIVE CHRONIC BRONCHITIS WITH EXACERBATION (H): Primary | ICD-10-CM

## 2018-08-01 RX ORDER — PREDNISONE 20 MG/1
60 TABLET ORAL 2 TIMES DAILY
Qty: 30 TABLET | Refills: 0 | Status: CANCELLED | OUTPATIENT
Start: 2018-08-01

## 2018-08-01 RX ORDER — PREDNISONE 20 MG/1
40 TABLET ORAL DAILY
Qty: 20 TABLET | Refills: 0 | Status: SHIPPED | OUTPATIENT
Start: 2018-08-01 | End: 2018-08-06

## 2018-08-01 NOTE — TELEPHONE ENCOUNTER
"Dr. Dubon: Please advise in Charissa's absence: He was seen yesterday by Charissa for \"exacerbation of chronic bronchitis\" He reports that he Started duoneb nebulizer at noon yesterday. Took it at 6 pm and 9pm yesterday. Did 1/2 one at 4 AM today as he said lungs were starting to feel tight.  At 7 AM today he \"woke up and stood up and it came on suddenly that I had difficulty getting air; lungs felt really tight. It was kind of scarey.\"  He did \"another duoneb and it worked.\"  He said that he feels fine now.  Has not been coughing up anything. He reports that he was on advair in the past and went off of it 4 years ago  as he was not having any issues. Charissa re-ordered advair but he said that the pharmacy could not fill it yet because of insurance. \"Charissa said that she would order prednisone if needed but she wanted to try advair first as she did not want the prednisone to mess with my INR but I would like to breathe so if I can get prednisone, that would be great.\"  See Robby from Pharmacy reports:    \"These two are the only ones that are cover with his copay  symbicort $135  dulera $129     See Robby  Pharmacy Technician, Elise  [facility]  Phone: [phone]  Fax: [phone]\"  "

## 2018-08-01 NOTE — TELEPHONE ENCOUNTER
Message handled by Nurse Triage with [employer] - provider name: Ling. Dulera 200-5 ordered and prednisone 40 mg twice a day for 5 days ordered. Patient notified.  Vern Conley RN

## 2018-08-01 NOTE — TELEPHONE ENCOUNTER
Reason for call:  Patient reporting a symptom    Symptom or request: Joe was seen on 7/31/18 and was prescribed albuterol nebs and advair.  He woke this morning around 3 a.m. And was SOB and took his nebs at that time.  He fell asleep for a few hours and woke at 7 a.m. And was very SOB, a little scary as he just couldn't get air.  He managed to do another neb and it finally opened up and he could breathe better.  Charissa had mentioned that if the nebs were not working she would prescribed prednisone.  He has not started advair yet as his insurance requiring PA.  Please call and assess. Thank you..Reba Henning      Duration (how long have symptoms been present): since before 7/31/18    Have you been treated for this before? Yes    Phone Number patient can be reached at:  Home number on file [phone] (home)    Best Time:  Any time    Can we leave a detailed message on this number:  YES    Call taken on 8/1/2018 at 8:48 AM by Reba Henning

## 2018-08-06 DIAGNOSIS — E11.9 TYPE 2 DIABETES, HBA1C GOAL < 7% (H): ICD-10-CM

## 2018-08-06 NOTE — TELEPHONE ENCOUNTER
Medication is being filled for 1 time refill only due to:  Patient needs to be seen because due for 6 month diabetic f/u in August.   2/27/18 Office Visit note: Patient is checking blood sugars: sometimes.    Heather TIRADO RN

## 2018-08-06 NOTE — TELEPHONE ENCOUNTER
"blood glucose monitoring (ACCU-CHEK SMARTVIEW) test strip        Last Written Prescription Date:  8/29/16  Last Fill Quantity: 300,   # refills: 1  Last Office Visit: 7/31/18  Future Office visit:       Requested Prescriptions   Pending Prescriptions Disp Refills     blood glucose monitoring (ACCU-CHEK SMARTVIEW) test strip 300 each 1     Sig: Test blood sugars daily    Diabetic Supplies Protocol Passed    8/6/2018  3:09 PM       Passed - Patient is 18 years of age or older       Passed - Recent (6 mo) or future (30 days) visit within the authorizing provider's specialty    Patient had office visit in the last 6 months or has a visit in the next 30 days with authorizing provider.  See \"Patient Info\" tab in inbasket, or \"Choose Columns\" in Meds & Orders section of the refill encounter.              "

## 2018-08-07 DIAGNOSIS — E11.9 TYPE 2 DIABETES, HBA1C GOAL < 7% (H): ICD-10-CM

## 2018-08-07 RX ORDER — LANCETS
1 EACH MISCELLANEOUS DAILY
Qty: 100 EACH | Refills: 5 | Status: SHIPPED | OUTPATIENT
Start: 2018-08-07 | End: 2019-12-12

## 2018-08-07 NOTE — TELEPHONE ENCOUNTER
Ok to refill per refill protocol.    Yazmin Souza, PharmD  [facility] [place]  [phone]

## 2018-08-09 ENCOUNTER — ANTICOAGULATION THERAPY VISIT (OUTPATIENT)
Dept: ANTICOAGULATION | Facility: CLINIC | Age: 63
End: 2018-08-09
Payer: COMMERCIAL

## 2018-08-09 DIAGNOSIS — Z79.01 LONG TERM CURRENT USE OF ANTICOAGULANT THERAPY: ICD-10-CM

## 2018-08-09 DIAGNOSIS — I48.91 ATRIAL FIBRILLATION, UNSPECIFIED TYPE (H): ICD-10-CM

## 2018-08-09 LAB — INR POINT OF CARE: 2.3 (ref 0.86–1.14)

## 2018-08-09 PROCEDURE — 36416 COLLJ CAPILLARY BLOOD SPEC: CPT

## 2018-08-09 PROCEDURE — 85610 PROTHROMBIN TIME: CPT | Mod: QW

## 2018-08-09 PROCEDURE — 99207 ZZC NO CHARGE NURSE ONLY: CPT

## 2018-08-09 NOTE — PROGRESS NOTES
ANTICOAGULATION FOLLOW-UP CLINIC VISIT    Patient Name:  Joe Mas  Date:  8/9/2018  Contact Type:  Face to Face    SUBJECTIVE:     Patient Findings     Positives No Problem Findings    Comments Patient denies any changes. Patient will continue weekly maintenance dose. INR is therapeutic. Recheck in 4 weeks.              OBJECTIVE    INR Protime   Date Value Ref Range Status   08/09/2018 2.3 (A) 0.86 - 1.14 Final       ASSESSMENT / PLAN  INR assessment THER    Recheck INR In: 4 WEEKS    INR Location Clinic      Anticoagulation Summary as of 8/9/2018     INR goal 2.0-3.0   Today's INR 2.3   Warfarin maintenance plan 2.5 mg (5 mg x 0.5) on Mon, Wed, Fri; 5 mg (5 mg x 1) all other days   Full warfarin instructions 2.5 mg on Mon, Wed, Fri; 5 mg all other days   Weekly warfarin total 27.5 mg   No change documented Lizbet Paiz, TRINI   Plan last modified Sofia Tai RN (7/2/2018)   Next INR check 9/6/2018   Priority INR   Target end date     Indications   Atrial fibrillation  unspecified type (H) [I48.91]  Long term current use of anticoagulant therapy [Z79.01]         Anticoagulation Episode Summary     INR check location     Preferred lab     Send INR reminders to [facility]    Comments *      Anticoagulation Care Providers     Provider Role Specialty Phone number    Jolynn Phillips MD [facility] [phone]            See the Encounter Report to view Anticoagulation Flowsheet and Dosing Calendar (Go to Encounters tab in chart review, and find the Anticoagulation Therapy Visit)        Lizbet Paiz, RN

## 2018-08-09 NOTE — MR AVS SNAPSHOT
Joe Mas   8/9/2018 10:15 AM   Anticoagulation Therapy Visit    Description:  62 year old male   Provider:  [place] [facility]   Department:  [facility]           INR as of 8/9/2018     Today's INR 2.3      Anticoagulation Summary as of 8/9/2018     INR goal 2.0-3.0   Today's INR 2.3   Full warfarin instructions 2.5 mg on Mon, Wed, Fri; 5 mg all other days   Next INR check 9/6/2018    Indications   Atrial fibrillation  unspecified type (H) [I48.91]  Long term current use of anticoagulant therapy [Z79.01]         Your next Anticoagulation Clinic appointment(s)     Sep 06, 2018 10:00 AM CDT   [facility] with [place] [facility]    [address] [address] [phone]              Contact Numbers     Please call [phone] with any problems or questions regarding your therapy.    If you need to cancel and/or reschedule your appointment please call one of the following numbers:  [facility] [phone]  [place] - [phone]  [facility] [phone]  [facility] [phone]  [place] - [phone]            August 2018 Details    Sun Mon Tue Wed Thu Fri Sat        1               2               3               4                 5               6               7               8               9      5 mg   See details      10      2.5 mg         11      5 mg           12      5 mg         13      2.5 mg         14      5 mg         15      2.5 mg         16      5 mg         17      2.5 mg         18      5 mg           19      5 mg         20      2.5 mg         21      5 mg         22      2.5 mg         23      5 mg         24      2.5 mg         25      5 mg           26      5 mg         27      2.5 mg         28      5 mg         29      2.5 mg         30      5 mg         31      2.5 mg           Date Details   08/09 This INR check               How to take your warfarin dose     To take:  2.5 mg Take 0.5 of a 5 mg  tablet.    To take:  5 mg Take 1 of the 5 mg tablets.           September 2018 Details    Sun Mon Tue Wed Thu Fri Sat           1      5 mg           2      5 mg         3      2.5 mg         4      5 mg         5      2.5 mg         6            7               8                 9               10               11               12               13               14               15                 16               17               18               19               20               21               22                 23               24               25               26               27               28               29                 30                      Date Details   No additional details    Date of next INR:  9/6/2018         How to take your warfarin dose     To take:  2.5 mg Take 0.5 of a 5 mg tablet.    To take:  5 mg Take 1 of the 5 mg tablets.

## 2018-08-14 ENCOUNTER — OFFICE VISIT (OUTPATIENT)
Dept: SLEEP MEDICINE | Facility: CLINIC | Age: 63
End: 2018-08-14
Payer: COMMERCIAL

## 2018-08-14 ENCOUNTER — OFFICE VISIT (OUTPATIENT)
Dept: SLEEP MEDICINE | Facility: CLINIC | Age: 63
End: 2018-08-14

## 2018-08-14 ENCOUNTER — OFFICE VISIT (OUTPATIENT)
Dept: CARDIOLOGY | Facility: CLINIC | Age: 63
End: 2018-08-14
Attending: INTERNAL MEDICINE
Payer: COMMERCIAL

## 2018-08-14 VITALS
HEIGHT: 69 IN | HEART RATE: 78 BPM | OXYGEN SATURATION: 93 % | SYSTOLIC BLOOD PRESSURE: 118 MMHG | DIASTOLIC BLOOD PRESSURE: 75 MMHG | BODY MASS INDEX: 38.69 KG/M2 | WEIGHT: 261.2 LBS

## 2018-08-14 VITALS
DIASTOLIC BLOOD PRESSURE: 73 MMHG | BODY MASS INDEX: 38.76 KG/M2 | SYSTOLIC BLOOD PRESSURE: 118 MMHG | HEART RATE: 71 BPM | OXYGEN SATURATION: 93 % | WEIGHT: 260.6 LBS

## 2018-08-14 DIAGNOSIS — G47.33 OSA (OBSTRUCTIVE SLEEP APNEA): ICD-10-CM

## 2018-08-14 DIAGNOSIS — I48.0 PAROXYSMAL ATRIAL FIBRILLATION (H): ICD-10-CM

## 2018-08-14 DIAGNOSIS — G47.33 OSA (OBSTRUCTIVE SLEEP APNEA): Primary | ICD-10-CM

## 2018-08-14 PROCEDURE — 99213 OFFICE O/P EST LOW 20 MIN: CPT | Performed by: INTERNAL MEDICINE

## 2018-08-14 PROCEDURE — 99214 OFFICE O/P EST MOD 30 MIN: CPT | Performed by: FAMILY MEDICINE

## 2018-08-14 NOTE — MR AVS SNAPSHOT
After Visit Summary   8/14/2018    Joe Mas    MRN: [id number]           Patient Information     Date Of Birth          1955        Visit Information        Provider Department      8/14/2018 4:00 PM Manan Day MD [facility]        Today's Diagnoses     JNUG (obstructive sleep apnea)    -  1      Care Instructions        Your Body mass index is 38.85 kg/(m^2).  Weight management is a personal decision.  If you are interested in exploring weight loss strategies, the following discussion covers the approaches that may be successful. Body mass index (BMI) is one way to tell whether you are at a healthy weight, overweight, or obese. It measures your weight in relation to your height.  A BMI of 18.5 to 24.9 is in the healthy range. A person with a BMI of 25 to 29.9 is considered overweight, and someone with a BMI of 30 or greater is considered obese. More than two-thirds of American adults are considered overweight or obese.  Being overweight or obese increases the risk for further weight gain. Excess weight may lead to heart disease and diabetes.  Creating and following plans for healthy eating and physical activity may help you improve your health.  Weight control is part of healthy lifestyle and includes exercise, emotional health, and healthy eating habits. Careful eating habits lifelong are the mainstay of weight control. Though there are significant health benefits from weight loss, long-term weight loss with diet alone may be very difficult to achieve- studies show long-term success with dietary management in less than 10% of people. Attaining a healthy weight may be especially difficult to achieve in those with severe obesity. In some cases, medications, devices and surgical management might be considered.  What can you do?  If you are overweight or obese and are interested in methods for weight loss, you should discuss this with your provider.     Consider  reducing daily calorie intake by 500 calories.     Keep a food journal.     Avoiding skipping meals, consider cutting portions instead.    Diet combined with exercise helps maintain muscle while optimizing fat loss. Strength training is particularly important for building and maintaining muscle mass. Exercise helps reduce stress, increase energy, and improves fitness. Increasing exercise without diet control, however, may not burn enough calories to loose weight.       Start walking three days a week 10-20 minutes at a time    Work towards walking thirty minutes five days a week     Eventually, increase the speed of your walking for 1-2 minutes at time    In addition, we recommend that you review healthy lifestyles and methods for weight loss available through the National Institutes of Health patient information sites:  http://win.niddk.nih.gov/publications/index.htm    And look into health and wellness programs that may be available through your health insurance provider, employer, local community center, or zara club.    Weight management plan: Patient was referred to their PCP to discuss a diet and exercise plan.            Follow-ups after your visit        Follow-up notes from your care team     Return in 1 year (on 8/14/2019) for PAP follow up.      Your next 10 appointments already scheduled     Sep 06, 2018 10:00 AM CDT   Anticoagulation Visit with [place] ANTI COAG   [facility]    [address] [address] [phone]              Who to contact     If you have questions or need follow up information about today's clinic visit or your schedule please contact [facility] directly at [phone].  Normal or non-critical lab and imaging results will be communicated to you by MyChart, letter or phone within 4 business days after the clinic has received the results. If you do not hear from us within 7 days, please contact the clinic  "through [email or web address] or phone. If you have a critical or abnormal lab result, we will notify you by phone as soon as possible.  Submit refill requests through YAZUO or call your pharmacy and they will forward the refill request to us. Please allow 3 business days for your refill to be completed.          Additional Information About Your Visit        [email or web address] Information     YAZUO gives you secure access to your electronic health record. If you see a primary care provider, you can also send messages to your care team and make appointments. If you have questions, please call your primary care clinic.  If you do not have a primary care provider, please call [phone] and they will assist you.        Care EveryWhere ID     This is your Care EveryWhere ID. This could be used by other organizations to access your [place] medical records  [id number]        Your Vitals Were     Pulse Height Pulse Oximetry BMI (Body Mass Index)          78 1.746 m (5' 8.75\") 93% 38.85 kg/m2         Blood Pressure from Last 3 Encounters:   08/14/18 118/75   08/14/18 118/73   07/31/18 123/73    Weight from Last 3 Encounters:   08/14/18 118.5 kg (261 lb 3.2 oz)   08/14/18 118.2 kg (260 lb 9.6 oz)   07/31/18 119.3 kg (263 lb)              Today, you had the following     No orders found for display       Primary Care Provider Office Phone # Fax #    Jolynn Phillips [phone] [id number] [facility] [address]        Equal Access to Services     [facility]: Hadii aad ku hadasho Soomaali, waaxda luqadaha, qaybta kaalmada adeegyada, catherine patel. So [facility] [phone].    ATENCIÓN: Si habla jes, tiene a rhodes disposición servicios gratuitos de asistencia lingüística. Llame al [phone].    We comply with applicable federal civil rights laws and [place] laws. We do not discriminate on the basis of race, color, national origin, age, disability, sex, sexual orientation, or " gender identity.            Thank you!     Thank you for choosing [facility]  for your care. Our goal is always to provide you with excellent care. Hearing back from our patients is one way we can continue to improve our services. Please take a few minutes to complete the written survey that you may receive in the mail after your visit with us. Thank you!             Your Updated Medication List - Protect others around you: Learn how to safely use, store and throw away your medicines at www.disposemymeds.org.          This list is accurate as of 8/14/18 11:59 PM.  Always use your most recent med list.                   Brand Name Dispense Instructions for use Diagnosis    aspirin 81 MG tablet     100    ONE DAILY    Mixed hyperlipidemia       atorvastatin 20 MG tablet    LIPITOR    90 tablet    Take 1 tablet (20 mg) by mouth daily    Mixed hyperlipidemia       blood glucose monitoring lancets     100 each    1 each daily    Type 2 diabetes, HbA1c goal < 7% (H)       blood glucose monitoring meter device kit     1 kit    Use to test blood sugar daily    Diabetes mellitus (H)       blood glucose monitoring test strip    ACCU-CHEK SMARTVIEW    100 each    Test blood sugars daily DUE FOR 6 MONTH VISIT AUG 2018    Type 2 diabetes, HbA1c goal < 7% (H)       flecainide 100 MG tablet    TAMBOCOR    60 tablet    Take 1 tablet (100 mg) by mouth 2 times daily Take the first dose three days before your scheduled stress test.    Paroxysmal atrial fibrillation (H)       fluticasone-salmeterol 250-50 MCG/DOSE diskus inhaler    ADVAIR    3 Inhaler    Inhale 1 puff into the lungs 2 times daily    Obstructive chronic bronchitis with exacerbation (H)       losartan 100 MG tablet    COZAAR    90 tablet    TAKE ONE TABLET BY MOUTH EVERY DAY    Essential hypertension, benign       metFORMIN 500 MG 24 hr tablet    GLUCOPHAGE-XR    90 tablet    Take 1 tablet (500 mg) by mouth daily    Type 2 diabetes mellitus without  complication, without long-term current use of insulin (H)       metoprolol succinate 50 MG 24 hr tablet    TOPROL-XL    90 tablet    Take 1 tablet (50 mg) by mouth daily    Atrial fibrillation, unspecified type (H)       mometasone-formoterol 200-5 MCG/ACT oral inhaler    DULERA    13 g    Inhale 2 puffs into the lungs 2 times daily    Obstructive chronic bronchitis with exacerbation (H)       order for DME     1 Device    Equipment being ordered: Nebulizer    Obstructive chronic bronchitis with exacerbation (H)       VENTOLIN  (90 Base) MCG/ACT inhaler   Generic drug:  albuterol     18 g    INHALE TWO PUFFS BY MOUTH EVERY 6 HOURS AS NEEDED FOR SHORTNESS OF BREATH    Obstructive chronic bronchitis with exacerbation (H)       warfarin 5 MG tablet    JANTOVEN    76 tablet    Take 2.5 mg on Mon, Wed, Fri; 5 mg all other days or As directed by Anticoagulation clinic    Atrial fibrillation, unspecified type (H)

## 2018-08-14 NOTE — MR AVS SNAPSHOT
After Visit Summary   8/14/2018    Joe Mas    MRN: [id number]           Patient Information     Date Of Birth          1955        Visit Information        Provider Department      8/14/2018 4:00 PM SLEEP LAB, BED FIVE [facility]        Today's Diagnoses     JUNG (obstructive sleep apnea)           Follow-ups after your visit        Your next 10 appointments already scheduled     Sep 06, 2018 10:00 AM CDT   Anticoagulation Visit with [place] ANTI COAYASSINE   [facility]    [address] [address] [phone]              Who to contact     If you have questions or need follow up information about today's clinic visit or your schedule please contact [facility] directly at [phone].  Normal or non-critical lab and imaging results will be communicated to you by MyChart, letter or phone within 4 business days after the clinic has received the results. If you do not hear from us within 7 days, please contact the clinic through MyChart or phone. If you have a critical or abnormal lab result, we will notify you by phone as soon as possible.  Submit refill requests through popexpert or call your pharmacy and they will forward the refill request to us. Please allow 3 business days for your refill to be completed.          Additional Information About Your Visit        MyChart Information     popexpert gives you secure access to your electronic health record. If you see a primary care provider, you can also send messages to your care team and make appointments. If you have questions, please call your primary care clinic.  If you do not have a primary care provider, please call [phone] and they will assist you.        Care EveryWhere ID     This is your Care EveryWhere ID. This could be used by other organizations to access your [place] medical records  [id number]         Blood Pressure from Last 3  Encounters:   08/14/18 118/75   08/14/18 118/73   07/31/18 123/73    Weight from Last 3 Encounters:   08/14/18 118.5 kg (261 lb 3.2 oz)   08/14/18 118.2 kg (260 lb 9.6 oz)   07/31/18 119.3 kg (263 lb)              We Performed the Following     Overnight oximetry study        Primary Care Provider Office Phone # Fax #    Jolynn Phillips [phone] [address]  [place] [address]        Equal Access to Services     [facility]: Hadii aad ku hadasho Soomaali, waaxda luqadaha, qaybta kaalmada adeegyaangy, catherine burciaga . So [facility] [phone].    ATENCIÓN: Si habla español, tiene a rhodes disposición servicios gratuitos de asistencia lingüística. [facility] [phone].    We comply with applicable federal civil rights laws and [place] laws. We do not discriminate on the basis of race, color, national origin, age, disability, sex, sexual orientation, or gender identity.            Thank you!     Thank you for choosing [facility]  for your care. Our goal is always to provide you with excellent care. Hearing back from our patients is one way we can continue to improve our services. Please take a few minutes to complete the written survey that you may receive in the mail after your visit with us. Thank you!             Your Updated Medication List - Protect others around you: Learn how to safely use, store and throw away your medicines at www.disposemymeds.org.          This list is accurate as of 8/14/18 11:59 PM.  Always use your most recent med list.                   Brand Name Dispense Instructions for use Diagnosis    aspirin 81 MG tablet     100    ONE DAILY    Mixed hyperlipidemia       atorvastatin 20 MG tablet    LIPITOR    90 tablet    Take 1 tablet (20 mg) by mouth daily    Mixed hyperlipidemia       blood glucose monitoring lancets     100 each    1 each daily    Type 2 diabetes, HbA1c goal < 7% (H)       blood glucose monitoring  meter device kit     1 kit    Use to test blood sugar daily    Diabetes mellitus (H)       blood glucose monitoring test strip    ACCU-CHEK SMARTVIEW    100 each    Test blood sugars daily DUE FOR 6 MONTH VISIT AUG 2018    Type 2 diabetes, HbA1c goal < 7% (H)       flecainide 100 MG tablet    TAMBOCOR    60 tablet    Take 1 tablet (100 mg) by mouth 2 times daily Take the first dose three days before your scheduled stress test.    Paroxysmal atrial fibrillation (H)       fluticasone-salmeterol 250-50 MCG/DOSE diskus inhaler    ADVAIR    3 Inhaler    Inhale 1 puff into the lungs 2 times daily    Obstructive chronic bronchitis with exacerbation (H)       losartan 100 MG tablet    COZAAR    90 tablet    TAKE ONE TABLET BY MOUTH EVERY DAY    Essential hypertension, benign       metFORMIN 500 MG 24 hr tablet    GLUCOPHAGE-XR    90 tablet    Take 1 tablet (500 mg) by mouth daily    Type 2 diabetes mellitus without complication, without long-term current use of insulin (H)       metoprolol succinate 50 MG 24 hr tablet    TOPROL-XL    90 tablet    Take 1 tablet (50 mg) by mouth daily    Atrial fibrillation, unspecified type (H)       mometasone-formoterol 200-5 MCG/ACT oral inhaler    DULERA    13 g    Inhale 2 puffs into the lungs 2 times daily    Obstructive chronic bronchitis with exacerbation (H)       order for DME     1 Device    Equipment being ordered: Nebulizer    Obstructive chronic bronchitis with exacerbation (H)       VENTOLIN  (90 Base) MCG/ACT inhaler   Generic drug:  albuterol     18 g    INHALE TWO PUFFS BY MOUTH EVERY 6 HOURS AS NEEDED FOR SHORTNESS OF BREATH    Obstructive chronic bronchitis with exacerbation (H)       warfarin 5 MG tablet    JANTOVEN    76 tablet    Take 2.5 mg on Mon, Wed, Fri; 5 mg all other days or As directed by Anticoagulation clinic    Atrial fibrillation, unspecified type (H)

## 2018-08-14 NOTE — PROGRESS NOTES
[id number]    Electrophysiology/ Clinic Note         H&P and Plan:         Jay Barrientos MD    Physical Exam:  Vitals: /73 (BP Location: Right arm, Patient Position: Sitting, Cuff Size: Adult Regular)  Pulse 71  Wt 118.2 kg (260 lb 9.6 oz)  SpO2 93%  BMI 38.76 kg/m2    Constitutional:  AAO x3.  Pt is in NAD.  HEAD: normocephalic.  SKIN: Skin normal color, texture and turgor with no lesions or eruptions.  Eyes: PERRL, EOMI.  ENT:  Supple, normal JVP. No lymphadenopathy or thyroid enlargement.  Chest:  CTAB.  Cardiac:  RRR, normal  S1 and S2.  No murmurs rubs or gallop.    Abdomen:  Normal BS.  Soft, non-tender and non-distended.  No rebound or guarding.    Extremities:  Pedious pulses palpable B/L.  No LE edema noticed.   Neurological: Strength and sensation grossly symmetric and intact throughout.       CURRENT MEDICATIONS:  Current Outpatient Prescriptions   Medication Sig Dispense Refill     ASPIRIN 81 MG OR TABS ONE DAILY 100 3     atorvastatin (LIPITOR) 20 MG tablet Take 1 tablet (20 mg) by mouth daily 90 tablet 3     flecainide (TAMBOCOR) 100 MG tablet Take 1 tablet (100 mg) by mouth 2 times daily Take the first dose three days before your scheduled stress test. 60 tablet 6     fluticasone-salmeterol (ADVAIR) 250-50 MCG/DOSE diskus inhaler Inhale 1 puff into the lungs 2 times daily 3 Inhaler 1     ipratropium - albuterol 0.5 mg/2.5 mg/3 mL (DUONEB) 0.5-2.5 (3) MG/3ML neb solution Take 1 vial (3 mLs) by nebulization every 6 hours as needed for shortness of breath / dyspnea or wheezing 30 vial 1     losartan (COZAAR) 100 MG tablet TAKE ONE TABLET BY MOUTH EVERY DAY 90 tablet 2     metFORMIN (GLUCOPHAGE-XR) 500 MG 24 hr tablet Take 1 tablet (500 mg) by mouth daily 90 tablet 3     metoprolol succinate (TOPROL-XL) 50 MG 24 hr tablet Take 1 tablet (50 mg) by mouth daily 90 tablet 3     VENTOLIN  (90 BASE) MCG/ACT Inhaler INHALE TWO PUFFS BY MOUTH EVERY 6 HOURS AS NEEDED FOR SHORTNESS OF BREATH 18 g 7      warfarin (JANTOVEN) 5 MG tablet Take 2.5 mg on Mon, Wed, Fri; 5 mg all other days or As directed by Anticoagulation clinic 76 tablet 0     blood glucose monitoring (ACCU-CHEK FASTCLIX) lancets 1 each daily (Patient not taking: Reported on 8/14/2018) 100 each 5     blood glucose monitoring (ACCU-CHEK SMARTVIEW) test strip Test blood sugars daily DUE FOR 6 MONTH VISIT AUG 2018 (Patient not taking: Reported on 8/14/2018) 100 each 0     blood glucose monitoring (Neptune CONTOUR NEXT MONITOR) meter device kit Use to test blood sugar daily (Patient not taking: Reported on 6/19/2018) 1 kit 0     mometasone-formoterol (DULERA) 200-5 MCG/ACT oral inhaler Inhale 2 puffs into the lungs 2 times daily (Patient not taking: Reported on 8/14/2018) 13 g 1     order for DME Equipment being ordered: Nebulizer (Patient not taking: Reported on 8/14/2018) 1 Device 0     [DISCONTINUED] mometasone-formoterol (DULERA) 200-5 MCG/ACT oral inhaler Inhale 2 puffs into the lungs 2 times daily 39 g 1       ALLERGIES     Allergies   Allergen Reactions     Lisinopril Cough       PAST MEDICAL HISTORY:  Past Medical History:   Diagnosis Date     A-fib (H) 2/27/2018     COPD (chronic obstructive pulmonary disease) (H)      Diabetes (H)      Hypertension      NO ACTIVE PROBLEMS        PAST SURGICAL HISTORY:  Past Surgical History:   Procedure Laterality Date     ABDOMEN SURGERY       COLONOSCOPY  11/12/2004    Follow up colonoscopy in 5 years     HERNIA REPAIR, UMBILICAL  2001     KNEE SURGERY  1960's    Left       FAMILY HISTORY:  Family History   Problem Relation Age of Onset     Osteoperosis Mother      HEART DISEASE Mother      Neurologic Disorder Father      passed away from a brain tumor age 48     Neurologic Disorder Maternal Grandmother      parkinsons     C.A.D. Maternal Grandmother      Diabetes Maternal Grandmother      Alcohol/Drug Maternal Grandfather      Cancer Maternal Grandfather      esphogus     Cancer Paternal Grandfather       lung     Diabetes Sister      Hypertension Sister      Asthma Daughter      Asthma No family hx of      Cerebrovascular Disease No family hx of      Breast Cancer No family hx of      Cancer - colorectal No family hx of        SOCIAL HISTORY:  Social History     Social History     Marital status: Single     Spouse name: N/A     Number of children: 2     Years of education: 12+     Occupational History      [employer]     Social History Main Topics     Smoking status: Former Smoker     Packs/day: 1.00     Years: 25.00     Types: Cigarettes     Start date: 1/1/1975     Quit date: 1/1/2000     Smokeless tobacco: Never Used      Comment: quit smoking 2010     Alcohol use 0.0 oz/week      Comment: 2-5 beers per month     Drug use: No     Sexual activity: Not Currently     Partners: Female     Birth control/ protection: Condom     Other Topics Concern     Parent/Sibling W/ Cabg, Mi Or Angioplasty Before 65f 55m? No     Social History Narrative       Review of Systems:  Skin:  Negative     Eyes:  Positive for glasses  ENT:  Negative    Respiratory:  Positive for cough;sleep apnea;CPAP;shortness of breath;dyspnea on exertion  Cardiovascular:    Positive for;fatigue  Gastroenterology: Negative    Genitourinary:  Positive for urinary frequency;urgency  Musculoskeletal:  Negative    Neurologic:  Positive for headaches  Psychiatric:  Negative    Heme/Lymph/Imm:  Negative    Endocrine:  Positive for diabetes      Recent Lab Results:  LIPID RESULTS:  Lab Results   Component Value Date    CHOL 156 02/27/2018    HDL 53 02/27/2018    LDL 69 02/27/2018    TRIG 168 (H) 02/27/2018    CHOLHDLRATIO 3.1 02/17/2015       LIVER ENZYME RESULTS:  Lab Results   Component Value Date    AST 26 02/17/2015    ALT 59 02/17/2015       CBC RESULTS:  Lab Results   Component Value Date    WBC 8.6 10/04/2011    RBC 5.17 10/04/2011    HGB 16.1 10/04/2011    HCT 48.2 10/04/2011    MCV 93.2 10/04/2011    MCH 31.2 10/04/2011    MCHC 33.4 10/04/2011    RDW 13.2  10/04/2011     10/04/2011     2011       BMP RESULTS:  Lab Results   Component Value Date     2018    POTASSIUM 3.9 2018    CHLORIDE 102 2018    CO2 29 2018    ANIONGAP 7 2018     (H) 2018    BUN 20 2018    CR 0.98 2018    GFRESTIMATED 77 2018    GFRESTBLACK >90 2018    SARA 9.2 2018        A1C RESULTS:  Lab Results   Component Value Date    A1C 6.9 (H) 2018       INR RESULTS:  Lab Results   Component Value Date    INR 2.3 (A) 2018    INR 2.9 (A) 2018    INR 2.97 (H) 2018         ECHOCARDIOGRAM  Recent Results (from the past 8760 hour(s))   ECHO COMPLETE WITH OPTISON    Narrative    [id number]  [id number]  [id number]  [id number]^RASTA^AISHA^KEVIN           [facility]  Echocardiography Laboratory  [facility] [facility] [place] [address]        Name: KENDRA MEHTA  MRN: [id number]  : 1955  Study Date: 2018 07:37 AM  Age: 62 yrs  Gender: Male  Patient Location: [facility]  Reason For Study: A-fib  Ordering Physician: AISHA MAGDALENO  Referring Physician: AISHA MAGDALENO  Performed By: Adelita Velazquez RDCS     BSA: 2.1 m2  Height: 59 in  Weight: 259 lb  HR: 62  BP: 133/91 mmHg  _____________________________________________________________________________  __        Procedure  Complete Echo Adult. Contrast Optison.  _____________________________________________________________________________  __        Interpretation Summary     Left ventricular systolic function is normal.  The visual ejection fraction is estimated at 55-60%.  Sinus rhythm was noted.  The study was technically difficult. There is no comparison study available.  _____________________________________________________________________________  __        Left Ventricle  The left ventricle is normal in size. There is mild eccentric left ventricular  hypertrophy. Left ventricular systolic function is normal. The visual  ejection  fraction is estimated at 55-60%. Left ventricular diastolic function is  indeterminate. No regional wall motion abnormalities noted.     Right Ventricle  The right ventricle is normal size. RV function is lower limits of normal.     Atria  Normal left atrial size. Right atrium not well visualized.     Mitral Valve  There is no mitral regurgitation noted.        Tricuspid Valve  No tricuspid regurgitation. Right ventricular systolic pressure could not be  approximated due to inadequate tricuspid regurgitation. Normal IVC (1.5-2.5cm)  with >50% respiratory collapse; right atrial pressure is estimated at 5-  10mmHg.     Aortic Valve  The aortic valve is trileaflet. No aortic regurgitation is present. No aortic  stenosis is present.     Pulmonic Valve  The pulmonic valve is not well visualized.     Vessels  The aortic root is normal size.     Pericardium  There is no pericardial effusion.        Rhythm  Sinus rhythm was noted.  _____________________________________________________________________________  __  MMode/2D Measurements & Calculations  IVSd: 1.1 cm     LVIDd: 5.6 cm  LVIDs: 4.0 cm  LVPWd: 1.0 cm  FS: 27.9 %  LV mass(C)d: 236.6 grams  LV mass(C)dI: 114.9 grams/m2  Ao root diam: 3.6 cm  asc Aorta Diam: 3.5 cm  RWT: 0.36        Doppler Measurements & Calculations  MV E max derrick: 71.8 cm/sec  MV A max derrick: 72.5 cm/sec  MV E/A: 0.99  MV dec slope: 338.4 cm/sec2  MV dec time: 0.21 sec  E/E' av.6  Lateral E/e': 10.0  Medial E/e': 9.1              _____________________________________________________________________________  __        Report approved by: Mackenzie Shanks 2018 08:43 AM            No orders of the defined types were placed in this encounter.    No orders of the defined types were placed in this encounter.    Medications Discontinued During This Encounter   Medication Reason     mometasone-formoterol (DULERA) 200-5 MCG/ACT oral inhaler Medication Reconciliation Clean Up          Encounter Diagnosis   Name Primary?     Paroxysmal atrial fibrillation (H)          CC  Breezy Daniel MD  [facility]  [address] [address]

## 2018-08-14 NOTE — MR AVS SNAPSHOT
After Visit Summary   8/14/2018    Joe Mas    MRN: [id number]           Patient Information     Date Of Birth          1955        Visit Information        Provider Department      8/14/2018 11:30 AM Jay Barrientos MD [facility]        Today's Diagnoses     Paroxysmal atrial fibrillation (H)           Follow-ups after your visit        Your next 10 appointments already scheduled     Aug 14, 2018  4:00 PM CDT   Return Sleep Patient with Manan Day MD   [facility] ([facility])    [address]  [facility] [address]   [phone]            Sep 06, 2018 10:00 AM CDT   Anticoagulation Visit with [place] ANTI COAYASSINE   [facility]    [address] [facility] [id number] [phone]              Who to contact     If you have questions or need follow up information about today's clinic visit or your schedule please contact [facility] directly at [phone].  Normal or non-critical lab and imaging results will be communicated to you by [employer], letter or phone within 4 business days after the clinic has received the results. If you do not hear from us within 7 days, please contact the clinic through [employer] or phone. If you have a critical or abnormal lab result, we will notify you by phone as soon as possible.  Submit refill requests through [employer] or call your pharmacy and they will forward the refill request to us. Please allow 3 business days for your refill to be completed.          Additional Information About Your Visit        Dis[employer] Information     [employer] gives you secure access to your electronic health record. If you see a primary care provider, you can also send messages to your care team and make appointments. If you have questions, please call your primary care clinic.  If you do not  have a primary care provider, please call [phone] and they will assist you.        Care EveryWhere ID     This is your Care EveryWhere ID. This could be used by other organizations to access your [facility] medical records  [id number]        Your Vitals Were     Pulse Pulse Oximetry BMI (Body Mass Index)             71 93% 38.76 kg/m2          Blood Pressure from Last 3 Encounters:   08/14/18 118/73   07/31/18 123/73   06/19/18 127/86    Weight from Last 3 Encounters:   08/14/18 118.2 kg (260 lb 9.6 oz)   07/31/18 119.3 kg (263 lb)   06/19/18 118.5 kg (261 lb 3.2 oz)              We Performed the Following     Follow-Up with Electrophysiologist        Primary Care Provider Office Phone # Fax #    Jolynn Phillips [phone] [address] [facility] [address]        Equal Access to Services     KEL MOROCHO : Hadii aad ku hadasho Somichaelali, waaxda luqadaha, qaybta kaalmada adeegyada, catherine burciaga . So [facility] [phone].    ATENCIÓN: Si herila espsalazar, tiene a rhodes disposición servicios gratuitos de asistencia lingüística. Llame al [phone].    We comply with applicable federal civil rights laws and [place] laws. We do not discriminate on the basis of race, color, national origin, age, disability, sex, sexual orientation, or gender identity.            Thank you!     Thank you for choosing [facility]  for your care. Our goal is always to provide you with excellent care. Hearing back from our patients is one way we can continue to improve our services. Please take a few minutes to complete the written survey that you may receive in the mail after your visit with us. Thank you!             Your Updated Medication List - Protect others around you: Learn how to safely use, store and throw away your medicines at www.disposemymeds.org.          This list is accurate as of 8/14/18 11:32 AM.  Always use your most recent  med list.                   Brand Name Dispense Instructions for use Diagnosis    aspirin 81 MG tablet     100    ONE DAILY    Mixed hyperlipidemia       atorvastatin 20 MG tablet    LIPITOR    90 tablet    Take 1 tablet (20 mg) by mouth daily    Mixed hyperlipidemia       blood glucose monitoring lancets     100 each    1 each daily    Type 2 diabetes, HbA1c goal < 7% (H)       blood glucose monitoring meter device kit     1 kit    Use to test blood sugar daily    Diabetes mellitus (H)       blood glucose monitoring test strip    ACCU-CHEK SMARTVIEW    100 each    Test blood sugars daily DUE FOR 6 MONTH VISIT AUG 2018    Type 2 diabetes, HbA1c goal < 7% (H)       flecainide 100 MG tablet    TAMBOCOR    60 tablet    Take 1 tablet (100 mg) by mouth 2 times daily Take the first dose three days before your scheduled stress test.    Paroxysmal atrial fibrillation (H)       fluticasone-salmeterol 250-50 MCG/DOSE diskus inhaler    ADVAIR    3 Inhaler    Inhale 1 puff into the lungs 2 times daily    Obstructive chronic bronchitis with exacerbation (H)       ipratropium - albuterol 0.5 mg/2.5 mg/3 mL 0.5-2.5 (3) MG/3ML neb solution    DUONEB    30 vial    Take 1 vial (3 mLs) by nebulization every 6 hours as needed for shortness of breath / dyspnea or wheezing    Obstructive chronic bronchitis with exacerbation (H)       losartan 100 MG tablet    COZAAR    90 tablet    TAKE ONE TABLET BY MOUTH EVERY DAY    Essential hypertension, benign       metFORMIN 500 MG 24 hr tablet    GLUCOPHAGE-XR    90 tablet    Take 1 tablet (500 mg) by mouth daily    Type 2 diabetes mellitus without complication, without long-term current use of insulin (H)       metoprolol succinate 50 MG 24 hr tablet    TOPROL-XL    90 tablet    Take 1 tablet (50 mg) by mouth daily    Atrial fibrillation, unspecified type (H)       mometasone-formoterol 200-5 MCG/ACT oral inhaler    DULERA    13 g    Inhale 2 puffs into the lungs 2 times daily    Obstructive  chronic bronchitis with exacerbation (H)       order for DME     1 Device    Equipment being ordered: Nebulizer    Obstructive chronic bronchitis with exacerbation (H)       VENTOLIN  (90 Base) MCG/ACT inhaler   Generic drug:  albuterol     18 g    INHALE TWO PUFFS BY MOUTH EVERY 6 HOURS AS NEEDED FOR SHORTNESS OF BREATH    Obstructive chronic bronchitis with exacerbation (H)       warfarin 5 MG tablet    JANTOVEN    76 tablet    Take 2.5 mg on Mon, Wed, Fri; 5 mg all other days or As directed by Anticoagulation clinic    Atrial fibrillation, unspecified type (H)

## 2018-08-14 NOTE — PROGRESS NOTES
Service Date: 08/14/2018      REASON FOR VISIT:  Evaluation of paroxysmal AFib.      HISTORY OF PRESENT ILLNESS:  Mr. Mas is a delightful 62-year-old gentleman with history of hypertension, hyperlipidemia, diabetes, obstructive sleep apnea (on CPAP), COPD and paroxysmal AFib who is here for evaluation.      The patient initially presented with episodes of palpitation back in 02/2018.  He describes the episodes as sensation of fast heart rates which sometimes may make him feel anxious.  The episodes happened every 3-4 days and lasted from 6 to 10 hours.  Initially, he was treated with Coumadin and metoprolol; however, he continues to have episodes despite pharmacotherapy.  Finally, in June, he was started on flecainide.  Since flecainide was initiated, he only had 1 episode.  He has done well after initiation of flecainide.      During this visit, he denies any other symptoms such as chest pain, shortness of breath, lightheadedness, near syncope or syncopal episode.      Echocardiogram obtained in 03/2018 revealed normal cardiac function with EF around 55%-60%.  A stress EKG performed in 06/2018 (on flecainide) was essentially within normal limits.      EKG obtained in 06/2018 revealed normal sinus rhythm with QRS measuring 104 msec.      ASSESSMENT AND PLAN:   1.  Paroxysmal AFib.  Responded well to combination of metoprolol and flecainide.  During this visit, we discussed options including continued pharmacotherapy or pursue catheter ablation procedure.  I explained the procedure in detail.  since medical therapy is controlling his symptoms well, I will continue with current therapy.  He will follow up with me on a yearly basis.   2.  Embolic prevention.  CHADS-VASc score of 2.  Continue anticoagulation with Coumadin indefinitely.   3.  Hypertension.  Blood pressure is well controlled.   4.  Followup care.  Follow up in clinic with me in a year or earlier as needed.         ANUJA ALMARAZ MD             D:  2018   T: 2018   MT: MARY      Name:     KENDRA MEHTA   MRN:      [id number]        Account:      [id number]   :      1955           Service Date: 2018      Document: [id number]

## 2018-08-14 NOTE — LETTER
8/14/2018      Jolynn Phillips [place]  [address] [facility] [address]      RE: Joe Mas       Dear Colleague,    I had the pleasure of seeing Joe Mas in the [facility].    Service Date: 08/14/2018      REASON FOR VISIT:  Evaluation of paroxysmal AFib.      HISTORY OF PRESENT ILLNESS:  Mr. Mas is a delightful 62-year-old gentleman with history of hypertension, hyperlipidemia, diabetes, obstructive sleep apnea (on CPAP), COPD and paroxysmal AFib who is here for evaluation.      The patient initially presented with episodes of palpitation back in 02/2018.  He describes the episodes as sensation of fast heart rates which sometimes may make him feel anxious.  The episodes were happening for 3-4 days and lasting from between 6-10 hours.  Initially, he was treated with Coumadin and metoprolol; however, he continues to have episodes despite pharmacotherapy.  Finally, in June, he was started on flecainide.  Since flecainide was initiated, he informs that he only had 1 episode.  He has done well after initiation of flecainide.      During this visit, he denies any other symptoms such as chest pain, shortness of breath, lightheadedness, near syncope or syncopal episode.  Echocardiogram obtained in 03/2018 revealed normal cardiac function with EF around 55%-60%.  A stress EKG performed in 06/2018 (on flecainide) was essentially within normal limits.      EKG obtained in 06/2018 revealed normal sinus rhythm with QRS measuring 104 msec.      ASSESSMENT AND PLAN:   1.  Paroxysmal AFib.  Responded well to combination of metoprolol and flecainide.  During this visit, we discussed options including continued pharmacotherapy or pursue catheter ablation procedure.  I explained the procedure in detail.  At this time, since the medical therapy is controlling his symptoms well, he would like to continue with current therapy.  He will follow up with me on a yearly basis.   2.  Embolic  prevention.  CHADS-VASc score of 2.  Continue anticoagulation with Coumadin indefinitely.   3.  Hypertension.  Blood pressure is well controlled.   4.  Followup care.  Follow up in clinic with me in a year or earlier as needed.         ANUJA ALMARAZ MD             D: 2018   T: 2018   MT: MARY      Name:     KENDRA MEHTA   MRN:      [id number]        Account:      [id number]   :      1955           Service Date: 2018      Document: [id number]         Outpatient Encounter Prescriptions as of 2018   Medication Sig Dispense Refill     ASPIRIN 81 MG OR TABS ONE DAILY 100 3     atorvastatin (LIPITOR) 20 MG tablet Take 1 tablet (20 mg) by mouth daily 90 tablet 3     flecainide (TAMBOCOR) 100 MG tablet Take 1 tablet (100 mg) by mouth 2 times daily Take the first dose three days before your scheduled stress test. 60 tablet 6     fluticasone-salmeterol (ADVAIR) 250-50 MCG/DOSE diskus inhaler Inhale 1 puff into the lungs 2 times daily 3 Inhaler 1     losartan (COZAAR) 100 MG tablet TAKE ONE TABLET BY MOUTH EVERY DAY 90 tablet 2     metFORMIN (GLUCOPHAGE-XR) 500 MG 24 hr tablet Take 1 tablet (500 mg) by mouth daily 90 tablet 3     metoprolol succinate (TOPROL-XL) 50 MG 24 hr tablet Take 1 tablet (50 mg) by mouth daily 90 tablet 3     VENTOLIN  (90 BASE) MCG/ACT Inhaler INHALE TWO PUFFS BY MOUTH EVERY 6 HOURS AS NEEDED FOR SHORTNESS OF BREATH 18 g 7     warfarin (JANTOVEN) 5 MG tablet Take 2.5 mg on Mon, Wed, Fri; 5 mg all other days or As directed by Anticoagulation clinic 76 tablet 0     [DISCONTINUED] ipratropium - albuterol 0.5 mg/2.5 mg/3 mL (DUONEB) 0.5-2.5 (3) MG/3ML neb solution Take 1 vial (3 mLs) by nebulization every 6 hours as needed for shortness of breath / dyspnea or wheezing 30 vial 1     blood glucose monitoring (ACCU-CHEK FASTCLIX) lancets 1 each daily (Patient not taking: Reported on 2018) 100 each 5     blood glucose monitoring (ACCU-CHEK SMARTVIEW) test  strip Test blood sugars daily DUE FOR 6 MONTH VISIT AUG 2018 (Patient not taking: Reported on 8/14/2018) 100 each 0     blood glucose monitoring (BELA CONTOUR NEXT MONITOR) meter device kit Use to test blood sugar daily (Patient not taking: Reported on 6/19/2018) 1 kit 0     mometasone-formoterol (DULERA) 200-5 MCG/ACT oral inhaler Inhale 2 puffs into the lungs 2 times daily 13 g 1     order for DME Equipment being ordered: Nebulizer 1 Device 0     [DISCONTINUED] mometasone-formoterol (DULERA) 200-5 MCG/ACT oral inhaler Inhale 2 puffs into the lungs 2 times daily 39 g 1     No facility-administered encounter medications on file as of 8/14/2018.        Again, thank you for allowing me to participate in the care of your patient.      Sincerely,    Jay Barrientos MD     [facility]

## 2018-08-14 NOTE — LETTER
8/14/2018    Jolynn Phillips MD  [address]  [facility] [address]    RE: Joe Mas       Dear Colleague,    I had the pleasure of seeing Joe Mas in the [facility].    [id number]    Electrophysiology/ Clinic Note         H&P and Plan:         Jay Barrientos MD    Physical Exam:  Vitals: /73 (BP Location: Right arm, Patient Position: Sitting, Cuff Size: Adult Regular)  Pulse 71  Wt 118.2 kg (260 lb 9.6 oz)  SpO2 93%  BMI 38.76 kg/m2    Constitutional:  AAO x3.  Pt is in NAD.  HEAD: normocephalic.  SKIN: Skin normal color, texture and turgor with no lesions or eruptions.  Eyes: PERRL, EOMI.  ENT:  Supple, normal JVP. No lymphadenopathy or thyroid enlargement.  Chest:  CTAB.  Cardiac:  RRR, normal  S1 and S2.  No murmurs rubs or gallop.    Abdomen:  Normal BS.  Soft, non-tender and non-distended.  No rebound or guarding.    Extremities:  Pedious pulses palpable B/L.  No LE edema noticed.   Neurological: Strength and sensation grossly symmetric and intact throughout.       CURRENT MEDICATIONS:  Current Outpatient Prescriptions   Medication Sig Dispense Refill     ASPIRIN 81 MG OR TABS ONE DAILY 100 3     atorvastatin (LIPITOR) 20 MG tablet Take 1 tablet (20 mg) by mouth daily 90 tablet 3     flecainide (TAMBOCOR) 100 MG tablet Take 1 tablet (100 mg) by mouth 2 times daily Take the first dose three days before your scheduled stress test. 60 tablet 6     fluticasone-salmeterol (ADVAIR) 250-50 MCG/DOSE diskus inhaler Inhale 1 puff into the lungs 2 times daily 3 Inhaler 1     ipratropium - albuterol 0.5 mg/2.5 mg/3 mL (DUONEB) 0.5-2.5 (3) MG/3ML neb solution Take 1 vial (3 mLs) by nebulization every 6 hours as needed for shortness of breath / dyspnea or wheezing 30 vial 1     losartan (COZAAR) 100 MG tablet TAKE ONE TABLET BY MOUTH EVERY DAY 90 tablet 2     metFORMIN (GLUCOPHAGE-XR) 500 MG 24 hr tablet Take 1 tablet (500 mg) by mouth daily 90 tablet 3     metoprolol  succinate (TOPROL-XL) 50 MG 24 hr tablet Take 1 tablet (50 mg) by mouth daily 90 tablet 3     VENTOLIN  (90 BASE) MCG/ACT Inhaler INHALE TWO PUFFS BY MOUTH EVERY 6 HOURS AS NEEDED FOR SHORTNESS OF BREATH 18 g 7     warfarin (JANTOVEN) 5 MG tablet Take 2.5 mg on Mon, Wed, Fri; 5 mg all other days or As directed by Anticoagulation clinic 76 tablet 0     blood glucose monitoring (ACCU-CHEK FASTCLIX) lancets 1 each daily (Patient not taking: Reported on 8/14/2018) 100 each 5     blood glucose monitoring (ACCU-CHEK SMARTVIEW) test strip Test blood sugars daily DUE FOR 6 MONTH VISIT AUG 2018 (Patient not taking: Reported on 8/14/2018) 100 each 0     blood glucose monitoring ([employer] CONTOUR NEXT MONITOR) meter device kit Use to test blood sugar daily (Patient not taking: Reported on 6/19/2018) 1 kit 0     mometasone-formoterol (DULERA) 200-5 MCG/ACT oral inhaler Inhale 2 puffs into the lungs 2 times daily (Patient not taking: Reported on 8/14/2018) 13 g 1     order for DME Equipment being ordered: Nebulizer (Patient not taking: Reported on 8/14/2018) 1 Device 0     [DISCONTINUED] mometasone-formoterol (DULERA) 200-5 MCG/ACT oral inhaler Inhale 2 puffs into the lungs 2 times daily 39 g 1       ALLERGIES     Allergies   Allergen Reactions     Lisinopril Cough       PAST MEDICAL HISTORY:  Past Medical History:   Diagnosis Date     A-fib (H) 2/27/2018     COPD (chronic obstructive pulmonary disease) (H)      Diabetes (H)      Hypertension      NO ACTIVE PROBLEMS        PAST SURGICAL HISTORY:  Past Surgical History:   Procedure Laterality Date     ABDOMEN SURGERY       COLONOSCOPY  11/12/2004    Follow up colonoscopy in 5 years     HERNIA REPAIR, UMBILICAL  2001     KNEE SURGERY  1960's    Left       FAMILY HISTORY:  Family History   Problem Relation Age of Onset     Osteoperosis Mother      HEART DISEASE Mother      Neurologic Disorder Father      passed away from a brain tumor age 48     Neurologic Disorder Maternal  Grandmother      parkinsons     DIANA Maternal Grandmother      Diabetes Maternal Grandmother      Alcohol/Drug Maternal Grandfather      Cancer Maternal Grandfather      esphogus     Cancer Paternal Grandfather      lung     Diabetes Sister      Hypertension Sister      Asthma Daughter      Asthma No family hx of      Cerebrovascular Disease No family hx of      Breast Cancer No family hx of      Cancer - colorectal No family hx of        SOCIAL HISTORY:  Social History     Social History     Marital status: Single     Spouse name: N/A     Number of children: 2     Years of education: 12+     Occupational History      [employer]     Social History Main Topics     Smoking status: Former Smoker     Packs/day: 1.00     Years: 25.00     Types: Cigarettes     Start date: 1/1/1975     Quit date: 1/1/2000     Smokeless tobacco: Never Used      Comment: quit smoking 2010     Alcohol use 0.0 oz/week      Comment: 2-5 beers per month     Drug use: No     Sexual activity: Not Currently     Partners: Female     Birth control/ protection: Condom     Other Topics Concern     Parent/Sibling W/ Cabg, Mi Or Angioplasty Before 65f 55m? No     Social History Narrative       Review of Systems:  Skin:  Negative     Eyes:  Positive for glasses  ENT:  Negative    Respiratory:  Positive for cough;sleep apnea;CPAP;shortness of breath;dyspnea on exertion  Cardiovascular:    Positive for;fatigue  Gastroenterology: Negative    Genitourinary:  Positive for urinary frequency;urgency  Musculoskeletal:  Negative    Neurologic:  Positive for headaches  Psychiatric:  Negative    Heme/Lymph/Imm:  Negative    Endocrine:  Positive for diabetes      Recent Lab Results:  LIPID RESULTS:  Lab Results   Component Value Date    CHOL 156 02/27/2018    HDL 53 02/27/2018    LDL 69 02/27/2018    TRIG 168 (H) 02/27/2018    CHOLHDLRATIO 3.1 02/17/2015       LIVER ENZYME RESULTS:  Lab Results   Component Value Date    AST 26 02/17/2015    ALT 59 02/17/2015       CBC  RESULTS:  Lab Results   Component Value Date    WBC 8.6 10/04/2011    RBC 5.17 10/04/2011    HGB 16.1 10/04/2011    HCT 48.2 10/04/2011    MCV 93.2 10/04/2011    MCH 31.2 10/04/2011    MCHC 33.4 10/04/2011    RDW 13.2 10/04/2011     10/04/2011     2011       BMP RESULTS:  Lab Results   Component Value Date     2018    POTASSIUM 3.9 2018    CHLORIDE 102 2018    CO2 29 2018    ANIONGAP 7 2018     (H) 2018    BUN 20 2018    CR 0.98 2018    GFRESTIMATED 77 2018    GFRESTBLACK >90 2018    SARA 9.2 2018        A1C RESULTS:  Lab Results   Component Value Date    A1C 6.9 (H) 2018       INR RESULTS:  Lab Results   Component Value Date    INR 2.3 (A) 2018    INR 2.9 (A) 2018    INR 2.97 (H) 2018         ECHOCARDIOGRAM  Recent Results (from the past 8760 hour(s))   ECHO COMPLETE WITH OPTISON    Narrative    [id number]  [id number]  [id number]  [id number]^RASTA^AISHA^KEVIN           [facility]  Echocardiography Laboratory  [address] [place] [address]        Name: KENDRA MEHTA  MRN: [id number]  : 1955  Study Date: 2018 07:37 AM  Age: 62 yrs  Gender: Male  Patient Location: [facility]  Reason For Study: A-fib  Ordering Physician: AISHA MAGDALENO  Referring Physician: AISHA MAGDALENO  Performed By: Adelita Velazquez RDCS     BSA: 2.1 m2  Height: 59 in  Weight: 259 lb  HR: 62  BP: 133/91 mmHg  _____________________________________________________________________________  __        Procedure  Complete Echo Adult. Contrast Optison.  _____________________________________________________________________________  __        Interpretation Summary     Left ventricular systolic function is normal.  The visual ejection fraction is estimated at 55-60%.  Sinus rhythm was noted.  The study was technically difficult. There is no comparison study  available.  _____________________________________________________________________________  __        Left Ventricle  The left ventricle is normal in size. There is mild eccentric left ventricular  hypertrophy. Left ventricular systolic function is normal. The visual ejection  fraction is estimated at 55-60%. Left ventricular diastolic function is  indeterminate. No regional wall motion abnormalities noted.     Right Ventricle  The right ventricle is normal size. RV function is lower limits of normal.     Atria  Normal left atrial size. Right atrium not well visualized.     Mitral Valve  There is no mitral regurgitation noted.        Tricuspid Valve  No tricuspid regurgitation. Right ventricular systolic pressure could not be  approximated due to inadequate tricuspid regurgitation. Normal IVC (1.5-2.5cm)  with >50% respiratory collapse; right atrial pressure is estimated at 5-  10mmHg.     Aortic Valve  The aortic valve is trileaflet. No aortic regurgitation is present. No aortic  stenosis is present.     Pulmonic Valve  The pulmonic valve is not well visualized.     Vessels  The aortic root is normal size.     Pericardium  There is no pericardial effusion.        Rhythm  Sinus rhythm was noted.  _____________________________________________________________________________  __  MMode/2D Measurements & Calculations  IVSd: 1.1 cm     LVIDd: 5.6 cm  LVIDs: 4.0 cm  LVPWd: 1.0 cm  FS: 27.9 %  LV mass(C)d: 236.6 grams  LV mass(C)dI: 114.9 grams/m2  Ao root diam: 3.6 cm  asc Aorta Diam: 3.5 cm  RWT: 0.36        Doppler Measurements & Calculations  MV E max derrick: 71.8 cm/sec  MV A max derrick: 72.5 cm/sec  MV E/A: 0.99  MV dec slope: 338.4 cm/sec2  MV dec time: 0.21 sec  E/E' av.6  Lateral E/e': 10.0  Medial E/e': 9.1              _____________________________________________________________________________  __        Report approved by: Mackenzie Shanks 2018 08:43 AM            No orders of the defined types were  placed in this encounter.    No orders of the defined types were placed in this encounter.    Medications Discontinued During This Encounter   Medication Reason     mometasone-formoterol (DULERA) 200-5 MCG/ACT oral inhaler Medication Reconciliation Clean Up         Encounter Diagnosis   Name Primary?     Paroxysmal atrial fibrillation (H)          CC  Breezy Daniel MD  [facility]  [address] [address]                Thank you for allowing me to participate in the care of your patient.      Sincerely,     Jay Barrientos MD     [facility]    cc:   Breezy Daniel MD  [facility]  [address] [address]

## 2018-08-16 DIAGNOSIS — J44.1 OBSTRUCTIVE CHRONIC BRONCHITIS WITH EXACERBATION (H): ICD-10-CM

## 2018-08-17 RX ORDER — IPRATROPIUM BROMIDE AND ALBUTEROL SULFATE 2.5; .5 MG/3ML; MG/3ML
1 SOLUTION RESPIRATORY (INHALATION) EVERY 6 HOURS PRN
Qty: 30 VIAL | Refills: 1 | Status: SHIPPED | OUTPATIENT
Start: 2018-08-17 | End: 2018-11-01

## 2018-08-17 NOTE — TELEPHONE ENCOUNTER
"Requested Prescriptions   Pending Prescriptions Disp Refills     ipratropium - albuterol 0.5 mg/2.5 mg/3 mL (DUONEB) 0.5-2.5 (3) MG/3ML neb solution 30 vial 1    Last Written Prescription Date:  7/31/18  Last Fill Quantity: 30,  # refills: 1   Last office visit: 7/31/2018 with prescribing provider:  7/31/18 /  Future Office Visit:     Sig: Take 1 vial (3 mLs) by nebulization every 6 hours as needed for shortness of breath / dyspnea or wheezing    Short-Acting Beta Agonist Inhalers Protocol  Passed    8/16/2018  4:37 PM       Passed - Patient is age 12 or older       Passed - Recent (12 mo) or future (30 days) visit within the authorizing provider's specialty    Patient had office visit in the last 12 months or has a visit in the next 30 days with authorizing provider or within the authorizing provider's specialty.  See \"Patient Info\" tab in inbasket, or \"Choose Columns\" in Meds & Orders section of the refill encounter.              "

## 2018-08-17 NOTE — TELEPHONE ENCOUNTER
Joe did not need this refill. It was sent by mistake... Sorry. I couldn't close this encounter so if you can, can you please close this encounter? Thank you!     See Robby  Pharmacy TechnicianElise  [facility]  Phone: [phone]  Fax: [phone]

## 2018-08-17 NOTE — TELEPHONE ENCOUNTER
Joe did not need this refill. It was sent by mistake.    See Robby  Pharmacy Technician, Elise  [facility]  Phone: [phone]  Fax: [phone]

## 2018-08-17 NOTE — TELEPHONE ENCOUNTER
This specific med/brand is not on pts med list    .  Requested Prescriptions   Pending Prescriptions Disp Refills     Nebulizers (DEVILBISS PULMO-AIDE) JEEVAN [Pharmacy Med Name: JEEVANLBISS PULMO-AIDROXANA CHEW] 1 each 0     Sig: USE AS DIRECTED    There is no refill protocol information for this order

## 2018-08-18 NOTE — PROGRESS NOTES
Obstructive Sleep Apnea - PAP Follow-Up Visit:    Chief Complaint   Patient presents with     CPAP Follow Up     compliance follow up.        Joe Mas comes in today for follow-up of their moderate sleep apnea with potential sustained hypoxemia, managed with CPAP.    Pertinent PMHx of HTN, DM II, obesity, paroxysmal atrial fibrillation.    HST performed 5/4/2018 with weight 259 lbs, AHI 15.6, codey SpO2 81%, baseline SpO2 88.5%.  Recommended for in-lab titration PSG, but patient declined due to cost.  Hesitantly started CPAP auto-titrate 5-15 cm H2O on room air and follow-up overnight oximetry.    Today, he returns for CPAP compliance follow-up.  Since our last visit, he has had near resolution of paroxysmal AF with start of fleicanide.  CPAP has gone well overall, but has considered trying a nasal mask as opposed to FFM given some chronic cough.    CPAP download from 7/8/2018 - 8/6/2018 on auto-titrate 5-15 cm H2O.  Used on 30/30 days.  Average daily usage of 4 hours 24 minutes, used >= 4 hours on 90% of nights.  Pressure median 6.5 cm H2O, 95th%ile of 108 cm H2O.  AHI 0.7.    Problem List:  Patient Active Problem List    Diagnosis Date Noted     JUNG (obstructive sleep apnea) 05/11/2018     Priority: Medium     Moderate to possible severe JUNG, potential for significant hypoxemia and borderline sustained hypoxemia   - Suspect AHI was actually under-reported given suspected sleep onset was not until ~4am (recording from [place] to 9am).      Home Sleep Apnea Testing - 5/4/2018: 259 lbs 0 oz: AHI 15.6/hr. Supine AHI 15.6/hr.   Oxygen Codey of 81%.  Baseline 88.5%.  Sp02 =< 88% for majority of recording  He slept on his back (11.9%), prone (0%), left (5.8) and right (72.8%) sides.   Analysis time: 483.1 minutes.            Long term current use of anticoagulant therapy 02/27/2018     Priority: Medium     Atrial fibrillation, unspecified type (H) 02/27/2018     Priority: Medium     Morbid obesity (H) 02/27/2018      "Priority: Medium     Tubular adenoma of colon 08/16/2017     Priority: Medium     Collected: 8/11/2017   Received: 8/14/2017   Reported: 8/15/2017 17:28   Ordering Phy(s): AL MORELAND     For improved result formatting, select 'View Enhanced Report Format'   under Linked Documents section.     SPECIMEN(S):   A: Colon polyps at 20 cm   B: Colon polyp, ascending     FINAL DIAGNOSIS:   A.  Colon at 20 cm, grossly biopsies:   -Tubular adenoma and fragments of hyperplastic polyp   - Negative for high-grade dysplasia and malignancy.     B.  Right colon, mucosal biopsy:   - Tubular adenoma.   - Negative for high-grade dysplasia and malignancy.        Obesity (BMI 30-39.9) 10/24/2015     Priority: Medium     Hypertension goal BP (blood pressure) < 140/90 07/07/2015     Priority: Medium     Hyperlipidemia with target LDL less than 100 02/14/2014     Priority: Medium     Diagnosis updated by automated process. Provider to review and confirm.       Type 2 diabetes mellitus without complications (H) 10/29/2013     Priority: Medium     CARDIOVASCULAR SCREENING; LDL GOAL LESS THAN 100 10/29/2013     Priority: Medium     Moderate major depression (H) 02/20/2013     Priority: Medium     Advanced directives, counseling/discussion 11/03/2011     Priority: Medium     Advance Directive Problem List Overview:   Name Relationship Phone    Primary Health Care Agent            Alternative Health Care Agent          Discussed advance care planning with patient; information given to patient to review.     Flavia Duggan CMA, HC Facilitator    11/3/2011          Obstructive chronic bronchitis with exacerbation (H) 02/22/2007     Priority: Medium     Essential hypertension, benign 02/22/2007     Priority: Medium          /75  Pulse 78  Ht 1.746 m (5' 8.75\")  Wt 118.5 kg (261 lb 3.2 oz)  SpO2 93%  BMI 38.85 kg/m2    Impression/Plan:    1.)  Moderate to possible severe JUNG, potential for significant hypoxemia and borderline " sustained hypoxemia   - Suspect AHI was actually under-reported given suspected sleep onset was not until ~4am (recording from [place] to 9am).   - Given this, potential significant hypoxemia, paroxysmal atrial fib, we will proceed with treatment.   - I recommended in-lab titration PSG given hypoxemia concern, but he is hesitant given potential cost.    - AHI is < 1 on CPAP auto-titrate 5-15 cm H2O, will arrange overnight oximetry on CPAP to see if residual hypoxemia.     Twenty-five minutes spent with patient, all of which were spent face-to-face counseling, consulting, coordinating plan of care.      Manan Day MD, MD    CC:  Jolynn Phillips

## 2018-08-18 NOTE — PATIENT INSTRUCTIONS
Your Body mass index is 38.85 kg/(m^2).  Weight management is a personal decision.  If you are interested in exploring weight loss strategies, the following discussion covers the approaches that may be successful. Body mass index (BMI) is one way to tell whether you are at a healthy weight, overweight, or obese. It measures your weight in relation to your height.  A BMI of 18.5 to 24.9 is in the healthy range. A person with a BMI of 25 to 29.9 is considered overweight, and someone with a BMI of 30 or greater is considered obese. More than two-thirds of American adults are considered overweight or obese.  Being overweight or obese increases the risk for further weight gain. Excess weight may lead to heart disease and diabetes.  Creating and following plans for healthy eating and physical activity may help you improve your health.  Weight control is part of healthy lifestyle and includes exercise, emotional health, and healthy eating habits. Careful eating habits lifelong are the mainstay of weight control. Though there are significant health benefits from weight loss, long-term weight loss with diet alone may be very difficult to achieve- studies show long-term success with dietary management in less than 10% of people. Attaining a healthy weight may be especially difficult to achieve in those with severe obesity. In some cases, medications, devices and surgical management might be considered.  What can you do?  If you are overweight or obese and are interested in methods for weight loss, you should discuss this with your provider.     Consider reducing daily calorie intake by 500 calories.     Keep a food journal.     Avoiding skipping meals, consider cutting portions instead.    Diet combined with exercise helps maintain muscle while optimizing fat loss. Strength training is particularly important for building and maintaining muscle mass. Exercise helps reduce stress, increase energy, and improves fitness.  Increasing exercise without diet control, however, may not burn enough calories to loose weight.       Start walking three days a week 10-20 minutes at a time    Work towards walking thirty minutes five days a week     Eventually, increase the speed of your walking for 1-2 minutes at time    In addition, we recommend that you review healthy lifestyles and methods for weight loss available through the National Institutes of Health patient information sites:  http://win.niddk.nih.gov/publications/index.htm    And look into health and wellness programs that may be available through your health insurance provider, employer, local community center, or zara club.    Weight management plan: Patient was referred to their PCP to discuss a diet and exercise plan.

## 2018-08-20 DIAGNOSIS — G47.33 OSA (OBSTRUCTIVE SLEEP APNEA): Primary | ICD-10-CM

## 2018-08-24 ENCOUNTER — DOCUMENTATION ONLY (OUTPATIENT)
Dept: SLEEP MEDICINE | Facility: CLINIC | Age: 63
End: 2018-08-24

## 2018-08-24 DIAGNOSIS — J44.1 OBSTRUCTIVE CHRONIC BRONCHITIS WITH EXACERBATION (H): Primary | ICD-10-CM

## 2018-08-24 NOTE — TELEPHONE ENCOUNTER
Requested Prescriptions   Pending Prescriptions Disp Refills     Nebulizers (DEVILBISS PULMO-AIDE) JEEVAN [Pharmacy Med Name: DEVILBISS PULMO-AIDE  JEEVAN] 1 each 0     Sig: USE AS DIRECTED    There is no refill protocol information for this order        Last Written Prescription Date:  1/5/18  Last Fill Quantity: 18g,  # refills: 7   Last office visit: 7/31/2018 with prescribing provider:  RASTA   Future Office Visit:

## 2018-08-24 NOTE — PROGRESS NOTES
PATIENT CAME INTO [facility] TO RETURN CPAP MACHINE. HE HAS NOT SPOKEN TO DR. MOLINA ABOUT THIS. I DID LET HIM KNOW IT IS AGAINST MEDICAL RECOMMENDATION TO RETURN THE MACHINE WITHOUT ADDRESSING IT WITH HIS PROVIDER. HE SAID HE IS GETTING WORSE SLEEP NOW THAN HE WAS BEFORE  AND THAT THE MACHINE IS NOT FOR HIM. MACHINE HAS BEEN RETURNED, WILL SEND MESSAGE TO DR. MOLINA. REMOVED FROM [facility].

## 2018-08-28 ENCOUNTER — TELEPHONE (OUTPATIENT)
Dept: LAB | Facility: CLINIC | Age: 63
End: 2018-08-28

## 2018-08-28 DIAGNOSIS — E11.9 TYPE 2 DIABETES MELLITUS WITHOUT COMPLICATION, WITHOUT LONG-TERM CURRENT USE OF INSULIN (H): ICD-10-CM

## 2018-08-28 DIAGNOSIS — I10 ESSENTIAL HYPERTENSION, BENIGN: ICD-10-CM

## 2018-08-28 DIAGNOSIS — Z79.01 LONG TERM CURRENT USE OF ANTICOAGULANT THERAPY: Primary | ICD-10-CM

## 2018-08-28 NOTE — LETTER
[facility]  [address] [place] [id number]  [phone]        September 17, 2018    Joe Mas  [address] [place] [facility] [address]              Dear Joe Mas    This is to remind you that your lab is due.    You may call our office at [phone] to schedule an appointment.    Please disregard this notice if you have already had your labs drawn or made an appointment.        Sincerely,        Jolynn Phillips MD

## 2018-08-28 NOTE — TELEPHONE ENCOUNTER
Patient coming in Thursday, 8/30, for labs (patient stated A1C).  Please place future orders.  Thank you!

## 2018-08-30 DIAGNOSIS — E11.9 TYPE 2 DIABETES MELLITUS WITHOUT COMPLICATION, WITHOUT LONG-TERM CURRENT USE OF INSULIN (H): ICD-10-CM

## 2018-08-30 LAB — HBA1C MFR BLD: 9.1 % (ref 0–5.6)

## 2018-08-30 PROCEDURE — 83036 HEMOGLOBIN GLYCOSYLATED A1C: CPT | Performed by: FAMILY MEDICINE

## 2018-08-30 PROCEDURE — 36415 COLL VENOUS BLD VENIPUNCTURE: CPT | Performed by: FAMILY MEDICINE

## 2018-09-06 ENCOUNTER — ANTICOAGULATION THERAPY VISIT (OUTPATIENT)
Dept: ANTICOAGULATION | Facility: CLINIC | Age: 63
End: 2018-09-06
Payer: COMMERCIAL

## 2018-09-06 DIAGNOSIS — I48.91 ATRIAL FIBRILLATION, UNSPECIFIED TYPE (H): ICD-10-CM

## 2018-09-06 DIAGNOSIS — Z79.01 LONG TERM CURRENT USE OF ANTICOAGULANT THERAPY: ICD-10-CM

## 2018-09-06 LAB — INR POINT OF CARE: 2.7 (ref 0.86–1.14)

## 2018-09-06 PROCEDURE — 99207 ZZC NO CHARGE NURSE ONLY: CPT

## 2018-09-06 PROCEDURE — 36416 COLLJ CAPILLARY BLOOD SPEC: CPT

## 2018-09-06 PROCEDURE — 85610 PROTHROMBIN TIME: CPT | Mod: QW

## 2018-09-06 NOTE — PROGRESS NOTES
ANTICOAGULATION FOLLOW-UP CLINIC VISIT    Patient Name:  Joe Mas  Date:  9/6/2018  Contact Type:  Face to Face    SUBJECTIVE:     Patient Findings     Positives No Problem Findings    Comments Patient states he had a chest cold and was on prednisone, he has been feeling better for about a week already. No issues with bleeding or unusual bruising noted. Will continue same warfarin dose and recheck the INR in 4 weeks.            OBJECTIVE    INR Protime   Date Value Ref Range Status   09/06/2018 2.7 (A) 0.86 - 1.14 Final       ASSESSMENT / PLAN  INR assessment THER    Recheck INR In: 4 WEEKS    INR Location Clinic      Anticoagulation Summary as of 9/6/2018     INR goal 2.0-3.0   Today's INR 2.7   Warfarin maintenance plan 2.5 mg (5 mg x 0.5) on Mon, Wed, Fri; 5 mg (5 mg x 1) all other days   Full warfarin instructions 2.5 mg on Mon, Wed, Fri; 5 mg all other days   Weekly warfarin total 27.5 mg   No change documented Beth De La Rosa RN   Plan last modified Sofia Tai RN (7/2/2018)   Next INR check 10/4/2018   Priority INR   Target end date     Indications   Atrial fibrillation  unspecified type (H) [I48.91]  Long term current use of anticoagulant therapy [Z79.01]         Anticoagulation Episode Summary     INR check location     Preferred lab     Send INR reminders to [facility]    Comments *      Anticoagulation Care Providers     Provider Role Specialty Phone number    Jolynn Phillips MD [facility] [phone]            See the Encounter Report to view Anticoagulation Flowsheet and Dosing Calendar (Go to Encounters tab in chart review, and find the Anticoagulation Therapy Visit)        Beth De La Rosa RN, CACP

## 2018-09-06 NOTE — MR AVS SNAPSHOT
Joe Mas   9/6/2018 10:00 AM   Anticoagulation Therapy Visit    Description:  62 year old male   Provider:  [facility]   Department:  [facility]           INR as of 9/6/2018     Today's INR 2.7      Anticoagulation Summary as of 9/6/2018     INR goal 2.0-3.0   Today's INR 2.7   Full warfarin instructions 2.5 mg on Mon, Wed, Fri; 5 mg all other days   Next INR check 10/4/2018    Indications   Atrial fibrillation  unspecified type (H) [I48.91]  Long term current use of anticoagulant therapy [Z79.01]         Your next Anticoagulation Clinic appointment(s)     Oct 04, 2018 10:15 AM CDT   Anticoagulation Visit with [facility]   [facility] ([facility]    [address] [address] [phone]              Contact Numbers     Please call [phone] with any problems or questions regarding your therapy.    If you need to cancel and/or reschedule your appointment please call one of the following numbers:  [facility] [phone]  [place] - [phone]  [place] - [phone]  [facility] [phone]  [place] - [phone]            September 2018 Details    Sun Mon Tue Wed Thu Fri Sat           1                 2               3               4               5               6      5 mg   See details      7      2.5 mg         8      5 mg           9      5 mg         10      2.5 mg         11      5 mg         12      2.5 mg         13      5 mg         14      2.5 mg         15      5 mg           16      5 mg         17      2.5 mg         18      5 mg         19      2.5 mg         20      5 mg         21      2.5 mg         22      5 mg           23      5 mg         24      2.5 mg         25      5 mg         26      2.5 mg         27      5 mg         28      2.5 mg         29      5 mg           30      5 mg                Date Details   09/06 This INR check               How to take your warfarin dose     To take:  2.5 mg Take 0.5 of a 5  mg tablet.    To take:  5 mg Take 1 of the 5 mg tablets.           October 2018 Details    Sun Mon Tue Wed Thu Fri Sat      1      2.5 mg         2      5 mg         3      2.5 mg         4            5               6                 7               8               9               10               11               12               13                 14               15               16               17               18               19               20                 21               22               23               24               25               26               27                 28               29               30               31                   Date Details   No additional details    Date of next INR:  10/4/2018         How to take your warfarin dose     To take:  2.5 mg Take 0.5 of a 5 mg tablet.    To take:  5 mg Take 1 of the 5 mg tablets.

## 2018-09-19 ENCOUNTER — MYC REFILL (OUTPATIENT)
Dept: FAMILY MEDICINE | Facility: CLINIC | Age: 63
End: 2018-09-19

## 2018-09-19 DIAGNOSIS — J44.1 OBSTRUCTIVE CHRONIC BRONCHITIS WITH EXACERBATION (H): ICD-10-CM

## 2018-09-19 RX ORDER — ALBUTEROL SULFATE 90 UG/1
2 AEROSOL, METERED RESPIRATORY (INHALATION) EVERY 6 HOURS PRN
Qty: 3 INHALER | Refills: 1 | Status: SHIPPED | OUTPATIENT
Start: 2018-09-19 | End: 2019-01-29

## 2018-09-19 NOTE — TELEPHONE ENCOUNTER
Message from [facility]:  Original authorizing provider: Jolynn Phillips MD    Joe Mas would like a refill of the following medications:  VENTOLIN  (90 BASE) MCG/ACT Inhaler [Jolynn Phillips MD]    Preferred pharmacy: [facility] [place]  [place], [place] - [address]    Comment:

## 2018-10-04 ENCOUNTER — ANTICOAGULATION THERAPY VISIT (OUTPATIENT)
Dept: ANTICOAGULATION | Facility: CLINIC | Age: 63
End: 2018-10-04
Payer: COMMERCIAL

## 2018-10-04 DIAGNOSIS — I48.91 ATRIAL FIBRILLATION, UNSPECIFIED TYPE (H): ICD-10-CM

## 2018-10-04 DIAGNOSIS — Z79.01 LONG TERM CURRENT USE OF ANTICOAGULANT THERAPY: ICD-10-CM

## 2018-10-04 LAB — INR POINT OF CARE: 1.8 (ref 0.86–1.14)

## 2018-10-04 PROCEDURE — 99207 ZZC NO CHARGE NURSE ONLY: CPT

## 2018-10-04 PROCEDURE — 36416 COLLJ CAPILLARY BLOOD SPEC: CPT

## 2018-10-04 PROCEDURE — 85610 PROTHROMBIN TIME: CPT | Mod: QW

## 2018-10-04 NOTE — MR AVS SNAPSHOT
Joe Mas   10/4/2018 10:15 AM   Anticoagulation Therapy Visit    Description:  62 year old male   Provider:  [facility]   Department:  [facility]           INR as of 10/4/2018     Today's INR 1.8!      Anticoagulation Summary as of 10/4/2018     INR goal 2.0-3.0   Today's INR 1.8!   Full warfarin instructions 10/5: 5 mg; Otherwise 2.5 mg on Mon, Fri; 5 mg all other days   Next INR check 10/18/2018    Indications   Atrial fibrillation  unspecified type (H) [I48.91]  Long term current use of anticoagulant therapy [Z79.01]         Description     Green intake (vitamin K) and activity may decrease your INR.  Alcohol, inflammation, infection, pain, stress, or less green intake (vitamin K) will increase your INR.  Medications can also have an effect on your INR.    Some signs and symptoms of clots include: pain or tenderness in arm or leg, swelling in arm or leg, changes in skin color, or area is warm to touch, shortness or breath, trouble breathing.  Numbness or weakness especially on 1 side of the body, sudden trouble speaking or swallowing, sudden trouble seeing, sudden confusion, dizzy spells or headache.  If you have these please call 911 or seek medical care immediately.      Your next Anticoagulation Clinic appointment(s)     Oct 18, 2018 10:00 AM CDT   Anticoagulation Visit with [facility]   [facility]    [address] [address] [phone]              Contact Numbers     Please call [phone] with any problems or questions regarding your therapy.    If you need to cancel and/or reschedule your appointment please call one of the following numbers:  [facility] - [phone]  [place] - [phone]  [place] - [phone]  [place] - [phone]  [place] - [phone]            October 2018 Details    Sun Mon Tue Wed Thu Fri Sat      1               2               3               4      5 mg   See details      5      5 mg          6      5 mg           7      5 mg         8      2.5 mg         9      5 mg         10      2.5 mg         11      5 mg         12      2.5 mg         13      5 mg           14      5 mg         15      2.5 mg         16      5 mg         17      5 mg         18            19               20                 21               22               23               24               25               26               27                 28               29               30               31                   Date Details   10/04 This INR check       Date of next INR:  10/18/2018         How to take your warfarin dose     To take:  2.5 mg Take 0.5 of a 5 mg tablet.    To take:  5 mg Take 1 of the 5 mg tablets.

## 2018-10-04 NOTE — PROGRESS NOTES
ANTICOAGULATION FOLLOW-UP CLINIC VISIT    Patient Name:  Joe Mas  Date:  10/4/2018  Contact Type:  Face to Face    SUBJECTIVE:     Patient Findings     Positives Change in medications (Increase in metformin due to high blood sugars), Activity level change (Increase in activity - walking about 2 miles about 5-6 days a week)    Comments No changes in diet noted. No bleeding or increased bruising noted. Took warfarin as prescribed.  Patient had 27.5 mg in the last 7 days, will adjust dose to 30 mg by next INR check in two weeks (~9.1% increase).  Patient educated on the increased risk for a clot/stroke, S&S of a clot/stroke, and when to seek medical attention.  Also discussed what may increase/decrease a pt's INR due to several questions.  Patient verbalizes understanding and agrees to plan. No further questions or concerns.           OBJECTIVE    INR Protime   Date Value Ref Range Status   10/04/2018 1.8 (A) 0.86 - 1.14 Final       ASSESSMENT / PLAN  INR assessment SUB    Recheck INR In: 2 WEEKS    INR Location Clinic      Anticoagulation Summary as of 10/4/2018     INR goal 2.0-3.0   Today's INR 1.8!   Warfarin maintenance plan 2.5 mg (5 mg x 0.5) on Mon, Fri; 5 mg (5 mg x 1) all other days   Full warfarin instructions 10/5: 5 mg; Otherwise 2.5 mg on Mon, Fri; 5 mg all other days   Weekly warfarin total 30 mg   Plan last modified Sofia Tai, RN (10/4/2018)   Next INR check 10/18/2018   Priority INR   Target end date     Indications   Atrial fibrillation  unspecified type (H) [I48.91]  Long term current use of anticoagulant therapy [Z79.01]         Anticoagulation Episode Summary     INR check location     Preferred lab     Send INR reminders to [facility]    Comments *      Anticoagulation Care Providers     Provider Role Specialty Phone number    Jolynn Phillips MD [facility] [phone]            See the Encounter Report to view Anticoagulation Flowsheet and Dosing  Calendar (Go to Encounters tab in chart review, and find the Anticoagulation Therapy Visit)        Sofia Tai RN

## 2018-10-18 ENCOUNTER — ANTICOAGULATION THERAPY VISIT (OUTPATIENT)
Dept: ANTICOAGULATION | Facility: CLINIC | Age: 63
End: 2018-10-18
Payer: COMMERCIAL

## 2018-10-18 DIAGNOSIS — Z79.01 LONG TERM CURRENT USE OF ANTICOAGULANT THERAPY: ICD-10-CM

## 2018-10-18 DIAGNOSIS — I48.91 ATRIAL FIBRILLATION, UNSPECIFIED TYPE (H): ICD-10-CM

## 2018-10-18 LAB — INR POINT OF CARE: 2.9 (ref 0.86–1.14)

## 2018-10-18 PROCEDURE — 99207 ZZC NO CHARGE NURSE ONLY: CPT

## 2018-10-18 PROCEDURE — 36416 COLLJ CAPILLARY BLOOD SPEC: CPT

## 2018-10-18 PROCEDURE — 85610 PROTHROMBIN TIME: CPT | Mod: QW

## 2018-10-18 NOTE — PROGRESS NOTES
ANTICOAGULATION FOLLOW-UP CLINIC VISIT    Patient Name:  Joe Mas  Date:  10/18/2018  Contact Type:  Face to Face    SUBJECTIVE:     Patient Findings     Positives No Problem Findings    Comments No changes in medications, activity, or diet noted. No bleeding or increased bruising noted. Took warfarin as prescribed.  Patient is to continue new maintenance warfarin plan, and check INR in 2 weeks. Pt does continue to walk 5-6 days a week.  Patient verbalizes understanding and agrees to plan. No further questions or concerns.           OBJECTIVE    INR Protime   Date Value Ref Range Status   10/18/2018 2.9 (A) 0.86 - 1.14 Final       ASSESSMENT / PLAN  INR assessment THER    Recheck INR In: 2 WEEKS    INR Location Clinic      Anticoagulation Summary as of 10/18/2018     INR goal 2.0-3.0   Today's INR 2.9   Warfarin maintenance plan 2.5 mg (5 mg x 0.5) on Mon, Fri; 5 mg (5 mg x 1) all other days   Full warfarin instructions 2.5 mg on Mon, Fri; 5 mg all other days   Weekly warfarin total 30 mg   No change documented Sofia Tai RN   Plan last modified Sofia Tai RN (10/4/2018)   Next INR check 11/1/2018   Priority INR   Target end date     Indications   Atrial fibrillation  unspecified type (H) [I48.91]  Long term current use of anticoagulant therapy [Z79.01]         Anticoagulation Episode Summary     INR check location     Preferred lab     Send INR reminders to [facility]    Comments *      Anticoagulation Care Providers     Provider Role Specialty Phone number    Jolynn Phillips MD [facility] [phone]            See the Encounter Report to view Anticoagulation Flowsheet and Dosing Calendar (Go to Encounters tab in chart review, and find the Anticoagulation Therapy Visit)        Sofia Tai RN

## 2018-10-18 NOTE — MR AVS SNAPSHOT
Joe Mas   10/18/2018 10:00 AM   Anticoagulation Therapy Visit    Description:  62 year old male   Provider:  [facility]   Department:  [facility]           INR as of 10/18/2018     Today's INR 2.9      Anticoagulation Summary as of 10/18/2018     INR goal 2.0-3.0   Today's INR 2.9   Full warfarin instructions 2.5 mg on Mon, Fri; 5 mg all other days   Next INR check 11/1/2018    Indications   Atrial fibrillation  unspecified type (H) [I48.91]  Long term current use of anticoagulant therapy [Z79.01]         Your next Anticoagulation Clinic appointment(s)     Nov 01, 2018 11:30 AM CDT   Anticoagulation Visit with [facility]   [facility]    [address], [place] [address] [phone]              Contact Numbers     Please call [phone] with any problems or questions regarding your therapy.    If you need to cancel and/or reschedule your appointment please call one of the following numbers:  [facility] [phone]  [place] - [phone]  [place] - [phone]  [facility] [phone]  [place] - [phone]            October 2018 Details    Sun Mon Tue Wed Thu Fri Sat      1               2               3               4               5               6                 7               8               9               10               11               12               13                 14               15               16               17               18      5 mg   See details      19      2.5 mg         20      5 mg           21      5 mg         22      2.5 mg         23      5 mg         24      5 mg         25      5 mg         26      2.5 mg         27      5 mg           28      5 mg         29      2.5 mg         30      5 mg         31      5 mg             Date Details   10/18 This INR check               How to take your warfarin dose     To take:  2.5 mg Take 0.5 of a 5 mg tablet.    To take:  5 mg Take 1 of the 5 mg  tablets.           November 2018 Details    Sun Mon Tue Wed Thu Fri Sat         1            2               3                 4               5               6               7               8               9               10                 11               12               13               14               15               16               17                 18               19               20               21               22               23               24                 25               26               27               28               29               30                 Date Details   No additional details    Date of next INR:  11/1/2018         How to take your warfarin dose     To take:  5 mg Take 1 of the 5 mg tablets.

## 2018-10-22 ENCOUNTER — TELEPHONE (OUTPATIENT)
Dept: LAB | Facility: CLINIC | Age: 63
End: 2018-10-22

## 2018-10-25 DIAGNOSIS — J44.1 OBSTRUCTIVE CHRONIC BRONCHITIS WITH EXACERBATION (H): ICD-10-CM

## 2018-10-25 RX ORDER — MOMETASONE FUROATE AND FORMOTEROL FUMARATE DIHYDRATE 200; 5 UG/1; UG/1
AEROSOL RESPIRATORY (INHALATION)
Qty: 13 G | Refills: 3 | Status: SHIPPED | OUTPATIENT
Start: 2018-10-25 | End: 2019-01-29

## 2018-10-25 NOTE — TELEPHONE ENCOUNTER
"DULERA 200-5MCG/ACT AERO        Last Written Prescription Date:  8/3/18  Last Fill Quantity: 13,   # refills: 1  Last Office Visit: 7/31/18  Future Office visit:       Requested Prescriptions   Pending Prescriptions Disp Refills     DULERA 200-5 MCG/ACT oral inhaler [Pharmacy Med Name: DULERA 200-5MCG/ACT AERO] 13 g 1     Sig: INHALE TWO PUFFS BY MOUTH TWICE A DAY    Inhaled Steroids Protocol Passed    10/25/2018  5:02 AM       Passed - Patient is age 12 or older       Passed - Recent (12 mo) or future (30 days) visit within the authorizing provider's specialty    Patient had office visit in the last 12 months or has a visit in the next 30 days with authorizing provider or within the authorizing provider's specialty.  See \"Patient Info\" tab in inbasket, or \"Choose Columns\" in Meds & Orders section of the refill encounter.                "

## 2018-11-01 ENCOUNTER — ANTICOAGULATION THERAPY VISIT (OUTPATIENT)
Dept: ANTICOAGULATION | Facility: CLINIC | Age: 63
End: 2018-11-01
Payer: COMMERCIAL

## 2018-11-01 DIAGNOSIS — I48.91 ATRIAL FIBRILLATION, UNSPECIFIED TYPE (H): ICD-10-CM

## 2018-11-01 DIAGNOSIS — J44.1 OBSTRUCTIVE CHRONIC BRONCHITIS WITH EXACERBATION (H): ICD-10-CM

## 2018-11-01 DIAGNOSIS — Z79.01 LONG TERM CURRENT USE OF ANTICOAGULANT THERAPY: ICD-10-CM

## 2018-11-01 LAB — INR POINT OF CARE: 2.7 (ref 0.86–1.14)

## 2018-11-01 PROCEDURE — 85610 PROTHROMBIN TIME: CPT | Mod: QW

## 2018-11-01 PROCEDURE — 99207 ZZC NO CHARGE NURSE ONLY: CPT

## 2018-11-01 PROCEDURE — 36416 COLLJ CAPILLARY BLOOD SPEC: CPT

## 2018-11-01 RX ORDER — IPRATROPIUM BROMIDE AND ALBUTEROL SULFATE 2.5; .5 MG/3ML; MG/3ML
1 SOLUTION RESPIRATORY (INHALATION) EVERY 6 HOURS PRN
Qty: 30 VIAL | Refills: 3 | Status: SHIPPED | OUTPATIENT
Start: 2018-11-01 | End: 2018-12-28

## 2018-11-01 NOTE — TELEPHONE ENCOUNTER
Prescription approved per [facility] Refill Protocol.  Bailey Ruiz RN LL/HU

## 2018-11-01 NOTE — MR AVS SNAPSHOT
Joe Mas   11/1/2018 11:30 AM   Anticoagulation Therapy Visit    Description:  63 year old male   Provider:  [facility]   Department:  [facility]           INR as of 11/1/2018     Today's INR 2.7      Anticoagulation Summary as of 11/1/2018     INR goal 2.0-3.0   Today's INR 2.7   Full warfarin instructions 2.5 mg on Mon, Fri; 5 mg all other days   Next INR check 11/29/2018    Indications   Atrial fibrillation  unspecified type (H) [I48.91]  Long term current use of anticoagulant therapy [Z79.01]         Your next Anticoagulation Clinic appointment(s)     Nov 29, 2018 11:30 AM CST   Anticoagulation Visit with [facility]   [facility]    [address] [address] [phone]              Contact Numbers     Please call [phone] with any problems or questions regarding your therapy.    If you need to cancel and/or reschedule your appointment please call one of the following numbers:  [facility] [phone]  [place] - [phone]  [facility] [phone]  [facility] [phone]  [place] - [phone]            November 2018 Details    Sun Mon Tue Wed Thu Fri Sat         1      5 mg   See details      2      2.5 mg         3      5 mg           4      5 mg         5      2.5 mg         6      5 mg         7      5 mg         8      5 mg         9      2.5 mg         10      5 mg           11      5 mg         12      2.5 mg         13      5 mg         14      5 mg         15      5 mg         16      2.5 mg         17      5 mg           18      5 mg         19      2.5 mg         20      5 mg         21      5 mg         22      5 mg         23      2.5 mg         24      5 mg           25      5 mg         26      2.5 mg         27      5 mg         28      5 mg         29            30                 Date Details   11/01 This INR check       Date of next INR:  11/29/2018         How to take your warfarin dose     To take:   2.5 mg Take 0.5 of a 5 mg tablet.    To take:  5 mg Take 1 of the 5 mg tablets.

## 2018-11-01 NOTE — TELEPHONE ENCOUNTER
"ipratropium - albuterol 0.5 mg/2.5 mg/3 mL (DUONEB) 0.5-2.5 (3) MG/3ML neb solution        Last Written Prescription Date:  8/17/18  Last Fill Quantity: 30,   # refills: 1  Last Office Visit: 7/31/18  Future Office visit:       Requested Prescriptions   Pending Prescriptions Disp Refills     ipratropium - albuterol 0.5 mg/2.5 mg/3 mL (DUONEB) 0.5-2.5 (3) MG/3ML neb solution 30 vial 1     Sig: Take 1 vial (3 mLs) by nebulization every 6 hours as needed for shortness of breath / dyspnea or wheezing    Short-Acting Beta Agonist Inhalers Protocol  Passed    11/1/2018 12:06 PM       Passed - Patient is age 12 or older       Passed - Recent (12 mo) or future (30 days) visit within the authorizing provider's specialty    Patient had office visit in the last 12 months or has a visit in the next 30 days with authorizing provider or within the authorizing provider's specialty.  See \"Patient Info\" tab in inbasket, or \"Choose Columns\" in Meds & Orders section of the refill encounter.                "

## 2018-11-01 NOTE — PROGRESS NOTES
ANTICOAGULATION FOLLOW-UP CLINIC VISIT    Patient Name:  Joe Mas  Date:  11/1/2018  Contact Type:  Face to Face    SUBJECTIVE:     Patient Findings     Positives No Problem Findings    Comments No changes in medications, activity, or diet noted. No bleeding or increased bruising noted. Took warfarin as prescribed.  Patient is to continue maintenance warfarin plan, and check INR in 4 weeks.  Patient verbalizes understanding and agrees to plan. No further questions or concerns.           OBJECTIVE    INR Protime   Date Value Ref Range Status   11/01/2018 2.7 (A) 0.86 - 1.14 Final       ASSESSMENT / PLAN  INR assessment THER    Recheck INR In: 4 WEEKS    INR Location Clinic      Anticoagulation Summary as of 11/1/2018     INR goal 2.0-3.0   Today's INR 2.7   Warfarin maintenance plan 2.5 mg (5 mg x 0.5) on Mon, Fri; 5 mg (5 mg x 1) all other days   Full warfarin instructions 2.5 mg on Mon, Fri; 5 mg all other days   Weekly warfarin total 30 mg   No change documented Sofia Tai RN   Plan last modified Sofia Tai RN (10/4/2018)   Next INR check 11/29/2018   Priority INR   Target end date     Indications   Atrial fibrillation  unspecified type (H) [I48.91]  Long term current use of anticoagulant therapy [Z79.01]         Anticoagulation Episode Summary     INR check location     Preferred lab     Send INR reminders to [facility]    Comments *      Anticoagulation Care Providers     Provider Role Specialty Phone number    Jolynn Phillips MD [facility] [phone]            See the Encounter Report to view Anticoagulation Flowsheet and Dosing Calendar (Go to Encounters tab in chart review, and find the Anticoagulation Therapy Visit)        Sofia Tai RN

## 2018-11-06 DIAGNOSIS — E11.9 TYPE 2 DIABETES, HBA1C GOAL < 7% (H): ICD-10-CM

## 2018-11-06 NOTE — TELEPHONE ENCOUNTER
"blood glucose monitoring (ACCU-CHEK SMARTVIEW) test strip        Last Written Prescription Date:  8/6/18  Last Fill Quantity: 100,   # refills: 0  Last Office Visit: 7/31/18  Future Office visit:       Requested Prescriptions   Pending Prescriptions Disp Refills     blood glucose monitoring (ACCU-CHEK SMARTVIEW) test strip 100 each 0     Sig: Test blood sugars daily DUE FOR 6 MONTH VISIT AUG 2018    Diabetic Supplies Protocol Passed    11/6/2018  3:11 PM       Passed - Patient is 18 years of age or older       Passed - Recent (6 mo) or future (30 days) visit within the authorizing provider's specialty    Patient had office visit in the last 6 months or has a visit in the next 30 days with authorizing provider.  See \"Patient Info\" tab in inbasket, or \"Choose Columns\" in Meds & Orders section of the refill encounter.              "

## 2018-11-14 ENCOUNTER — DOCUMENTATION ONLY (OUTPATIENT)
Dept: SLEEP MEDICINE | Facility: CLINIC | Age: 63
End: 2018-11-14

## 2018-11-14 NOTE — PROGRESS NOTES
6 month RUST    STM Recheck Visit     Diagnostic AHI:  15.6 HST    Data only recheck     Assessment:   Patient returned his machine.   Action plan:   Pt to follow up per provider request.       Device type: Auto-CPAP  PAP settings: CPAP min 5.0 cm  H20     CPAP max 15.0 cm  H20  Objective measures: 14 day rolling measures       Objective measure goal  Compliance   Goal >70%  Leak   Goal < 24 lpm  AHI  Goal < 5  Usage  Goal >240

## 2018-11-29 ENCOUNTER — ANTICOAGULATION THERAPY VISIT (OUTPATIENT)
Dept: ANTICOAGULATION | Facility: CLINIC | Age: 63
End: 2018-11-29
Payer: COMMERCIAL

## 2018-11-29 DIAGNOSIS — I48.91 ATRIAL FIBRILLATION, UNSPECIFIED TYPE (H): ICD-10-CM

## 2018-11-29 DIAGNOSIS — Z79.01 LONG TERM CURRENT USE OF ANTICOAGULANT THERAPY: ICD-10-CM

## 2018-11-29 LAB — INR POINT OF CARE: 2.5 (ref 0.86–1.14)

## 2018-11-29 PROCEDURE — 36416 COLLJ CAPILLARY BLOOD SPEC: CPT

## 2018-11-29 PROCEDURE — 85610 PROTHROMBIN TIME: CPT | Mod: QW

## 2018-11-29 PROCEDURE — 99207 ZZC NO CHARGE NURSE ONLY: CPT

## 2018-11-29 NOTE — PROGRESS NOTES
ANTICOAGULATION FOLLOW-UP CLINIC VISIT    Patient Name:  Joe Mas  Date:  11/29/2018  Contact Type:  Face to Face    SUBJECTIVE:     Patient Findings     Positives Change in diet/appetite (Pt has slowly decreased his meat intake - consuming about 1 salad per week. May increase his salad intake to about 2 per week. Consistency discussed.), No Problem Findings    Comments No changes in medications or activity noted. No bleeding or increased bruising noted. Took warfarin as prescribed.  Patient is to continue maintenance warfarin plan, and check INR in 5 weeks.  Patient verbalizes understanding and agrees to plan. No further questions or concerns.           OBJECTIVE    INR Protime   Date Value Ref Range Status   11/29/2018 2.5 (A) 0.86 - 1.14 Final       ASSESSMENT / PLAN  INR assessment THER    Recheck INR In: 5 WEEKS    INR Location Clinic      Anticoagulation Summary as of 11/29/2018     INR goal 2.0-3.0   Today's INR 2.5   Warfarin maintenance plan 2.5 mg (5 mg x 0.5) on Mon, Fri; 5 mg (5 mg x 1) all other days   Full warfarin instructions 2.5 mg on Mon, Fri; 5 mg all other days   Weekly warfarin total 30 mg   No change documented Sofia Tai, RN   Plan last modified Sofia Tai, RN (10/4/2018)   Next INR check 1/3/2019   Priority INR   Target end date     Indications   Atrial fibrillation  unspecified type (H) [I48.91]  Long term current use of anticoagulant therapy [Z79.01]         Anticoagulation Episode Summary     INR check location     Preferred lab     Send INR reminders to [facility]    Comments *      Anticoagulation Care Providers     Provider Role Specialty Phone number    Jolynn Phillips MD [facility] [phone]            See the Encounter Report to view Anticoagulation Flowsheet and Dosing Calendar (Go to Encounters tab in chart review, and find the Anticoagulation Therapy Visit)        Sofia Tai RN

## 2018-11-29 NOTE — MR AVS SNAPSHOT
Joe Mas   11/29/2018 11:30 AM   Anticoagulation Therapy Visit    Description:  63 year old male   Provider:  [facility]   Department:  [facility]           INR as of 11/29/2018     Today's INR 2.5      Anticoagulation Summary as of 11/29/2018     INR goal 2.0-3.0   Today's INR 2.5   Full warfarin instructions 2.5 mg on Mon, Fri; 5 mg all other days   Next INR check 1/3/2019    Indications   Atrial fibrillation  unspecified type (H) [I48.91]  Long term current use of anticoagulant therapy [Z79.01]         Your next Anticoagulation Clinic appointment(s)     Jan 03, 2019 11:30 AM CST   [facility] with [facility]   [facility]    [address] [address] [phone]              Contact Numbers     Please call [phone] with any problems or questions regarding your therapy.    If you need to cancel and/or reschedule your appointment please call one of the following numbers:  [facility] [phone]  [facility] [phone]  [facility] [phone]  [facility] [place] [phone]  [place] - [phone]            November 2018 Details    Sun Mon Tue Wed Thu Fri Sat         1               2               3                 4               5               6               7               8               9               10                 11               12               13               14               15               16               17                 18               19               20               21               22               23               24                 25               26               27               28               29      5 mg   See details      30      2.5 mg           Date Details   11/29 This INR check               How to take your warfarin dose     To take:  2.5 mg Take 0.5 of a 5 mg tablet.    To take:  5 mg Take 1 of the 5 mg tablets.           December 2018 Details    Sun Mon Tue Wed Thu Fri Sat            1      5 mg           2      5 mg         3      2.5 mg         4      5 mg         5      5 mg         6      5 mg         7      2.5 mg         8      5 mg           9      5 mg         10      2.5 mg         11      5 mg         12      5 mg         13      5 mg         14      2.5 mg         15      5 mg           16      5 mg         17      2.5 mg         18      5 mg         19      5 mg         20      5 mg         21      2.5 mg         22      5 mg           23      5 mg         24      2.5 mg         25      5 mg         26      5 mg         27      5 mg         28      2.5 mg         29      5 mg           30      5 mg         31      2.5 mg               Date Details   No additional details            How to take your warfarin dose     To take:  2.5 mg Take 0.5 of a 5 mg tablet.    To take:  5 mg Take 1 of the 5 mg tablets.           January 2019 Details    Sun Mon Tue Wed Thu Fri Sat       1      5 mg         2      5 mg         3            4               5                 6               7               8               9               10               11               12                 13               14               15               16               17               18               19                 20               21               22               23               24               25               26                 27               28               29               30               31                  Date Details   No additional details    Date of next INR:  1/3/2019         How to take your warfarin dose     To take:  5 mg Take 1 of the 5 mg tablets.

## 2018-12-02 DIAGNOSIS — E11.9 TYPE 2 DIABETES MELLITUS WITHOUT COMPLICATION, WITHOUT LONG-TERM CURRENT USE OF INSULIN (H): ICD-10-CM

## 2018-12-03 RX ORDER — METFORMIN HCL 500 MG
500 TABLET, EXTENDED RELEASE 24 HR ORAL DAILY
Qty: 90 TABLET | Refills: 0 | Status: SHIPPED | OUTPATIENT
Start: 2018-12-03 | End: 2019-01-29

## 2018-12-03 NOTE — TELEPHONE ENCOUNTER
"metFORMIN (GLUCOPHAGE-XR) 500 MG 24 hr tablet        Last Written Prescription Date:  2/27/18  Last Fill Quantity: 90,   # refills: 3  Last Office Visit: 7/31/18  Future Office visit:       Requested Prescriptions   Pending Prescriptions Disp Refills     metFORMIN (GLUCOPHAGE-XR) 500 MG 24 hr tablet 90 tablet 3     Sig: Take 1 tablet (500 mg) by mouth daily    Biguanide Agents Failed    12/2/2018 10:55 AM       Failed - Patient has documented A1c within the specified period of time.    If HgbA1C is 8 or greater, it needs to be on file within the past 3 months.  If less than 8, must be on file within the past 6 months.     Recent Labs   Lab Test  08/30/18   0941   A1C  9.1*            Passed - Blood pressure less than 140/90 in past 6 months    BP Readings from Last 3 Encounters:   08/14/18 118/75   08/14/18 118/73   07/31/18 123/73                Passed - Patient has documented LDL within the past 12 mos.    Recent Labs   Lab Test  02/27/18   1030   LDL  69            Passed - Patient has had a Microalbumin in the past 15 mos.    Recent Labs   Lab Test  02/27/18   1038   MICROL  30   UMALCR  10.00            Passed - Patient is age 10 or older       Passed - Patient's CR is NOT>1.4 OR Patient's EGFR is NOT<45 within past 12 mos.    Recent Labs   Lab Test  02/27/18   1030   GFRESTIMATED  77   GFRESTBLACK  >90       Recent Labs   Lab Test  02/27/18   1030   CR  0.98            Passed - Patient does NOT have a diagnosis of CHF.       Passed - Recent (6 mo) or future (30 days) visit within the authorizing provider's specialty    Patient had office visit in the last 6 months or has a visit in the next 30 days with authorizing provider or within the authorizing provider's specialty.  See \"Patient Info\" tab in inbasket, or \"Choose Columns\" in Meds & Orders section of the refill encounter.              "

## 2018-12-04 ENCOUNTER — TELEPHONE (OUTPATIENT)
Dept: FAMILY MEDICINE | Facility: CLINIC | Age: 63
End: 2018-12-04

## 2018-12-04 DIAGNOSIS — E11.9 TYPE 2 DIABETES MELLITUS WITHOUT COMPLICATION, WITHOUT LONG-TERM CURRENT USE OF INSULIN (H): ICD-10-CM

## 2018-12-04 RX ORDER — METFORMIN HCL 500 MG
1000 TABLET, EXTENDED RELEASE 24 HR ORAL
Qty: 180 TABLET | Refills: 0 | Status: SHIPPED | OUTPATIENT
Start: 2018-12-04 | End: 2019-01-29

## 2018-12-04 NOTE — TELEPHONE ENCOUNTER
Patient called and stated he was instructed to take up to 2 tablets daily of his Metformin. Please send updated script if appropriate.    Melvi Espinal CPhT  [facility]  [phone]

## 2018-12-11 ENCOUNTER — TELEPHONE (OUTPATIENT)
Dept: FAMILY MEDICINE | Facility: CLINIC | Age: 63
End: 2018-12-11

## 2018-12-28 DIAGNOSIS — J44.1 OBSTRUCTIVE CHRONIC BRONCHITIS WITH EXACERBATION (H): ICD-10-CM

## 2018-12-28 RX ORDER — IPRATROPIUM BROMIDE AND ALBUTEROL SULFATE 2.5; .5 MG/3ML; MG/3ML
1 SOLUTION RESPIRATORY (INHALATION) EVERY 6 HOURS PRN
Qty: 30 VIAL | Refills: 2 | Status: SHIPPED | OUTPATIENT
Start: 2018-12-28 | End: 2019-01-29

## 2018-12-28 NOTE — TELEPHONE ENCOUNTER
Prescription approved per [facility] Refill Protocol.  Vern Conley RN

## 2019-01-03 ENCOUNTER — ANTICOAGULATION THERAPY VISIT (OUTPATIENT)
Dept: ANTICOAGULATION | Facility: CLINIC | Age: 64
End: 2019-01-03
Payer: COMMERCIAL

## 2019-01-03 DIAGNOSIS — I48.91 ATRIAL FIBRILLATION, UNSPECIFIED TYPE (H): ICD-10-CM

## 2019-01-03 DIAGNOSIS — Z79.01 LONG TERM CURRENT USE OF ANTICOAGULANT THERAPY: ICD-10-CM

## 2019-01-03 LAB — INR POINT OF CARE: 2.6 (ref 0.86–1.14)

## 2019-01-03 PROCEDURE — 85610 PROTHROMBIN TIME: CPT | Mod: QW

## 2019-01-03 PROCEDURE — 36416 COLLJ CAPILLARY BLOOD SPEC: CPT

## 2019-01-03 PROCEDURE — 99207 ZZC NO CHARGE NURSE ONLY: CPT

## 2019-01-03 NOTE — PROGRESS NOTES
ANTICOAGULATION FOLLOW-UP CLINIC VISIT    Patient Name:  Joe Mas  Date:  1/3/2019  Contact Type:  Face to Face    SUBJECTIVE:     Patient Findings     Positives:   Bruising, No Problem Findings    Comments:   Patient noted a bruise on his arm from hitting a microphone stand, but states it is healing well. Noted a bruise to his hip and foot and unsure how those occurred but they also are healing well. Patient states he has had more ETOH than usual just in general during the holidays. INR therapeutic. No missed or extra doses of warfarin. No concerns with bleeding reported. Will continue maintenance dose and recheck INR in 5 weeks. Patient to call [facility] with any changes or concerns. Patient verbalized understanding of all instructions, denies questions or concerns at this time.              OBJECTIVE    INR Protime   Date Value Ref Range Status   2019 2.6 (A) 0.86 - 1.14 Final       ASSESSMENT / PLAN  INR assessment THER    Recheck INR In: 5 WEEKS    INR Location Clinic      Anticoagulation Summary  As of 1/3/2019    INR goal:   2.0-3.0   TTR:   79.9 % (10 mo)   INR used for dosin.6 (1/3/2019)   Warfarin maintenance plan:   2.5 mg (5 mg x 0.5) every Mon, Fri; 5 mg (5 mg x 1) all other days   Full warfarin instructions:   2.5 mg every Mon, Fri; 5 mg all other days   Weekly warfarin total:   30 mg   No change documented:   Cheryl Coats RN   Plan last modified:   Sofia Tai, TRINI (10/4/2018)   Next INR check:   2019   Priority:   INR   Target end date:       Indications    Atrial fibrillation  unspecified type (H) [I48.91]  Long term current use of anticoagulant therapy [Z79.01]             Anticoagulation Episode Summary     INR check location:       Preferred lab:       Send INR reminders to:   [facility]    Comments:   *      Anticoagulation Care Providers     Provider Role Specialty Phone number    Jolynn Phillips MD [facility] [phone]             See the Encounter Report to view Anticoagulation Flowsheet and Dosing Calendar (Go to Encounters tab in chart review, and find the Anticoagulation Therapy Visit)        Cheryl Coats RN

## 2019-01-08 DIAGNOSIS — I48.91 ATRIAL FIBRILLATION, UNSPECIFIED TYPE (H): ICD-10-CM

## 2019-01-08 RX ORDER — WARFARIN SODIUM 5 MG/1
TABLET ORAL
Qty: 85 TABLET | Refills: 0 | Status: SHIPPED | OUTPATIENT
Start: 2019-01-08 | End: 2019-04-09

## 2019-01-15 DIAGNOSIS — I48.0 PAROXYSMAL ATRIAL FIBRILLATION (H): ICD-10-CM

## 2019-01-16 RX ORDER — FLECAINIDE ACETATE 100 MG/1
100 TABLET ORAL 2 TIMES DAILY
Qty: 180 TABLET | Refills: 6 | Status: SHIPPED | OUTPATIENT
Start: 2019-01-16 | End: 2020-03-30

## 2019-01-29 ENCOUNTER — PATIENT OUTREACH (OUTPATIENT)
Dept: CARE COORDINATION | Facility: CLINIC | Age: 64
End: 2019-01-29

## 2019-01-29 ENCOUNTER — OFFICE VISIT (OUTPATIENT)
Dept: FAMILY MEDICINE | Facility: CLINIC | Age: 64
End: 2019-01-29
Payer: COMMERCIAL

## 2019-01-29 VITALS
HEART RATE: 70 BPM | HEIGHT: 69 IN | WEIGHT: 266.1 LBS | OXYGEN SATURATION: 93 % | SYSTOLIC BLOOD PRESSURE: 115 MMHG | BODY MASS INDEX: 39.41 KG/M2 | TEMPERATURE: 96.7 F | DIASTOLIC BLOOD PRESSURE: 73 MMHG

## 2019-01-29 DIAGNOSIS — Z79.01 LONG TERM CURRENT USE OF ANTICOAGULANT THERAPY: ICD-10-CM

## 2019-01-29 DIAGNOSIS — G47.33 OSA (OBSTRUCTIVE SLEEP APNEA): ICD-10-CM

## 2019-01-29 DIAGNOSIS — I10 HYPERTENSION GOAL BP (BLOOD PRESSURE) < 140/90: ICD-10-CM

## 2019-01-29 DIAGNOSIS — E11.9 TYPE 2 DIABETES MELLITUS WITHOUT COMPLICATION, WITHOUT LONG-TERM CURRENT USE OF INSULIN (H): Primary | ICD-10-CM

## 2019-01-29 DIAGNOSIS — E66.01 MORBID OBESITY (H): ICD-10-CM

## 2019-01-29 DIAGNOSIS — I48.91 ATRIAL FIBRILLATION, UNSPECIFIED TYPE (H): ICD-10-CM

## 2019-01-29 DIAGNOSIS — I10 ESSENTIAL HYPERTENSION, BENIGN: ICD-10-CM

## 2019-01-29 DIAGNOSIS — J44.1 OBSTRUCTIVE CHRONIC BRONCHITIS WITH EXACERBATION (H): ICD-10-CM

## 2019-01-29 DIAGNOSIS — E78.2 MIXED HYPERLIPIDEMIA: ICD-10-CM

## 2019-01-29 DIAGNOSIS — E78.5 HYPERLIPIDEMIA WITH TARGET LDL LESS THAN 100: ICD-10-CM

## 2019-01-29 LAB
ANION GAP SERPL CALCULATED.3IONS-SCNC: 8 MMOL/L (ref 3–14)
BUN SERPL-MCNC: 16 MG/DL (ref 7–30)
CALCIUM SERPL-MCNC: 9.6 MG/DL (ref 8.5–10.1)
CHLORIDE SERPL-SCNC: 99 MMOL/L (ref 94–109)
CHOLEST SERPL-MCNC: 198 MG/DL
CO2 SERPL-SCNC: 28 MMOL/L (ref 20–32)
CREAT SERPL-MCNC: 0.96 MG/DL (ref 0.66–1.25)
CREAT UR-MCNC: 320 MG/DL
GFR SERPL CREATININE-BSD FRML MDRD: 83 ML/MIN/{1.73_M2}
GLUCOSE SERPL-MCNC: 167 MG/DL (ref 70–99)
HBA1C MFR BLD: 8.1 % (ref 0–5.6)
HDLC SERPL-MCNC: 43 MG/DL
LDLC SERPL CALC-MCNC: 123 MG/DL
MICROALBUMIN UR-MCNC: 21 MG/L
MICROALBUMIN/CREAT UR: 6.66 MG/G CR (ref 0–17)
NONHDLC SERPL-MCNC: 155 MG/DL
POTASSIUM SERPL-SCNC: 3.9 MMOL/L (ref 3.4–5.3)
SODIUM SERPL-SCNC: 135 MMOL/L (ref 133–144)
TRIGL SERPL-MCNC: 161 MG/DL

## 2019-01-29 PROCEDURE — 99214 OFFICE O/P EST MOD 30 MIN: CPT | Performed by: FAMILY MEDICINE

## 2019-01-29 PROCEDURE — 80048 BASIC METABOLIC PNL TOTAL CA: CPT | Performed by: FAMILY MEDICINE

## 2019-01-29 PROCEDURE — 80061 LIPID PANEL: CPT | Performed by: FAMILY MEDICINE

## 2019-01-29 PROCEDURE — 83036 HEMOGLOBIN GLYCOSYLATED A1C: CPT | Performed by: FAMILY MEDICINE

## 2019-01-29 PROCEDURE — 82043 UR ALBUMIN QUANTITATIVE: CPT | Performed by: FAMILY MEDICINE

## 2019-01-29 PROCEDURE — 36415 COLL VENOUS BLD VENIPUNCTURE: CPT | Performed by: FAMILY MEDICINE

## 2019-01-29 RX ORDER — ATORVASTATIN CALCIUM 20 MG/1
20 TABLET, FILM COATED ORAL DAILY
Qty: 90 TABLET | Refills: 3 | Status: SHIPPED | OUTPATIENT
Start: 2019-01-29 | End: 2020-01-14

## 2019-01-29 RX ORDER — ALBUTEROL SULFATE 90 UG/1
2 AEROSOL, METERED RESPIRATORY (INHALATION) EVERY 6 HOURS PRN
Qty: 3 INHALER | Refills: 1 | Status: SHIPPED | OUTPATIENT
Start: 2019-01-29 | End: 2019-11-28

## 2019-01-29 RX ORDER — LOSARTAN POTASSIUM 100 MG/1
100 TABLET ORAL DAILY
Qty: 90 TABLET | Refills: 3 | Status: SHIPPED | OUTPATIENT
Start: 2019-01-29 | End: 2020-01-14

## 2019-01-29 RX ORDER — METFORMIN HCL 500 MG
1000 TABLET, EXTENDED RELEASE 24 HR ORAL
Qty: 180 TABLET | Refills: 1 | Status: SHIPPED | OUTPATIENT
Start: 2019-01-29 | End: 2019-08-25

## 2019-01-29 RX ORDER — IPRATROPIUM BROMIDE AND ALBUTEROL SULFATE 2.5; .5 MG/3ML; MG/3ML
1 SOLUTION RESPIRATORY (INHALATION) EVERY 6 HOURS PRN
Qty: 30 VIAL | Refills: 2 | Status: SHIPPED | OUTPATIENT
Start: 2019-01-29 | End: 2019-03-08

## 2019-01-29 RX ORDER — METOPROLOL SUCCINATE 50 MG/1
50 TABLET, EXTENDED RELEASE ORAL DAILY
Qty: 90 TABLET | Refills: 3 | Status: SHIPPED | OUTPATIENT
Start: 2019-01-29 | End: 2020-01-14

## 2019-01-29 ASSESSMENT — PATIENT HEALTH QUESTIONNAIRE - PHQ9
5. POOR APPETITE OR OVEREATING: NOT AT ALL
SUM OF ALL RESPONSES TO PHQ QUESTIONS 1-9: 0

## 2019-01-29 ASSESSMENT — ANXIETY QUESTIONNAIRES
1. FEELING NERVOUS, ANXIOUS, OR ON EDGE: NOT AT ALL
7. FEELING AFRAID AS IF SOMETHING AWFUL MIGHT HAPPEN: NOT AT ALL
3. WORRYING TOO MUCH ABOUT DIFFERENT THINGS: NOT AT ALL
2. NOT BEING ABLE TO STOP OR CONTROL WORRYING: NOT AT ALL
5. BEING SO RESTLESS THAT IT IS HARD TO SIT STILL: NOT AT ALL
GAD7 TOTAL SCORE: 0
6. BECOMING EASILY ANNOYED OR IRRITABLE: NOT AT ALL

## 2019-01-29 ASSESSMENT — MIFFLIN-ST. JEOR: SCORE: 1984.46

## 2019-01-29 NOTE — PROGRESS NOTES
Clinic Care Coordination Contact  [facility]/Voicemail    Referral Source: PCP  Clinical Data: Care Coordinator Outreach  Outreach attempted x 1.  Left message on voicemail with call back information and requested return call.  Plan: Care Coordinator will mail out care coordination introduction letter with care coordinator contact information and explanation of care coordination services. Care Coordinator will try to reach patient again in 1-2 business days.  SUSU Lombardo, Clinic Care Coordinator 1/29/2019   2:34 PM  [phone]

## 2019-01-29 NOTE — LETTER
[place] CARE COORDINATION - [facility]  [address] [place] [address]  Phone: [phone]    January 29, 2019    Joe Chace  [address] [place] [address]      Dear Joe,    I am a clinic care coordinator who works with Jolynn Phillips MD at [place]. I have been trying to reach you recently to introduce Clinic Care Coordination and to see if there was anything I could assist you with.  I wanted to introduce myself and provide you with my contact information so that you can call me with questions or concerns about your health care. Below is a description of clinic care coordination and how I can further assist you.     The clinic care coordinator is a registered nurse and/or  who understand the health care system. The goal of clinic care coordination is to help you manage your health and improve access to the [facility] system in the most efficient manner. The registered nurse can assist you in meeting your health care goals by providing education, coordinating services, and strengthening the communication among your providers. The  can assist you with financial, behavioral, psychosocial, chemical dependency, counseling, and/or psychiatric resources.    Please feel free to contact me at [phone], with any questions or concerns. We at [facility] are focused on providing you with the highest-quality healthcare experience possible and that all starts with you.     Sincerely,     Ann Kelley, \[facility]\""  Clinic Care Coordination  [phone]      Enclosed: I have enclosed a copy of a 24 Hour Access Plan. This has helpful phone numbers for you to call when needed. Please keep this in an easy to access place to use as needed.

## 2019-01-29 NOTE — LETTER
Health Care Home - Access Care Plan    About Me  Patient Name:  Joe Mas    YOB: 1955  Age:                             63 year old   Andreina MRN:            [id number] Telephone Information:   Home Phone [phone]   Mobile [phone]       Address:    [address] [place] [address] Email address:  [email or web address]      Emergency Contact(s)  Name Relationship Lgl Grd Work Phone Home Phone Mobile Phone   DANITZA MAIER Daughter   [phone]              Health Maintenance:      My Access Plan  Medical Emergency 914   Questions or concerns during clinic hours Primary Clinic Line, I will call the clinic directly: [facility] [phone]   24 Hour Appointment Line [phone] or  [phone]) (toll free)   24 Hour Nurse Line [phone] (toll free)   Questions or concerns outside clinic hours 24 Hour Appointment Line, I will call the after-hours on-call line:   [facility] [phone] or [phone]) (toll-free)   Preferred Urgent Care     Preferred Hospital     Preferred Pharmacy [id number] [place] [address]     Behavioral Health Crisis Line The National Suicide Prevention Lifeline at 1-840.584.5454 or 915     My Care Team Members  Patient Care Team       Relationship Specialty Notifications Start End    Jolynn Phillips MD PCP - General Family Practice  2/14/14     Phone: [phone] Fax: [phone]         [address] [facility] [address]    Jolynn Phillips MD PCP - Assigned PCP   11/3/13     Phone: [phone] Fax: [phone] [address] [facility] [address] [address]    Ann Kelley LSW Clinic Care Coordinator Primary Care - [facility] Admissions 1/29/19     Phone: [phone] Fax: [phone]               My Medical and Care Information  Problem List   Patient Active Problem List   Diagnosis     Obstructive chronic bronchitis with exacerbation (H)      Essential hypertension, benign     Advanced directives, counseling/discussion     Moderate major depression (H)     Type 2 diabetes mellitus without complication, without long-term current use of insulin (H)     CARDIOVASCULAR SCREENING; LDL GOAL LESS THAN 100     Hyperlipidemia with target LDL less than 100     Hypertension goal BP (blood pressure) < 140/90     Obesity (BMI 30-39.9)     Tubular adenoma of colon     Long term current use of anticoagulant therapy     Atrial fibrillation, unspecified type (H)     Morbid obesity (H)     JUNG (obstructive sleep apnea)      Current Medications and Allergies:  See printed Medication Report

## 2019-01-29 NOTE — PROGRESS NOTES
SUBJECTIVE:   Joe Mas is a 63 year old male who presents to clinic today for the following health issues:      Diabetes Follow-up      Patient is checking blood sugars: here and there about two times a day. Patient said his blood sugar has been higher since starting the dulera he then had gone up to 2 tabs of the metformin blood sugar was around 200 and now the readings have been going down this past month 200-150.     Diabetic concerns: None     Symptoms of hypoglycemia (low blood sugar): none     Paresthesias (numbness or burning in feet) or sores: No     Date of last diabetic eye exam: 01/2018. Patient is doing tomorrow.     Metformin doubled the dose to 1,000 daily 9/2018  - no side effects     Lab Results   Component Value Date    A1C 8.1 01/29/2019    A1C 9.1 08/30/2018    A1C 6.9 02/27/2018    A1C 6.8 08/01/2017    A1C 6.7 02/09/2017     Diabetes Management Resources    Hyperlipidemia Follow-Up      Rate your low fat/cholesterol diet?: fair    Taking statin?  Yes, no muscle aches from statin    Other lipid medications/supplements?:  None    On 20 of lipitor     Hypertension Follow-up      Outpatient blood pressures on and off     Low Salt Diet: no added salt    BP Readings from Last 2 Encounters:   01/29/19 115/73   08/14/18 118/75     Hemoglobin A1C (%)   Date Value   08/30/2018 9.1 (H)   02/27/2018 6.9 (H)     LDL Cholesterol Calculated (mg/dL)   Date Value   02/27/2018 69   02/09/2017 84       Amount of exercise or physical activity: 2 days/week for an average of 15-30 minutes    Problems taking medications regularly: No    Medication side effects: none    Diet: regular (no restrictions)      COPD - started on dulera at his last OV - made his blood sugar go up   Also on duonebs - only using once a day - uses them more often when he is sick   And on prn albuterol      A-fib   On warfarin  1/3/19 - INR 2.6      Review of systems:  No cp/sob/cough  No n/v   No diarrhea/constipation       Problem list  "and histories reviewed & adjusted, as indicated.  Additional history: as documented    Patient Active Problem List   Diagnosis     Obstructive chronic bronchitis with exacerbation (H)     Essential hypertension, benign     Advanced directives, counseling/discussion     Moderate major depression (H)     Type 2 diabetes mellitus without complication, without long-term current use of insulin (H)     CARDIOVASCULAR SCREENING; LDL GOAL LESS THAN 100     Hyperlipidemia with target LDL less than 100     Hypertension goal BP (blood pressure) < 140/90     Obesity (BMI 30-39.9)     Tubular adenoma of colon     Long term current use of anticoagulant therapy     Atrial fibrillation, unspecified type (H)     Morbid obesity (H)     JUNG (obstructive sleep apnea)     Current Outpatient Medications   Medication     albuterol (VENTOLIN HFA) 108 (90 Base) MCG/ACT inhaler     ASPIRIN 81 MG OR TABS     atorvastatin (LIPITOR) 20 MG tablet     DULERA 200-5 MCG/ACT oral inhaler     flecainide (TAMBOCOR) 100 MG tablet     ipratropium - albuterol 0.5 mg/2.5 mg/3 mL (DUONEB) 0.5-2.5 (3) MG/3ML neb solution     losartan (COZAAR) 100 MG tablet     metFORMIN (GLUCOPHAGE-XR) 500 MG 24 hr tablet     metoprolol succinate (TOPROL-XL) 50 MG 24 hr tablet     warfarin (JANTOVEN) 5 MG tablet     blood glucose monitoring (ACCU-CHEK FASTCLIX) lancets     blood glucose monitoring (ACCU-CHEK SMARTVIEW) test strip     blood glucose monitoring ([employer] CONTOUR NEXT MONITOR) meter device kit     fluticasone-salmeterol (ADVAIR) 250-50 MCG/DOSE diskus inhaler     metFORMIN (GLUCOPHAGE-XR) 500 MG 24 hr tablet     Nebulizers ([facility] PUL[facility]-AIDE) JEEVAN     order for DME     No current facility-administered medications for this visit.         Allergies   Allergen Reactions     Lisinopril Cough       /73 (BP Location: Right arm, Patient Position: Sitting, Cuff Size: Adult Large)   Pulse 70   Temp 96.7  F (35.9  C) (Tympanic)   Ht 1.74 m (5' 8.5\")   Wt 120.7 " kg (266 lb 1.6 oz)   SpO2 93%   BMI 39.87 kg/m    GENERAL - Pt is alert and oriented in no acute distress.  Affect is appropriate. Good eye contact.  HEET - Head is normocephalic, atraumatic.    PERRLA,EEMI. Conjunctiva are free of icterus or erythema.    Oropharynx free of masses and lesions, no tonsillar exudate or petechiae.    NECK - Neck is supple w/o LA or thyromegaly  RESPIRATORY - Clear to auscultation bilaterally.  No wheezing noted  CV - RRR, no murmurs, rubs, gallops.   EXTREM - No edema.  Feet - no wounds. Normal capillary refill. Warm to touch. Intact microfilament testing. Normal DP and PT pulses    Assessment/Plan -    (E11.9) Type 2 diabetes mellitus without complication, without long-term current use of insulin (H)  (primary encounter diagnosis)  Comment: discussed a1c. It has improved since last check but is still higher than we'd like. He wants to try and work on 5-10% weight loss and getting more active. Will keep meds the same and recheck in 3 months. The patient indicates understanding of these issues and agrees with the plan.   Plan: Lipid panel reflex to direct LDL Fasting, Basic        metabolic panel, Albumin Random Urine         Quantitative with Creat Ratio, Hemoglobin A1c,         metFORMIN (GLUCOPHAGE-XR) 500 MG 24 hr tablet,         CARE COORDINATION REFERRAL            (I10) Essential hypertension, benign  Comment: check lytes. meds refilled   Plan: Basic metabolic panel            (I48.91) Atrial fibrillation, unspecified type (H)  Comment: stable. In sinus rhythm today   Plan: metoprolol succinate ER (TOPROL-XL) 50 MG 24 hr        tablet, CARE COORDINATION REFERRAL            (J44.1) Obstructive chronic bronchitis with exacerbation (H)  Comment: he feels meds are working well.   Plan: mometasone-formoterol (DULERA) 200-5 MCG/ACT         inhaler, albuterol (VENTOLIN HFA) 108 (90 Base)        MCG/ACT inhaler, ipratropium - albuterol 0.5         mg/2.5 mg/3 mL (DUONEB) 0.5-2.5 (3)  MG/3ML neb         solution            (E78.2) Mixed hyperlipidemia  Comment: check lipids today. Med refilled   Plan: atorvastatin (LIPITOR) 20 MG tablet            (E78.5) Hyperlipidemia with target LDL less than 100  Comment:   Plan: CARE COORDINATION REFERRAL            (Z79.01) Long term current use of anticoagulant therapy  Comment: on warfarin. Last inr at goal   Plan:     (G47.33) JUNG (obstructive sleep apnea)  Comment: well controlled   Plan:     (E66.01) Morbid obesity (H)  Comment: Discussed  weight watchers. We discussed healthy diet including whole grains, fruits, vegies, lean protein.   Three meals a day - with reasonable portion sizes. Do not eat within 3 hours of bed. Increase activity to at least 30 minutes/day. Weigh weekly, not daily.    Questions about medicare supplements - referred to care coordination    TESFAYE Phillips MD

## 2019-01-30 ASSESSMENT — ANXIETY QUESTIONNAIRES: GAD7 TOTAL SCORE: 0

## 2019-01-31 NOTE — PROGRESS NOTES
Clinic Care Coordination Contact  Outreach    Pt answered and SW identified self.  He said he was babysitting his grandson and asked if he can call back tomorrow.      Plan:  Pt to call Sw tomorrow.  If no call sw will call in 4-6 business days.     SUSU Lombardo, Clinic Care Coordinator 1/31/2019   11:12 AM  [phone]

## 2019-02-01 ASSESSMENT — ACTIVITIES OF DAILY LIVING (ADL): DEPENDENT_IADLS:: INDEPENDENT

## 2019-02-01 NOTE — PROGRESS NOTES
Clinic Care Coordination Contact  Outreach    Pt returned call.  His only concern was to get information on signing up for Medicare and supplemental options. He denied any financial, medication, housing, food concerns.     Reviewed Senior Linkage Line  [phone] and that they can assist with understanding Medicare and options.  He does want to be able to to to [place] for some cares.  Encouraged him to talk with SLL about options so he can make an educated decision on options.     Pt voiced understanding and had no other concerns.      Plan:  Pt to call Senior linkage line.  Pt to call Sw if he has any future concerns/questions.  No planned outreach at this time.     SUSU Lombardo, Clinic Care Coordinator 2/1/2019   11:13 AM  [phone]

## 2019-02-07 ENCOUNTER — ANTICOAGULATION THERAPY VISIT (OUTPATIENT)
Dept: ANTICOAGULATION | Facility: CLINIC | Age: 64
End: 2019-02-07
Payer: COMMERCIAL

## 2019-02-07 DIAGNOSIS — Z79.01 LONG TERM CURRENT USE OF ANTICOAGULANT THERAPY: ICD-10-CM

## 2019-02-07 DIAGNOSIS — I48.91 ATRIAL FIBRILLATION, UNSPECIFIED TYPE (H): ICD-10-CM

## 2019-02-07 LAB — INR POINT OF CARE: 1.9 (ref 0.86–1.14)

## 2019-02-07 PROCEDURE — 36416 COLLJ CAPILLARY BLOOD SPEC: CPT

## 2019-02-07 PROCEDURE — 99207 ZZC NO CHARGE NURSE ONLY: CPT

## 2019-02-07 PROCEDURE — 85610 PROTHROMBIN TIME: CPT | Mod: QW

## 2019-02-12 ENCOUNTER — TELEPHONE (OUTPATIENT)
Dept: FAMILY MEDICINE | Facility: CLINIC | Age: 64
End: 2019-02-12

## 2019-02-12 DIAGNOSIS — E11.9 TYPE 2 DIABETES MELLITUS WITHOUT COMPLICATION, WITHOUT LONG-TERM CURRENT USE OF INSULIN (H): Primary | ICD-10-CM

## 2019-02-12 NOTE — TELEPHONE ENCOUNTER
Patient's insurance does not cover patient's pervious testing supplies (contour next) and prefers [employer] products. Can we get new rx's for a meter, test strips, lancets and control solution please. If there are questions about this request please contact pharmacy directly.         Thank you,  Elliott Ovalles Technician   [facility]  On behalf of  [id number]) [id number] [facility]. [address]  [place] [address]     Phone: [phone]    Fax: [phone]

## 2019-02-23 DIAGNOSIS — J44.1 OBSTRUCTIVE CHRONIC BRONCHITIS WITH EXACERBATION (H): ICD-10-CM

## 2019-02-25 RX ORDER — MOMETASONE FUROATE AND FORMOTEROL FUMARATE DIHYDRATE 200; 5 UG/1; UG/1
AEROSOL RESPIRATORY (INHALATION)
Qty: 13 G | Refills: 3 | OUTPATIENT
Start: 2019-02-25

## 2019-02-25 NOTE — TELEPHONE ENCOUNTER
"Requested Prescriptions   Pending Prescriptions Disp Refills     DULERA 200-5 MCG/ACT inhaler [Pharmacy Med Name: DULERA 200-5MCG/ACT AERO] 13 g 3     Sig: INHALE TWO PUFFS BY MOUTH TWICE A DAY    Inhaled Steroids Protocol Passed - 2/23/2019  5:03 AM       Passed - Patient is age 12 or older       Passed - Recent (12 mo) or future (30 days) visit within the authorizing provider's specialty    Patient had office visit in the last 12 months or has a visit in the next 30 days with authorizing provider or within the authorizing provider's specialty.  See \"Patient Info\" tab in inbasket, or \"Choose Columns\" in Meds & Orders section of the refill encounter.             Passed - Medication is active on med list        Last Written Prescription Date:  1/29/19  Last Fill Quantity: 13g,  # refills: 3   Last office visit: 1/29/2019 with prescribing provider:  Jolynn Phillips     Future Office Visit:      "

## 2019-03-21 ENCOUNTER — TRANSFERRED RECORDS (OUTPATIENT)
Dept: HEALTH INFORMATION MANAGEMENT | Facility: CLINIC | Age: 64
End: 2019-03-21

## 2019-03-21 ENCOUNTER — ANTICOAGULATION THERAPY VISIT (OUTPATIENT)
Dept: ANTICOAGULATION | Facility: CLINIC | Age: 64
End: 2019-03-21
Payer: COMMERCIAL

## 2019-03-21 DIAGNOSIS — Z79.01 LONG TERM CURRENT USE OF ANTICOAGULANT THERAPY: ICD-10-CM

## 2019-03-21 DIAGNOSIS — I48.91 ATRIAL FIBRILLATION, UNSPECIFIED TYPE (H): ICD-10-CM

## 2019-03-21 LAB — INR POINT OF CARE: 2.3 (ref 0.86–1.14)

## 2019-03-21 PROCEDURE — 85610 PROTHROMBIN TIME: CPT | Mod: QW

## 2019-03-21 PROCEDURE — 99207 ZZC NO CHARGE NURSE ONLY: CPT

## 2019-03-21 PROCEDURE — 36416 COLLJ CAPILLARY BLOOD SPEC: CPT

## 2019-03-21 NOTE — PROGRESS NOTES
ANTICOAGULATION FOLLOW-UP CLINIC VISIT    Patient Name:  Joe Mas  Date:  3/21/2019  Contact Type:  Face to Face    SUBJECTIVE:     Patient Findings     Positives:   Change in diet/appetite (increase in greens (vitamin K) - pt will work on consistency. informed pt that we can increase coumadin if needed in future)    Comments:   No changes in medications, activity, or diet noted. No bleeding or increased bruising noted. Took warfarin as prescribed.  Pt did pull a muscle in his arm a few weeks ago and had some bruising - it is resolved at this time.  Patient is to continue maintenance warfarin plan, and check INR in 6 weeks.  Patient verbalizes understanding and agrees to plan. No further questions or concerns.           OBJECTIVE    INR Protime   Date Value Ref Range Status   2019 2.3 (A) 0.86 - 1.14 Final       ASSESSMENT / PLAN  INR assessment THER    Recheck INR In: 6 WEEKS    INR Location Clinic      Anticoagulation Summary  As of 3/21/2019    INR goal:   2.0-3.0   TTR:   79.9 % (1 y)   INR used for dosin.3 (3/21/2019)   Warfarin maintenance plan:   2.5 mg (5 mg x 0.5) every Mon, Fri; 5 mg (5 mg x 1) all other days   Full warfarin instructions:   2.5 mg every Mon, Fri; 5 mg all other days   Weekly warfarin total:   30 mg   No change documented:   Sofia Tai RN   Plan last modified:   Sofia Tai RN (10/4/2018)   Next INR check:   2019   Priority:   INR   Target end date:       Indications    Atrial fibrillation  unspecified type (H) [I48.91]  Long term current use of anticoagulant therapy [Z79.01]             Anticoagulation Episode Summary     INR check location:       Preferred lab:       Send INR reminders to:   [facility]    Comments:   *      Anticoagulation Care Providers     Provider Role Specialty Phone number    Jolynn Phillips MD [facility] [phone]            See the Encounter Report to view Anticoagulation Flowsheet and Dosing  Calendar (Go to Encounters tab in chart review, and find the Anticoagulation Therapy Visit)        Sofia Tai RN

## 2019-04-09 DIAGNOSIS — I48.91 ATRIAL FIBRILLATION, UNSPECIFIED TYPE (H): ICD-10-CM

## 2019-04-09 RX ORDER — WARFARIN SODIUM 5 MG/1
TABLET ORAL
Qty: 85 TABLET | Refills: 0 | Status: SHIPPED | OUTPATIENT
Start: 2019-04-09 | End: 2019-05-16

## 2019-04-09 NOTE — TELEPHONE ENCOUNTER
"Requested Prescriptions   Pending Prescriptions Disp Refills     warfarin (JANTOVEN) 5 MG tablet  Last Written Prescription Date:  1/8/19  Last Fill Quantity: 85,  # refills: 0   Last office visit: 1/29/2019 with prescribing provider:  Alan   Future Office Visit:     85 tablet 0     Sig: Take 2.5 mg on Mon, Fri; 5 mg all other days or As directed by Anticoagulation clinic       Vitamin K Antagonists Failed - 4/9/2019  4:26 PM        Failed - INR is within goal in the past 6 weeks     Confirm INR is within goal in the past 6 weeks.     Recent Labs   Lab Test 03/21/19   INR 2.3*                       Passed - Recent (12 mo) or future (30 days) visit within the authorizing provider's specialty     Patient had office visit in the last 12 months or has a visit in the next 30 days with authorizing provider or within the authorizing provider's specialty.  See \"Patient Info\" tab in inbasket, or \"Choose Columns\" in Meds & Orders section of the refill encounter.              Passed - Medication is active on med list        Passed - Patient is 18 years of age or older          "

## 2019-05-01 ENCOUNTER — TRANSFERRED RECORDS (OUTPATIENT)
Dept: HEALTH INFORMATION MANAGEMENT | Facility: CLINIC | Age: 64
End: 2019-05-01

## 2019-05-02 ENCOUNTER — ANTICOAGULATION THERAPY VISIT (OUTPATIENT)
Dept: ANTICOAGULATION | Facility: CLINIC | Age: 64
End: 2019-05-02
Payer: COMMERCIAL

## 2019-05-02 DIAGNOSIS — Z79.01 LONG TERM CURRENT USE OF ANTICOAGULANT THERAPY: ICD-10-CM

## 2019-05-02 DIAGNOSIS — I48.91 ATRIAL FIBRILLATION, UNSPECIFIED TYPE (H): ICD-10-CM

## 2019-05-02 LAB — INR POINT OF CARE: 1.6 (ref 0.86–1.14)

## 2019-05-02 PROCEDURE — 36416 COLLJ CAPILLARY BLOOD SPEC: CPT | Performed by: FAMILY MEDICINE

## 2019-05-02 PROCEDURE — 99207 ZZC NO CHARGE NURSE ONLY: CPT | Performed by: FAMILY MEDICINE

## 2019-05-02 PROCEDURE — 85610 PROTHROMBIN TIME: CPT | Mod: QW | Performed by: FAMILY MEDICINE

## 2019-05-02 NOTE — PROGRESS NOTES
ANTICOAGULATION FOLLOW-UP CLINIC VISIT    Patient Name:  Joe Mas  Date:  2019  Contact Type:  Face to Face    SUBJECTIVE:     Patient Findings     Positives:   Change in health (Loss 15 lbs), Change in activity (Increase in activity still - about 5 days a week. Walking about 2-3 miles.), Change in diet/appetite (Increase in greens (vitamin K), less red meats, but more fish)    Comments:   No changes in medications noted. No bleeding or increased bruising noted. Took warfarin as prescribed.  Patient had 30 mg in the last 7 days, will adjust dose to 32.5 mg by next INR check in two weeks (~8.3% increase).  Education provided regarding blood sugars, blood pressure, exercise, and other topics as pt had several questions.  Patient educated on the increased risk for a clot/stroke, precautions to take, S &S of a clot/stroke, and when to seek medical attention.  Patient verbalizes understanding and agrees to plan. No further questions or concerns.           OBJECTIVE    INR Protime   Date Value Ref Range Status   2019 1.6 (A) 0.86 - 1.14 Final       ASSESSMENT / PLAN  INR assessment SUB    Recheck INR In: 2 WEEKS    INR Location Clinic      Anticoagulation Summary  As of 2019    INR goal:   2.0-3.0   TTR:   76.2 % (1.1 y)   INR used for dosin.6! (2019)   Warfarin maintenance plan:   2.5 mg (5 mg x 0.5) every Mon; 5 mg (5 mg x 1) all other days   Full warfarin instructions:   : 7.5 mg; Otherwise 2.5 mg every Mon; 5 mg all other days   Weekly warfarin total:   32.5 mg   Plan last modified:   Sofia Tai, RN (2019)   Next INR check:   2019   Priority:   INR   Target end date:       Indications    Atrial fibrillation  unspecified type (H) [I48.91]  Long term current use of anticoagulant therapy [Z79.01]             Anticoagulation Episode Summary     INR check location:       Preferred lab:       Send INR reminders to:   [facility]    Comments:   *      Anticoagulation  Care Providers     Provider Role Specialty Phone number    Jolynn Phillips MD [facility] [phone]            See the Encounter Report to view Anticoagulation Flowsheet and Dosing Calendar (Go to Encounters tab in chart review, and find the Anticoagulation Therapy Visit)        Sofia Tai RN

## 2019-05-02 NOTE — PATIENT INSTRUCTIONS
Some signs and symptoms of clots include: pain or tenderness in arm or leg, swelling in arm or leg, changes in skin color, or area is warm to touch, shortness or breath, trouble breathing.  Numbness or weakness especially on 1 side of the body, sudden trouble speaking or swallowing, sudden trouble seeing, sudden confusion, dizzy spells or headache.  If you have these please call 911 or seek medical care immediately.

## 2019-05-16 ENCOUNTER — ANTICOAGULATION THERAPY VISIT (OUTPATIENT)
Dept: ANTICOAGULATION | Facility: CLINIC | Age: 64
End: 2019-05-16
Payer: COMMERCIAL

## 2019-05-16 DIAGNOSIS — I48.91 ATRIAL FIBRILLATION, UNSPECIFIED TYPE (H): ICD-10-CM

## 2019-05-16 DIAGNOSIS — Z79.01 LONG TERM CURRENT USE OF ANTICOAGULANT THERAPY: ICD-10-CM

## 2019-05-16 LAB — INR POINT OF CARE: 1.9 (ref 0.86–1.14)

## 2019-05-16 PROCEDURE — 36416 COLLJ CAPILLARY BLOOD SPEC: CPT

## 2019-05-16 PROCEDURE — 99207 ZZC NO CHARGE NURSE ONLY: CPT

## 2019-05-16 PROCEDURE — 85610 PROTHROMBIN TIME: CPT | Mod: QW

## 2019-05-16 RX ORDER — WARFARIN SODIUM 5 MG/1
TABLET ORAL
Qty: 85 TABLET | Refills: 0 | COMMUNITY
Start: 2019-05-16 | End: 2020-01-07

## 2019-05-16 NOTE — PROGRESS NOTES
ANTICOAGULATION FOLLOW-UP CLINIC VISIT    Patient Name:  Joe Mas  Date:  2019  Contact Type:  Face to Face    SUBJECTIVE:  Patient Findings     Comments:   No changes in medications, activity, or diet noted. No bleeding or increased bruising noted. Took warfarin as prescribed.  Pt continues to have more greens (vitamin K) in his diet, increase in activity (not as much but continues to try to), and did have weight loss.  Patient had 32.5 mg in the last 7 days, will adjust dose to 35 mg by next INR check in one week (~7.7% increase)  Patient verbalizes understanding and agrees to plan. No further questions or concerns.        Clinical Outcomes     Negatives:   Major bleeding event, Thromboembolic event, Anticoagulation-related hospital admission, Anticoagulation-related ED visit, Anticoagulation-related fatality    Comments:   No changes in medications, activity, or diet noted. No bleeding or increased bruising noted. Took warfarin as prescribed.  Pt continues to have more greens (vitamin K) in his diet, increase in activity (not as much but continues to try to), and did have weight loss.  Patient had 32.5 mg in the last 7 days, will adjust dose to 35 mg by next INR check in one week (~7.7% increase)  Patient verbalizes understanding and agrees to plan. No further questions or concerns.           OBJECTIVE    INR Protime   Date Value Ref Range Status   2019 1.9 (A) 0.86 - 1.14 Final       ASSESSMENT / PLAN  INR assessment THER +/- 0.1 within INR goal range   Recheck INR In: 1 WEEK    INR Location Clinic      Anticoagulation Summary  As of 2019    INR goal:   2.0-3.0   TTR:   73.7 % (1.2 y)   INR used for dosin.9! (2019)   Warfarin maintenance plan:   5 mg (5 mg x 1) every day   Full warfarin instructions:   5 mg every day   Weekly warfarin total:   35 mg   Plan last modified:   Sofia Tai RN (2019)   Next INR check:   2019   Priority:   INR   Target end date:        Indications    Atrial fibrillation  unspecified type (H) [I48.91]  Long term current use of anticoagulant therapy [Z79.01]             Anticoagulation Episode Summary     INR check location:       Preferred lab:       Send INR reminders to:   [facility]    Comments:   *      Anticoagulation Care Providers     Provider Role Specialty Phone number    Jolynn Phillips MD [facility] [phone]            See the Encounter Report to view Anticoagulation Flowsheet and Dosing Calendar (Go to Encounters tab in chart review, and find the Anticoagulation Therapy Visit)        Sofia Tai RN

## 2019-05-31 ENCOUNTER — ANTICOAGULATION THERAPY VISIT (OUTPATIENT)
Dept: ANTICOAGULATION | Facility: CLINIC | Age: 64
End: 2019-05-31
Payer: COMMERCIAL

## 2019-05-31 DIAGNOSIS — I48.91 ATRIAL FIBRILLATION, UNSPECIFIED TYPE (H): ICD-10-CM

## 2019-05-31 DIAGNOSIS — Z79.01 LONG TERM CURRENT USE OF ANTICOAGULANT THERAPY: ICD-10-CM

## 2019-05-31 LAB — INR POINT OF CARE: 2.7 (ref 0.86–1.14)

## 2019-05-31 PROCEDURE — 36416 COLLJ CAPILLARY BLOOD SPEC: CPT

## 2019-05-31 PROCEDURE — 85610 PROTHROMBIN TIME: CPT | Mod: QW

## 2019-05-31 PROCEDURE — 99207 ZZC NO CHARGE NURSE ONLY: CPT

## 2019-05-31 NOTE — PROGRESS NOTES
ANTICOAGULATION FOLLOW-UP CLINIC VISIT    Patient Name:  Joe Mas  Date:  2019  Contact Type:  Face to Face    SUBJECTIVE:  Patient Findings     Positives:   Missed doses    Comments:   Patient went back to 2.5 mg on  and 5 mg ROW. He is in range on this dose so will continue this weekly dosing. No changes in diet, activity level, medications (including over the counter), or health. No signs of clots or bleeding concerns.             Clinical Outcomes     Comments:   Patient went back to 2.5 mg on  and 5 mg ROW. He is in range on this dose so will continue this weekly dosing. No changes in diet, activity level, medications (including over the counter), or health. No signs of clots or bleeding concerns.                OBJECTIVE    INR Protime   Date Value Ref Range Status   2019 2.7 (A) 0.86 - 1.14 Final       ASSESSMENT / PLAN  INR assessment THER    Recheck INR In: 3 WEEKS    INR Location Clinic      Anticoagulation Summary  As of 2019    INR goal:   2.0-3.0   TTR:   74.2 % (1.2 y)   INR used for dosin.7 (2019)   Warfarin maintenance plan:   5 mg (5 mg x 1) every day   Full warfarin instructions:   5 mg every day   Weekly warfarin total:   35 mg   No change documented:   Halie Birmingham RN   Plan last modified:   Sofia Tai RN (2019)   Next INR check:   2019   Priority:   INR   Target end date:       Indications    Atrial fibrillation  unspecified type (H) [I48.91]  Long term current use of anticoagulant therapy [Z79.01]             Anticoagulation Episode Summary     INR check location:       Preferred lab:       Send INR reminders to:   [facility]    Comments:   *      Anticoagulation Care Providers     Provider Role Specialty Phone number    Jolynn Phillips MD [facility] [phone]            See the Encounter Report to view Anticoagulation Flowsheet and Dosing Calendar (Go to Encounters tab in chart review, and  find the Anticoagulation Therapy Visit)        Halie Birmingham RN

## 2019-06-20 ENCOUNTER — ANTICOAGULATION THERAPY VISIT (OUTPATIENT)
Dept: ANTICOAGULATION | Facility: CLINIC | Age: 64
End: 2019-06-20
Payer: COMMERCIAL

## 2019-06-20 DIAGNOSIS — Z79.01 LONG TERM CURRENT USE OF ANTICOAGULANT THERAPY: ICD-10-CM

## 2019-06-20 DIAGNOSIS — I48.91 ATRIAL FIBRILLATION, UNSPECIFIED TYPE (H): ICD-10-CM

## 2019-06-20 LAB — INR POINT OF CARE: 2.3 (ref 0.86–1.14)

## 2019-06-20 PROCEDURE — 36416 COLLJ CAPILLARY BLOOD SPEC: CPT

## 2019-06-20 PROCEDURE — 85610 PROTHROMBIN TIME: CPT | Mod: QW

## 2019-06-20 PROCEDURE — 99207 ZZC NO CHARGE NURSE ONLY: CPT

## 2019-06-20 NOTE — PROGRESS NOTES
ANTICOAGULATION FOLLOW-UP CLINIC VISIT    Patient Name:  Joe Mas  Date:  2019  Contact Type:  Face to Face    SUBJECTIVE:  Patient Findings     Comments:   No changes in medications, activity, or diet noted. No bleeding or increased bruising noted. Took warfarin as prescribed - however, pt continued to take the 2.5 mg on Monday as previously noted. Pt will continue at that dose.  Patient verbalizes understanding and agrees to plan. No further questions or concerns.        Clinical Outcomes     Negatives:   Major bleeding event, Thromboembolic event, Anticoagulation-related hospital admission, Anticoagulation-related ED visit, Anticoagulation-related fatality    Comments:   No changes in medications, activity, or diet noted. No bleeding or increased bruising noted. Took warfarin as prescribed - however, pt continued to take the 2.5 mg on Monday as previously noted. Pt will continue at that dose.  Patient verbalizes understanding and agrees to plan. No further questions or concerns.           OBJECTIVE    INR Protime   Date Value Ref Range Status   2019 2.3 (A) 0.86 - 1.14 Final       ASSESSMENT / PLAN  INR assessment THER    Recheck INR In: 4 WEEKS    INR Location Clinic      Anticoagulation Summary  As of 2019    INR goal:   2.0-3.0   TTR:   75.3 % (1.3 y)   INR used for dosin.3 (2019)   Warfarin maintenance plan:   2.5 mg (5 mg x 0.5) every Mon; 5 mg (5 mg x 1) all other days   Full warfarin instructions:   2.5 mg every Mon; 5 mg all other days   Weekly warfarin total:   32.5 mg   Plan last modified:   Sofia Tai RN (2019)   Next INR check:   2019   Priority:   INR   Target end date:       Indications    Atrial fibrillation  unspecified type (H) [I48.91]  Long term current use of anticoagulant therapy [Z79.01]             Anticoagulation Episode Summary     INR check location:       Preferred lab:       Send INR reminders to:   [facility]    Comments:   *       Anticoagulation Care Providers     Provider Role Specialty Phone number    Jolynn Phillips MD [facility] [phone]            See the Encounter Report to view Anticoagulation Flowsheet and Dosing Calendar (Go to Encounters tab in chart review, and find the Anticoagulation Therapy Visit)        Sofia Tai RN

## 2019-07-01 DIAGNOSIS — J44.1 OBSTRUCTIVE CHRONIC BRONCHITIS WITH EXACERBATION (H): ICD-10-CM

## 2019-07-02 RX ORDER — PREDNISONE 20 MG/1
TABLET ORAL
Qty: 20 TABLET | Refills: 0 | OUTPATIENT
Start: 2019-07-02

## 2019-07-02 NOTE — TELEPHONE ENCOUNTER
Routing refill request to provider for review/approval because:  Drug not on the [facility] refill protocol   Chart indicates that Joe has appointment with Dr. Phillips on 7/5/19.  Vern Conley RN

## 2019-07-02 NOTE — TELEPHONE ENCOUNTER
Joe reports that he has been having a cold; yesterday he had an ear ache. This morning he woke up and he is fine. He does not want the prednisone and he wants to cancel the 7/5/19 appointment with Dr. Phillips.  Vern Conley RN

## 2019-07-02 NOTE — TELEPHONE ENCOUNTER
Message left on patient's answering machine to call the [facility] RN back.  Vern Conley RN

## 2019-07-02 NOTE — TELEPHONE ENCOUNTER
PREDNISONE 20MG TABS      Last Written Prescription Date:  8/1/18  Last Fill Quantity: 20,   # refills: 0  Last Office Visit: 1/29/19  Future Office visit:    Next 5 appointments (look out 90 days)    Jul 05, 2019  1:00 PM CDT  Office Visit with Jolynn Phillips MD  [facility] ([facility] [address] [facility] [address]  [phone]           Routing refill request to provider for review/approval because:  Drug not on the [facility], [facility] or  Health refill protocol or controlled substance

## 2019-07-05 ENCOUNTER — OFFICE VISIT (OUTPATIENT)
Dept: FAMILY MEDICINE | Facility: CLINIC | Age: 64
End: 2019-07-05
Payer: COMMERCIAL

## 2019-07-05 VITALS
BODY MASS INDEX: 38.06 KG/M2 | HEIGHT: 69 IN | TEMPERATURE: 97.8 F | HEART RATE: 60 BPM | WEIGHT: 257 LBS | DIASTOLIC BLOOD PRESSURE: 77 MMHG | OXYGEN SATURATION: 94 % | SYSTOLIC BLOOD PRESSURE: 126 MMHG

## 2019-07-05 DIAGNOSIS — E78.5 HYPERLIPIDEMIA WITH TARGET LDL LESS THAN 100: ICD-10-CM

## 2019-07-05 DIAGNOSIS — J44.1 OBSTRUCTIVE CHRONIC BRONCHITIS WITH EXACERBATION (H): Primary | ICD-10-CM

## 2019-07-05 DIAGNOSIS — Z79.01 LONG TERM CURRENT USE OF ANTICOAGULANT THERAPY: ICD-10-CM

## 2019-07-05 DIAGNOSIS — I48.91 ATRIAL FIBRILLATION, UNSPECIFIED TYPE (H): ICD-10-CM

## 2019-07-05 DIAGNOSIS — E11.9 TYPE 2 DIABETES MELLITUS WITHOUT COMPLICATION, WITHOUT LONG-TERM CURRENT USE OF INSULIN (H): ICD-10-CM

## 2019-07-05 DIAGNOSIS — G47.33 OSA (OBSTRUCTIVE SLEEP APNEA): ICD-10-CM

## 2019-07-05 DIAGNOSIS — I10 ESSENTIAL HYPERTENSION, BENIGN: ICD-10-CM

## 2019-07-05 DIAGNOSIS — E66.9 OBESITY (BMI 30-39.9): ICD-10-CM

## 2019-07-05 DIAGNOSIS — F32.1 MODERATE MAJOR DEPRESSION (H): ICD-10-CM

## 2019-07-05 LAB — HBA1C MFR BLD: 6.6 % (ref 0–5.6)

## 2019-07-05 PROCEDURE — 83036 HEMOGLOBIN GLYCOSYLATED A1C: CPT | Performed by: FAMILY MEDICINE

## 2019-07-05 PROCEDURE — 36415 COLL VENOUS BLD VENIPUNCTURE: CPT | Performed by: FAMILY MEDICINE

## 2019-07-05 PROCEDURE — 99214 OFFICE O/P EST MOD 30 MIN: CPT | Performed by: FAMILY MEDICINE

## 2019-07-05 RX ORDER — PREDNISONE 20 MG/1
TABLET ORAL
Qty: 18 TABLET | Refills: 0 | Status: SHIPPED | OUTPATIENT
Start: 2019-07-05 | End: 2019-12-12

## 2019-07-05 RX ORDER — DOXYCYCLINE 100 MG/1
100 CAPSULE ORAL 2 TIMES DAILY
Qty: 20 CAPSULE | Refills: 0 | Status: SHIPPED | OUTPATIENT
Start: 2019-07-05 | End: 2019-12-12

## 2019-07-05 ASSESSMENT — MIFFLIN-ST. JEOR: SCORE: 1943.18

## 2019-07-05 NOTE — PROGRESS NOTES
Subjective     Joe Mas is a 63 year old male who presents to clinic today for the following health issues:    HPI                Symptoms: cc Present Absent Comment   Fever/Chills   x    Fatigue  x     Headache  x     Muscle or Body  Aches   x    Eye Irritation   x    Sneezing       Nasal Juan Alberto/Drg  x  In the beginning more   Sinus Pressure/Pain   x    Dental pain   x    Sore Throat  x  Mild now, started with extremely sore throat   Swollen Glands   x    Ear Pain/Fullness x   Right ear   Cough x   Fluid in lungs   Wheeze  x     Chest Discomfort  x     Shortness of breath  x  Getting better   Abdominal pain   x    Emesis    x    Diarrhea   x    Other   x      Symptom duration:  3 weeks   Symptom severity:     Treatments tried:  inhaler and neb   Contacts:  yes, mom     Symptoms: started 2.5 weeks ago   Sore throat off and on  Sinus pressure and sinus drainage and coughing   Green sputum  No shortness of breath  Some wheezing  Right ear plugged and sore right neck   No fever     Has copd  No allergy meds   Using cpap  duonebs bring him some relief       Metformin was increased to two tablets/day after last a1c. He is tolerating this well  Working on better healthy habits   2-3 miles a day   And gianluca patiño - sterachid       Lab Results   Component Value Date    A1C 8.1 01/29/2019    A1C 9.1 08/30/2018    A1C 6.9 02/27/2018    A1C 6.8 08/01/2017    A1C 6.7 02/09/2017       Review of systems:  No chest pain or pressure   Normal appetite   No n/v   No diarrhea/constipation     Reviewed and updated as needed this visit by Provider         Patient Active Problem List   Diagnosis     Obstructive chronic bronchitis with exacerbation (H)     Essential hypertension, benign     Advanced directives, counseling/discussion     Moderate major depression (H)     Type 2 diabetes mellitus without complication, without long-term current use of insulin (H)     CARDIOVASCULAR SCREENING; LDL GOAL LESS THAN 100     Hyperlipidemia with  "target LDL less than 100     Hypertension goal BP (blood pressure) < 140/90     Obesity (BMI 30-39.9)     Tubular adenoma of colon     Long term current use of anticoagulant therapy     Atrial fibrillation, unspecified type (H)     Morbid obesity (H)     JUNG (obstructive sleep apnea)     Current Outpatient Medications   Medication     albuterol (VENTOLIN HFA) 108 (90 Base) MCG/ACT inhaler     ASPIRIN 81 MG OR TABS     atorvastatin (LIPITOR) 20 MG tablet     blood glucose (NO BRAND SPECIFIED) lancets standard     blood glucose (NO BRAND SPECIFIED) test strip     blood glucose monitoring (ACCU-CHEK FASTCLIX) lancets     blood glucose monitoring (ACCU-CHEK SMARTVIEW) test strip     blood glucose monitoring (BELA CONTOUR NEXT MONITOR) meter device kit     blood glucose monitoring (NO BRAND SPECIFIED) meter device kit     flecainide (TAMBOCOR) 100 MG tablet     ipratropium - albuterol 0.5 mg/2.5 mg/3 mL (DUONEB) 0.5-2.5 (3) MG/3ML neb solution     losartan (COZAAR) 100 MG tablet     metFORMIN (GLUCOPHAGE-XR) 500 MG 24 hr tablet     metoprolol succinate ER (TOPROL-XL) 50 MG 24 hr tablet     mometasone-formoterol (DULERA) 200-5 MCG/ACT inhaler     Nebulizers (DEVILBISS PULMO-AIDE) JEEVAN     order for DME     warfarin (JANTOVEN) 5 MG tablet     warfarin (JANTOVEN) 5 MG tablet     No current facility-administered medications for this visit.         Allergies   Allergen Reactions     Lisinopril Cough           Objective    /77   Pulse 60   Temp 97.8  F (36.6  C) (Tympanic)   Ht 1.74 m (5' 8.5\")   Wt 116.6 kg (257 lb)   SpO2 94%   BMI 38.51 kg/m    Body mass index is 38.51 kg/m .  GENERAL - Pt is alert and oriented in no acute distress.  Affect is appropriate. Good eye contact.  HEET - Head is normocephalic, atraumatic.    PERRLA,EEMI. Conjunctiva are free of icterus or erythema.    TMs bilaterally normal.   Oropharynx free of masses and lesions, no tonsillar exudate or petechiae.    NECK - Neck is supple w/o LA or " "thyromegaly  RESPIRATORY - Clear to auscultation bilaterally.  End expiratory wheezing with forced expirations   CV - RRR, no murmurs, rubs, gallops.  EXTREM - No edema.    Results for orders placed or performed in visit on 07/05/19   Hemoglobin A1c   Result Value Ref Range    Hemoglobin A1C 6.6 (H) 0 - 5.6 %       Assessment & Plan     (J44.1) Obstructive chronic bronchitis with exacerbation (H)  (primary encounter diagnosis)  Comment: discussed diagnosis with him. Treat with antibiotics because of copd diagnosis and prednisone. Continue regular copd meds including duonebs. Return to clinic if symptoms persist/change/worsen. The patient indicates understanding of these issues and agrees with the plan.   Plan: doxycycline hyclate         (VIBRAMYCIN) 100 MG capsule, predniSONE         (DELTASONE) 20 MG tablet    (E11.9) Type 2 diabetes mellitus without complication, without long-term current use of insulin (H)  Comment: his A1c is much improved. He is congratulated. Continue current dosing of metformin and continue daily exercise.    Plan: Hemoglobin A1c,             (F32.1) Moderate major depression (H)  Comment: stable. He is feeling good.   Plan:     (I10) Essential hypertension, benign  Comment: stable.   Plan:     (E78.5) Hyperlipidemia with target LDL less than 100  Comment: stable   Plan:     (I48.91) Atrial fibrillation, unspecified type (H)  Comment: on jantoven. Will check INR senior living through doxycycline treatment as he may need to hold a dose or two of jantoven.   Plan: INR            (Z79.01) Long term current use of anticoagulant therapy  Comment:   Plan: INR           (G47.33) JUNG (obstructive sleep apnea)  Comment:   Plan:     (E66.9) Obesity (BMI 30-39.9)  Comment: continue exercising and eating vegies. He is doing well.   Plan:         BMI:   Estimated body mass index is 38.51 kg/m  as calculated from the following:    Height as of this encounter: 1.74 m (5' 8.5\").    Weight as of this encounter: " 116.6 kg (257 lb).   Weight management plan: Discussed healthy diet and exercise guidelines      Jolynn Phillips MD  [facility]

## 2019-07-18 ENCOUNTER — ANTICOAGULATION THERAPY VISIT (OUTPATIENT)
Dept: ANTICOAGULATION | Facility: CLINIC | Age: 64
End: 2019-07-18
Payer: COMMERCIAL

## 2019-07-18 DIAGNOSIS — I48.91 ATRIAL FIBRILLATION, UNSPECIFIED TYPE (H): ICD-10-CM

## 2019-07-18 DIAGNOSIS — Z79.01 LONG TERM CURRENT USE OF ANTICOAGULANT THERAPY: ICD-10-CM

## 2019-07-18 LAB — INR POINT OF CARE: 3 (ref 0.86–1.14)

## 2019-07-18 PROCEDURE — 85610 PROTHROMBIN TIME: CPT | Mod: QW

## 2019-07-18 PROCEDURE — 36416 COLLJ CAPILLARY BLOOD SPEC: CPT

## 2019-07-18 PROCEDURE — 99207 ZZC NO CHARGE NURSE ONLY: CPT

## 2019-07-18 NOTE — PROGRESS NOTES
ANTICOAGULATION FOLLOW-UP CLINIC VISIT    Patient Name:  Joe Mas  Date:  7/18/2019  Contact Type:  Face to Face    SUBJECTIVE:  Patient Findings     Positives:   Change in health (chronic bronchitis with exacerbation about 2 weeks ago), Change in medications (was on prednisone and doxycycline, now completed both)    Comments:   No issues with bleeding or unusual bruising noted.         Clinical Outcomes     Comments:   No issues with bleeding or unusual bruising noted.            OBJECTIVE    INR Protime   Date Value Ref Range Status   07/18/2019 3.0 (A) 0.86 - 1.14 Final       ASSESSMENT / PLAN  INR assessment THER    Recheck INR In: 4 WEEKS    INR Location Clinic      Anticoagulation Summary  As of 7/18/2019    INR goal:   2.0-3.0   TTR:   76.7 % (1.4 y)   INR used for dosing:   3.0 (7/18/2019)   Warfarin maintenance plan:   2.5 mg (5 mg x 0.5) every Mon; 5 mg (5 mg x 1) all other days   Full warfarin instructions:   2.5 mg every Mon; 5 mg all other days   Weekly warfarin total:   32.5 mg   No change documented:   Beth De La Rosa RN   Plan last modified:   Sofia Tai RN (6/20/2019)   Next INR check:   8/15/2019   Priority:   INR   Target end date:       Indications    Atrial fibrillation  unspecified type (H) [I48.91]  Long term current use of anticoagulant therapy [Z79.01]             Anticoagulation Episode Summary     INR check location:       Preferred lab:       Send INR reminders to:   [facility]    Comments:   *      Anticoagulation Care Providers     Provider Role Specialty Phone number    Jolynn Phillips MD [facility] [phone]            See the Encounter Report to view Anticoagulation Flowsheet and Dosing Calendar (Go to Encounters tab in chart review, and find the Anticoagulation Therapy Visit)        Beth De La Rosa RN CACP

## 2019-08-15 ENCOUNTER — ANTICOAGULATION THERAPY VISIT (OUTPATIENT)
Dept: ANTICOAGULATION | Facility: CLINIC | Age: 64
End: 2019-08-15
Payer: COMMERCIAL

## 2019-08-15 DIAGNOSIS — Z79.01 LONG TERM CURRENT USE OF ANTICOAGULANT THERAPY: ICD-10-CM

## 2019-08-15 DIAGNOSIS — I48.91 ATRIAL FIBRILLATION, UNSPECIFIED TYPE (H): ICD-10-CM

## 2019-08-15 LAB — INR POINT OF CARE: 2.4 (ref 0.86–1.14)

## 2019-08-15 PROCEDURE — 36416 COLLJ CAPILLARY BLOOD SPEC: CPT

## 2019-08-15 PROCEDURE — 85610 PROTHROMBIN TIME: CPT | Mod: QW

## 2019-08-15 PROCEDURE — 99207 ZZC NO CHARGE NURSE ONLY: CPT

## 2019-08-15 NOTE — PROGRESS NOTES
ANTICOAGULATION FOLLOW-UP CLINIC VISIT    Patient Name:  Joe Mas  Date:  8/15/2019  Contact Type:  Face to Face    SUBJECTIVE:  Patient Findings     Comments:   No changes in medications, activity, or diet noted. No concerns with clotting, bleeding, or increased bruising noted. Took warfarin as prescribed.  Patient is to continue maintenance warfarin plan, and check INR in 6 weeks.  Patient verbalizes understanding and agrees to plan. No further questions or concerns.        Clinical Outcomes     Negatives:   Major bleeding event, Thromboembolic event, Anticoagulation-related hospital admission, Anticoagulation-related ED visit, Anticoagulation-related fatality    Comments:   No changes in medications, activity, or diet noted. No concerns with clotting, bleeding, or increased bruising noted. Took warfarin as prescribed.  Patient is to continue maintenance warfarin plan, and check INR in 6 weeks.  Patient verbalizes understanding and agrees to plan. No further questions or concerns.           OBJECTIVE    INR Protime   Date Value Ref Range Status   08/15/2019 2.4 (A) 0.86 - 1.14 Final       ASSESSMENT / PLAN  INR assessment THER    Recheck INR In: 6 WEEKS    INR Location Clinic      Anticoagulation Summary  As of 8/15/2019    INR goal:   2.0-3.0   TTR:   77.9 % (1.4 y)   INR used for dosin.4 (8/15/2019)   Warfarin maintenance plan:   2.5 mg (5 mg x 0.5) every Mon; 5 mg (5 mg x 1) all other days   Full warfarin instructions:   2.5 mg every Mon; 5 mg all other days   Weekly warfarin total:   32.5 mg   No change documented:   Sofia Tai RN   Plan last modified:   Sofia Tai RN (2019)   Next INR check:   2019   Priority:   INR   Target end date:       Indications    Atrial fibrillation  unspecified type (H) [I48.91]  Long term current use of anticoagulant therapy [Z79.01]             Anticoagulation Episode Summary     INR check location:       Preferred lab:       Send INR reminders to:    MARIA ELENA HORTA    Comments:   *      Anticoagulation Care Providers     Provider Role Specialty Phone number    Jolynn Phillips MD [facility] [phone]            See the Encounter Report to view Anticoagulation Flowsheet and Dosing Calendar (Go to Encounters tab in chart review, and find the Anticoagulation Therapy Visit)        Sofia Tai RN

## 2019-08-25 DIAGNOSIS — E11.9 TYPE 2 DIABETES MELLITUS WITHOUT COMPLICATION, WITHOUT LONG-TERM CURRENT USE OF INSULIN (H): ICD-10-CM

## 2019-08-26 RX ORDER — METFORMIN HCL 500 MG
TABLET, EXTENDED RELEASE 24 HR ORAL
Qty: 180 TABLET | Refills: 2 | Status: SHIPPED | OUTPATIENT
Start: 2019-08-26 | End: 2020-01-14

## 2019-08-26 NOTE — TELEPHONE ENCOUNTER
"METFORMIN HCL ER 500MG TB24      Last Written Prescription Date:  1/29/19  Last Fill Quantity: 180,   # refills: 1  Last Office Visit: 7/5/19  Future Office visit:       Requested Prescriptions   Pending Prescriptions Disp Refills     metFORMIN (GLUCOPHAGE-XR) 500 MG 24 hr tablet [Pharmacy Med Name: METFORMIN HCL ER 500MG TB24] 180 tablet 1     Sig: TAKE TWO TABLETS BY MOUTH ONCE DAILY WITH DINNER       Biguanide Agents Passed - 8/25/2019  5:02 AM        Passed - Blood pressure less than 140/90 in past 6 months     BP Readings from Last 3 Encounters:   07/05/19 126/77   01/29/19 115/73   08/14/18 118/75                 Passed - Patient has documented LDL within the past 12 mos.     Recent Labs   Lab Test 01/29/19  0936   *             Passed - Patient has had a Microalbumin in the past 15 mos.     Recent Labs   Lab Test 01/29/19  0936   MICROL 21   UMALCR 6.66             Passed - Patient is age 10 or older        Passed - Patient has documented A1c within the specified period of time.     If HgbA1C is 8 or greater, it needs to be on file within the past 3 months.  If less than 8, must be on file within the past 6 months.     Recent Labs   Lab Test 07/05/19  1402   A1C 6.6*             Passed - Patient's CR is NOT>1.4 OR Patient's EGFR is NOT<45 within past 12 mos.     Recent Labs   Lab Test 01/29/19  0936   GFRESTIMATED 83   GFRESTBLACK >90       Recent Labs   Lab Test 01/29/19  0936   CR 0.96             Passed - Patient does NOT have a diagnosis of CHF.        Passed - Medication is active on med list        Passed - Recent (6 mo) or future (30 days) visit within the authorizing provider's specialty     Patient had office visit in the last 6 months or has a visit in the next 30 days with authorizing provider or within the authorizing provider's specialty.  See \"Patient Info\" tab in inbasket, or \"Choose Columns\" in Meds & Orders section of the refill encounter.              "

## 2019-08-26 NOTE — TELEPHONE ENCOUNTER
Prescription approved per [facility] Refill Protocol.  Vern Conley RN

## 2019-09-26 ENCOUNTER — ANTICOAGULATION THERAPY VISIT (OUTPATIENT)
Dept: ANTICOAGULATION | Facility: CLINIC | Age: 64
End: 2019-09-26
Payer: COMMERCIAL

## 2019-09-26 DIAGNOSIS — Z79.01 LONG TERM CURRENT USE OF ANTICOAGULANT THERAPY: ICD-10-CM

## 2019-09-26 DIAGNOSIS — I48.91 ATRIAL FIBRILLATION, UNSPECIFIED TYPE (H): ICD-10-CM

## 2019-09-26 LAB — INR POINT OF CARE: 3 (ref 0.86–1.14)

## 2019-09-26 PROCEDURE — 36416 COLLJ CAPILLARY BLOOD SPEC: CPT

## 2019-09-26 PROCEDURE — 85610 PROTHROMBIN TIME: CPT | Mod: QW

## 2019-09-26 PROCEDURE — 99207 ZZC NO CHARGE NURSE ONLY: CPT

## 2019-09-26 NOTE — PROGRESS NOTES
ANTICOAGULATION FOLLOW-UP CLINIC VISIT    Patient Name:  Joe Mas  Date:  9/26/2019  Contact Type:  Face to Face    SUBJECTIVE:  Patient Findings     Comments:   No changes in medications, activity, or diet noted. No concerns with clotting, bleeding, or increased bruising noted. Took warfarin as prescribed.  Patient is to continue maintenance warfarin plan, and check INR in 6 weeks.  Patient verbalizes understanding and agrees to plan. No further questions or concerns.        Clinical Outcomes     Negatives:   Major bleeding event, Thromboembolic event, Anticoagulation-related hospital admission, Anticoagulation-related ED visit, Anticoagulation-related fatality    Comments:   No changes in medications, activity, or diet noted. No concerns with clotting, bleeding, or increased bruising noted. Took warfarin as prescribed.  Patient is to continue maintenance warfarin plan, and check INR in 6 weeks.  Patient verbalizes understanding and agrees to plan. No further questions or concerns.           OBJECTIVE    INR Protime   Date Value Ref Range Status   09/26/2019 3.0 (A) 0.86 - 1.14 Final       ASSESSMENT / PLAN  INR assessment THER    Recheck INR In: 6 WEEKS    INR Location Clinic      Anticoagulation Summary  As of 9/26/2019    INR goal:   2.0-3.0   TTR:   79.6 % (1.6 y)   INR used for dosing:   3.0 (9/26/2019)   Warfarin maintenance plan:   2.5 mg (5 mg x 0.5) every Mon; 5 mg (5 mg x 1) all other days   Full warfarin instructions:   2.5 mg every Mon; 5 mg all other days   Weekly warfarin total:   32.5 mg   No change documented:   Sofia Tai RN   Plan last modified:   Sofia Tai RN (6/20/2019)   Next INR check:   11/7/2019   Priority:   INR   Target end date:       Indications    Atrial fibrillation  unspecified type (H) [I48.91]  Long term current use of anticoagulant therapy [Z79.01]             Anticoagulation Episode Summary     INR check location:       Preferred lab:       Send INR reminders to:    MARIA ELENA HORTA    Comments:   *      Anticoagulation Care Providers     Provider Role Specialty Phone number    Jolynn Phillips MD [facility] [phone]            See the Encounter Report to view Anticoagulation Flowsheet and Dosing Calendar (Go to Encounters tab in chart review, and find the Anticoagulation Therapy Visit)        Sofia Tai RN

## 2019-11-06 ENCOUNTER — HEALTH MAINTENANCE LETTER (OUTPATIENT)
Age: 64
End: 2019-11-06

## 2019-11-07 ENCOUNTER — ANTICOAGULATION THERAPY VISIT (OUTPATIENT)
Dept: ANTICOAGULATION | Facility: CLINIC | Age: 64
End: 2019-11-07
Payer: COMMERCIAL

## 2019-11-07 DIAGNOSIS — Z79.01 LONG TERM CURRENT USE OF ANTICOAGULANT THERAPY: ICD-10-CM

## 2019-11-07 DIAGNOSIS — I48.91 ATRIAL FIBRILLATION, UNSPECIFIED TYPE (H): ICD-10-CM

## 2019-11-07 LAB — INR POINT OF CARE: 2.5 (ref 0.86–1.14)

## 2019-11-07 PROCEDURE — 99207 ZZC NO CHARGE NURSE ONLY: CPT

## 2019-11-07 PROCEDURE — 36416 COLLJ CAPILLARY BLOOD SPEC: CPT

## 2019-11-07 PROCEDURE — 85610 PROTHROMBIN TIME: CPT | Mod: QW

## 2019-11-07 NOTE — PROGRESS NOTES
ANTICOAGULATION FOLLOW-UP CLINIC VISIT    Patient Name:  Joe Mas  Date:  2019  Contact Type:  Face to Face    SUBJECTIVE:  Patient Findings     Comments:   No changes in medications, activity, or diet noted. No concerns with clotting, bleeding, or increased bruising noted. Took warfarin as prescribed.  Patient is to continue maintenance warfarin plan, and check INR in 6 weeks.  Patient verbalizes understanding and agrees to plan. No further questions or concerns.        Clinical Outcomes     Negatives:   Major bleeding event, Thromboembolic event, Anticoagulation-related hospital admission, Anticoagulation-related ED visit, Anticoagulation-related fatality    Comments:   No changes in medications, activity, or diet noted. No concerns with clotting, bleeding, or increased bruising noted. Took warfarin as prescribed.  Patient is to continue maintenance warfarin plan, and check INR in 6 weeks.  Patient verbalizes understanding and agrees to plan. No further questions or concerns.           OBJECTIVE    INR Protime   Date Value Ref Range Status   2019 2.5 (A) 0.86 - 1.14 Final       ASSESSMENT / PLAN  INR assessment THER    Recheck INR In: 6 WEEKS    INR Location Clinic      Anticoagulation Summary  As of 2019    INR goal:   2.0-3.0   TTR:   81.0 % (1.7 y)   INR used for dosin.5 (2019)   Warfarin maintenance plan:   2.5 mg (5 mg x 0.5) every Mon; 5 mg (5 mg x 1) all other days   Full warfarin instructions:   2.5 mg every Mon; 5 mg all other days   Weekly warfarin total:   32.5 mg   No change documented:   Sofia Tai RN   Plan last modified:   Sofia Tai RN (2019)   Next INR check:   2019   Priority:   INR   Target end date:       Indications    Atrial fibrillation  unspecified type (H) [I48.91]  Long term current use of anticoagulant therapy [Z79.01]             Anticoagulation Episode Summary     INR check location:       Preferred lab:       Send INR reminders to:    MARIA ELENA HORTA    Comments:   *      Anticoagulation Care Providers     Provider Role Specialty Phone number    Jolynn Phillips MD [facility] [phone]            See the Encounter Report to view Anticoagulation Flowsheet and Dosing Calendar (Go to Encounters tab in chart review, and find the Anticoagulation Therapy Visit)      Sofia Tai RN

## 2019-11-28 DIAGNOSIS — J44.1 OBSTRUCTIVE CHRONIC BRONCHITIS WITH EXACERBATION (H): ICD-10-CM

## 2019-11-29 RX ORDER — ALBUTEROL SULFATE 90 UG/1
AEROSOL, METERED RESPIRATORY (INHALATION)
Qty: 54 G | Refills: 1 | Status: SHIPPED | OUTPATIENT
Start: 2019-11-29 | End: 2020-04-21

## 2019-12-12 ENCOUNTER — OFFICE VISIT (OUTPATIENT)
Dept: FAMILY MEDICINE | Facility: CLINIC | Age: 64
End: 2019-12-12
Payer: COMMERCIAL

## 2019-12-12 VITALS
DIASTOLIC BLOOD PRESSURE: 75 MMHG | BODY MASS INDEX: 37.71 KG/M2 | TEMPERATURE: 98.3 F | OXYGEN SATURATION: 92 % | SYSTOLIC BLOOD PRESSURE: 123 MMHG | HEIGHT: 69 IN | RESPIRATION RATE: 16 BRPM | WEIGHT: 254.6 LBS | HEART RATE: 65 BPM

## 2019-12-12 DIAGNOSIS — J44.1 OBSTRUCTIVE CHRONIC BRONCHITIS WITH EXACERBATION (H): Primary | ICD-10-CM

## 2019-12-12 DIAGNOSIS — J06.9 UPPER RESPIRATORY TRACT INFECTION, UNSPECIFIED TYPE: ICD-10-CM

## 2019-12-12 PROCEDURE — 99214 OFFICE O/P EST MOD 30 MIN: CPT | Performed by: PHYSICIAN ASSISTANT

## 2019-12-12 RX ORDER — PREDNISONE 20 MG/1
TABLET ORAL
Qty: 18 TABLET | Refills: 0 | Status: SHIPPED | OUTPATIENT
Start: 2019-12-12 | End: 2020-01-14

## 2019-12-12 ASSESSMENT — MIFFLIN-ST. JEOR: SCORE: 1932.36

## 2019-12-12 NOTE — PATIENT INSTRUCTIONS
I will treat your COPD flare with a course of prednisone.Keep closer track of the blood sugars because the prednisone with increase your blood sugars. If not having improvement over the weekend call and I will add an antibiotic on Monday.     Patient Education     COPD Flare    You have had a flare-up of your COPD.  COPD, or chronic obstructive pulmonary disease, is a common lung disease. It causes your airways to become irritated and narrower. This makes it harder for you to breathe. Emphysema and chronic bronchitis are both types of COPD. This is a chronic condition, which means you always have it. Sometimes it gets worse. When this happens, it is called a flare-up.  Symptoms of COPD  People with COPD may have symptoms most of the time. In a flare-up, your symptoms get worse. These symptoms may mean you are having a flare-up:    Shortness of breath, shallow or rapid breathing, or wheezing that gets worse    Lung infection    Cough that gets worse    More mucus, thicker mucus or mucus of a different color    Tiredness, decreased energy, or trouble doing your usual activities    Fever    Chest tightness    Your symptoms don t get better even when you use your usual medicines, inhalers, and nebulizer    Trouble talking    You feel confused  Causes of flare-ups  Unfortunately, a flare-up can happen even though you did everything right, and you followed your doctor s instructions. Some causes of flare-ups are:    Smoking or secondhand smoke    Colds, the flu, or respiratory infections    Air pollution    Sudden change in the weather    Dust, irritating chemicals, or strong fumes    Not taking your medicines as prescribed  Home care  Here are some things you can do at home to treat a flare-up:    Try not to panic. This makes it harder to breathe, and keeps you from doing the right things.    Don t smoke or be around others who are smoking.    Try to drink more fluids than usual during a flare-up, unless your doctor has  told you not to because of heart and kidney problems. More fluids can help loosen the mucus.    Use your inhalers and nebulizer, if you have one, as you have been told to.    If you were given antibiotics, take them until they are used up or your doctor tells you to stop. It s important to finish the antibiotics, even though you feel better. This will make sure the infection has cleared.    If you were given prednisone or another steroid, finish it even if you feel better.  Preventing a flare-up  Even though flare-ups happen, the best way to treat one is to prevent it before it starts. Here are some pointers:    Don t smoke or be around others who are smoking.    Take your medicines as you have been told.    Talk with your doctor about getting a flu shot every year. Also find out if you need a pneumonia shot.    If there is a weather advisory warning to stay indoors, try to stay inside when possible.    Try to eat healthy and get plenty of sleep.    Try to avoid things that usually set you off, like dust, chemical fumes, hairsprays, or strong perfumes.  Follow-up care  Follow up with your healthcare provider, or as advised.  If a culture was done, you will be told if your treatment needs to be changed. You can call as directed for the results.  If X-rays were done, you will be notified of any new findings that may affect your care.  Call 911  Call 911 if any of these occur:    You have trouble breathing    You feel confused or it s difficult to wake you up    You faint or lose consciousness    You have a rapid heart rate    You have new pain in your chest, arm, shoulder, neck or upper back  When to seek medical advice  Call your healthcare provider right away if any of these occur:    Wheezing or shortness of breath gets worse    You need to use your inhalers more often than usual without relief    Fever of 100.4 F (38 C) or higher, or as directed by your healthcare provider    Coughing up lots of dark-colored or  bloody mucus (sputum)    Chest pain with each breath    You do not start to get better within 24 hours    Swelling of your ankles gets worse    Dizziness or weakness  Date Last Reviewed: 9/1/2016 2000-2018 [employer]. [address] [place] [address], [place] [address]. All rights reserved. This information is not intended as a substitute for professional medical care. Always follow your healthcare professional's instructions.

## 2019-12-12 NOTE — PROGRESS NOTES
"Subjective     Joe Mas is a 64 year old male who presents to clinic today for the following health issues:    HPI   ENT Symptoms             Symptoms: cc Present Absent Comment   Fever/Chills   x    Fatigue  x     Muscle Aches   x    Eye Irritation   x    Sneezing   x    Nasal Juan Alberto/Drg   x    Sinus Pressure/Pain   x    Loss of smell   x    Dental pain   x    Sore Throat   x    Swollen Glands   x    Ear Pain/Fullness   x    Cough x x     Wheeze  x     Chest Pain   x Chest tightness   Shortness of breath  x     Rash   x    Other   x      Symptom duration:  Tuesday    Symptom severity:  moderate   Treatments tried:  Nebs and inhalers with little relief   Contacts:  Daughter     Patient has had cold symptoms for the last 3 days. He has been having increased chest congestion and wheezing. He has not had fevers, has had some productivity but not much. He has resumed his dulera that he had been off of due to controlled symptoms.   -------------------------------------    BP Readings from Last 3 Encounters:   12/12/19 123/75   07/05/19 126/77   01/29/19 115/73    Wt Readings from Last 3 Encounters:   12/12/19 115.5 kg (254 lb 9.6 oz)   07/05/19 116.6 kg (257 lb)   01/29/19 120.7 kg (266 lb 1.6 oz)        Reviewed and updated as needed this visit by Provider  Tobacco  Allergies  Meds  Problems  Med Hx  Surg Hx  Fam Hx  Soc Hx          Review of Systems   ROS COMP: Constitutional, HEENT, cardiovascular, pulmonary, GI, , musculoskeletal, neuro, skin, endocrine and psych systems are negative, except as otherwise noted.      Objective    /75   Pulse 65   Temp 98.3  F (36.8  C) (Tympanic)   Resp 16   Ht 1.748 m (5' 8.82\")   Wt 115.5 kg (254 lb 9.6 oz)   SpO2 92%   BMI 37.80 kg/m    Body mass index is 37.8 kg/m .  Physical Exam   GENERAL: alert and no distress  EYES: Eyes grossly normal to inspection  HENT: normal cephalic/atraumatic, ear canals and TM's normal, rhinorrhea clear, oropharynx clear and " oral mucous membranes moist  NECK: no adenopathy, no asymmetry, masses, or scars and thyroid normal to palpation  RESP: expiratory wheezes bilateral  CV: regular rate and rhythm, normal S1 S2, no S3 or S4, no murmur, click or rub, no peripheral edema and peripheral pulses strong  MS: no gross musculoskeletal defects noted, no edema  SKIN: no suspicious lesions or rashes    Diagnostic Test Results:  none         Assessment & Plan       ICD-10-CM    1. Obstructive chronic bronchitis with exacerbation (H) J44.1 predniSONE (DELTASONE) 20 MG tablet   2. Upper respiratory tract infection, unspecified type J06.9           I will treat for COPD flare with a prednisone taper. If not having improvement over the weekend would have him call and I will start him on antibiotics Monday.   See Patient Instructions    No follow-ups on file.    Yenifer Keating PA-C  [facility]

## 2019-12-16 ENCOUNTER — TELEPHONE (OUTPATIENT)
Dept: FAMILY MEDICINE | Facility: CLINIC | Age: 64
End: 2019-12-16

## 2019-12-16 DIAGNOSIS — J44.1 COPD EXACERBATION (H): Primary | ICD-10-CM

## 2019-12-16 RX ORDER — DOXYCYCLINE HYCLATE 100 MG
100 TABLET ORAL 2 TIMES DAILY
Qty: 20 TABLET | Refills: 0 | Status: SHIPPED | OUTPATIENT
Start: 2019-12-16 | End: 2020-01-17

## 2019-12-16 NOTE — TELEPHONE ENCOUNTER
Patient says he was treated with prednisone and is still not feeling better.    Sharon Ferris [facility]

## 2019-12-16 NOTE — TELEPHONE ENCOUNTER
Joe reports that his symptoms are persisting. He took 60 mg prednisone for 4 days and has been tapering down. He is currently taking 20 mg; has 2 more days of that.  Denies fever. He said that his breathing is better during the day but has problems with his breathing at night. Productive cough and short of breath. He would like an antibiotic ordered and he wants to know if he should have more prednisone?  Thank you.   Vern Conley RN

## 2019-12-16 NOTE — TELEPHONE ENCOUNTER
I will send antibiotic to the pharmacy if not having improvement he will need to follow up in clinic.

## 2019-12-19 ENCOUNTER — ANTICOAGULATION THERAPY VISIT (OUTPATIENT)
Dept: ANTICOAGULATION | Facility: CLINIC | Age: 64
End: 2019-12-19
Payer: COMMERCIAL

## 2019-12-19 DIAGNOSIS — I48.91 ATRIAL FIBRILLATION, UNSPECIFIED TYPE (H): ICD-10-CM

## 2019-12-19 DIAGNOSIS — Z79.01 LONG TERM CURRENT USE OF ANTICOAGULANT THERAPY: ICD-10-CM

## 2019-12-19 LAB — INR POINT OF CARE: 3 (ref 0.86–1.14)

## 2019-12-19 PROCEDURE — 99207 ZZC NO CHARGE NURSE ONLY: CPT

## 2019-12-19 PROCEDURE — 85610 PROTHROMBIN TIME: CPT | Mod: QW

## 2019-12-19 PROCEDURE — 36416 COLLJ CAPILLARY BLOOD SPEC: CPT

## 2019-12-19 NOTE — PROGRESS NOTES
ANTICOAGULATION FOLLOW-UP CLINIC VISIT    Patient Name:  Joe Mas  Date:  12/19/2019  Contact Type:  Face to Face    SUBJECTIVE:  Patient Findings     Positives:   Change in medications (Last Friday pt started antibiotics and prednisone - lung congestion)    Comments:   Per lexicomp: Summary Tetracyclines may enhance the anticoagulant effect of Vitamin K Antagonists.  Pt is to take 2.5 mg tomorrow as he already took his dose today. Pt then can resume maintenance dose and recheck INR in two weeks.   Patient educated on the increased risk for bleeding, precautions to take, and when to seek medical attention.  Patient verbalizes understanding and agrees to plan. No further questions or concerns.        Clinical Outcomes     Negatives:   Major bleeding event, Thromboembolic event, Anticoagulation-related hospital admission, Anticoagulation-related ED visit, Anticoagulation-related fatality    Comments:   Per lexicomp: Summary Tetracyclines may enhance the anticoagulant effect of Vitamin K Antagonists.  Pt is to take 2.5 mg tomorrow as he already took his dose today. Pt then can resume maintenance dose and recheck INR in two weeks.   Patient educated on the increased risk for bleeding, precautions to take, and when to seek medical attention.  Patient verbalizes understanding and agrees to plan. No further questions or concerns.           OBJECTIVE    INR Protime   Date Value Ref Range Status   12/19/2019 3.0 (A) 0.86 - 1.14 Final       ASSESSMENT / PLAN  INR assessment THER    Recheck INR In: 2 WEEKS    INR Location Clinic      Anticoagulation Summary  As of 12/19/2019    INR goal:   2.0-3.0   TTR:   84.8 % (1 y)   INR used for dosing:   3.0 (12/19/2019)   Warfarin maintenance plan:   2.5 mg (5 mg x 0.5) every Mon; 5 mg (5 mg x 1) all other days   Full warfarin instructions:   12/20: 2.5 mg; Otherwise 2.5 mg every Mon; 5 mg all other days   Weekly warfarin total:   32.5 mg   Plan last modified:   Sofia Tai RN  (6/20/2019)   Next INR check:   1/3/2020   Priority:   High   Target end date:       Indications    Atrial fibrillation  unspecified type (H) [I48.91]  Long term current use of anticoagulant therapy [Z79.01]             Anticoagulation Episode Summary     INR check location:       Preferred lab:       Send INR reminders to:   MARIA ELENA HORTA    Comments:   *      Anticoagulation Care Providers     Provider Role Specialty Phone number    Jolynn Phillips MD [facility] [phone]            See the Encounter Report to view Anticoagulation Flowsheet and Dosing Calendar (Go to Encounters tab in chart review, and find the Anticoagulation Therapy Visit)        Sofia Tai RN

## 2020-01-03 ENCOUNTER — ANTICOAGULATION THERAPY VISIT (OUTPATIENT)
Dept: ANTICOAGULATION | Facility: CLINIC | Age: 65
End: 2020-01-03
Payer: COMMERCIAL

## 2020-01-03 DIAGNOSIS — Z79.01 LONG TERM CURRENT USE OF ANTICOAGULANT THERAPY: ICD-10-CM

## 2020-01-03 DIAGNOSIS — I48.91 ATRIAL FIBRILLATION, UNSPECIFIED TYPE (H): ICD-10-CM

## 2020-01-03 LAB — INR POINT OF CARE: 2.9 (ref 0.86–1.14)

## 2020-01-03 PROCEDURE — 99207 ZZC NO CHARGE NURSE ONLY: CPT

## 2020-01-03 PROCEDURE — 85610 PROTHROMBIN TIME: CPT | Mod: QW

## 2020-01-03 PROCEDURE — 36416 COLLJ CAPILLARY BLOOD SPEC: CPT

## 2020-01-03 NOTE — PROGRESS NOTES
ANTICOAGULATION FOLLOW-UP CLINIC VISIT    Patient Name:  Joe Mas  Date:  1/3/2020  Contact Type:  Face to Face    SUBJECTIVE:  Patient Findings     Comments:   No changes in medications, activity, health, or diet noted. No bleeding or increased bruising noted. Took warfarin as prescribed.  Patient will continue weekly maintenance dose. INR is therapeutic.   Recheck in 6 weeks.   Patient verbalizes understanding and agrees to plan. No further questions or concerns.          Clinical Outcomes     Negatives:   Major bleeding event, Thromboembolic event, Anticoagulation-related hospital admission, Anticoagulation-related ED visit, Anticoagulation-related fatality    Comments:   No changes in medications, activity, health, or diet noted. No bleeding or increased bruising noted. Took warfarin as prescribed.  Patient will continue weekly maintenance dose. INR is therapeutic.   Recheck in 6 weeks.   Patient verbalizes understanding and agrees to plan. No further questions or concerns.             OBJECTIVE    INR Protime   Date Value Ref Range Status   2020 2.9 (A) 0.86 - 1.14 Final       ASSESSMENT / PLAN  INR assessment THER    Recheck INR In: 6 WEEKS    INR Location Clinic      Anticoagulation Summary  As of 1/3/2020    INR goal:   2.0-3.0   TTR:   84.8 % (1 y)   INR used for dosin.9 (1/3/2020)   Warfarin maintenance plan:   2.5 mg (5 mg x 0.5) every Mon; 5 mg (5 mg x 1) all other days   Full warfarin instructions:   2.5 mg every Mon; 5 mg all other days   Weekly warfarin total:   32.5 mg   No change documented:   Lizbet Paiz RN   Plan last modified:   Sofia Tai RN (2019)   Next INR check:   2020   Priority:   Maintenance   Target end date:       Indications    Atrial fibrillation  unspecified type (H) [I48.91]  Long term current use of anticoagulant therapy [Z79.01]             Anticoagulation Episode Summary     INR check location:       Preferred lab:       Send INR reminders  to:   MARIA ELENA HORTA    Comments:   *      Anticoagulation Care Providers     Provider Role Specialty Phone number    Jolynn Phillips MD [facility] [phone]            See the Encounter Report to view Anticoagulation Flowsheet and Dosing Calendar (Go to Encounters tab in chart review, and find the Anticoagulation Therapy Visit)        Lizbet Paiz RN

## 2020-01-14 ENCOUNTER — OFFICE VISIT (OUTPATIENT)
Dept: FAMILY MEDICINE | Facility: CLINIC | Age: 65
End: 2020-01-14
Payer: COMMERCIAL

## 2020-01-14 DIAGNOSIS — E66.01 MORBID OBESITY (H): ICD-10-CM

## 2020-01-14 DIAGNOSIS — Z79.01 LONG TERM CURRENT USE OF ANTICOAGULANT THERAPY: ICD-10-CM

## 2020-01-14 DIAGNOSIS — J44.1 OBSTRUCTIVE CHRONIC BRONCHITIS WITH EXACERBATION (H): Primary | ICD-10-CM

## 2020-01-14 DIAGNOSIS — I48.91 ATRIAL FIBRILLATION, UNSPECIFIED TYPE (H): ICD-10-CM

## 2020-01-14 DIAGNOSIS — E78.5 HYPERLIPIDEMIA WITH TARGET LDL LESS THAN 100: ICD-10-CM

## 2020-01-14 DIAGNOSIS — E11.8 TYPE 2 DIABETES MELLITUS WITH COMPLICATION, WITHOUT LONG-TERM CURRENT USE OF INSULIN (H): ICD-10-CM

## 2020-01-14 DIAGNOSIS — I10 ESSENTIAL HYPERTENSION, BENIGN: ICD-10-CM

## 2020-01-14 DIAGNOSIS — E78.2 MIXED HYPERLIPIDEMIA: ICD-10-CM

## 2020-01-14 DIAGNOSIS — E11.9 TYPE 2 DIABETES MELLITUS WITHOUT COMPLICATION, WITHOUT LONG-TERM CURRENT USE OF INSULIN (H): ICD-10-CM

## 2020-01-14 LAB
ANION GAP SERPL CALCULATED.3IONS-SCNC: 5 MMOL/L (ref 3–14)
BUN SERPL-MCNC: 12 MG/DL (ref 7–30)
CALCIUM SERPL-MCNC: 9.8 MG/DL (ref 8.5–10.1)
CHLORIDE SERPL-SCNC: 101 MMOL/L (ref 94–109)
CHOLEST SERPL-MCNC: 186 MG/DL
CO2 SERPL-SCNC: 30 MMOL/L (ref 20–32)
CREAT SERPL-MCNC: 1.04 MG/DL (ref 0.66–1.25)
CREAT UR-MCNC: 623 MG/DL
GFR SERPL CREATININE-BSD FRML MDRD: 75 ML/MIN/{1.73_M2}
GLUCOSE SERPL-MCNC: 196 MG/DL (ref 70–99)
HBA1C MFR BLD: 7 % (ref 0–5.6)
HDLC SERPL-MCNC: 57 MG/DL
LDLC SERPL CALC-MCNC: 104 MG/DL
MICROALBUMIN UR-MCNC: 238 MG/L
MICROALBUMIN/CREAT UR: 38.2 MG/G CR (ref 0–17)
NONHDLC SERPL-MCNC: 129 MG/DL
POTASSIUM SERPL-SCNC: 4.3 MMOL/L (ref 3.4–5.3)
SODIUM SERPL-SCNC: 136 MMOL/L (ref 133–144)
TRIGL SERPL-MCNC: 126 MG/DL

## 2020-01-14 PROCEDURE — 99214 OFFICE O/P EST MOD 30 MIN: CPT | Performed by: FAMILY MEDICINE

## 2020-01-14 PROCEDURE — 36415 COLL VENOUS BLD VENIPUNCTURE: CPT | Performed by: FAMILY MEDICINE

## 2020-01-14 PROCEDURE — 80061 LIPID PANEL: CPT | Performed by: FAMILY MEDICINE

## 2020-01-14 PROCEDURE — 82043 UR ALBUMIN QUANTITATIVE: CPT | Performed by: FAMILY MEDICINE

## 2020-01-14 PROCEDURE — 83036 HEMOGLOBIN GLYCOSYLATED A1C: CPT | Performed by: FAMILY MEDICINE

## 2020-01-14 PROCEDURE — 80048 BASIC METABOLIC PNL TOTAL CA: CPT | Performed by: FAMILY MEDICINE

## 2020-01-14 RX ORDER — LOSARTAN POTASSIUM 100 MG/1
100 TABLET ORAL DAILY
Qty: 90 TABLET | Refills: 3 | Status: SHIPPED | OUTPATIENT
Start: 2020-01-14 | End: 2021-03-03

## 2020-01-14 RX ORDER — PREDNISONE 20 MG/1
TABLET ORAL
Qty: 18 TABLET | Refills: 0 | Status: SHIPPED | OUTPATIENT
Start: 2020-01-14 | End: 2020-06-09

## 2020-01-14 RX ORDER — ATORVASTATIN CALCIUM 20 MG/1
20 TABLET, FILM COATED ORAL DAILY
Qty: 90 TABLET | Refills: 3 | Status: SHIPPED | OUTPATIENT
Start: 2020-01-14 | End: 2021-03-03

## 2020-01-14 RX ORDER — METFORMIN HCL 500 MG
TABLET, EXTENDED RELEASE 24 HR ORAL
Qty: 90 TABLET | Refills: 1 | Status: SHIPPED | OUTPATIENT
Start: 2020-01-14 | End: 2020-06-09

## 2020-01-14 RX ORDER — METOPROLOL SUCCINATE 50 MG/1
50 TABLET, EXTENDED RELEASE ORAL DAILY
Qty: 90 TABLET | Refills: 3 | Status: SHIPPED | OUTPATIENT
Start: 2020-01-14 | End: 2020-12-31

## 2020-01-14 ASSESSMENT — MIFFLIN-ST. JEOR: SCORE: 1911.76

## 2020-01-14 ASSESSMENT — ANXIETY QUESTIONNAIRES
6. BECOMING EASILY ANNOYED OR IRRITABLE: NOT AT ALL
3. WORRYING TOO MUCH ABOUT DIFFERENT THINGS: NOT AT ALL
2. NOT BEING ABLE TO STOP OR CONTROL WORRYING: NOT AT ALL
1. FEELING NERVOUS, ANXIOUS, OR ON EDGE: NOT AT ALL
7. FEELING AFRAID AS IF SOMETHING AWFUL MIGHT HAPPEN: NOT AT ALL
GAD7 TOTAL SCORE: 0
5. BEING SO RESTLESS THAT IT IS HARD TO SIT STILL: NOT AT ALL

## 2020-01-14 ASSESSMENT — PATIENT HEALTH QUESTIONNAIRE - PHQ9
5. POOR APPETITE OR OVEREATING: NOT AT ALL
SUM OF ALL RESPONSES TO PHQ QUESTIONS 1-9: 0

## 2020-01-14 NOTE — PROGRESS NOTES
SUBJECTIVE:                                                    Joe Mas is a 64 year old male who presents to clinic today for the following health issues:    ENT Symptoms             Symptoms: cc Present Absent Comment   Fever/Chills   x    Fatigue  x     Muscle Aches   x    Eye Irritation   x    Sneezing   x    Nasal Juan Alberto/Drg  x     Sinus Pressure/Pain  x     Loss of smell   x    Dental pain   x    Sore Throat  x     Swollen Glands  x  Tender    Ear Pain/Fullness  x     Cough  x     Wheeze  x     Chest Pain   x    Shortness of breath  x     Rash   x    Other  x  Nausea      Symptom duration:  past week    Symptom severity:  mild-moderate    Treatments tried:     Contacts:  sister is ill too      Copd exac last month - treated with prednisone and antibiotics       Current symptoms - ongoing for a week or so  Greenish discharge, cough, sore throat  Right ear pain - improved     Not using the dulera   Has been off it for a year      Diabetes - due for labs  In October, decreased the metformin to one tab/day   Dietary indiscretion, hasn't been able to exercise     Lab Results   Component Value Date    A1C 7.0 01/14/2020    A1C 6.6 07/05/2019    A1C 8.1 01/29/2019    A1C 9.1 08/30/2018    A1C 6.9 02/27/2018         Review of systems:  no f/c   Normal appetite  No chest pain  No n/v         Problem list and histories reviewed & adjusted, as indicated.  Additional history: as documented   Patient Active Problem List   Diagnosis     Obstructive chronic bronchitis with exacerbation (H)     Essential hypertension, benign     Advanced directives, counseling/discussion     Moderate major depression (H)     Type 2 diabetes mellitus with complication, without long-term current use of insulin (H)     CARDIOVASCULAR SCREENING; LDL GOAL LESS THAN 100     Hyperlipidemia with target LDL less than 100     Obesity (BMI 30-39.9)     Tubular adenoma of colon     Long term current use of anticoagulant therapy     Atrial fibrillation,  "unspecified type (H)     Morbid obesity (H)     JUNG (obstructive sleep apnea)     Current Outpatient Medications   Medication     ASPIRIN 81 MG OR TABS     atorvastatin (LIPITOR) 20 MG tablet     ipratropium - albuterol 0.5 mg/2.5 mg/3 mL (DUONEB) 0.5-2.5 (3) MG/3ML neb solution     losartan (COZAAR) 100 MG tablet     metFORMIN (GLUCOPHAGE-XR) 500 MG 24 hr tablet     metoprolol succinate ER (TOPROL-XL) 50 MG 24 hr tablet     mometasone-formoterol (DULERA) 200-5 MCG/ACT inhaler     predniSONE (DELTASONE) 20 MG tablet     VENTOLIN  (90 Base) MCG/ACT inhaler     warfarin ANTICOAGULANT (JANTOVEN ANTICOAGULANT) 5 MG tablet     blood glucose (NO BRAND SPECIFIED) lancets standard     blood glucose (NO BRAND SPECIFIED) test strip     blood glucose monitoring (NO BRAND SPECIFIED) meter device kit     flecainide (TAMBOCOR) 100 MG tablet     No current facility-administered medications for this visit.         Allergies   Allergen Reactions     Lisinopril Cough       OBJECTIVE:                                                    BP (!) 131/92   Temp 96.8  F (36  C) (Tympanic)   Ht 1.746 m (5' 8.75\")   Wt 113.5 kg (250 lb 4.8 oz)   SpO2 94%   BMI 37.23 kg/m   Body mass index is 37.23 kg/m .   GENERAL - Pt is alert and oriented in no acute distress.  Affect is appropriate. Good eye contact.  HEET - Head is normocephalic, atraumatic.    PERRLA,EEMI. Conjunctiva are free of icterus or erythema.    TMs bilaterally normal.   Oropharynx free of masses and lesions, no tonsillar exudate or petechiae.    NECK - Neck is supple w/o LA or thyromegaly  RESPIRATORY - bilateral end expiratory high pitched wheezes throughout  CV - RRR, no murmurs, rubs, gallops.        ASSESSMENT/PLAN:                                                      (E11.8) Type 2 diabetes mellitus with complication, without long-term current use of insulin (H)  (primary encounter diagnosis)  Comment: will start walking daily. Work on eating better. Continue on " current meds - he is taking 500mg/day of metformin. Recheck a1c in 3 months. The patient indicates understanding of these issues and agrees with the plan.    Plan: Albumin Random Urine Quantitative with Creat         Ratio, Hemoglobin A1c, metFORMIN (GLUCOPHAGE-XR) 500 MG 24 hr tablet            (I10) Essential hypertension, benign  Comment: check lytes   Plan: Basic metabolic panel, losartan (COZAAR) 100 MG        tablet            (I48.91) Atrial fibrillation, unspecified type (H)  Comment: in regular rhythm today.   Plan: metoprolol succinate ER (TOPROL-XL) 50 MG 24 hr        tablet    (E78.5) Hyperlipidemia with target LDL less than 100  Comment:   Plan: Lipid panel reflex to direct LDL Fasting          (J44.1) Obstructive chronic bronchitis with exacerbation (H)  (primary encounter diagnosis)  Comment: will treat for copd exacerbation with steroid burst. He should restart his dulera daily and use albuterol as needed. The patient indicates understanding of these issues and agrees with the plan.   Plan: mometasone-formoterol (DULERA) 200-5 MCG/ACT         inhaler, predniSONE (DELTASONE) 20 MG tablet            (E78.5) Hyperlipidemia with target LDL less than 100  Comment:   Plan: Lipid panel reflex to direct LDL Fasting            (Z79.01) Long term current use of anticoagulant therapy  Comment:   Plan:     (E66.01) Morbid obesity (H)  Comment:   Plan:     (E78.2) Mixed hyperlipidemia  Comment:   Plan: atorvastatin (LIPITOR) 20 MG tablet         TESFAYE Phillips MD   [facility]

## 2020-01-15 VITALS
BODY MASS INDEX: 37.07 KG/M2 | TEMPERATURE: 96.8 F | DIASTOLIC BLOOD PRESSURE: 89 MMHG | WEIGHT: 250.3 LBS | OXYGEN SATURATION: 94 % | SYSTOLIC BLOOD PRESSURE: 131 MMHG | HEIGHT: 69 IN

## 2020-01-15 ASSESSMENT — ANXIETY QUESTIONNAIRES: GAD7 TOTAL SCORE: 0

## 2020-01-17 ENCOUNTER — OFFICE VISIT (OUTPATIENT)
Dept: FAMILY MEDICINE | Facility: CLINIC | Age: 65
End: 2020-01-17
Payer: COMMERCIAL

## 2020-01-17 VITALS
HEART RATE: 80 BPM | DIASTOLIC BLOOD PRESSURE: 74 MMHG | WEIGHT: 250 LBS | SYSTOLIC BLOOD PRESSURE: 128 MMHG | BODY MASS INDEX: 37.03 KG/M2 | HEIGHT: 69 IN | TEMPERATURE: 97.4 F

## 2020-01-17 DIAGNOSIS — B30.9 VIRAL CONJUNCTIVITIS OF RIGHT EYE: Primary | ICD-10-CM

## 2020-01-17 PROCEDURE — 99213 OFFICE O/P EST LOW 20 MIN: CPT | Performed by: NURSE PRACTITIONER

## 2020-01-17 ASSESSMENT — MIFFLIN-ST. JEOR: SCORE: 1910.4

## 2020-01-17 ASSESSMENT — PAIN SCALES - GENERAL: PAINLEVEL: MILD PAIN (2)

## 2020-01-17 NOTE — PATIENT INSTRUCTIONS
1.   Opcon-A (Boush and [employer]) eye drops and use 1-2 drops 4 times daily for no more than up to 3 weeks.  2.  This will help soothe the symptoms of the eye.  3.  Follow-up in clinic if any changes with increase in purulent drainage that is persistent.

## 2020-01-17 NOTE — PROGRESS NOTES
Joe Mas is a 64 year old male who presents to clinic today for the following health issues:    HPI   Eye(s) Problem  Onset: Wednesday 1/15     Description:   Location: bilateral  Pain: YES  Redness: YES    Accompanying Signs & Symptoms:  Discharge/mattering: YES- in the morning   Swelling: YES- mild   Visual changes: no  Fever: no  Nasal Congestion: no  Bothered by bright lights: YES- mild     History:   Trauma: no   Foreign body exposure: no    Precipitating factors:   Wearing contacts: no    Alleviating factors:  Improved by: None    Therapies Tried and outcome: Black tea on a paper towel     Patient was recently seen in clinic for bronchitis.  Currently being treated with prednisone and nasal rinses for the nasal congestion.  Yesterday started having tearing in the eye alexander feeling and right eye matting shut this morning.    Patient Active Problem List   Diagnosis     Obstructive chronic bronchitis with exacerbation (H)     Essential hypertension, benign     Advanced directives, counseling/discussion     Moderate major depression (H)     Type 2 diabetes mellitus with complication, without long-term current use of insulin (H)     CARDIOVASCULAR SCREENING; LDL GOAL LESS THAN 100     Hyperlipidemia with target LDL less than 100     Obesity (BMI 30-39.9)     Tubular adenoma of colon     Long term current use of anticoagulant therapy     Atrial fibrillation, unspecified type (H)     Morbid obesity (H)     JUNG (obstructive sleep apnea)     Past Surgical History:   Procedure Laterality Date     ABDOMEN SURGERY       COLONOSCOPY  2004    Follow up colonoscopy in 5 years     HERNIA REPAIR, UMBILICAL  2001     KNEE SURGERY  1960's    Left       Social History     Tobacco Use     Smoking status: Former Smoker     Packs/day: 1.00     Years: 25.00     Pack years: 25.00     Types: Cigarettes     Start date: 1975     Last attempt to quit: 2000     Years since quittin.0     Smokeless tobacco: Never Used      Tobacco comment: quit smoking 2010   Substance Use Topics     Alcohol use: Yes     Alcohol/week: 0.0 standard drinks     Comment: 2-5 beers per month     Family History   Problem Relation Age of Onset     Osteoporosis Mother      Heart Disease Mother      Neurologic Disorder Father         passed away from a brain tumor age 48     Neurologic Disorder Maternal Grandmother         parkinsons     C.A.D. Maternal Grandmother      Diabetes Maternal Grandmother      Alcohol/Drug Maternal Grandfather      Cancer Maternal Grandfather         esphogus     Cancer Paternal Grandfather         lung     Diabetes Sister      Hypertension Sister      Asthma Daughter      Asthma No family hx of      Cerebrovascular Disease No family hx of      Breast Cancer No family hx of      Cancer - colorectal No family hx of          Current Outpatient Medications   Medication Sig Dispense Refill     ASPIRIN 81 MG OR TABS ONE DAILY 100 3     atorvastatin (LIPITOR) 20 MG tablet Take 1 tablet (20 mg) by mouth daily 90 tablet 3     blood glucose (NO BRAND SPECIFIED) lancets standard Use to test blood sugar two times daily or as directed. 200 each 2     blood glucose (NO BRAND SPECIFIED) test strip Use to test blood sugar two times daily or as directed. 200 each 2     blood glucose monitoring (NO BRAND SPECIFIED) meter device kit Use to test blood sugar two times daily or as directed. 1 kit 0     flecainide (TAMBOCOR) 100 MG tablet Take 1 tablet (100 mg) by mouth 2 times daily Take the first dose three days before your scheduled stress test. 180 tablet 6     ipratropium - albuterol 0.5 mg/2.5 mg/3 mL (DUONEB) 0.5-2.5 (3) MG/3ML neb solution Take 1 vial (3 mLs) by nebulization every 6 hours as needed for shortness of breath / dyspnea or wheezing 120 vial 11     losartan (COZAAR) 100 MG tablet Take 1 tablet (100 mg) by mouth daily 90 tablet 3     metFORMIN (GLUCOPHAGE-XR) 500 MG 24 hr tablet TAKE one TABLET BY MOUTH ONCE DAILY WITH DINNER 90  "tablet 1     metoprolol succinate ER (TOPROL-XL) 50 MG 24 hr tablet Take 1 tablet (50 mg) by mouth daily 90 tablet 3     mometasone-formoterol (DULERA) 200-5 MCG/ACT inhaler INHALE TWO PUFFS BY MOUTH TWICE A DAY 13 g 3     predniSONE (DELTASONE) 20 MG tablet 60mg/day for 3 days, then 40mg/day for 3 days, then 20mg/day for 3 days 18 tablet 0     VENTOLIN  (90 Base) MCG/ACT inhaler INHALE TWO PUFFS BY MOUTH EVERY 6 HOURS AS NEEDED FOR SHORTNESS OF BREATH OR WHEEZING 54 g 1     warfarin ANTICOAGULANT (JANTOVEN ANTICOAGULANT) 5 MG tablet TAKE ONE HALF TABLET BY MOUTH ON MONDAY AND TAKE ONE TABLET BY MOUTH ALL OTHER DAYS OR AS DIRECTED BY ANTICOAGULATION CLINIC 85 tablet 0     Allergies   Allergen Reactions     Lisinopril Cough       Reviewed and updated as needed this visit by Provider  Tobacco  Allergies  Meds  Problems  Med Hx  Surg Hx  Fam Hx         Review of Systems   ROS COMP: EYES: POSITIVE for mattering right, redness right and tearing right  RESP: NEGATIVE for significant cough or SOB  CV: NEGATIVE for chest pain, palpitations or peripheral edema  PSYCHIATRIC: NEGATIVE for changes in mood or affect  ROS otherwise negative      Objective    /74   Pulse 80   Temp 97.4  F (36.3  C) (Tympanic)   Ht 1.746 m (5' 8.75\")   Wt 113.4 kg (250 lb)   BMI 37.19 kg/m    Body mass index is 37.19 kg/m .  Physical Exam   GENERAL: healthy, alert and no distress  EYES: PERRL, visual fields normal and conjunctiva/corneas- conjunctival injection OD  RESP: lungs clear to auscultation - no rales, rhonchi or wheezes  CV: regular rate and rhythm, normal S1 S2, no S3 or S4, no murmur, click or rub, no peripheral edema and peripheral pulses strong  PSYCH: mentation appears normal, affect normal/bright    Diagnostic Test Results:  none         Assessment & Plan     1. Viral conjunctivitis of right eye  Patient has no regular discharge in the eye but it is tearing burning and matted shut in the morning.  There is " some injection in the conjunctiva of the right eye on exam today with tearing.  Recommend an antihistamine eyedrop 4 times daily for treatment of symptoms.  Recommend follow-up if any persistent drainage out of the eye throughout the day for reevaluation.    See Patient Instructions    Return in about 1 week (around 1/24/2020), or if symptoms worsen or fail to improve.    Ann Garvey NP  [facility]

## 2020-01-23 ENCOUNTER — ANTICOAGULATION THERAPY VISIT (OUTPATIENT)
Dept: ANTICOAGULATION | Facility: CLINIC | Age: 65
End: 2020-01-23
Payer: COMMERCIAL

## 2020-01-23 DIAGNOSIS — I48.91 ATRIAL FIBRILLATION, UNSPECIFIED TYPE (H): ICD-10-CM

## 2020-01-23 DIAGNOSIS — Z79.01 LONG TERM CURRENT USE OF ANTICOAGULANT THERAPY: ICD-10-CM

## 2020-01-23 LAB — INR POINT OF CARE: 2.2 (ref 0.86–1.14)

## 2020-01-23 PROCEDURE — 36416 COLLJ CAPILLARY BLOOD SPEC: CPT

## 2020-01-23 PROCEDURE — 99207 ZZC NO CHARGE NURSE ONLY: CPT

## 2020-01-23 PROCEDURE — 85610 PROTHROMBIN TIME: CPT | Mod: QW

## 2020-01-23 NOTE — PROGRESS NOTES
ANTICOAGULATION FOLLOW-UP CLINIC VISIT    Patient Name:  Joe Mas  Date:  2020  Contact Type:  Face to Face    SUBJECTIVE:  Patient Findings     Positives:   Change in health (pink eye, sore throat), Change in activity (less activity), Change in medications (drops for eye redness)    Comments:   No changes in diet noted. No concerns with clotting, bleeding, or increased bruising noted. Took warfarin as prescribed.  Patient is to continue maintenance warfarin plan, and check INR in 3 weeks.  Patient verbalizes understanding and agrees to plan. No further questions or concerns.        Clinical Outcomes     Negatives:   Major bleeding event, Thromboembolic event, Anticoagulation-related hospital admission, Anticoagulation-related ED visit, Anticoagulation-related fatality    Comments:   No changes in diet noted. No concerns with clotting, bleeding, or increased bruising noted. Took warfarin as prescribed.  Patient is to continue maintenance warfarin plan, and check INR in 3 weeks.  Patient verbalizes understanding and agrees to plan. No further questions or concerns.           OBJECTIVE    INR Protime   Date Value Ref Range Status   2020 2.2 (A) 0.86 - 1.14 Final       ASSESSMENT / PLAN  INR assessment THER    Recheck INR In: 3 WEEKS    INR Location Clinic      Anticoagulation Summary  As of 2020    INR goal:   2.0-3.0   TTR:   84.8 % (1 y)   INR used for dosin.2 (2020)   Warfarin maintenance plan:   2.5 mg (5 mg x 0.5) every Mon; 5 mg (5 mg x 1) all other days   Full warfarin instructions:   2.5 mg every Mon; 5 mg all other days   Weekly warfarin total:   32.5 mg   No change documented:   Sofia Tai RN   Plan last modified:   Sofia Tai RN (2019)   Next INR check:   2020   Priority:   Maintenance   Target end date:       Indications    Atrial fibrillation  unspecified type (H) [I48.91]  Long term current use of anticoagulant therapy [Z79.01]              Anticoagulation Episode Summary     INR check location:       Preferred lab:       Send INR reminders to:   MARIA ELENA HORTA    Comments:   *      Anticoagulation Care Providers     Provider Role Specialty Phone number    Jolynn Phillips MD [facility] [phone]            See the Encounter Report to view Anticoagulation Flowsheet and Dosing Calendar (Go to Encounters tab in chart review, and find the Anticoagulation Therapy Visit)        Sofia Tai RN

## 2020-01-24 ENCOUNTER — DOCUMENTATION ONLY (OUTPATIENT)
Dept: SLEEP MEDICINE | Facility: CLINIC | Age: 65
End: 2020-01-24

## 2020-02-03 ENCOUNTER — OFFICE VISIT (OUTPATIENT)
Dept: FAMILY MEDICINE | Facility: CLINIC | Age: 65
End: 2020-02-03
Payer: COMMERCIAL

## 2020-02-03 VITALS
OXYGEN SATURATION: 93 % | SYSTOLIC BLOOD PRESSURE: 120 MMHG | BODY MASS INDEX: 37.03 KG/M2 | HEART RATE: 70 BPM | DIASTOLIC BLOOD PRESSURE: 72 MMHG | HEIGHT: 69 IN | WEIGHT: 250 LBS | TEMPERATURE: 97 F

## 2020-02-03 DIAGNOSIS — J02.9 SORE THROAT: Primary | ICD-10-CM

## 2020-02-03 LAB
DEPRECATED S PYO AG THROAT QL EIA: NORMAL
SPECIMEN SOURCE: NORMAL

## 2020-02-03 PROCEDURE — 87070 CULTURE OTHR SPECIMN AEROBIC: CPT | Performed by: PHYSICIAN ASSISTANT

## 2020-02-03 PROCEDURE — 99213 OFFICE O/P EST LOW 20 MIN: CPT | Performed by: PHYSICIAN ASSISTANT

## 2020-02-03 PROCEDURE — 87880 STREP A ASSAY W/OPTIC: CPT | Performed by: PHYSICIAN ASSISTANT

## 2020-02-03 ASSESSMENT — MIFFLIN-ST. JEOR: SCORE: 1910.4

## 2020-02-03 ASSESSMENT — PAIN SCALES - GENERAL: PAINLEVEL: MILD PAIN (3)

## 2020-02-03 NOTE — PATIENT INSTRUCTIONS
Keep up with the dulera even as the breathing continues to improve    Continue over the counter management of symptoms for now - the culture should be back in 24-48 hours and we will contact you with the results and see how you are feeling at that time

## 2020-02-03 NOTE — PROGRESS NOTES
Subjective     Joe Mas is a 64 year old male who presents to clinic today for the following health issues:    HPI   ENT Symptoms             Symptoms: cc Present Absent Comment   Fever/Chills   x    Fatigue   x    Muscle Aches   x    Eye Irritation  x  He did have pink eye a few weeks ago, still bothering him    Sneezing   x    Nasal Juan Alberto/Drg   x    Sinus Pressure/Pain   x    Loss of smell   x    Dental pain   x    Sore Throat  x  Better in the morning but worse at night    Swollen Glands   x    Ear Pain/Fullness   x He did have ear pain previously but it is doing better now    Cough  x  Mild    Wheeze   x    Chest Pain   x    Shortness of breath   x    Rash   x    Other   x      Symptom duration:  Has been sick since the middle of December, current complaint is a sore throat that started about 2 weeks ago    Symptom severity:  moderate    Treatments tried:  Throat spray, cough drops, ibuprofen    Contacts:  None       On/off sick since early/mid December  Was first seen mid December - coughing/breathing were more of an issues  Was put on prednisone but then called back as she wasn't better and completed a 10 day course of doxycycline  Did seem to get a little better he thinks  Returned in mid January for similar symptoms - was put on prednisone again and then advised to continue/restart his dulera which he has been doing  Was seen a few days later for a viral conjunctivitis    About 2 weeks ago developed a sore throat  Not as bad in the morning but seems to get worse as the day goes on   Hard to swallow his pills - feels like they get stuck  Does feel a little bit better today   No coughing - breathing has been good      BP Readings from Last 3 Encounters:   02/03/20 120/72   01/17/20 128/74   01/15/20 131/89    Wt Readings from Last 3 Encounters:   02/03/20 113.4 kg (250 lb)   01/17/20 113.4 kg (250 lb)   01/14/20 113.5 kg (250 lb 4.8 oz)                  Reviewed and updated as needed this visit by  "Provider         Review of Systems   Remainder of ROS obtained and found to be negative other than that which was documented above        Objective    /72   Pulse 70   Temp 97  F (36.1  C) (Tympanic)   Ht 1.746 m (5' 8.75\")   Wt 113.4 kg (250 lb)   SpO2 93%   BMI 37.19 kg/m    Body mass index is 37.19 kg/m .  Physical Exam   GENERAL: healthy, alert and no distress  EYES: Eyes grossly normal to inspection  HENT: ear canals and TM's normal, nose and mouth without ulcers or lesions. Mild erythema and edema in posterior orpharynx, no exudate  NECK: no adenopathy  RESP: lungs clear to auscultation - no rales, rhonchi or wheezes  CV: regular rates and rhythm, normal S1 S2, no S3 or S4 and no murmur, click or rub    Diagnostic Test Results:  Rapid strep: negative  Throat culture: pending        Assessment & Plan     (J02.9) Sore throat  (primary encounter diagnosis)  Comment: maybe a little better today. Breathing is good. Will swab for strep and culture given unlikely it is strep - discussed waiting for those results and given current symptoms a few more days versus more antibiotics as I suspect this could still be viral and want to avoid overuse of medications if possible  Plan: Strep, Rapid Screen, Throat Culture Aerobic         Bacterial              Return in about 1 week (around 2/10/2020) for If not improving or worsening.    Charissa Segal PA-C  [facility] ([facility]3]        "

## 2020-02-06 LAB
BACTERIA SPEC CULT: NORMAL
Lab: NORMAL
SPECIMEN SOURCE: NORMAL

## 2020-02-07 ENCOUNTER — OFFICE VISIT (OUTPATIENT)
Dept: SLEEP MEDICINE | Facility: CLINIC | Age: 65
End: 2020-02-07
Payer: COMMERCIAL

## 2020-02-07 VITALS
WEIGHT: 253 LBS | RESPIRATION RATE: 6 BRPM | OXYGEN SATURATION: 93 % | DIASTOLIC BLOOD PRESSURE: 86 MMHG | BODY MASS INDEX: 37.47 KG/M2 | SYSTOLIC BLOOD PRESSURE: 138 MMHG | HEART RATE: 80 BPM | HEIGHT: 69 IN

## 2020-02-07 DIAGNOSIS — G47.33 OSA (OBSTRUCTIVE SLEEP APNEA): Primary | ICD-10-CM

## 2020-02-07 PROCEDURE — 99214 OFFICE O/P EST MOD 30 MIN: CPT | Performed by: FAMILY MEDICINE

## 2020-02-07 ASSESSMENT — MIFFLIN-ST. JEOR: SCORE: 1923.85

## 2020-02-07 NOTE — PROGRESS NOTES
Sleep Follow-Up Visit:    Date on this visit: 2/7/2020    Joe Mas comes in today for follow-up of their moderate sleep apnea with potential sustained hypoxemia.     Pertinent PMHx of HTN, DM II, obesity, paroxysmal atrial fibrillation.     HST performed 5/4/2018 with weight 259 lbs, AHI 15.6, lynen SpO2 81%, baseline SpO2 88.5%.  Recommended for in-lab titration PSG, but patient declined due to cost.  Hesitantly started CPAP auto-titrate 5-15 cm H2O on room air and follow-up overnight oximetry.     Last visit on 8/14/2018 for CPAP compliance follow-up.  Since our last visit, he has had near resolution of paroxysmal AF with start of fleicanide.  CPAP has gone well overall, but has considered trying a nasal mask as opposed to FFM given some chronic cough.  CPAP download from 7/8/2018 - 8/6/2018 on auto-titrate 5-15 cm H2O.  Used on 30/30 days.  Average daily usage of 4 hours 24 minutes, used >= 4 hours on 90% of nights.  Pressure median 6.5 cm H2O, 95th%ile of 108 cm H2O.  AHI 0.7.  A/P to continue CPAP auto 5-15, weight management.    He returns today to discuss restarting CPAP.  It is unclear when he stopped using his CPAP regularly, but likely stopped about 1 year ago.  He was motivated to return as he is actively working on weight loss and improving his health.  Atrial fib is stable, still on flecanide.      He feels his sleep is actually doing well aside from some frequent awakenings to urinate.  Some residual snoring.  Weight is down ~5 lbs from his HST and he is planning to lose an additional ~25-50 lbs.  Walking 3 miles every other day.  TST ~6-7 hours.  In bed between 9-11pm and will seem to awaken naturally 6-7 hours later.    Problem List:  Patient Active Problem List    Diagnosis Date Noted     JUNG (obstructive sleep apnea) 05/11/2018     Priority: Medium     Moderate to possible severe JUNG, potential for significant hypoxemia and borderline sustained hypoxemia   - Suspect AHI was actually  under-reported given suspected sleep onset was not until ~4am (recording from MN to 9am).      Home Sleep Apnea Testing - 5/4/2018: 259 lbs 0 oz: AHI 15.6/hr. Supine AHI 15.6/hr.   Oxygen Codey of 81%.  Baseline 88.5%.  Sp02 =< 88% for majority of recording  He slept on his back (11.9%), prone (0%), left (5.8) and right (72.8%) sides.   Analysis time: 483.1 minutes.            Long term current use of anticoagulant therapy 02/27/2018     Priority: Medium     Atrial fibrillation, unspecified type (H) 02/27/2018     Priority: Medium     Morbid obesity (H) 02/27/2018     Priority: Medium     Tubular adenoma of colon 08/16/2017     Priority: Medium     Collected: 8/11/2017   Received: 8/14/2017   Reported: 8/15/2017 17:28   Ordering Phy(s): AL MOREALND     For improved result formatting, select 'View Enhanced Report Format'   under Linked Documents section.     SPECIMEN(S):   A: Colon polyps at 20 cm   B: Colon polyp, ascending     FINAL DIAGNOSIS:   A.  Colon at 20 cm, grossly biopsies:   -Tubular adenoma and fragments of hyperplastic polyp   - Negative for high-grade dysplasia and malignancy.     B.  Right colon, mucosal biopsy:   - Tubular adenoma.   - Negative for high-grade dysplasia and malignancy.        Obesity (BMI 30-39.9) 10/24/2015     Priority: Medium     Hyperlipidemia with target LDL less than 100 02/14/2014     Priority: Medium     Diagnosis updated by automated process. Provider to review and confirm.       Type 2 diabetes mellitus with complication, without long-term current use of insulin (H) 10/29/2013     Priority: Medium     CARDIOVASCULAR SCREENING; LDL GOAL LESS THAN 100 10/29/2013     Priority: Medium     Moderate major depression (H) 02/20/2013     Priority: Medium     Advanced directives, counseling/discussion 11/03/2011     Priority: Medium     Advance Directive Problem List Overview:   Name Relationship Phone    Primary Health Care Agent            Alternative Health Care Agent           Discussed advance care planning with patient; information given to patient to review.     Flavia Duggan, SUMIT, HC Facilitator    11/3/2011          Obstructive chronic bronchitis with exacerbation (H) 02/22/2007     Priority: Medium     Essential hypertension, benign 02/22/2007     Priority: Medium        Impression/Plan:    1.)  History of moderate JUNG with sleep-associated hypoxemia   - We reviewed treatment options of CPAP, oral appliances, ongoing weight loss.   - He seems quite motivated for his continued weight loss, so we agreed to touch base by Dany in 3 months to see his current weight.  If he is down to 240 lbs or less, would consider repeat HST or DEEPTHI to see current JUNG or hypoxemia severity.    He will follow up with me in about 3 month(s).     Twenty-five minutes spent with patient, all of which were spent face-to-face counseling, consulting, coordinating plan of care.      Manan Day MD    CC: Jolynn Phillips

## 2020-02-07 NOTE — PATIENT INSTRUCTIONS
Congratulations on the success with the weight loss.  I would plan to talk by phone or MyChart in about 3 months and see where your weight is at that time.  Your weight on your home sleep test was 259 lbs, and it is possible that if the weight is down by 20 or more lbs, we may consider to repeat the home sleep test to see if the apnea has gone away.  Your BMI is Body mass index is 37.64 kg/m .  Weight management is a personal decision.  If you are interested in exploring weight loss strategies, the following discussion covers the approaches that may be successful. Body mass index (BMI) is one way to tell whether you are at a healthy weight, overweight, or obese. It measures your weight in relation to your height.  A BMI of 18.5 to 24.9 is in the healthy range. A person with a BMI of 25 to 29.9 is considered overweight, and someone with a BMI of 30 or greater is considered obese. More than two-thirds of American adults are considered overweight or obese.  Being overweight or obese increases the risk for further weight gain. Excess weight may lead to heart disease and diabetes.  Creating and following plans for healthy eating and physical activity may help you improve your health.  Weight control is part of healthy lifestyle and includes exercise, emotional health, and healthy eating habits. Careful eating habits lifelong are the mainstay of weight control. Though there are significant health benefits from weight loss, long-term weight loss with diet alone may be very difficult to achieve- studies show long-term success with dietary management in less than 10% of people. Attaining a healthy weight may be especially difficult to achieve in those with severe obesity. In some cases, medications, devices and surgical management might be considered.  What can you do?  If you are overweight or obese and are interested in methods for weight loss, you should discuss this with your provider.     Consider reducing daily calorie  intake by 500 calories.     Keep a food journal.     Avoiding skipping meals, consider cutting portions instead.    Diet combined with exercise helps maintain muscle while optimizing fat loss. Strength training is particularly important for building and maintaining muscle mass. Exercise helps reduce stress, increase energy, and improves fitness. Increasing exercise without diet control, however, may not burn enough calories to loose weight.       Start walking three days a week 10-20 minutes at a time    Work towards walking thirty minutes five days a week     Eventually, increase the speed of your walking for 1-2 minutes at time    In addition, we recommend that you review healthy lifestyles and methods for weight loss available through the National Institutes of Health patient information sites:  http://win.niddk.nih.gov/publications/index.htm    And look into health and wellness programs that may be available through your health insurance provider, employer, local community center, or [employer] club.    Weight management plan: Patient was referred to their PCP to discuss a diet and exercise plan.

## 2020-02-19 ENCOUNTER — TELEPHONE (OUTPATIENT)
Dept: ANTICOAGULATION | Facility: CLINIC | Age: 65
End: 2020-02-19

## 2020-02-19 NOTE — TELEPHONE ENCOUNTER
Patient left a VM. Needs to schedule an INR appointment.   #[phone]     VM left for pt to return call to [facility] - [phone].  Sofia Tai RN on 2/19/2020 at 3:31 PM

## 2020-02-25 ENCOUNTER — ANTICOAGULATION THERAPY VISIT (OUTPATIENT)
Dept: ANTICOAGULATION | Facility: CLINIC | Age: 65
End: 2020-02-25
Payer: COMMERCIAL

## 2020-02-25 DIAGNOSIS — Z79.01 LONG TERM CURRENT USE OF ANTICOAGULANT THERAPY: ICD-10-CM

## 2020-02-25 DIAGNOSIS — I48.91 ATRIAL FIBRILLATION, UNSPECIFIED TYPE (H): ICD-10-CM

## 2020-02-25 LAB — INR POINT OF CARE: 2 (ref 0.86–1.14)

## 2020-02-25 PROCEDURE — 85610 PROTHROMBIN TIME: CPT | Mod: QW

## 2020-02-25 PROCEDURE — 36416 COLLJ CAPILLARY BLOOD SPEC: CPT

## 2020-02-25 PROCEDURE — 99207 ZZC NO CHARGE NURSE ONLY: CPT

## 2020-02-25 NOTE — PROGRESS NOTES
ANTICOAGULATION FOLLOW-UP CLINIC VISIT    Patient Name:  Joe Mas  Date:  2020  Contact Type:  Face to Face    SUBJECTIVE:  Patient Findings     Comments:   No changes in medications, activity, or diet noted. No concerns with clotting, bleeding, or increased bruising noted. Took warfarin as prescribed.  Patient is to continue maintenance warfarin plan, and check INR in 3 weeks. Pt plans to increase his activity again.  Patient verbalizes understanding and agrees to plan. No further questions or concerns.        Clinical Outcomes     Negatives:   Major bleeding event, Thromboembolic event, Anticoagulation-related hospital admission, Anticoagulation-related ED visit, Anticoagulation-related fatality    Comments:   No changes in medications, activity, or diet noted. No concerns with clotting, bleeding, or increased bruising noted. Took warfarin as prescribed.  Patient is to continue maintenance warfarin plan, and check INR in 3 weeks. Pt plans to increase his activity again.  Patient verbalizes understanding and agrees to plan. No further questions or concerns.           OBJECTIVE    INR Protime   Date Value Ref Range Status   2020 2.0 (A) 0.86 - 1.14 Final       ASSESSMENT / PLAN  INR assessment THER    Recheck INR In: 3 WEEKS    INR Location Clinic      Anticoagulation Summary  As of 2020    INR goal:   2.0-3.0   TTR:   89.1 % (1 y)   INR used for dosin.0 (2020)   Warfarin maintenance plan:   2.5 mg (5 mg x 0.5) every Mon; 5 mg (5 mg x 1) all other days   Full warfarin instructions:   2.5 mg every Mon; 5 mg all other days   Weekly warfarin total:   32.5 mg   No change documented:   Sofia Tai RN   Plan last modified:   Sofia Tai RN (2019)   Next INR check:   3/17/2020   Priority:   Maintenance   Target end date:       Indications    Atrial fibrillation  unspecified type (H) [I48.91]  Long term current use of anticoagulant therapy [Z79.01]             Anticoagulation  Episode Summary     INR check location:       Preferred lab:       Send INR reminders to:   MARIA ELENA HORTA    Comments:   *      Anticoagulation Care Providers     Provider Role Specialty Phone number    Jolynn Phillips MD [facility] [phone]            See the Encounter Report to view Anticoagulation Flowsheet and Dosing Calendar (Go to Encounters tab in chart review, and find the Anticoagulation Therapy Visit)        Sofia Tai RN

## 2020-03-24 DIAGNOSIS — I48.91 A-FIB (H): Primary | ICD-10-CM

## 2020-03-29 DIAGNOSIS — I48.0 PAROXYSMAL ATRIAL FIBRILLATION (H): ICD-10-CM

## 2020-03-30 RX ORDER — FLECAINIDE ACETATE 100 MG/1
TABLET ORAL
Qty: 180 TABLET | Refills: 1 | Status: SHIPPED | OUTPATIENT
Start: 2020-03-30 | End: 2020-09-15

## 2020-03-30 NOTE — TELEPHONE ENCOUNTER
"FLECAINIDE ACETATE 100MG TABS      Last Written Prescription Date:  1/16/19  Last Fill Quantity: 180,   # refills: 6  Last Office Visit: 2/3/20  Future Office visit:       Requested Prescriptions   Pending Prescriptions Disp Refills     flecainide (TAMBOCOR) 100 MG tablet [Pharmacy Med Name: FLECAINIDE ACETATE 100MG TABS] 180 tablet 6     Sig: TAKE ONE TABLET BY MOUTH TWICE A DAY       Anti Arrhythmic Agents Protocol Failed - 3/29/2020  5:02 AM        Failed - CBC on file in past year     No lab results found.              Failed - ALT on file in past year     Recent Labs   Lab Test 02/17/15  0759   ALT 59             Failed - Medication needs approval from authorizing provider     Please forward this request to the authorizing provider for approval.           Passed - Lipid Panel on file in past year     Recent Labs   Lab Test 01/14/20  0736  02/17/15  0759   CHOL 186   < > 203*   TRIG 126   < > 193*   HDL 57   < > 65   *   < > 99   NHDL 129   < >  --    VLDL  --   --  39*   CHOLHDLRATIO  --   --  3.1    < > = values in this interval not displayed.               Passed - Serum Creatinine on file in past year     Recent Labs   Lab Test 01/14/20  0736   CR 1.04       Ok to refill medication if creatinine is low          Passed - Serum Sodium on file in past year     Recent Labs   Lab Test 01/14/20  0736                 Passed - Serum Potassium on file in past year     Recent Labs   Lab Test 01/14/20  0736   POTASSIUM 4.3             Passed - Patient is 18 years of age or older        Passed - Recent (6 mo) or future (30 days) visit with authorizing provider's specialty     Patient had office visit in the last 6 months or has a visit in the next 30 days with authorizing provider.  See \"Patient Info\" tab in inbasket, or \"Choose Columns\" in Meds & Orders section of the refill encounter.                   "

## 2020-03-30 NOTE — TELEPHONE ENCOUNTER
Routing refill request to provider for review/approval because:  Labs not current:  See below  Failed - Medication needs approval from authorizing provider         Please forward this request to the authorizing provider for approval     Heather TIRADO RN

## 2020-04-07 DIAGNOSIS — I48.91 ATRIAL FIBRILLATION, UNSPECIFIED TYPE (H): ICD-10-CM

## 2020-04-07 RX ORDER — WARFARIN SODIUM 5 MG/1
TABLET ORAL
Qty: 85 TABLET | Refills: 0 | OUTPATIENT
Start: 2020-04-07

## 2020-04-07 NOTE — TELEPHONE ENCOUNTER
warfarin ANTICOAGULANT (JANTOVEN ANTICOAGULANT) 5 MG tablet  85 tablet  0  4/6/2020   No    Sig: TAKE ONE-HALF TABLET BY MOUTH ON MONDAY AND ONE TABLET ON ALL OTHER DAYS OF THE WEEK OR AS DIRECTED BY ANTICOAGULATION CLINIC.    Sent to pharmacy as: warfarin ANTICOAGULANT (JANTOVEN ANTICOAGULANT) 5 MG tablet    Class: E-Prescribe    Order: [id number]    E-Prescribing Status: Receipt confirmed by pharmacy (4/6/2020  8:10 AM CDT)    Printout Tracking     External Result Report    Medication Administration Instructions     TAKE ONE-HALF TABLET BY MOUTH ON MONDAY AND ONE TABLET ON ALL OTHER DAYS OF THE WEEK OR AS DIRECTED BY ANTICOAGULATION CLINIC.    Possibly Related Medication Notes     WARFARIN SODIUM medications were reorganized on 9/10/2019. The possibly related notes have not been reviewed. Relevant information could exist in the related notes.    Pharmacy     [facility] - [id number]

## 2020-04-14 ENCOUNTER — ANTICOAGULATION THERAPY VISIT (OUTPATIENT)
Dept: ANTICOAGULATION | Facility: CLINIC | Age: 65
End: 2020-04-14

## 2020-04-14 DIAGNOSIS — I48.91 A-FIB (H): ICD-10-CM

## 2020-04-14 DIAGNOSIS — Z79.01 LONG TERM CURRENT USE OF ANTICOAGULANT THERAPY: ICD-10-CM

## 2020-04-14 DIAGNOSIS — I48.91 ATRIAL FIBRILLATION, UNSPECIFIED TYPE (H): ICD-10-CM

## 2020-04-14 LAB — INR PPP: 2.8 (ref 0.86–1.14)

## 2020-04-14 PROCEDURE — 85610 PROTHROMBIN TIME: CPT | Performed by: FAMILY MEDICINE

## 2020-04-14 PROCEDURE — 36415 COLL VENOUS BLD VENIPUNCTURE: CPT | Performed by: FAMILY MEDICINE

## 2020-04-14 PROCEDURE — 99207 ZZC NO CHARGE NURSE ONLY: CPT

## 2020-04-14 NOTE — PROGRESS NOTES
ANTICOAGULATION FOLLOW-UP CLINIC VISIT    Patient Name:  Joe Mas  Date:  4/14/2020  Contact Type:  Telephone    SUBJECTIVE:  Patient Findings     Comments:   Patient reports no changes in medication, activity, or diet. Patient reports no changes in health. Patient reports has taken warfarin as instructed. Patient reports no increased bruising or bleeding and no signs or symptoms of a blood clot.   Will plan to continue maintenance dose and recheck INR in 6 weeks. Patient to call [facility] with any changes or concerns. Patient verbalized understanding of all instructions, denies questions or concerns at this time. Patient is also interested in a home meter when his insurance changes to Medicare in October.           Clinical Outcomes     Negatives:   Major bleeding event, Thromboembolic event, Anticoagulation-related hospital admission, Anticoagulation-related ED visit, Anticoagulation-related fatality    Comments:   Patient reports no changes in medication, activity, or diet. Patient reports no changes in health. Patient reports has taken warfarin as instructed. Patient reports no increased bruising or bleeding and no signs or symptoms of a blood clot.   Will plan to continue maintenance dose and recheck INR in 6 weeks. Patient to call [facility] with any changes or concerns. Patient verbalized understanding of all instructions, denies questions or concerns at this time. Patient is also interested in a home meter when his insurance changes to Medicare in October.              OBJECTIVE    INR   Date Value Ref Range Status   04/14/2020 2.80 (H) 0.86 - 1.14 Final     Comment:     This test is intended for monitoring Coumadin therapy.  Results are not   accurate in patients with prolonged INR due to factor deficiency.         ASSESSMENT / PLAN  INR assessment THER    Recheck INR In: 6 WEEKS    INR Location Clinic      Anticoagulation Summary  As of 4/14/2020    INR goal:   2.0-3.0   TTR:   91.0 % (1 y)   INR used for dosing:    2.80 (4/14/2020)   Warfarin maintenance plan:   2.5 mg (5 mg x 0.5) every Mon; 5 mg (5 mg x 1) all other days   Full warfarin instructions:   2.5 mg every Mon; 5 mg all other days   Weekly warfarin total:   32.5 mg   No change documented:   Cheryl Coats RN   Plan last modified:   Sofia Tai RN (6/20/2019)   Next INR check:   5/28/2020   Priority:   Maintenance   Target end date:       Indications    Atrial fibrillation  unspecified type (H) [I48.91]  Long term current use of anticoagulant therapy [Z79.01]             Anticoagulation Episode Summary     INR check location:       Preferred lab:       Send INR reminders to:   MARIA ELENA HORTA    Comments:   * Interest in Home meter when insurance changes in Oct 2020      Anticoagulation Care Providers     Provider Role Specialty Phone number    Jolynn Cooper MD [facility] [phone]            See the Encounter Report to view Anticoagulation Flowsheet and Dosing Calendar (Go to Encounters tab in chart review, and find the Anticoagulation Therapy Visit)      Cheryl Coats RN

## 2020-04-16 ENCOUNTER — TELEPHONE (OUTPATIENT)
Dept: FAMILY MEDICINE | Facility: CLINIC | Age: 65
End: 2020-04-16

## 2020-04-16 DIAGNOSIS — Z79.01 LONG TERM CURRENT USE OF ANTICOAGULANT THERAPY: ICD-10-CM

## 2020-04-16 DIAGNOSIS — I48.91 ATRIAL FIBRILLATION, UNSPECIFIED TYPE (H): ICD-10-CM

## 2020-04-16 NOTE — TELEPHONE ENCOUNTER
Writer left a message for patient to notify him that he can extend his INR recheck by an additional 2 weeks (for a total of 8 weeks). He is currently scheduled for his next INR on May 28th 2020. He is okay to reschedule out to June 9th, 2020. Asked that patient return call to [facility] once the message was received, or call the clinic directly to schedule.     Cheryl Coats RN 04/16/20 at 4:23 PM

## 2020-04-16 NOTE — TELEPHONE ENCOUNTER
Anticoagulation    Joe Mas 64 year old male    Chart reviewed for evaluation of next INR follow up date to limit exposure to health care setting during COVID-19 concern.    Lab Results   Component Value Date    INR 2.80 04/14/2020    INR 2.0 02/25/2020    INR 2.2 01/23/2020    INR 2.9 01/03/2020    INR 3.0 12/19/2019    INR 2.5 11/07/2019    INR 3.0 09/26/2019    INR 2.4 08/15/2019    INR 3.0 07/18/2019    INR 2.3 06/20/2019    INR 2.7 05/31/2019    INR 1.9 05/16/2019    INR 1.6 05/02/2019    INR 2.97 03/05/2018       Assessment/plan:     Last out of range INR 5/16/19 and stable on 32.5 mg/week.  TTR 91%  Recently instructed to check INR Q 3  weeks due to illness/activity level changes in Dec/Jan which is no longer being reported as changes. Historically on Q 6 week checks.     Recent INR 4/14 2.8; 7 weeks since prior INR.     Chest guidelines suggest for patients with consistently stable INRs an INR testing frequency of up to 12 weeks.    May extend next INR to 8 weeks after last INR (6/9/20); revisit for further extension after additional INRs.    Required patient education:      Importance of notifying clinic for diarrhea, nausea/vomiting, reduced intake, illness, medication changes and/or s/sx of bleeding/clotting.    Emiliana Rich, [facility]  Anticoagulation clinic

## 2020-04-21 DIAGNOSIS — J44.1 OBSTRUCTIVE CHRONIC BRONCHITIS WITH EXACERBATION (H): ICD-10-CM

## 2020-04-21 RX ORDER — IPRATROPIUM BROMIDE AND ALBUTEROL SULFATE 2.5; .5 MG/3ML; MG/3ML
SOLUTION RESPIRATORY (INHALATION)
Qty: 360 ML | Refills: 2 | Status: SHIPPED | OUTPATIENT
Start: 2020-04-21 | End: 2020-07-06

## 2020-04-21 RX ORDER — ALBUTEROL SULFATE 90 UG/1
AEROSOL, METERED RESPIRATORY (INHALATION)
Qty: 54 G | Refills: 1 | Status: SHIPPED | OUTPATIENT
Start: 2020-04-21 | End: 2021-01-28

## 2020-04-21 NOTE — LETTER
[facility]  [address]  [facility] [id number] [phone]        April 21, 2020  Joe Chace  [address] [place] [id number]    Dear Joe,    I care about your health and have reviewed your health plan. I have reviewed your medical conditions, medication list, and lab results and am making recommendations based on this review, to better manage your health.    You are in particular need of attention regarding:  -Diabetes    I am recommending that you:  -schedule a Virtual Visit: either a Video Visit or Telephone Visit    Please call us at [phone] (or use [employer]) to address the above recommendations.     Thank you for trusting [facility] and we appreciate the opportunity to serve you.  We look forward to supporting your healthcare needs in the future.    Sherman RegardsTESFAYE MD /Heather TIRADO RN, BSN

## 2020-04-21 NOTE — TELEPHONE ENCOUNTER
"Prescriptions approved per [facility] Refill Protocol.  Due for diabetic recheck. Letter mailed to pt and sent via [employer] as well.     Requested Prescriptions   Pending Prescriptions Disp Refills     VENTOLIN  (90 Base) MCG/ACT inhaler [Pharmacy Med Name: VENTOLIN HFA 108MCG/ACT AERS] 54 g 1     Sig: INHALE TWO PUFFS BY MOUTH EVERY 6 HOURS AS NEEDED FOR SHORTNESS OF BREATH OR WHEEZING       Asthma Maintenance Inhalers - Anticholinergics Passed - 4/21/2020  9:27 AM        Passed - Patient is age 12 years or older        Passed - Recent (12 mo) or future (30 days) visit within the authorizing provider's specialty     Patient has had an office visit with the authorizing provider or a provider within the authorizing providers department within the previous 12 mos or has a future within next 30 days. See \"Patient Info\" tab in inbasket, or \"Choose Columns\" in Meds & Orders section of the refill encounter.              Passed - Medication is active on med list       Short-Acting Beta Agonist Inhalers Protocol  Passed - 4/21/2020  9:27 AM        Passed - Patient is age 12 or older        Passed - Recent (12 mo) or future (30 days) visit within the authorizing provider's specialty     Patient has had an office visit with the authorizing provider or a provider within the authorizing providers department within the previous 12 mos or has a future within next 30 days. See \"Patient Info\" tab in inbasket, or \"Choose Columns\" in Meds & Orders section of the refill encounter.              Passed - Medication is active on med list           ipratropium - albuterol 0.5 mg/2.5 mg/3 mL (DUONEB) 0.5-2.5 (3) MG/3ML neb solution [Pharmacy Med Name: IPRATROPIUM-ALBUTEROL 0.5-2.5 SOLN] 360 mL 11     Sig: NEBULIZE CONTENTS OF ONE VIAL EVERY 6 HOURS AS NEEDED FOR SHORTNESS OF BREATH/DYSPNEA OR WHEEZING       Short-Acting Beta Agonist Inhalers Protocol  Passed - 4/21/2020  9:27 AM        Passed - Patient is age 12 or older        Passed - " "Recent (12 mo) or future (30 days) visit within the authorizing provider's specialty     Patient has had an office visit with the authorizing provider or a provider within the authorizing providers department within the previous 12 mos or has a future within next 30 days. See \"Patient Info\" tab in inbasket, or \"Choose Columns\" in Meds & Orders section of the refill encounter.              Passed - Medication is active on med list       Asthma Nebs Protocol Passed - 4/21/2020  9:27 AM        Passed - Patient is age 4 years or older        Passed - Recent (12 mo) or future (30 days) visit within the authorizing provider's specialty     Patient has had an office visit with the authorizing provider or a provider within the authorizing providers department within the previous 12 mos or has a future within next 30 days. See \"Patient Info\" tab in inbasket, or \"Choose Columns\" in Meds & Orders section of the refill encounter.              Passed - Medication is active on med list           Heather TIRADO RN, BSN        "

## 2020-06-09 ENCOUNTER — ANTICOAGULATION THERAPY VISIT (OUTPATIENT)
Dept: ANTICOAGULATION | Facility: CLINIC | Age: 65
End: 2020-06-09

## 2020-06-09 ENCOUNTER — VIRTUAL VISIT (OUTPATIENT)
Dept: FAMILY MEDICINE | Facility: CLINIC | Age: 65
End: 2020-06-09
Payer: COMMERCIAL

## 2020-06-09 VITALS — WEIGHT: 252 LBS | BODY MASS INDEX: 37.5 KG/M2

## 2020-06-09 DIAGNOSIS — I48.91 ATRIAL FIBRILLATION, UNSPECIFIED TYPE (H): ICD-10-CM

## 2020-06-09 DIAGNOSIS — E11.8 TYPE 2 DIABETES MELLITUS WITH COMPLICATION, WITHOUT LONG-TERM CURRENT USE OF INSULIN (H): Primary | ICD-10-CM

## 2020-06-09 DIAGNOSIS — E78.5 HYPERLIPIDEMIA WITH TARGET LDL LESS THAN 100: ICD-10-CM

## 2020-06-09 DIAGNOSIS — I48.91 A-FIB (H): ICD-10-CM

## 2020-06-09 DIAGNOSIS — Z79.01 LONG TERM CURRENT USE OF ANTICOAGULANT THERAPY: ICD-10-CM

## 2020-06-09 DIAGNOSIS — E11.9 TYPE 2 DIABETES MELLITUS WITHOUT COMPLICATION, WITHOUT LONG-TERM CURRENT USE OF INSULIN (H): ICD-10-CM

## 2020-06-09 DIAGNOSIS — I10 ESSENTIAL HYPERTENSION, BENIGN: ICD-10-CM

## 2020-06-09 LAB
CAPILLARY BLOOD COLLECTION: NORMAL
INR PPP: 2 (ref 0.86–1.14)

## 2020-06-09 PROCEDURE — 85610 PROTHROMBIN TIME: CPT | Performed by: FAMILY MEDICINE

## 2020-06-09 PROCEDURE — 99214 OFFICE O/P EST MOD 30 MIN: CPT | Mod: TEL | Performed by: FAMILY MEDICINE

## 2020-06-09 PROCEDURE — 36416 COLLJ CAPILLARY BLOOD SPEC: CPT | Performed by: FAMILY MEDICINE

## 2020-06-09 PROCEDURE — 99207 ZZC NO CHARGE NURSE ONLY: CPT

## 2020-06-09 RX ORDER — METFORMIN HCL 500 MG
TABLET, EXTENDED RELEASE 24 HR ORAL
Qty: 90 TABLET | Refills: 1 | Status: SHIPPED | OUTPATIENT
Start: 2020-06-09 | End: 2020-09-04

## 2020-06-09 NOTE — PROGRESS NOTES
"VM left for pt to return call to [facility] - [phone]. Dosing instructions not given to pt.  Tentative plan: recheck INR in 6 weeks.  Sofia Tai RN on 2020 at 2:34 P  M  ANTICOAGULATION FOLLOW-UP CLINIC VISIT    Patient Name:  Joe Mas  Date:  2020  Contact Type:  Telephone    SUBJECTIVE:  Patient Findings     Positives:   Change in activity (Increase in activity - \"a little more\" activity)    Comments:   No changes in medications or diet noted. No concerns with clotting, bleeding, or increased bruising noted. Took warfarin as prescribed.  Discussed increase in activity - pt did agree to check INR in 4 weeks to see if his INR drops below goal range. Pt has been on the lower end of his goal range in the past. He does fluctuate within his goal range.  Patient verbalizes understanding and agrees to plan. No further questions or concerns.        Clinical Outcomes     Negatives:   Major bleeding event, Thromboembolic event, Anticoagulation-related hospital admission, Anticoagulation-related ED visit, Anticoagulation-related fatality    Comments:   No changes in medications or diet noted. No concerns with clotting, bleeding, or increased bruising noted. Took warfarin as prescribed.  Discussed increase in activity - pt did agree to check INR in 4 weeks to see if his INR drops below goal range. Pt has been on the lower end of his goal range in the past. He does fluctuate within his goal range.  Patient verbalizes understanding and agrees to plan. No further questions or concerns.           OBJECTIVE    Recent labs: (last 7 days)     20  1321   INR 2.00*       ASSESSMENT / PLAN  INR assessment THER    Recheck INR In: 4 WEEKS    INR Location Clinic      Anticoagulation Summary  As of 2020    INR goal:   2.0-3.0   TTR:   100.0 % (1 y)   INR used for dosin.00 (2020)   Warfarin maintenance plan:   2.5 mg (5 mg x 0.5) every Mon; 5 mg (5 mg x 1) all other days   Full warfarin instructions:   2.5 " mg every Mon; 5 mg all other days   Weekly warfarin total:   32.5 mg   No change documented:   Sofia Tai RN   Plan last modified:   Sofia Tai RN (6/20/2019)   Next INR check:   7/7/2020   Priority:   Maintenance   Target end date:   Indefinite    Indications    Atrial fibrillation  unspecified type (H) [I48.91]  Long term current use of anticoagulant therapy [Z79.01]             Anticoagulation Episode Summary     INR check location:       Preferred lab:       Send INR reminders to:   MARIA ELENA HORTA    Comments:   * Interest in Home meter when insurance changes in Oct 2020      Anticoagulation Care Providers     Provider Role Specialty Phone number    Jolynn Cooper MD [facility] [phone]            See the Encounter Report to view Anticoagulation Flowsheet and Dosing Calendar (Go to Encounters tab in chart review, and find the Anticoagulation Therapy Visit)        Sofia Tai RN

## 2020-06-09 NOTE — PROGRESS NOTES
"Joe Mas is a 64 year old male who is being evaluated via a billable telephone visit.      The patient has been notified of following:     \"This telephone visit will be conducted via a call between you and your physician/provider. We have found that certain health care needs can be provided without the need for a physical exam.  This service lets us provide the care you need with a short phone conversation.  If a prescription is necessary we can send it directly to your pharmacy.  If lab work is needed we can place an order for that and you can then stop by our lab to have the test done at a later time.    Telephone visits are billed at different rates depending on your insurance coverage. During this emergency period, for some insurers they may be billed the same as an in-person visit.  Please reach out to your insurance provider with any questions.    If during the course of the call the physician/provider feels a telephone visit is not appropriate, you will not be charged for this service.\"    Patient has given verbal consent for Telephone visit?  Yes    What phone number would you like to be contacted at? [phone]    How would you like to obtain your AVS? None     Subjective     Joe Mas is a 64 year old male who presents via phone visit today for the following health issues:    HPI  Diabetes Follow-up    How often are you checking your blood sugar? One time daily  What time of day are you checking your blood sugars (select all that apply)?  Am   120s-155    02 has been 95%-96%   Pulse has been 58-75 with activity 115-120    INR has been 2-3 so now gets to go and get checked every 8 weeks.   130-140/83-88 for blood pressure   Have you had any blood sugars above 200?  Not to often   Have you had any blood sugars below 70?  Yes     What symptoms do you notice when your blood sugar is low?  None    What concerns do you have today about your diabetes? None      Do you have any of these symptoms? (Select " all that apply)  No numbness or tingling in feet.  No redness, sores or blisters on feet.  No complaints of excessive thirst.  No reports of blurry vision.  No significant changes to weight.    Have you had a diabetic eye exam in the last 12 months? Due for one     Feeling good   Walking two miles/day    BP Readings from Last 2 Encounters:   02/07/20 138/86   02/03/20 120/72     Hemoglobin A1C (%)   Date Value   01/14/2020 7.0 (H)   07/05/2019 6.6 (H)     LDL Cholesterol Calculated (mg/dL)   Date Value   01/14/2020 104 (H)   01/29/2019 123 (H)         Hypertension Follow-up       Do you check your blood pressure regularly outside of the clinic? Yes     Are you following a low salt diet? Yes    Are your blood pressures ever more than 140 on the top number (systolic) OR more   than 90 on the bottom number (diastolic), for example 140/90? See above       How many servings of fruits and vegetables do you eat daily?  2-3    On average, how many sweetened beverages do you drink each day (Examples: soda, juice, sweet tea, etc.  Do NOT count diet or artificially sweetened beverages)?   0    How many days per week do you exercise enough to make your heart beat faster? 5    How many minutes a day do you exercise enough to make your heart beat faster? Walking a lot     How many days per week do you miss taking your medication? 0     Wondering about taking his blood thinner and ASA? If he should or not?   flecainade and warfarin interaction profile = no interaction, discussed with him  Takes baby asa too       Stable INR - checking weekly       Review of systems:  No headache  No cp/sob/cough   No tingling numbness in extrems  No vision changes     Reviewed and updated as needed this visit by Provider              Objective   Reported vitals:  There were no vitals taken for this visit.   healthy, alert and no distress  PSYCH: Alert and oriented times 3; coherent speech, normal   rate and volume, able to articulate logical  thoughts, able   to abstract reason, no tangential thoughts, no hallucinations   or delusions  His affect is normal  RESP: No cough, no audible wheezing, able to talk in full sentences  Remainder of exam unable to be completed due to telephone visits    Diagnostic Test Results:  Labs reviewed in Epic        Assessment/Plan:  1. Type 2 diabetes mellitus with complication, without long-term current use of insulin (H)  Doing well. Walking daily. Due for eye exam and will make appointment. Asymptomatic. BG in goal range. Due for A1c next month. Metformin refilled. The patient indicates understanding of these issues and agrees with the plan.   - **A1C FUTURE 1yr; Future    2. Hyperlipidemia with target LDL less than 100  Stable in 1/2020    3. Essential hypertension, benign  He reports normal blood pressures at home     4. Atrial fibrillation, unspecified type (H)  On flecainade and warfarin     5. Type 2 diabetes mellitus without complication, without long-term current use of insulin (H)  See me for physical and DM visit this fall   - metFORMIN (GLUCOPHAGE-XR) 500 MG 24 hr tablet; TAKE one TABLET BY MOUTH ONCE DAILY WITH DINNER  Dispense: 90 tablet; Refill: 1    Return in about 3 months (around 9/9/2020) for 3 months for a diabetes visit, Routine Visit.    Phone call duration:  23 minutes    Jolynn Cooper MD

## 2020-06-16 DIAGNOSIS — J44.1 OBSTRUCTIVE CHRONIC BRONCHITIS WITH EXACERBATION (H): ICD-10-CM

## 2020-06-17 RX ORDER — MOMETASONE FUROATE AND FORMOTEROL FUMARATE DIHYDRATE 200; 5 UG/1; UG/1
AEROSOL RESPIRATORY (INHALATION)
Qty: 39 G | Refills: 3 | Status: SHIPPED | OUTPATIENT
Start: 2020-06-17 | End: 2021-04-09

## 2020-07-06 DIAGNOSIS — J44.1 OBSTRUCTIVE CHRONIC BRONCHITIS WITH EXACERBATION (H): ICD-10-CM

## 2020-07-06 RX ORDER — IPRATROPIUM BROMIDE AND ALBUTEROL SULFATE 2.5; .5 MG/3ML; MG/3ML
SOLUTION RESPIRATORY (INHALATION)
Qty: 360 ML | Refills: 2 | Status: SHIPPED | OUTPATIENT
Start: 2020-07-06 | End: 2021-02-26

## 2020-07-07 ENCOUNTER — ANTICOAGULATION THERAPY VISIT (OUTPATIENT)
Dept: ANTICOAGULATION | Facility: CLINIC | Age: 65
End: 2020-07-07

## 2020-07-07 DIAGNOSIS — Z79.01 LONG TERM CURRENT USE OF ANTICOAGULANT THERAPY: ICD-10-CM

## 2020-07-07 DIAGNOSIS — I48.91 ATRIAL FIBRILLATION, UNSPECIFIED TYPE (H): ICD-10-CM

## 2020-07-07 DIAGNOSIS — I48.91 A-FIB (H): ICD-10-CM

## 2020-07-07 LAB
CAPILLARY BLOOD COLLECTION: NORMAL
INR PPP: 2.8 (ref 0.86–1.14)

## 2020-07-07 PROCEDURE — 85610 PROTHROMBIN TIME: CPT | Performed by: FAMILY MEDICINE

## 2020-07-07 PROCEDURE — 36416 COLLJ CAPILLARY BLOOD SPEC: CPT | Performed by: FAMILY MEDICINE

## 2020-07-07 PROCEDURE — 99207 ZZC NO CHARGE NURSE ONLY: CPT

## 2020-07-07 NOTE — PROGRESS NOTES
VM left for pt to return call to [facility] [phone]. Dosing instructions not given to pt.  Tentative plan: recheck in 6 weeks.  Sofia Tai RN on 2020 at 2:20 PM    ANTICOAGULATION FOLLOW-UP CLINIC VISIT    Patient Name:  Joe Mas  Date:  2020  Contact Type:  Telephone    SUBJECTIVE:  Patient Findings     Positives:   Change in activity (Increase in walking)    Comments:   No changes in medications or diet noted. No concerns with clotting, bleeding, or increased bruising noted. Took warfarin as prescribed.  Patient is to continue maintenance warfarin plan, and check INR in 6 weeks.  Patient verbalizes understanding and agrees to plan. No further questions or concerns.        Clinical Outcomes     Negatives:   Major bleeding event, Thromboembolic event, Anticoagulation-related hospital admission, Anticoagulation-related ED visit, Anticoagulation-related fatality    Comments:   No changes in medications or diet noted. No concerns with clotting, bleeding, or increased bruising noted. Took warfarin as prescribed.  Patient is to continue maintenance warfarin plan, and check INR in 6 weeks.  Patient verbalizes understanding and agrees to plan. No further questions or concerns.           OBJECTIVE    Recent labs: (last 7 days)     20  1332   INR 2.80*       ASSESSMENT / PLAN  INR assessment THER    Recheck INR In: 6 WEEKS    INR Location Clinic      Anticoagulation Summary  As of 2020    INR goal:   2.0-3.0   TTR:   100.0 % (1 y)   INR used for dosin.80 (2020)   Warfarin maintenance plan:   2.5 mg (5 mg x 0.5) every Mon; 5 mg (5 mg x 1) all other days   Full warfarin instructions:   2.5 mg every Mon; 5 mg all other days   Weekly warfarin total:   32.5 mg   No change documented:   Sofia Tai, RN   Plan last modified:   Sofia Tai, RN (2019)   Next INR check:   2020   Priority:   Maintenance   Target end date:   Indefinite    Indications    Atrial fibrillation  unspecified  type (H) [I48.91]  Long term current use of anticoagulant therapy [Z79.01]             Anticoagulation Episode Summary     INR check location:       Preferred lab:       Send INR reminders to:   MARAI ELENA HORTA    Comments:   * Interest in Home meter when insurance changes in Oct 2020      Anticoagulation Care Providers     Provider Role Specialty Phone number    Jolynn Cooper MD Referring [facility] [phone]            See the Encounter Report to view Anticoagulation Flowsheet and Dosing Calendar (Go to Encounters tab in chart review, and find the Anticoagulation Therapy Visit)        Sofia Tai RN

## 2020-08-14 ENCOUNTER — TELEPHONE (OUTPATIENT)
Dept: FAMILY MEDICINE | Facility: CLINIC | Age: 65
End: 2020-08-14

## 2020-08-14 DIAGNOSIS — E11.9 TYPE 2 DIABETES MELLITUS WITHOUT COMPLICATION, WITHOUT LONG-TERM CURRENT USE OF INSULIN (H): ICD-10-CM

## 2020-08-14 RX ORDER — BLOOD SUGAR DIAGNOSTIC
STRIP MISCELLANEOUS
Qty: 200 EACH | Refills: 2 | Status: SHIPPED | OUTPATIENT
Start: 2020-08-14 | End: 2020-08-17

## 2020-08-14 NOTE — TELEPHONE ENCOUNTER
"We received an electronic order for Joe's test strips. Is this approved or denied - due for labs? The directions read \"due for lab work- orders are in\" if it is approved please send a new order with directions for how often to test.     Thank you   "

## 2020-08-17 RX ORDER — BLOOD SUGAR DIAGNOSTIC
1 STRIP MISCELLANEOUS 2 TIMES DAILY
Qty: 200 EACH | Refills: 0 | Status: SHIPPED | OUTPATIENT
Start: 2020-08-17 | End: 2020-11-22

## 2020-08-17 NOTE — TELEPHONE ENCOUNTER
Medication is being filled for 1 time refill only due to:  Patient needs labs hgba1c. Future labs ordered yes.   Vern Conley RN

## 2020-08-18 ENCOUNTER — ANTICOAGULATION THERAPY VISIT (OUTPATIENT)
Dept: ANTICOAGULATION | Facility: CLINIC | Age: 65
End: 2020-08-18

## 2020-08-18 DIAGNOSIS — E11.8 TYPE 2 DIABETES MELLITUS WITH COMPLICATION, WITHOUT LONG-TERM CURRENT USE OF INSULIN (H): ICD-10-CM

## 2020-08-18 DIAGNOSIS — I48.91 ATRIAL FIBRILLATION, UNSPECIFIED TYPE (H): ICD-10-CM

## 2020-08-18 DIAGNOSIS — Z79.01 LONG TERM CURRENT USE OF ANTICOAGULANT THERAPY: ICD-10-CM

## 2020-08-18 DIAGNOSIS — I48.91 A-FIB (H): ICD-10-CM

## 2020-08-18 LAB
HBA1C MFR BLD: 6.7 % (ref 0–5.6)
INR PPP: 1.9 (ref 0.86–1.14)

## 2020-08-18 PROCEDURE — 36415 COLL VENOUS BLD VENIPUNCTURE: CPT | Performed by: FAMILY MEDICINE

## 2020-08-18 PROCEDURE — 83036 HEMOGLOBIN GLYCOSYLATED A1C: CPT | Performed by: FAMILY MEDICINE

## 2020-08-18 PROCEDURE — 85610 PROTHROMBIN TIME: CPT | Performed by: FAMILY MEDICINE

## 2020-08-18 PROCEDURE — 99207 ZZC NO CHARGE NURSE ONLY: CPT

## 2020-08-18 NOTE — PROGRESS NOTES
Anticoagulation Management    Unable to reach Joe today.    Today's INR result of 1.90 is subtherapeutic (goal INR of 2.0-3.0).  Result received from: Clinic Lab    Follow up required to confirm warfarin dose taken and assess for changes    Left message to continue current dose of warfarin 5 mg tonight. Requested a call back. Tx to [phone].      Anticoagulation clinic to follow up    Lizbet Paiz RN  ANTICOAGULATION FOLLOW-UP CLINIC VISIT    Patient Name:  Joe Mas  Date:  2020  Contact Type:  Telephone    SUBJECTIVE:  Patient Findings     Positives:   Change in activity (Walking 20 miles a week.)    Comments:   No changes in medications, health, or diet noted. No bleeding or increased bruising noted. Took warfarin as prescribed.  Writer increased today's dose to 7.5 mg. Writer then adjusted dose to 35 mg weekly.   Recheck in 10 days.   Patient verbalizes understanding and agrees to plan. No further questions or concerns.            Clinical Outcomes     Negatives:   Major bleeding event, Thromboembolic event, Anticoagulation-related hospital admission, Anticoagulation-related ED visit, Anticoagulation-related fatality    Comments:   No changes in medications, health, or diet noted. No bleeding or increased bruising noted. Took warfarin as prescribed.  Writer increased today's dose to 7.5 mg. Writer then adjusted dose to 35 mg weekly.   Recheck in 10 days.   Patient verbalizes understanding and agrees to plan. No further questions or concerns.               OBJECTIVE    Recent labs: (last 7 days)     20  1204   INR 1.90*       ASSESSMENT / PLAN  INR assessment SUB    Recheck INR In: 10 DAYS    INR Location Outside lab      Anticoagulation Summary  As of 2020    INR goal:   2.0-3.0   TTR:   98.7 % (1 y)   INR used for dosin.90! (2020)   Warfarin maintenance plan:   2.5 mg (5 mg x 0.5) every Mon; 5 mg (5 mg x 1) all other days   Full warfarin instructions:   : 7.5 mg;  8/24: 5 mg; Otherwise 2.5 mg every Mon; 5 mg all other days   Weekly warfarin total:   32.5 mg   Plan last modified:   Lizbet Paiz RN (8/18/2020)   Next INR check:   9/1/2020   Priority:   High   Target end date:   Indefinite    Indications    Atrial fibrillation  unspecified type (H) [I48.91]  Long term current use of anticoagulant therapy [Z79.01]             Anticoagulation Episode Summary     INR check location:       Preferred lab:       Send INR reminders to:   MARIA ELENA HORTA    Comments:   * Interest in Home meter when insurance changes in Oct 2020      Anticoagulation Care Providers     Provider Role Specialty Phone number    Jolynn Cooper MD Referring Family Practice [phone]            See the Encounter Report to view Anticoagulation Flowsheet and Dosing Calendar (Go to Encounters tab in chart review, and find the Anticoagulation Therapy Visit)        Lizbet Paiz RN

## 2020-08-27 ENCOUNTER — ANTICOAGULATION THERAPY VISIT (OUTPATIENT)
Dept: ANTICOAGULATION | Facility: CLINIC | Age: 65
End: 2020-08-27

## 2020-08-27 ENCOUNTER — TRANSFERRED RECORDS (OUTPATIENT)
Dept: HEALTH INFORMATION MANAGEMENT | Facility: CLINIC | Age: 65
End: 2020-08-27

## 2020-08-27 DIAGNOSIS — I48.91 A-FIB (H): ICD-10-CM

## 2020-08-27 DIAGNOSIS — Z79.01 LONG TERM CURRENT USE OF ANTICOAGULANT THERAPY: ICD-10-CM

## 2020-08-27 DIAGNOSIS — I48.91 ATRIAL FIBRILLATION, UNSPECIFIED TYPE (H): ICD-10-CM

## 2020-08-27 LAB
CAPILLARY BLOOD COLLECTION: NORMAL
INR PPP: 2.1 (ref 0.86–1.14)
RETINOPATHY: NEGATIVE
RETINOPATHY: NEGATIVE

## 2020-08-27 PROCEDURE — 85610 PROTHROMBIN TIME: CPT | Performed by: FAMILY MEDICINE

## 2020-08-27 PROCEDURE — 36416 COLLJ CAPILLARY BLOOD SPEC: CPT | Performed by: FAMILY MEDICINE

## 2020-08-27 RX ORDER — WARFARIN SODIUM 5 MG/1
TABLET ORAL
Qty: 85 TABLET | Refills: 0
Start: 2020-08-27 | End: 2020-09-14

## 2020-08-27 NOTE — PROGRESS NOTES
ANTICOAGULATION MANAGEMENT     Patient Name:  Joe Mas  Date:  2020    ASSESSMENT /SUBJECTIVE:    Today's INR result of 2.10 is therapeutic. Goal INR of 2.0-3.0      Warfarin dose taken: Less warfarin taken than instructed which may be affecting INR (patient was previously instructed to take 5 mg daily)    Diet: No new diet changes affecting INR    Medication changes/ interactions: No new medications/supplements affecting INR    Previous INR: Subtherapeutic     S/S of bleeding or thromboembolism: No    New injury or illness: No    Upcoming surgery, procedure or cardioversion: No    Additional findings: Patient was instructed to change his dose to 5 mg daily but he is not sure if he took the dose as directed (he thinks he may have taken 2.5 mg on Monday but he cannot remember)      PLAN:    Spoke with Joe regarding INR result and instructed:     Warfarin Dosing Instructions: Change your warfarin dose to 5 mg daily as previously intended    Instructed patient to follow up no later than: 2 weeks  Lab visit scheduled    Education provided: Importance of taking warfarin as instructed, he plans to fill his pill box with the updated dose.       Joe verbalizes understanding and agrees to warfarin dosing plan.    Instructed to call the Anticoagulation Clinic for any changes, questions or concerns. (#[phone])        Cheryl Coats RN      OBJECTIVE:  Recent labs: (last 7 days)     20  1408   INR 2.10*         No question data found.  Anticoagulation Summary  As of 2020    INR goal:   2.0-3.0   TTR:   97.5 % (1 y)   INR used for dosin.10 (2020)   Warfarin maintenance plan:   5 mg (5 mg x 1) every day   Full warfarin instructions:   5 mg every day   Weekly warfarin total:   35 mg   Plan last modified:   Cheryl Coats, RN (2020)   Next INR check:   9/10/2020   Priority:   Maintenance   Target end date:   Indefinite    Indications    Atrial fibrillation  unspecified type (H)  [I48.91]  Long term current use of anticoagulant therapy [Z79.01]             Anticoagulation Episode Summary     INR check location:       Preferred lab:       Send INR reminders to:   MARIA ELENA HORTA    Comments:   * Interest in Home meter when insurance changes in Oct 2020      Anticoagulation Care Providers     Provider Role Specialty Phone number    Jolynn Cooper MD Referring Family Practice [phone]

## 2020-09-04 DIAGNOSIS — E11.9 TYPE 2 DIABETES MELLITUS WITHOUT COMPLICATION, WITHOUT LONG-TERM CURRENT USE OF INSULIN (H): ICD-10-CM

## 2020-09-04 RX ORDER — METFORMIN HCL 500 MG
TABLET, EXTENDED RELEASE 24 HR ORAL
Qty: 90 TABLET | Refills: 0 | Status: SHIPPED | OUTPATIENT
Start: 2020-09-04 | End: 2020-12-01

## 2020-09-04 NOTE — TELEPHONE ENCOUNTER
Prescription approved per [facility] Refill Protocol.  Patient due for DM F/U and labs this month.  [employer] message sent to remind patient.  Bailey Ruiz RN

## 2020-09-10 ENCOUNTER — ANTICOAGULATION THERAPY VISIT (OUTPATIENT)
Dept: ANTICOAGULATION | Facility: CLINIC | Age: 65
End: 2020-09-10

## 2020-09-10 DIAGNOSIS — I48.91 ATRIAL FIBRILLATION, UNSPECIFIED TYPE (H): ICD-10-CM

## 2020-09-10 DIAGNOSIS — I48.91 A-FIB (H): ICD-10-CM

## 2020-09-10 DIAGNOSIS — Z79.01 LONG TERM CURRENT USE OF ANTICOAGULANT THERAPY: ICD-10-CM

## 2020-09-10 LAB — INR PPP: 2 (ref 0.86–1.14)

## 2020-09-10 PROCEDURE — 36416 COLLJ CAPILLARY BLOOD SPEC: CPT | Performed by: FAMILY MEDICINE

## 2020-09-10 PROCEDURE — 85610 PROTHROMBIN TIME: CPT | Performed by: FAMILY MEDICINE

## 2020-09-10 NOTE — PROGRESS NOTES
ANTICOAGULATION MANAGEMENT     Patient Name:  Joe Mas  Date:  9/10/2020    ASSESSMENT /SUBJECTIVE:    Today's INR result of 2.00 is therapeutic. Goal INR of 2.0-3.0      Warfarin dose taken: Less warfarin taken than instructed which may be affecting INR, took 5mg last week but then took 2.5mg 3 days ago    Diet: No new diet changes affecting INR    Medication changes/ interactions: No new medications/supplements affecting INR    Previous INR: Therapeutic 2.10    S/S of bleeding or thromboembolism: No    New injury or illness: No    Upcoming surgery, procedure or cardioversion: No    Additional findings: None      PLAN:    Spoke with Joe regarding INR result and instructed:     Warfarin Dosing Instructions: Continue your current warfarin dose 5mg daily -- continue this every day (do not take 2.5mg on any )    Instructed patient to follow up no later than: 2 weeks  Lab visit scheduled    Education provided: Importance of taking warfarin as instructed Importance of notifying clinic for changes in medications/health      Joe verbalizes understanding and agrees to warfarin dosing plan.    Instructed to call the Anticoagulation Clinic for any changes, questions or concerns. (#[phone])      OBJECTIVE:  Recent labs: (last 7 days)     09/10/20  1027   INR 2.00*         INR assessment THER    Recheck INR In: 2 WEEKS    INR Location Clinic      Anticoagulation Summary  As of 9/10/2020    INR goal:   2.0-3.0   TTR:   97.5 % (1 y)   INR used for dosin.00 (9/10/2020)   Warfarin maintenance plan:   5 mg (5 mg x 1) every day   Full warfarin instructions:   5 mg every day   Weekly warfarin total:   35 mg   No change documented:   Beth De La Rosa RN   Plan last modified:   Cheryl Coats RN (2020)   Next INR check:   2020   Priority:   Maintenance   Target end date:   Indefinite    Indications    Atrial fibrillation  unspecified type (H) [I48.91]  Long term current use of anticoagulant  therapy [Z79.01]             Anticoagulation Episode Summary     INR check location:       Preferred lab:       Send INR reminders to:   MARIA ELENA HORTA    Comments:   * Interest in Home meter when insurance changes in Oct 2020      Anticoagulation Care Providers     Provider Role Specialty Phone number    Jolynn Cooper MD Referring Family Practice [phone]

## 2020-09-14 DIAGNOSIS — I48.0 PAROXYSMAL ATRIAL FIBRILLATION (H): ICD-10-CM

## 2020-09-14 DIAGNOSIS — I48.91 ATRIAL FIBRILLATION, UNSPECIFIED TYPE (H): ICD-10-CM

## 2020-09-14 NOTE — TELEPHONE ENCOUNTER
Routing refill request to provider for review/approval because:  Drug not on the [facility] refill protocol   Drug interaction warning  Vern Conley RN

## 2020-09-15 RX ORDER — FLECAINIDE ACETATE 100 MG/1
TABLET ORAL
Qty: 180 TABLET | Refills: 1 | Status: SHIPPED | OUTPATIENT
Start: 2020-09-15 | End: 2021-03-15

## 2020-09-15 RX ORDER — WARFARIN SODIUM 5 MG/1
5 TABLET ORAL DAILY
Qty: 90 TABLET | Refills: 2 | Status: SHIPPED | OUTPATIENT
Start: 2020-09-15 | End: 2020-09-17

## 2020-09-16 DIAGNOSIS — I48.91 ATRIAL FIBRILLATION, UNSPECIFIED TYPE (H): ICD-10-CM

## 2020-09-16 DIAGNOSIS — I48.0 PAROXYSMAL ATRIAL FIBRILLATION (H): ICD-10-CM

## 2020-09-16 RX ORDER — FLECAINIDE ACETATE 100 MG/1
TABLET ORAL
Qty: 180 TABLET | Refills: 1 | OUTPATIENT
Start: 2020-09-16

## 2020-09-16 RX ORDER — WARFARIN SODIUM 5 MG/1
TABLET ORAL
Qty: 84 TABLET | Refills: 0 | OUTPATIENT
Start: 2020-09-16

## 2020-09-24 ENCOUNTER — ANTICOAGULATION THERAPY VISIT (OUTPATIENT)
Dept: ANTICOAGULATION | Facility: CLINIC | Age: 65
End: 2020-09-24

## 2020-09-24 DIAGNOSIS — Z79.01 LONG TERM CURRENT USE OF ANTICOAGULANT THERAPY: ICD-10-CM

## 2020-09-24 DIAGNOSIS — I48.91 ATRIAL FIBRILLATION, UNSPECIFIED TYPE (H): ICD-10-CM

## 2020-09-24 DIAGNOSIS — I48.91 A-FIB (H): ICD-10-CM

## 2020-09-24 LAB
CAPILLARY BLOOD COLLECTION: NORMAL
INR PPP: 2.3 (ref 0.86–1.14)

## 2020-09-24 PROCEDURE — 85610 PROTHROMBIN TIME: CPT | Performed by: FAMILY MEDICINE

## 2020-09-24 PROCEDURE — 99207 ZZC NO CHARGE NURSE ONLY: CPT

## 2020-09-24 PROCEDURE — 36416 COLLJ CAPILLARY BLOOD SPEC: CPT | Performed by: FAMILY MEDICINE

## 2020-09-24 NOTE — PROGRESS NOTES
ANTICOAGULATION FOLLOW-UP CLINIC VISIT    Patient Name:  Joe Mas  Date:  2020  Contact Type:  Telephone    SUBJECTIVE:  Patient Findings     Comments:   No changes in diet, activity level, medications (including over the counter), or health. No missed doses of warfarin. Patient took dosing as prescribed. No signs of clots or bleeding concerns. Patient will continue maintenance warfarin dosing.          Clinical Outcomes     Negatives:   Major bleeding event, Thromboembolic event, Anticoagulation-related hospital admission, Anticoagulation-related ED visit, Anticoagulation-related fatality    Comments:   No changes in diet, activity level, medications (including over the counter), or health. No missed doses of warfarin. Patient took dosing as prescribed. No signs of clots or bleeding concerns. Patient will continue maintenance warfarin dosing.             OBJECTIVE    Recent labs: (last 7 days)     20  1025   INR 2.30*       ASSESSMENT / PLAN  INR assessment THER    Recheck INR In: 4 WEEKS    INR Location Clinic      Anticoagulation Summary  As of 2020    INR goal:   2.0-3.0   TTR:   97.5 % (1 y)   INR used for dosin.30 (2020)   Warfarin maintenance plan:   5 mg (5 mg x 1) every day   Full warfarin instructions:   5 mg every day   Weekly warfarin total:   35 mg   No change documented:   Halie Birmingham RN   Plan last modified:   Cheryl Coats RN (2020)   Next INR check:   10/22/2020   Priority:   Maintenance   Target end date:   Indefinite    Indications    Atrial fibrillation  unspecified type (H) [I48.91]  Long term current use of anticoagulant therapy [Z79.01]             Anticoagulation Episode Summary     INR check location:       Preferred lab:       Send INR reminders to:   MARIA ELENA HORTA    Comments:   * Interest in Home meter when insurance changes in Oct 2020      Anticoagulation Care Providers     Provider Role Specialty Phone number    Jolynn Cooper MD  Referring Family Practice [phone]            See the Encounter Report to view Anticoagulation Flowsheet and Dosing Calendar (Go to Encounters tab in chart review, and find the Anticoagulation Therapy Visit)        Halie Birmingham RN

## 2020-10-22 ENCOUNTER — ANTICOAGULATION THERAPY VISIT (OUTPATIENT)
Dept: ANTICOAGULATION | Facility: CLINIC | Age: 65
End: 2020-10-22

## 2020-10-22 DIAGNOSIS — I48.91 ATRIAL FIBRILLATION, UNSPECIFIED TYPE (H): ICD-10-CM

## 2020-10-22 DIAGNOSIS — Z79.01 LONG TERM CURRENT USE OF ANTICOAGULANT THERAPY: ICD-10-CM

## 2020-10-22 DIAGNOSIS — I48.91 A-FIB (H): ICD-10-CM

## 2020-10-22 LAB — INR PPP: 4.3 (ref 0.86–1.14)

## 2020-10-22 PROCEDURE — 99207 PR NO CHARGE NURSE ONLY: CPT

## 2020-10-22 PROCEDURE — 85610 PROTHROMBIN TIME: CPT | Performed by: FAMILY MEDICINE

## 2020-10-22 PROCEDURE — 36416 COLLJ CAPILLARY BLOOD SPEC: CPT | Performed by: FAMILY MEDICINE

## 2020-10-22 NOTE — PROGRESS NOTES
ANTICOAGULATION FOLLOW-UP CLINIC VISIT    Patient Name:  Joe Mas  Date:  10/22/2020  Contact Type:  Telephone    SUBJECTIVE:  Patient Findings     Comments:  No changes in diet, activity level, medications (including over the counter), or health. No illnesses, diarrhea, swelling, pain, etc. No missed doses of warfarin. Patient took dosing as prescribed. No signs of clots or bleeding concerns.     Patient states he would like his dosing back to 1/2 tab once per week. Writer explained that is not likely why INR went up 2 full points but patient was not happy dose was switched to 5 mg daily. Will hold today, then 2.5 mg Friday and recheck next Friday.         Clinical Outcomes     Negatives:  Major bleeding event, Thromboembolic event, Anticoagulation-related hospital admission, Anticoagulation-related ED visit, Anticoagulation-related fatality    Comments:  No changes in diet, activity level, medications (including over the counter), or health. No illnesses, diarrhea, swelling, pain, etc. No missed doses of warfarin. Patient took dosing as prescribed. No signs of clots or bleeding concerns.     Patient states he would like his dosing back to 1/2 tab once per week. Writer explained that is not likely why INR went up 2 full points but patient was not happy dose was switched to 5 mg daily. Will hold today, then 2.5 mg Friday and recheck next Friday.            OBJECTIVE    Recent labs: (last 7 days)     10/22/20  0951   INR 4.30*       ASSESSMENT / PLAN  INR assessment SUPRA    Recheck INR In: 1 WEEK    INR Location Clinic      Anticoagulation Summary  As of 10/22/2020    INR goal:  2.0-3.0   TTR:  92.5 % (1 y)   INR used for dosin.30 (10/22/2020)   Warfarin maintenance plan:  5 mg (5 mg x 1) every day   Full warfarin instructions:  10/22: Hold; 10/23: 2.5 mg; Otherwise 5 mg every day   Weekly warfarin total:  35 mg   Plan last modified:  Cheryl Coats RN (2020)   Next INR check:  10/30/2020    Priority:  High   Target end date:  Indefinite    Indications    Atrial fibrillation  unspecified type (H) [I48.91]  Long term current use of anticoagulant therapy [Z79.01]             Anticoagulation Episode Summary     INR check location:      Preferred lab:      Send INR reminders to:  MARIA ELENA HORTA    Comments:  * Interest in Home meter when insurance changes in Oct 2020      Anticoagulation Care Providers     Provider Role Specialty Phone number    Jolynn Cooper MD Referring [facility] [phone]            See the Encounter Report to view Anticoagulation Flowsheet and Dosing Calendar (Go to Encounters tab in chart review, and find the Anticoagulation Therapy Visit)      Halie Birmingham RN

## 2020-10-30 ENCOUNTER — ANTICOAGULATION THERAPY VISIT (OUTPATIENT)
Dept: ANTICOAGULATION | Facility: CLINIC | Age: 65
End: 2020-10-30

## 2020-10-30 DIAGNOSIS — Z79.01 LONG TERM CURRENT USE OF ANTICOAGULANT THERAPY: ICD-10-CM

## 2020-10-30 DIAGNOSIS — I48.91 A-FIB (H): ICD-10-CM

## 2020-10-30 DIAGNOSIS — I48.91 ATRIAL FIBRILLATION, UNSPECIFIED TYPE (H): ICD-10-CM

## 2020-10-30 LAB
CAPILLARY BLOOD COLLECTION: NORMAL
INR PPP: 2.8 (ref 0.86–1.14)

## 2020-10-30 PROCEDURE — 85610 PROTHROMBIN TIME: CPT | Performed by: FAMILY MEDICINE

## 2020-10-30 PROCEDURE — 36416 COLLJ CAPILLARY BLOOD SPEC: CPT | Performed by: FAMILY MEDICINE

## 2020-10-30 PROCEDURE — 99207 PR NO CHARGE NURSE ONLY: CPT

## 2020-10-30 RX ORDER — WARFARIN SODIUM 5 MG/1
TABLET ORAL
Qty: 90 TABLET | Refills: 0 | COMMUNITY
Start: 2020-10-30 | End: 2021-06-03

## 2020-10-30 NOTE — PROGRESS NOTES
ANTICOAGULATION FOLLOW-UP CLINIC VISIT    Patient Name:  Joe Mas  Date:  10/30/2020  Contact Type:  Telephone    SUBJECTIVE:  Patient Findings     Positives:  Change in activity (Less activity - not walking as much)    Comments:  No changes in medications or diet noted. No concerns with clotting, bleeding, or increased bruising noted. Took warfarin as prescribed.  Pt expressed that he did not want to continue on 5 mg daily due to last INR being supra therapeutic. RN agreed to tried pt's previous maintenance dose of 2.5 mg on Monday, 5 mg all other days. Pt did take 2.5 mg last Friday. He did not want to change his 2.5 mg dose on Fridays.  Pt agreed to recheck INR in 2 weeks. Education provided. Informed pt that if his INR is sub therapeutic at next INR check, may need to consider resuming 5 mg daily. Pt did state that he is not as active.  Patient verbalizes understanding and agrees to plan. No further questions or concerns.        Clinical Outcomes     Negatives:  Major bleeding event, Thromboembolic event, Anticoagulation-related hospital admission, Anticoagulation-related ED visit, Anticoagulation-related fatality    Comments:  No changes in medications or diet noted. No concerns with clotting, bleeding, or increased bruising noted. Took warfarin as prescribed.  Pt expressed that he did not want to continue on 5 mg daily due to last INR being supra therapeutic. RN agreed to tried pt's previous maintenance dose of 2.5 mg on Monday, 5 mg all other days. Pt did take 2.5 mg last Friday. He did not want to change his 2.5 mg dose on Fridays.  Pt agreed to recheck INR in 2 weeks. Education provided. Informed pt that if his INR is sub therapeutic at next INR check, may need to consider resuming 5 mg daily. Pt did state that he is not as active.  Patient verbalizes understanding and agrees to plan. No further questions or concerns.           OBJECTIVE    Recent labs: (last 7 days)     10/30/20  1448   INR 2.80*        ASSESSMENT / PLAN  INR assessment THER    Recheck INR In: 2 WEEKS    INR Location Clinic      Anticoagulation Summary  As of 10/30/2020    INR goal:  2.0-3.0   TTR:  90.6 % (1 y)   INR used for dosin.80 (10/30/2020)   Warfarin maintenance plan:  2.5 mg (5 mg x 0.5) every Mon; 5 mg (5 mg x 1) all other days   Full warfarin instructions:  2.5 mg every Mon; 5 mg all other days   Weekly warfarin total:  32.5 mg   Plan last modified:  Sofia Tai RN (10/30/2020)   Next INR check:  2020   Priority:  Maintenance   Target end date:  Indefinite    Indications    Atrial fibrillation  unspecified type (H) [I48.91]  Long term current use of anticoagulant therapy [Z79.01]             Anticoagulation Episode Summary     INR check location:      Preferred lab:      Send INR reminders to:  MARIA ELENA HORTA    Comments:  * Interest in Home meter when insurance changes in Oct 2020      Anticoagulation Care Providers     Provider Role Specialty Phone number    Jolynn Cooper MD Referring Family Medicine [phone]            See the Encounter Report to view Anticoagulation Flowsheet and Dosing Calendar (Go to Encounters tab in chart review, and find the Anticoagulation Therapy Visit)        Sofia Tai RN

## 2020-11-12 ENCOUNTER — ANTICOAGULATION THERAPY VISIT (OUTPATIENT)
Dept: NURSING | Facility: CLINIC | Age: 65
End: 2020-11-12

## 2020-11-12 DIAGNOSIS — I48.91 ATRIAL FIBRILLATION, UNSPECIFIED TYPE (H): ICD-10-CM

## 2020-11-12 DIAGNOSIS — Z79.01 LONG TERM CURRENT USE OF ANTICOAGULANT THERAPY: ICD-10-CM

## 2020-11-12 DIAGNOSIS — I48.91 A-FIB (H): ICD-10-CM

## 2020-11-12 LAB
CAPILLARY BLOOD COLLECTION: NORMAL
INR PPP: 2.2 (ref 0.86–1.14)

## 2020-11-12 PROCEDURE — 85610 PROTHROMBIN TIME: CPT | Performed by: FAMILY MEDICINE

## 2020-11-12 PROCEDURE — 36416 COLLJ CAPILLARY BLOOD SPEC: CPT | Performed by: FAMILY MEDICINE

## 2020-11-12 NOTE — PROGRESS NOTES
ANTICOAGULATION MANAGEMENT     Patient Name:  Joe Mas  Date:  2020    ASSESSMENT /SUBJECTIVE:    Today's INR result of 2.2 is therapeutic. Goal INR of 2.0-3.0      Warfarin dose taken: Warfarin taken as instructed    Diet: No new diet changes affecting INR    Medication changes/ interactions: No new medications/supplements affecting INR    Previous INR: Therapeutic     S/S of bleeding or thromboembolism: No    New injury or illness: No    Upcoming surgery, procedure or cardioversion: No    Additional findings: None      PLAN:    Telephone call with Joe regarding INR result and instructed:     Warfarin Dosing Instructions: Continue your current warfarin dose 2.5 mg Mon and 5 mg ROW    Instructed patient to follow up no later than: 4 weeks  Lab visit scheduled    Education provided: Target INR goal and significance of current INR result      Joe verbalizes understanding and agrees to warfarin dosing plan.    Instructed to call the Anticoagulation Clinic for any changes, questions or concerns. (#[phone])        Lexis Mckeon RN      OBJECTIVE:  Recent labs: (last 7 days)     20  1448   INR 2.20*         No question data found.  Anticoagulation Summary  As of 2020    INR goal:  2.0-3.0   TTR:  90.6 % (1 y)   INR used for dosin.20 (2020)   Warfarin maintenance plan:  2.5 mg (5 mg x 0.5) every Mon; 5 mg (5 mg x 1) all other days   Full warfarin instructions:  2.5 mg every Mon; 5 mg all other days   Weekly warfarin total:  32.5 mg   No change documented:  Lexis Mckeon RN   Plan last modified:  Sofia Tai RN (10/30/2020)   Next INR check:  12/3/2020   Priority:  Maintenance   Target end date:  Indefinite    Indications    Atrial fibrillation  unspecified type (H) [I48.91]  Long term current use of anticoagulant therapy [Z79.01]             Anticoagulation Episode Summary     INR check location:      Preferred lab:      Send INR reminders to:  MARIA ELENA HORTA     Comments:  * Interest in Home meter when insurance changes in Oct 2020      Anticoagulation Care Providers     Provider Role Specialty Phone number    Jolynn Cooper MD Referring Family Medicine [phone]

## 2020-11-14 DIAGNOSIS — Z23 FLU VACCINE NEED: Primary | ICD-10-CM

## 2020-11-16 RX ORDER — INFLUENZA A VIRUS A/HAWAII/70/2019 (H1N1) RECOMBINANT HEMAGGLUTININ ANTIGEN, INFLUENZA A VIRUS A/MINNESOTA/41/2019 (H3N2) RECOMBINANT HEMAGGLUTININ ANTIGEN, INFLUENZA B VIRUS B/WASHINGTON/02/2019 RECOMBINANT HEMAGGLUTININ ANTIGEN, AND INFLUENZA B VIRUS B/PHUKET/3073/2013 RECOMBINANT HEMAGGLUTININ ANTIGEN 45; 45; 45; 45 UG/.5ML; UG/.5ML; UG/.5ML; UG/.5ML
INJECTION INTRAMUSCULAR
Qty: 1 SYRINGE | Refills: 0 | Status: SHIPPED | OUTPATIENT
Start: 2020-11-16 | End: 2022-08-08

## 2020-11-16 NOTE — TELEPHONE ENCOUNTER
Routing refill request to provider for review/approval because:  Drug not on the [facility] refill protocol   SABINO EllisN-RN  [facility]

## 2020-11-21 DIAGNOSIS — E11.9 TYPE 2 DIABETES MELLITUS WITHOUT COMPLICATION, WITHOUT LONG-TERM CURRENT USE OF INSULIN (H): ICD-10-CM

## 2020-11-22 RX ORDER — BLOOD SUGAR DIAGNOSTIC
STRIP MISCELLANEOUS
Qty: 200 EACH | Refills: 0 | Status: SHIPPED | OUTPATIENT
Start: 2020-11-22 | End: 2023-05-05

## 2020-11-22 NOTE — TELEPHONE ENCOUNTER
Prescription approved per [facility] Refill Protocol.  Vern Conley RN

## 2020-11-29 ENCOUNTER — HEALTH MAINTENANCE LETTER (OUTPATIENT)
Age: 65
End: 2020-11-29

## 2020-12-01 DIAGNOSIS — E11.9 TYPE 2 DIABETES MELLITUS WITHOUT COMPLICATION, WITHOUT LONG-TERM CURRENT USE OF INSULIN (H): ICD-10-CM

## 2020-12-01 RX ORDER — METFORMIN HCL 500 MG
TABLET, EXTENDED RELEASE 24 HR ORAL
Qty: 90 TABLET | Refills: 0 | Status: SHIPPED | OUTPATIENT
Start: 2020-12-01 | End: 2021-03-03

## 2020-12-01 NOTE — TELEPHONE ENCOUNTER
Medication is being filled for 1 time refill only due to:  Patient needs to be seen because Dr. Cooper requested follow up appt on the August 2020 lab results.   Vern Conley RN

## 2020-12-10 ENCOUNTER — ANTICOAGULATION THERAPY VISIT (OUTPATIENT)
Dept: ANTICOAGULATION | Facility: CLINIC | Age: 65
End: 2020-12-10

## 2020-12-10 DIAGNOSIS — I48.91 A-FIB (H): ICD-10-CM

## 2020-12-10 DIAGNOSIS — I48.91 ATRIAL FIBRILLATION, UNSPECIFIED TYPE (H): ICD-10-CM

## 2020-12-10 DIAGNOSIS — Z79.01 LONG TERM CURRENT USE OF ANTICOAGULANT THERAPY: ICD-10-CM

## 2020-12-10 LAB
CAPILLARY BLOOD COLLECTION: NORMAL
INR PPP: 2.4 (ref 0.86–1.14)

## 2020-12-10 PROCEDURE — 99207 PR NO CHARGE NURSE ONLY: CPT

## 2020-12-10 PROCEDURE — 85610 PROTHROMBIN TIME: CPT | Performed by: FAMILY MEDICINE

## 2020-12-10 PROCEDURE — 36416 COLLJ CAPILLARY BLOOD SPEC: CPT | Performed by: FAMILY MEDICINE

## 2020-12-10 NOTE — PROGRESS NOTES
ANTICOAGULATION FOLLOW-UP CLINIC VISIT    Patient Name:  Joe Mas  Date:  12/10/2020  Contact Type:  Telephone    SUBJECTIVE:  Patient Findings     Comments:  No changes in medications, activity, or diet noted. No concerns with clotting, bleeding, or increased bruising noted. Took warfarin as prescribed.  Patient is to continue maintenance warfarin plan, and check INR in 5 weeks.  Patient verbalizes understanding and agrees to plan. No further questions or concerns.        Clinical Outcomes     Negatives:  Major bleeding event, Thromboembolic event, Anticoagulation-related hospital admission, Anticoagulation-related ED visit, Anticoagulation-related fatality    Comments:  No changes in medications, activity, or diet noted. No concerns with clotting, bleeding, or increased bruising noted. Took warfarin as prescribed.  Patient is to continue maintenance warfarin plan, and check INR in 5 weeks.  Patient verbalizes understanding and agrees to plan. No further questions or concerns.           OBJECTIVE    Recent labs: (last 7 days)     12/10/20  1442   INR 2.40*       ASSESSMENT / PLAN  INR assessment THER    Recheck INR In: 5 WEEKS    INR Location Clinic      Anticoagulation Summary  As of 12/10/2020    INR goal:  2.0-3.0   TTR:  90.6 % (1 y)   INR used for dosin.40 (12/10/2020)   Warfarin maintenance plan:  2.5 mg (5 mg x 0.5) every Mon; 5 mg (5 mg x 1) all other days   Full warfarin instructions:  2.5 mg every Mon; 5 mg all other days   Weekly warfarin total:  32.5 mg   No change documented:  Sofia Tai, RN   Plan last modified:  Sofia Tai RN (10/30/2020)   Next INR check:  2021   Priority:  Maintenance   Target end date:  Indefinite    Indications    Atrial fibrillation  unspecified type (H) [I48.91]  Long term current use of anticoagulant therapy [Z79.01]             Anticoagulation Episode Summary     INR check location:      Preferred lab:      Send INR reminders to:  MARIA ELENA HORTA     Comments:  * Interest in Home meter when insurance changes in Oct 2020      Anticoagulation Care Providers     Provider Role Specialty Phone number    Jolynn Cooper MD Referring Family Medicine [phone]            See the Encounter Report to view Anticoagulation Flowsheet and Dosing Calendar (Go to Encounters tab in chart review, and find the Anticoagulation Therapy Visit)        Sfoia Tai RN

## 2020-12-31 DIAGNOSIS — I48.91 ATRIAL FIBRILLATION, UNSPECIFIED TYPE (H): ICD-10-CM

## 2020-12-31 RX ORDER — METOPROLOL SUCCINATE 50 MG/1
TABLET, EXTENDED RELEASE ORAL
Qty: 90 TABLET | Refills: 3 | Status: SHIPPED | OUTPATIENT
Start: 2020-12-31 | End: 2021-04-09

## 2021-01-14 ENCOUNTER — ANTICOAGULATION THERAPY VISIT (OUTPATIENT)
Dept: ANTICOAGULATION | Facility: CLINIC | Age: 66
End: 2021-01-14

## 2021-01-14 DIAGNOSIS — Z79.01 LONG TERM CURRENT USE OF ANTICOAGULANT THERAPY: ICD-10-CM

## 2021-01-14 DIAGNOSIS — I48.91 A-FIB (H): ICD-10-CM

## 2021-01-14 DIAGNOSIS — I48.91 ATRIAL FIBRILLATION, UNSPECIFIED TYPE (H): ICD-10-CM

## 2021-01-14 LAB — INR PPP: 3.7 (ref 0.86–1.14)

## 2021-01-14 PROCEDURE — 85610 PROTHROMBIN TIME: CPT | Performed by: FAMILY MEDICINE

## 2021-01-14 PROCEDURE — 36416 COLLJ CAPILLARY BLOOD SPEC: CPT | Performed by: FAMILY MEDICINE

## 2021-01-14 NOTE — PROGRESS NOTES
ANTICOAGULATION MANAGEMENT     Patient Name:  Joe Mas  Date:  1/14/2021    ASSESSMENT /SUBJECTIVE:    Today's INR result of 3.70 is supratherapeutic. Goal INR of 2.0-3.0      Warfarin dose taken: Warfarin taken as instructed    Diet: States he has not been watching what he has been eating lately, increase in alcohol yesterday (is not an ongoing change)    Medication changes/ interactions: No new medications/supplements affecting INR    Previous INR: Therapeutic 2.40    S/S of bleeding or thromboembolism: No    New injury or illness: No    Upcoming surgery, procedure or cardioversion: No    Additional findings: None      PLAN:    Telephone call with Joe regarding INR result and instructed:     Warfarin Dosing Instructions: Hold tomorrow (already took dose today) then continue your current warfarin dose of 2.5mg Mon; 5mg all other days    Instructed patient to follow up no later than: 2 weeks  Lab visit scheduled    Education provided: Monitoring for bleeding signs and symptoms and When to seek medical attention/emergency care      Joe verbalizes understanding and agrees to warfarin dosing plan.    Instructed to call the Anticoagulation Clinic for any changes, questions or concerns. (#[phone])        Beth De La Rosa, RN CACP       OBJECTIVE:  Recent labs: (last 7 days)     01/14/21  1449   INR 3.70*         INR assessment SUPRA    Recheck INR In: 2 WEEKS    INR Location Clinic      Anticoagulation Summary  As of 1/14/2021    INR goal:  2.0-3.0   TTR:  85.4 % (1 y)   INR used for dosing:  3.70 (1/14/2021)   Warfarin maintenance plan:  2.5 mg (5 mg x 0.5) every Mon; 5 mg (5 mg x 1) all other days   Full warfarin instructions:  1/15: Hold; Otherwise 2.5 mg every Mon; 5 mg all other days   Weekly warfarin total:  32.5 mg   Plan last modified:  Sofia Tai RN (10/30/2020)   Next INR check:  1/28/2021   Priority:  Maintenance   Target end date:  Indefinite    Indications    Atrial  fibrillation  unspecified type (H) [I48.91]  Long term current use of anticoagulant therapy [Z79.01]             Anticoagulation Episode Summary     INR check location:      Preferred lab:      Send INR reminders to:  MARIA ELENA HORTA    Comments:  * Interest in Home meter when insurance changes in Oct 2020      Anticoagulation Care Providers     Provider Role Specialty Phone number    Jolynn Cooper MD Referring Family Medicine [phone]

## 2021-01-26 DIAGNOSIS — J44.1 OBSTRUCTIVE CHRONIC BRONCHITIS WITH EXACERBATION (H): ICD-10-CM

## 2021-01-27 NOTE — TELEPHONE ENCOUNTER
ALBUTEROL SULFATE  AERS  Last Written Prescription Date:  4/21/20  Last Fill Quantity: 54 g,  # refills: 2   Last office visit: 2/3/2020 with prescribing provider:     Future Office Visit:

## 2021-01-28 ENCOUNTER — ANTICOAGULATION THERAPY VISIT (OUTPATIENT)
Dept: ANTICOAGULATION | Facility: CLINIC | Age: 66
End: 2021-01-28

## 2021-01-28 DIAGNOSIS — I48.91 ATRIAL FIBRILLATION, UNSPECIFIED TYPE (H): ICD-10-CM

## 2021-01-28 DIAGNOSIS — Z79.01 LONG TERM CURRENT USE OF ANTICOAGULANT THERAPY: ICD-10-CM

## 2021-01-28 DIAGNOSIS — I48.91 A-FIB (H): ICD-10-CM

## 2021-01-28 LAB — INR PPP: 2.4 (ref 0.86–1.14)

## 2021-01-28 PROCEDURE — 85610 PROTHROMBIN TIME: CPT | Performed by: FAMILY MEDICINE

## 2021-01-28 PROCEDURE — 36416 COLLJ CAPILLARY BLOOD SPEC: CPT | Performed by: FAMILY MEDICINE

## 2021-01-28 RX ORDER — ALBUTEROL SULFATE 90 UG/1
AEROSOL, METERED RESPIRATORY (INHALATION)
Qty: 1 INHALER | Refills: 1 | Status: SHIPPED | OUTPATIENT
Start: 2021-01-28 | End: 2021-05-06

## 2021-01-28 NOTE — TELEPHONE ENCOUNTER
"Prescription approved per [facility] Refill Protocol.  Mila Roland RN      Requested Prescriptions   Pending Prescriptions Disp Refills     albuterol (PROAIR HFA/PROVENTIL HFA/VENTOLIN HFA) 108 (90 Base) MCG/ACT inhaler [Pharmacy Med Name: ALBUTEROL SULFATE  AERS]  1     Sig: INHALE TWO PUFFS BY MOUTH EVERY 6 HOURS AS NEEDED FOR SHORTNESS OF BREATH OF WHEEZING       Asthma Maintenance Inhalers - Anticholinergics Passed - 1/28/2021  2:42 PM        Passed - Patient is age 12 years or older        Passed - Recent (12 mo) or future (30 days) visit within the authorizing provider's specialty     Patient has had an office visit with the authorizing provider or a provider within the authorizing providers department within the previous 12 mos or has a future within next 30 days. See \"Patient Info\" tab in inbasket, or \"Choose Columns\" in Meds & Orders section of the refill encounter.              Passed - Medication is active on med list       Short-Acting Beta Agonist Inhalers Protocol  Passed - 1/28/2021  2:42 PM        Passed - Patient is age 12 or older        Passed - Recent (12 mo) or future (30 days) visit within the authorizing provider's specialty     Patient has had an office visit with the authorizing provider or a provider within the authorizing providers department within the previous 12 mos or has a future within next 30 days. See \"Patient Info\" tab in inbasket, or \"Choose Columns\" in Meds & Orders section of the refill encounter.              Passed - Medication is active on med list             "

## 2021-01-28 NOTE — PROGRESS NOTES
ANTICOAGULATION MANAGEMENT     Patient Name:  Joe Mas  Date:  2021    ASSESSMENT /SUBJECTIVE:    Today's INR result of 2.40 is therapeutic. Goal INR of 2.0-3.0      Warfarin dose taken: Warfarin taken as instructed    Diet: No new diet changes affecting INR    Medication changes/ interactions: No new medications/supplements affecting INR    Previous INR: Supratherapeutic 3.70    S/S of bleeding or thromboembolism: No    New injury or illness: No    Upcoming surgery, procedure or cardioversion: No    Additional findings: None      PLAN:    Telephone call with Joe regarding INR result and instructed:     Warfarin Dosing Instructions: Continue your current warfarin dose 2.5mg Mon; 5mg all other days    Instructed patient to follow up no later than: 3 weeks  Patient elected to schedule next visit in 4 weeks    Education provided: None required      Joe verbalizes understanding and agrees to warfarin dosing plan.    Instructed to call the Anticoagulation Clinic for any changes, questions or concerns. (#[phone])        Beth De La Rosa RN CACP       OBJECTIVE:  Recent labs: (last 7 days)     21  0953   INR 2.40*         INR assessment THER    Recheck INR In: 4 WEEKS    INR Location Clinic      Anticoagulation Summary  As of 2021    INR goal:  2.0-3.0   TTR:  83.4 % (1 y)   INR used for dosin.40 (2021)   Warfarin maintenance plan:  2.5 mg (5 mg x 0.5) every Mon; 5 mg (5 mg x 1) all other days   Full warfarin instructions:  2.5 mg every Mon; 5 mg all other days   Weekly warfarin total:  32.5 mg   No change documented:  Beth De La Rosa RN   Plan last modified:  Sofia Tai RN (10/30/2020)   Next INR check:  2021   Priority:  Maintenance   Target end date:  Indefinite    Indications    Atrial fibrillation  unspecified type (H) [I48.91]  Long term current use of anticoagulant therapy [Z79.01]             Anticoagulation Episode Summary     INR check location:       Preferred lab:      Send INR reminders to:  MARIA ELENA HORTA    Comments:  * Interest in Home meter when insurance changes in Oct 2020      Anticoagulation Care Providers     Provider Role Specialty Phone number    Jolynn Cooper MD Referring Family Medicine [phone]

## 2021-02-25 ENCOUNTER — ANTICOAGULATION THERAPY VISIT (OUTPATIENT)
Dept: ANTICOAGULATION | Facility: CLINIC | Age: 66
End: 2021-02-25

## 2021-02-25 DIAGNOSIS — Z79.01 LONG TERM CURRENT USE OF ANTICOAGULANT THERAPY: ICD-10-CM

## 2021-02-25 DIAGNOSIS — J44.1 OBSTRUCTIVE CHRONIC BRONCHITIS WITH EXACERBATION (H): ICD-10-CM

## 2021-02-25 DIAGNOSIS — I48.91 A-FIB (H): ICD-10-CM

## 2021-02-25 DIAGNOSIS — E11.8 TYPE 2 DIABETES MELLITUS WITH COMPLICATION, WITHOUT LONG-TERM CURRENT USE OF INSULIN (H): Primary | ICD-10-CM

## 2021-02-25 DIAGNOSIS — E78.5 HYPERLIPIDEMIA WITH TARGET LDL LESS THAN 100: ICD-10-CM

## 2021-02-25 DIAGNOSIS — I48.91 ATRIAL FIBRILLATION, UNSPECIFIED TYPE (H): ICD-10-CM

## 2021-02-25 DIAGNOSIS — I10 ESSENTIAL HYPERTENSION, BENIGN: ICD-10-CM

## 2021-02-25 LAB — INR PPP: 3 (ref 0.86–1.14)

## 2021-02-25 PROCEDURE — 36416 COLLJ CAPILLARY BLOOD SPEC: CPT | Performed by: FAMILY MEDICINE

## 2021-02-25 PROCEDURE — 85610 PROTHROMBIN TIME: CPT | Performed by: FAMILY MEDICINE

## 2021-02-25 NOTE — PROGRESS NOTES
ANTICOAGULATION MANAGEMENT     Patient Name:  Joe Mas  Date:  2/25/2021    ASSESSMENT /SUBJECTIVE:    Today's INR result of 3.00 is therapeutic. Goal INR of 2.0-3.0      Warfarin dose taken: Warfarin taken as instructed    Diet: No new diet changes affecting INR    Medication changes/ interactions: No new medications/supplements affecting INR    Previous INR: Therapeutic 2.40    S/S of bleeding or thromboembolism: No    New injury or illness: No    Upcoming surgery, procedure or cardioversion: No    Additional findings: None      PLAN:    Telephone call with Joe regarding INR result and instructed:     Warfarin Dosing Instructions: Continue your current warfarin dose 2.5mg Mon; 5mg all other days    Instructed patient to follow up no later than: 4 weeks  Lab visit scheduled    Education provided: Importance of notifying clinic for changes in medications or health; a sooner lab recheck maybe needed       Joe verbalizes understanding and agrees to warfarin dosing plan.    Instructed to call the Anticoagulation Clinic for any changes, questions or concerns. (#[phone])        Beth De La Rosa RN CACP       OBJECTIVE:  Recent labs: (last 7 days)     02/25/21  0953   INR 3.00*       Anticoagulation Summary  As of 2/25/2021    INR goal:  2.0-3.0   TTR:  83.4 % (1 y)   INR used for dosing:  3.00 (2/25/2021)   Warfarin maintenance plan:  2.5 mg (5 mg x 0.5) every Mon; 5 mg (5 mg x 1) all other days   Full warfarin instructions:  2.5 mg every Mon; 5 mg all other days   Weekly warfarin total:  32.5 mg   No change documented:  Beth De La Rosa RN   Plan last modified:  Sofia Tai RN (10/30/2020)   Next INR check:  3/25/2021   Priority:  Maintenance   Target end date:  Indefinite    Indications    Atrial fibrillation  unspecified type (H) [I48.91]  Long term current use of anticoagulant therapy [Z79.01]             Anticoagulation Episode Summary     INR check location:      Preferred lab:       Send INR reminders to:  MARIA ELENA HORTA    Comments:  * Interest in Home meter when insurance changes in Oct 2020      Anticoagulation Care Providers     Provider Role Specialty Phone number    Jolynn Cooper MD Referring Family Medicine [phone]

## 2021-02-25 NOTE — TELEPHONE ENCOUNTER
Routing refill request to provider for review/approval because:  Patient needs to be seen because it has been more than 1 year since last office visit.    Lab orders placed per HM.     Edinson Kumari RN

## 2021-02-26 RX ORDER — IPRATROPIUM BROMIDE AND ALBUTEROL SULFATE 2.5; .5 MG/3ML; MG/3ML
SOLUTION RESPIRATORY (INHALATION)
Qty: 360 ML | Refills: 2 | Status: SHIPPED | OUTPATIENT
Start: 2021-02-26 | End: 2021-07-23

## 2021-03-15 DIAGNOSIS — I48.0 PAROXYSMAL ATRIAL FIBRILLATION (H): ICD-10-CM

## 2021-03-15 RX ORDER — FLECAINIDE ACETATE 100 MG/1
TABLET ORAL
Qty: 60 TABLET | Refills: 0 | Status: SHIPPED | OUTPATIENT
Start: 2021-03-15 | End: 2021-04-09

## 2021-03-15 NOTE — CONFIDENTIAL NOTE
Medication is being filled for 1 time refill of 60 only due to:  Patient needs to be seen because it has been more than one year since last visit. Needs fasting lab.   Vern Conley RN

## 2021-03-25 ENCOUNTER — ANTICOAGULATION THERAPY VISIT (OUTPATIENT)
Dept: ANTICOAGULATION | Facility: CLINIC | Age: 66
End: 2021-03-25

## 2021-03-25 DIAGNOSIS — I48.91 ATRIAL FIBRILLATION, UNSPECIFIED TYPE (H): ICD-10-CM

## 2021-03-25 DIAGNOSIS — I48.91 A-FIB (H): ICD-10-CM

## 2021-03-25 DIAGNOSIS — Z79.01 LONG TERM CURRENT USE OF ANTICOAGULANT THERAPY: ICD-10-CM

## 2021-03-25 LAB — INR PPP: 2.4 (ref 0.86–1.14)

## 2021-03-25 PROCEDURE — 36416 COLLJ CAPILLARY BLOOD SPEC: CPT | Performed by: FAMILY MEDICINE

## 2021-03-25 PROCEDURE — 85610 PROTHROMBIN TIME: CPT | Performed by: FAMILY MEDICINE

## 2021-03-25 NOTE — PROGRESS NOTES
ANTICOAGULATION MANAGEMENT     Patient Name:  Joe Mas  Date:  3/25/2021    ASSESSMENT /SUBJECTIVE:    Today's INR result of 2.40 is therapeutic. Goal INR of 2.0-3.0      Warfarin dose taken: Warfarin taken as instructed    Diet: No new diet changes affecting INR    Medication changes/ interactions: No new medications/supplements affecting INR    Previous INR: Therapeutic 3.00    S/S of bleeding or thromboembolism: No    New injury or illness: No    Upcoming surgery, procedure or cardioversion: No    Additional findings: None      PLAN:    Telephone call with Joe regarding INR result and instructed:     Warfarin Dosing Instructions: Continue your current warfarin dose 2.5mg Mon; 5mg all other days    Instructed patient to follow up no later than: 5 weeks  Lab visit scheduled    Education provided: Importance of notifying clinic for changes in medications or health; a sooner lab recheck maybe needed      Joe verbalizes understanding and agrees to warfarin dosing plan.    Instructed to call the Anticoagulation Clinic for any changes, questions or concerns. (#[phone])        Beth De La Rosa RN CACP       OBJECTIVE:  Recent labs: (last 7 days)     21  0951   INR 2.40*         INR assessment THER    Recheck INR In: 5 WEEKS    INR Location Clinic      Anticoagulation Summary  As of 3/25/2021    INR goal:  2.0-3.0   TTR:  83.4 % (1 y)   INR used for dosin.40 (3/25/2021)   Warfarin maintenance plan:  2.5 mg (5 mg x 0.5) every Mon; 5 mg (5 mg x 1) all other days   Full warfarin instructions:  2.5 mg every Mon; 5 mg all other days   Weekly warfarin total:  32.5 mg   Plan last modified:  Sofia Tai RN (10/30/2020)   Next INR check:  2021   Priority:  Maintenance   Target end date:  Indefinite    Indications    Atrial fibrillation  unspecified type (H) [I48.91]  Long term current use of anticoagulant therapy [Z79.01]             Anticoagulation Episode Summary     INR check location:       Preferred lab:      Send INR reminders to:  MARIA ELENA HORTA    Comments:  * Interest in Home meter when insurance changes in Oct 2020      Anticoagulation Care Providers     Provider Role Specialty Phone number    Jolynn Cooper MD Referring Family Medicine [phone]

## 2021-04-09 ENCOUNTER — OFFICE VISIT (OUTPATIENT)
Dept: FAMILY MEDICINE | Facility: CLINIC | Age: 66
End: 2021-04-09
Payer: COMMERCIAL

## 2021-04-09 VITALS
HEIGHT: 69 IN | WEIGHT: 258.6 LBS | DIASTOLIC BLOOD PRESSURE: 86 MMHG | TEMPERATURE: 96.3 F | SYSTOLIC BLOOD PRESSURE: 132 MMHG | HEART RATE: 70 BPM | OXYGEN SATURATION: 95 % | BODY MASS INDEX: 38.3 KG/M2

## 2021-04-09 DIAGNOSIS — F32.1 MODERATE MAJOR DEPRESSION (H): ICD-10-CM

## 2021-04-09 DIAGNOSIS — Z87.891 QUIT SMOKING: ICD-10-CM

## 2021-04-09 DIAGNOSIS — E11.9 TYPE 2 DIABETES MELLITUS WITHOUT COMPLICATION, WITHOUT LONG-TERM CURRENT USE OF INSULIN (H): ICD-10-CM

## 2021-04-09 DIAGNOSIS — E11.8 TYPE 2 DIABETES MELLITUS WITH COMPLICATION, WITHOUT LONG-TERM CURRENT USE OF INSULIN (H): Primary | ICD-10-CM

## 2021-04-09 DIAGNOSIS — I10 ESSENTIAL HYPERTENSION, BENIGN: ICD-10-CM

## 2021-04-09 DIAGNOSIS — J44.1 OBSTRUCTIVE CHRONIC BRONCHITIS WITH EXACERBATION (H): ICD-10-CM

## 2021-04-09 DIAGNOSIS — E66.01 MORBID OBESITY (H): ICD-10-CM

## 2021-04-09 DIAGNOSIS — I48.91 ATRIAL FIBRILLATION, UNSPECIFIED TYPE (H): ICD-10-CM

## 2021-04-09 DIAGNOSIS — I48.0 PAROXYSMAL ATRIAL FIBRILLATION (H): ICD-10-CM

## 2021-04-09 DIAGNOSIS — E78.5 HYPERLIPIDEMIA WITH TARGET LDL LESS THAN 100: ICD-10-CM

## 2021-04-09 LAB
ANION GAP SERPL CALCULATED.3IONS-SCNC: 8 MMOL/L (ref 3–14)
BUN SERPL-MCNC: 17 MG/DL (ref 7–30)
CALCIUM SERPL-MCNC: 9.7 MG/DL (ref 8.5–10.1)
CHLORIDE SERPL-SCNC: 104 MMOL/L (ref 94–109)
CHOLEST SERPL-MCNC: 182 MG/DL
CO2 SERPL-SCNC: 25 MMOL/L (ref 20–32)
CREAT SERPL-MCNC: 0.99 MG/DL (ref 0.66–1.25)
GFR SERPL CREATININE-BSD FRML MDRD: 79 ML/MIN/{1.73_M2}
GLUCOSE SERPL-MCNC: 134 MG/DL (ref 70–99)
HBA1C MFR BLD: 6.8 % (ref 0–5.6)
HDLC SERPL-MCNC: 62 MG/DL
LDLC SERPL CALC-MCNC: 94 MG/DL
NONHDLC SERPL-MCNC: 120 MG/DL
POTASSIUM SERPL-SCNC: 4.2 MMOL/L (ref 3.4–5.3)
SODIUM SERPL-SCNC: 137 MMOL/L (ref 133–144)
TRIGL SERPL-MCNC: 132 MG/DL

## 2021-04-09 PROCEDURE — 80061 LIPID PANEL: CPT | Performed by: FAMILY MEDICINE

## 2021-04-09 PROCEDURE — 99214 OFFICE O/P EST MOD 30 MIN: CPT | Performed by: FAMILY MEDICINE

## 2021-04-09 PROCEDURE — 36415 COLL VENOUS BLD VENIPUNCTURE: CPT | Performed by: FAMILY MEDICINE

## 2021-04-09 PROCEDURE — 80048 BASIC METABOLIC PNL TOTAL CA: CPT | Performed by: FAMILY MEDICINE

## 2021-04-09 PROCEDURE — 83036 HEMOGLOBIN GLYCOSYLATED A1C: CPT | Performed by: FAMILY MEDICINE

## 2021-04-09 RX ORDER — MOMETASONE FUROATE AND FORMOTEROL FUMARATE DIHYDRATE 200; 5 UG/1; UG/1
AEROSOL RESPIRATORY (INHALATION)
Qty: 39 G | Refills: 3 | Status: SHIPPED | OUTPATIENT
Start: 2021-04-09 | End: 2022-02-01

## 2021-04-09 RX ORDER — METFORMIN HCL 500 MG
TABLET, EXTENDED RELEASE 24 HR ORAL
Qty: 90 TABLET | Refills: 3 | Status: SHIPPED | OUTPATIENT
Start: 2021-04-09 | End: 2022-02-01

## 2021-04-09 RX ORDER — FLECAINIDE ACETATE 100 MG/1
100 TABLET ORAL 2 TIMES DAILY
Qty: 180 TABLET | Refills: 3 | Status: SHIPPED | OUTPATIENT
Start: 2021-04-09 | End: 2022-02-01

## 2021-04-09 RX ORDER — METOPROLOL SUCCINATE 50 MG/1
50 TABLET, EXTENDED RELEASE ORAL DAILY
Qty: 90 TABLET | Refills: 3 | Status: SHIPPED | OUTPATIENT
Start: 2021-04-09 | End: 2022-02-01

## 2021-04-09 RX ORDER — LOSARTAN POTASSIUM 100 MG/1
100 TABLET ORAL DAILY
Qty: 90 TABLET | Refills: 3 | Status: SHIPPED | OUTPATIENT
Start: 2021-04-09 | End: 2022-02-01

## 2021-04-09 ASSESSMENT — PATIENT HEALTH QUESTIONNAIRE - PHQ9
5. POOR APPETITE OR OVEREATING: NOT AT ALL
SUM OF ALL RESPONSES TO PHQ QUESTIONS 1-9: 0

## 2021-04-09 ASSESSMENT — ANXIETY QUESTIONNAIRES
7. FEELING AFRAID AS IF SOMETHING AWFUL MIGHT HAPPEN: NOT AT ALL
3. WORRYING TOO MUCH ABOUT DIFFERENT THINGS: NOT AT ALL
6. BECOMING EASILY ANNOYED OR IRRITABLE: NOT AT ALL
1. FEELING NERVOUS, ANXIOUS, OR ON EDGE: NOT AT ALL
5. BEING SO RESTLESS THAT IT IS HARD TO SIT STILL: NOT AT ALL
2. NOT BEING ABLE TO STOP OR CONTROL WORRYING: NOT AT ALL
GAD7 TOTAL SCORE: 0

## 2021-04-09 ASSESSMENT — MIFFLIN-ST. JEOR: SCORE: 1948.38

## 2021-04-09 NOTE — PROGRESS NOTES
"    Assessment & Plan     Type 2 diabetes mellitus with complication, without long-term current use of insulin (H)  Continue current meds. Has upcoming eye appointment   - **A1C FUTURE anytime  - Albumin Random Urine Quantitative with Creat Ratio    Hyperlipidemia with target LDL less than 100  Check labs.   - Lipid panel reflex to direct LDL Fasting    Essential hypertension, benign  Med refilled. Check labs.   - **Basic metabolic panel FUTURE anytime  - losartan (COZAAR) 100 MG tablet; Take 1 tablet (100 mg) by mouth daily - needs an ov before further refills    Quit smoking  Reviewed lung cancer screening recommendations. He declines for now     Obstructive chronic bronchitis with exacerbation (H)  Doing well. meds refilled   - mometasone-formoterol (DULERA) 200-5 MCG/ACT inhaler; INHALE TWO PUFFS BY MOUTH TWICE A DAY    Atrial fibrillation, unspecified type (H)  On flecainade. Has questions about ablation. Cardiology referral made   - metoprolol succinate ER (TOPROL-XL) 50 MG 24 hr tablet; Take 1 tablet (50 mg) by mouth daily  - CARDIOLOGY EVAL ADULT REFERRAL; Future    Type 2 diabetes mellitus without complication, without long-term current use of insulin (H)    - metFORMIN (GLUCOPHAGE-XR) 500 MG 24 hr tablet; One tablet daily with dinner    Paroxysmal atrial fibrillation (H)  On warfarin per coumadin clinic   - flecainide (TAMBOCOR) 100 MG tablet; Take 1 tablet (100 mg) by mouth 2 times daily  - CARDIOLOGY EVAL ADULT REFERRAL; Future    Moderate major depression (H)  Stable. Doing well     Morbid obesity (H)  Working hard on weight loss. Encouraged to get back to walking     56}     BMI:   Estimated body mass index is 38.19 kg/m  as calculated from the following:    Height as of this encounter: 1.753 m (5' 9\").    Weight as of this encounter: 117.3 kg (258 lb 9.6 oz).   Weight management plan: Discussed healthy diet and exercise guidelines  Return in about 3 months (around 7/9/2021) for 3 months for a diabetes " visit.    Jolynn Cooper MD  [facility] is a 65 year [facility] who presents for the following health issues - multiple health issues. See below     HPI     Diabetes Follow-up      How often are you checking your blood sugar? Once a day 150's before eating 180-190s after eating     What concerns do you have today about your diabetes? None     Do you have any of these symptoms? (Select all that apply)  No numbness or tingling in feet.  No redness, sores or blisters on feet.  No complaints of excessive thirst.  No reports of blurry vision.  No significant changes to weight.    Hasn't been walking     Saw his mom for the first time on easter    Increase in salt use during the pandemic - thinks that is why blood pressure is running high     Hasn't taken his meds today   BP Readings from Last 2 Encounters:   04/09/21 132/86   02/07/20 138/86     Hemoglobin A1C (%)   Date Value   04/09/2021 6.8 (H)   08/18/2020 6.7 (H)     LDL Cholesterol Calculated (mg/dL)   Date Value   01/14/2020 104 (H)   01/29/2019 123 (H)               Hypertension Follow-up      Do you check your blood pressure regularly outside of the clinic? Yes     Are you following a low salt diet? No    Are your blood pressures ever more than 140 on the top number (systolic) OR more   than 90 on the bottom number (diastolic), for example 140/90? Yes      How many servings of fruits and vegetables do you eat daily?  0-1    On average, how many sweetened beverages do you drink each day (Examples: soda, juice, sweet tea, etc.  Do NOT count diet or artificially sweetened beverages)?   0    How many days per week do you exercise enough to make your heart beat faster? Walking     How many minutes a day do you exercise enough to make your heart beat faster?     How many days per week do you miss taking your medication? 0    Tobacco - intermittent use   Smoked daily for 30 years     svetlana cards - 8/2018  The patient  "initially presented with episodes of palpitation back in 02/2018.  He describes the episodes as sensation of fast heart rates which sometimes may make him feel anxious.  The episodes happened every 3-4 days and lasted from 6 to 10 hours.  Initially, he was treated with Coumadin and metoprolol; however, he continues to have episodes despite pharmacotherapy.  Finally, in June, he was started on flecainide.  Since flecainide was initiated, he only had 1 episode.  He has done well after initiation of flecainide.     Working on weight loss   Wt Readings from Last 4 Encounters:   04/09/21 117.3 kg (258 lb 9.6 oz)   06/09/20 114.3 kg (252 lb)   02/07/20 114.8 kg (253 lb)   02/03/20 113.4 kg (250 lb)         Review of Systems   Constitutional, HEENT, cardiovascular, pulmonary, gi and gu systems are negative, except as otherwise noted.      Objective    /86   Pulse 70   Temp 96.3  F (35.7  C) (Tympanic)   Ht 1.753 m (5' 9\")   Wt 117.3 kg (258 lb 9.6 oz)   SpO2 95%   BMI 38.19 kg/m    Body mass index is 38.19 kg/m .  Physical Exam   GENERAL: healthy, alert and no distress  EYES: Eyes grossly normal to inspection, PERRL and conjunctivae and sclerae normal  HENT: ear canals and TM's normal, nose and mouth without ulcers or lesions  NECK: no adenopathy, no asymmetry, masses, or scars and thyroid normal to palpation  RESP: lungs clear to auscultation - no rales, rhonchi or wheezes  CV: regular rate and rhythm, normal S1 S2, no S3 or S4, no murmur, click or rub, no peripheral edema and peripheral pulses strong  MS: no gross musculoskeletal defects noted, no edema  NEURO: Normal strength and tone, mentation intact and speech normal  PSYCH: mentation appears normal, affect normal/bright  Diabetic foot exam: normal DP and PT pulses, no trophic changes or ulcerative lesions and normal sensory exam        "

## 2021-04-10 DIAGNOSIS — I10 ESSENTIAL HYPERTENSION, BENIGN: ICD-10-CM

## 2021-04-10 DIAGNOSIS — E11.8 TYPE 2 DIABETES MELLITUS WITH COMPLICATION, WITHOUT LONG-TERM CURRENT USE OF INSULIN (H): Primary | ICD-10-CM

## 2021-04-10 ASSESSMENT — ANXIETY QUESTIONNAIRES: GAD7 TOTAL SCORE: 0

## 2021-04-29 ENCOUNTER — ANTICOAGULATION THERAPY VISIT (OUTPATIENT)
Dept: ANTICOAGULATION | Facility: CLINIC | Age: 66
End: 2021-04-29

## 2021-04-29 ENCOUNTER — TELEPHONE (OUTPATIENT)
Dept: ANTICOAGULATION | Facility: CLINIC | Age: 66
End: 2021-04-29

## 2021-04-29 DIAGNOSIS — E78.2 MIXED HYPERLIPIDEMIA: ICD-10-CM

## 2021-04-29 DIAGNOSIS — I48.91 ATRIAL FIBRILLATION, UNSPECIFIED TYPE (H): Primary | ICD-10-CM

## 2021-04-29 DIAGNOSIS — I48.91 ATRIAL FIBRILLATION, UNSPECIFIED TYPE (H): ICD-10-CM

## 2021-04-29 DIAGNOSIS — Z79.01 LONG TERM CURRENT USE OF ANTICOAGULANT THERAPY: ICD-10-CM

## 2021-04-29 DIAGNOSIS — I48.91 A-FIB (H): ICD-10-CM

## 2021-04-29 LAB
CAPILLARY BLOOD COLLECTION: NORMAL
INR PPP: 3.1 (ref 0.86–1.14)

## 2021-04-29 PROCEDURE — 85610 PROTHROMBIN TIME: CPT | Performed by: FAMILY MEDICINE

## 2021-04-29 PROCEDURE — 36416 COLLJ CAPILLARY BLOOD SPEC: CPT | Performed by: FAMILY MEDICINE

## 2021-04-29 RX ORDER — ATORVASTATIN CALCIUM 20 MG/1
TABLET, FILM COATED ORAL
Qty: 30 TABLET | Refills: 5 | Status: SHIPPED | OUTPATIENT
Start: 2021-04-29 | End: 2021-10-19

## 2021-04-29 NOTE — PROGRESS NOTES
ANTICOAGULATION MANAGEMENT     Patient Name:  Joe Mas  Date:  4/29/2021    ASSESSMENT /SUBJECTIVE:    Today's INR result of 3.10 is supratherapeutic. Goal INR of 2.0-3.0      Warfarin dose taken: Warfarin taken as instructed    Diet: No new diet changes affecting INR    Medication changes/ interactions: No new medications/supplements affecting INR    Previous INR: Therapeutic     S/S of bleeding or thromboembolism: No    New injury or illness: No    Upcoming surgery, procedure or cardioversion: No    Additional findings: None      PLAN:    Telephone call with Joe regarding INR result and instructed:     Warfarin Dosing Instructions: Continue your current warfarin dose 2.5 mg every Mon; 5 mg all other days    Instructed patient to follow up no later than: 2 weeks  Lab visit scheduled    Education provided: Please call back if any changes to your diet, medications or how you've been taking warfarin and Monitoring for bleeding signs and symptoms      Pt verbalizes understanding and agrees to warfarin dosing plan.    Instructed to call the Anticoagulation Clinic for any changes, questions or concerns. (#[phone])        Sofia Tai RN      OBJECTIVE:  Recent labs: (last 7 days)     04/29/21  0951   INR 3.10*         No question data found.  Anticoagulation Summary  As of 4/29/2021    INR goal:  2.0-3.0   TTR:  82.0 % (1 y)   INR used for dosing:  3.10 (4/29/2021)   Warfarin maintenance plan:  2.5 mg (5 mg x 0.5) every Mon; 5 mg (5 mg x 1) all other days   Full warfarin instructions:  2.5 mg every Mon; 5 mg all other days   Weekly warfarin total:  32.5 mg   Plan last modified:  Sofia Tai RN (10/30/2020)   Next INR check:  5/13/2021   Priority:  High   Target end date:  Indefinite    Indications    Atrial fibrillation  unspecified type (H) [I48.91]  Long term current use of anticoagulant therapy [Z79.01]             Anticoagulation Episode Summary     INR check location:      Preferred lab:      Send  INR reminders to:  [facility]    Comments:  * Interest in Home meter when insurance changes in Oct 2020      Anticoagulation Care Providers     Provider Role Specialty Phone number    Jolynn Cooper MD Referring Family Medicine [phone]

## 2021-04-29 NOTE — TELEPHONE ENCOUNTER
ANTICOAGULATION MANAGEMENT      Joe Mas due for annual renewal of referral to anticoagulation monitoring. Order pended for your review and signature.      ANTICOAGULATION SUMMARY      Warfarin indication(s)     Atrial fibrillation    Heart valve present?  NO       Current goal range   INR: 2.0-3.0     Goal appropriate for indication? Yes, INR 2-3 appropriate for hx of DVT, PE, hypercoagulable state, Afib, LVAD, or bileaflet AVR without risk factors     Current duration of therapy Indefinite/long term therapy   Time in Therapeutic Range (TTR)  (Goal > 60%) 82 %       Office visit with referring provider's group within last year yes on 4/9/2021       Sofia Tai RN

## 2021-05-05 DIAGNOSIS — J44.1 OBSTRUCTIVE CHRONIC BRONCHITIS WITH EXACERBATION (H): ICD-10-CM

## 2021-05-06 RX ORDER — ALBUTEROL SULFATE 90 UG/1
AEROSOL, METERED RESPIRATORY (INHALATION)
Qty: 18 G | Refills: 1 | Status: SHIPPED | OUTPATIENT
Start: 2021-05-06 | End: 2021-07-23

## 2021-05-13 ENCOUNTER — ANTICOAGULATION THERAPY VISIT (OUTPATIENT)
Dept: ANTICOAGULATION | Facility: CLINIC | Age: 66
End: 2021-05-13

## 2021-05-13 DIAGNOSIS — Z79.01 LONG TERM CURRENT USE OF ANTICOAGULANT THERAPY: ICD-10-CM

## 2021-05-13 DIAGNOSIS — I48.91 ATRIAL FIBRILLATION, UNSPECIFIED TYPE (H): ICD-10-CM

## 2021-05-13 LAB
CAPILLARY BLOOD COLLECTION: NORMAL
INR PPP: 2.1 (ref 0.86–1.14)

## 2021-05-13 PROCEDURE — 36416 COLLJ CAPILLARY BLOOD SPEC: CPT | Performed by: FAMILY MEDICINE

## 2021-05-13 PROCEDURE — 85610 PROTHROMBIN TIME: CPT | Performed by: FAMILY MEDICINE

## 2021-05-13 NOTE — PROGRESS NOTES
ANTICOAGULATION MANAGEMENT     Patient Name:  Joe Mas  Date:  2021    ASSESSMENT /SUBJECTIVE:    Today's INR result of 2.10 is therapeutic. Goal INR of 2.0-3.0      Warfarin dose taken: Warfarin taken as instructed    Diet: No new diet changes affecting INR    Medication changes/ interactions: No new medications/supplements affecting INR    Previous INR: Supratherapeutic 3.10    S/S of bleeding or thromboembolism: No    New injury or illness: No    Upcoming surgery, procedure or cardioversion: No    Additional findings: None      PLAN:    Telephone call with Joe regarding INR result and instructed:     Warfarin Dosing Instructions: Continue your current warfarin dose 2.5mg Mon; 5mg all other days    Instructed patient to follow up no later than: 4 weeks  Lab visit scheduled    Education provided: Importance of notifying clinic for changes in medications or health; a sooner lab recheck maybe needed      Joe verbalizes understanding and agrees to warfarin dosing plan.    Instructed to call the Anticoagulation Clinic for any changes, questions or concerns. (#[phone])        Beth De La Rosa RN [facility]       OBJECTIVE:  Recent labs: (last 7 days)     21  0954   INR 2.10*         Anticoagulation Summary  As of 2021    INR goal:  2.0-3.0   TTR:  81.6 % (1 y)   INR used for dosin.10 (2021)   Warfarin maintenance plan:  2.5 mg (5 mg x 0.5) every Mon; 5 mg (5 mg x 1) all other days   Full warfarin instructions:  2.5 mg every Mon; 5 mg all other days   Weekly warfarin total:  32.5 mg   No change documented:  Beth De La Rosa RN   Plan last modified:  Sofia Tai RN (10/30/2020)   Next INR check:  6/10/2021   Priority:  Maintenance   Target end date:  Indefinite    Indications    Atrial fibrillation  unspecified type (H) [I48.91]  Long term current use of anticoagulant therapy [Z79.01]             Anticoagulation Episode Summary     INR check location:      Preferred lab:       Send INR reminders to:  [facility] ELE[facility] HORTA    Comments:  * Interest in Home meter when insurance changes in Oct 2020      Anticoagulation Care Providers     Provider Role Specialty Phone number    Jolynn Cooper MD Referring Family Medicine [phone]

## 2021-06-10 ENCOUNTER — ANTICOAGULATION THERAPY VISIT (OUTPATIENT)
Dept: ANTICOAGULATION | Facility: CLINIC | Age: 66
End: 2021-06-10

## 2021-06-10 DIAGNOSIS — Z79.01 LONG TERM CURRENT USE OF ANTICOAGULANT THERAPY: ICD-10-CM

## 2021-06-10 DIAGNOSIS — I48.91 ATRIAL FIBRILLATION, UNSPECIFIED TYPE (H): ICD-10-CM

## 2021-06-10 LAB
CAPILLARY BLOOD COLLECTION: NORMAL
INR PPP: 2.8 (ref 0.86–1.14)

## 2021-06-10 PROCEDURE — 85610 PROTHROMBIN TIME: CPT | Performed by: FAMILY MEDICINE

## 2021-06-10 PROCEDURE — 36416 COLLJ CAPILLARY BLOOD SPEC: CPT | Performed by: FAMILY MEDICINE

## 2021-06-10 NOTE — PROGRESS NOTES
ANTICOAGULATION MANAGEMENT     Patient Name:  Joe Mas  Date:  6/10/2021    ASSESSMENT /SUBJECTIVE:    Today's INR result of 2.8 is therapeutic. Goal INR of 2.0-3.0      Warfarin dose taken: Warfarin taken as instructed    Diet: No new diet changes affecting INR    Medication changes/ interactions: No new medications/supplements affecting INR    Previous INR: Therapeutic     S/S of bleeding or thromboembolism: No    New injury or illness: No    Upcoming surgery, procedure or cardioversion: No    Additional findings: None      PLAN:    Warfarin Dosing Instructions: Continue your current warfarin dose 2.5 mg every Mon; 5 mg all other days    Instructed patient to follow up no later than: 4 weeks  Lab visit scheduled    Education provided: Please call back if any changes to your diet, medications or how you've been taking warfarin    Telephone call with Joe whom verbalizes understanding and agrees to plan    Instructed to call the Anticoagulation Clinic for any changes, questions or concerns. (#[phone])        Sofia Tai RN      OBJECTIVE:  Recent labs: (last 7 days)     06/10/21  1036   INR 2.80*         No question data found.  Anticoagulation Summary  As of 6/10/2021    INR goal:  2.0-3.0   TTR:  81.6 % (1 y)   INR used for dosin.80 (6/10/2021)   Warfarin maintenance plan:  2.5 mg (5 mg x 0.5) every Mon; 5 mg (5 mg x 1) all other days   Full warfarin instructions:  2.5 mg every Mon; 5 mg all other days   Weekly warfarin total:  32.5 mg   No change documented:  Sofia Tai RN   Plan last modified:  Sofia Tai RN (10/30/2020)   Next INR check:  2021   Priority:  Maintenance   Target end date:  Indefinite    Indications    Atrial fibrillation  unspecified type (H) [I48.91]  Long term current use of anticoagulant therapy [Z79.01]             Anticoagulation Episode Summary     INR check location:      Preferred lab:      Send INR reminders to:  MARIA ELENA HORTA    Comments:  * Interest  in Home meter when insurance changes in Oct 2020      Anticoagulation Care Providers     Provider Role Specialty Phone number    Jolynn Cooper MD Referring Family Medicine [phone]

## 2021-07-01 NOTE — PATIENT INSTRUCTIONS
INR clinic phone: [phone]    Information: Selecting Yes will display possible errors in your note based on the variables you have selected. This validation is only offered as a suggestion for you. PLEASE NOTE THAT THE VALIDATION TEXT WILL BE REMOVED WHEN YOU FINALIZE YOUR NOTE. IF YOU WANT TO FAX A PRELIMINARY NOTE YOU WILL NEED TO TOGGLE THIS TO 'NO' IF YOU DO NOT WANT IT IN YOUR FAXED NOTE.

## 2021-07-08 ENCOUNTER — ANTICOAGULATION THERAPY VISIT (OUTPATIENT)
Dept: ANTICOAGULATION | Facility: CLINIC | Age: 66
End: 2021-07-08

## 2021-07-08 DIAGNOSIS — Z79.01 LONG TERM (CURRENT) USE OF ANTICOAGULANTS: ICD-10-CM

## 2021-07-08 DIAGNOSIS — I48.91 ATRIAL FIBRILLATION, UNSPECIFIED TYPE (H): ICD-10-CM

## 2021-07-08 DIAGNOSIS — Z79.01 LONG TERM CURRENT USE OF ANTICOAGULANT THERAPY: ICD-10-CM

## 2021-07-08 DIAGNOSIS — I48.91 ATRIAL FIBRILLATION (H): Primary | ICD-10-CM

## 2021-07-08 LAB
CAPILLARY BLOOD COLLECTION: NORMAL
INR PPP: 3 (ref 0.86–1.14)

## 2021-07-08 PROCEDURE — 85610 PROTHROMBIN TIME: CPT | Performed by: FAMILY MEDICINE

## 2021-07-08 PROCEDURE — 36416 COLLJ CAPILLARY BLOOD SPEC: CPT | Performed by: FAMILY MEDICINE

## 2021-07-08 NOTE — PROGRESS NOTES
ANTICOAGULATION MANAGEMENT     Joe Mas 65 year old male is on warfarin with therapeutic INR result. (Goal INR 2.0-3.0)    Recent labs: (last 7 days)     07/08/21  1059   INR 3.00*       ASSESSMENT     Source(s): Patient/Caregiver Call       Warfarin doses taken: Warfarin taken as instructed    Diet: No new diet changes identified    New illness, injury, or hospitalization: No    Medication/supplement changes: None noted    Signs or symptoms of bleeding or clotting: No    Previous INR: Therapeutic last 2(+) visits    Additional findings: None     PLAN     Recommended plan for no diet, medication or health factor changes affecting INR     Dosing Instructions: Continue your current warfarin dose with next INR in 5 weeks       Summary  As of 7/8/2021    Full warfarin instructions:  2.5 mg every Mon; 5 mg all other days   Next INR check:               Telephone call with Joe who verbalizes understanding and agrees to plan    Lab visit scheduled    Education provided: Please call back if any changes to your diet, medications or how you've been taking warfarin    Plan made per [facility] anticoagulation protocol    Sofia Tai RN  Anticoagulation Clinic  7/8/2021    _______________________________________________________________________     Anticoagulation Episode Summary     Current INR goal:  2.0-3.0   TTR:  81.6 % (1 y)   Target end date:  Indefinite   Send INR reminders to:  [facility]    Indications    Atrial fibrillation  unspecified type (H) [I48.91]  Long term current use of anticoagulant therapy [Z79.01]           Comments:  * Interest in Home meter when insurance changes in Oct 2020         Anticoagulation Care Providers     Provider Role Specialty Phone number    Jolynn Cooper MD Referring Family Medicine [phone]

## 2021-07-22 DIAGNOSIS — I48.91 ATRIAL FIBRILLATION, UNSPECIFIED TYPE (H): ICD-10-CM

## 2021-07-22 DIAGNOSIS — J44.1 OBSTRUCTIVE CHRONIC BRONCHITIS WITH EXACERBATION (H): ICD-10-CM

## 2021-07-22 NOTE — TELEPHONE ENCOUNTER
Routing refill request to provider for review/approval because:  Labs out of range:  INR: 3.00 on 7/2021  Patient needs to be seen because:  Due for annual exam    Edinson Kumari RN

## 2021-07-23 RX ORDER — WARFARIN SODIUM 5 MG/1
TABLET ORAL
Qty: 84 TABLET | Refills: 0 | Status: SHIPPED | OUTPATIENT
Start: 2021-07-23 | End: 2021-09-02

## 2021-07-23 RX ORDER — ALBUTEROL SULFATE 90 UG/1
AEROSOL, METERED RESPIRATORY (INHALATION)
Qty: 8 G | Refills: 1 | Status: SHIPPED | OUTPATIENT
Start: 2021-07-23 | End: 2021-09-20

## 2021-07-23 RX ORDER — IPRATROPIUM BROMIDE AND ALBUTEROL SULFATE 2.5; .5 MG/3ML; MG/3ML
SOLUTION RESPIRATORY (INHALATION)
Qty: 360 ML | Refills: 2 | Status: SHIPPED | OUTPATIENT
Start: 2021-07-23 | End: 2022-01-17

## 2021-07-23 NOTE — TELEPHONE ENCOUNTER
He is due for a diabetes appointment   Please help him schedule  I will fill his meds for now   TESFAYE Cooper MD

## 2021-08-12 ENCOUNTER — LAB (OUTPATIENT)
Dept: LAB | Facility: CLINIC | Age: 66
End: 2021-08-12
Payer: COMMERCIAL

## 2021-08-12 ENCOUNTER — ANTICOAGULATION THERAPY VISIT (OUTPATIENT)
Dept: ANTICOAGULATION | Facility: CLINIC | Age: 66
End: 2021-08-12

## 2021-08-12 DIAGNOSIS — Z79.01 LONG TERM CURRENT USE OF ANTICOAGULANT THERAPY: ICD-10-CM

## 2021-08-12 DIAGNOSIS — I48.91 ATRIAL FIBRILLATION, UNSPECIFIED TYPE (H): Primary | ICD-10-CM

## 2021-08-12 DIAGNOSIS — I48.91 ATRIAL FIBRILLATION, UNSPECIFIED TYPE (H): ICD-10-CM

## 2021-08-12 LAB — INR BLD: 2.7 (ref 0.9–1.1)

## 2021-08-12 PROCEDURE — 85610 PROTHROMBIN TIME: CPT

## 2021-08-12 PROCEDURE — 36416 COLLJ CAPILLARY BLOOD SPEC: CPT

## 2021-08-12 NOTE — PROGRESS NOTES
ANTICOAGULATION MANAGEMENT     Joe Mas 65 year old male is on warfarin with therapeutic INR result. (Goal INR 2.0-3.0)    Recent labs: (last 7 days)     08/12/21  1100   INR 2.7*       ASSESSMENT     Source(s): Patient/Caregiver Call       Warfarin doses taken: Warfarin taken as instructed and Patient reports for the last 4 months he has been taking his maintenance dose slightly differently. One week he takes as directed, then the next week he does 2.5 mg on Mon, Sat; 5 mg all other days    Diet: No new diet changes identified    New illness, injury, or hospitalization: No    Medication/supplement changes: None noted    Signs or symptoms of bleeding or clotting: No    Previous INR: Therapeutic last 2(+) visits    Additional findings: None     PLAN     Recommended plan for no diet, medication or health factor changes affecting INR     Dosing Instructions: Continue your current warfarin dose with next INR in 6 weeks       Summary  As of 8/12/2021    Full warfarin instructions:  2.5 mg every Mon; 5 mg all other days   Next INR check:  9/23/2021             Telephone call with Joe who verbalizes understanding and agrees to plan    Lab visit scheduled    Education provided: Contact [phone]  with any changes, questions or concerns.     Plan made per [facility] anticoagulation protocol    Cheryl Coats RN  Anticoagulation Clinic  8/12/2021    _______________________________________________________________________     Anticoagulation Episode Summary     Current INR goal:  2.0-3.0   TTR:  81.6 % (1 y)   Target end date:  Indefinite   [facility]    Indications    Atrial fibrillation  unspecified type (H) [I48.91]  Long term current use of anticoagulant therapy [Z79.01]           Comments:  * Interest in Home meter when insurance changes in Oct 2020         Anticoagulation Care Providers     Provider Role Specialty Phone number    Jolynn Cooper MD Referring Family Medicine  [phone]

## 2021-09-19 ENCOUNTER — HEALTH MAINTENANCE LETTER (OUTPATIENT)
Age: 66
End: 2021-09-19

## 2021-09-20 DIAGNOSIS — J44.1 OBSTRUCTIVE CHRONIC BRONCHITIS WITH EXACERBATION (H): ICD-10-CM

## 2021-09-20 RX ORDER — ALBUTEROL SULFATE 90 UG/1
AEROSOL, METERED RESPIRATORY (INHALATION)
Qty: 18 G | Refills: 0 | Status: SHIPPED | OUTPATIENT
Start: 2021-09-20 | End: 2021-11-02

## 2021-09-20 NOTE — TELEPHONE ENCOUNTER
Prescription approved per [facility] Refill Protocol.  Vern Conley RN

## 2021-09-27 ENCOUNTER — ANTICOAGULATION THERAPY VISIT (OUTPATIENT)
Dept: ANTICOAGULATION | Facility: CLINIC | Age: 66
End: 2021-09-27

## 2021-09-27 ENCOUNTER — LAB (OUTPATIENT)
Dept: LAB | Facility: CLINIC | Age: 66
End: 2021-09-27
Payer: COMMERCIAL

## 2021-09-27 DIAGNOSIS — Z79.01 LONG TERM CURRENT USE OF ANTICOAGULANT THERAPY: ICD-10-CM

## 2021-09-27 DIAGNOSIS — I48.91 ATRIAL FIBRILLATION, UNSPECIFIED TYPE (H): Primary | ICD-10-CM

## 2021-09-27 DIAGNOSIS — I48.91 ATRIAL FIBRILLATION, UNSPECIFIED TYPE (H): ICD-10-CM

## 2021-09-27 LAB — INR BLD: 4.1 (ref 0.9–1.1)

## 2021-09-27 PROCEDURE — 36416 COLLJ CAPILLARY BLOOD SPEC: CPT

## 2021-09-27 PROCEDURE — 85610 PROTHROMBIN TIME: CPT

## 2021-09-27 RX ORDER — WARFARIN SODIUM 5 MG/1
TABLET ORAL
Qty: 84 TABLET | Refills: 1 | COMMUNITY
Start: 2021-09-27 | End: 2022-02-01

## 2021-09-27 NOTE — PROGRESS NOTES
ANTICOAGULATION MANAGEMENT     Joe Mas 65 year old male is on warfarin with supratherapeutic INR result. (Goal INR 2.0-3.0)    Recent labs: (last 7 days)     09/27/21  1602   INR 4.1*       ASSESSMENT     Source(s): Chart Review and Patient/Caregiver Call       Warfarin doses taken: Warfarin taken as instructed, patient has been alternating a 2.5 mg dose on Sat every OTHER week for 6 months, this week was not the week he took 2.5 mg on Sat so his weekly dose was 32.5 mg and he also has been drinking more alcohol    Diet: Change in alcohol intake may be affecting INR. patient used to drink 2 drinks 2 times per week, that has increased to 3-4 times per week     New illness, injury, or hospitalization: No    Medication/supplement changes: None noted    Signs or symptoms of bleeding or clotting: No    Previous INR: Therapeutic last 2(+) visits    Additional findings: None     PLAN     Recommended plan for ongoing change(s) affecting INR     Dosing Instructions: Hold dose then Decrease your warfarin dose (8% change) with next INR in 10 days       Summary  As of 9/27/2021    Full warfarin instructions:  9/28: Hold; Otherwise 2.5 mg every Mon, Fri; 5 mg all other days   Next INR check:               Telephone call with Joe who verbalizes understanding and agrees to plan    Lab visit scheduled    Education provided: Potential interaction between warfarin and alcohol, Monitoring for bleeding signs and symptoms and When to seek medical attention/emergency care    Plan made per [facility] anticoagulation protocol    Cheryl Coats RN  Anticoagulation Clinic  9/27/2021    _______________________________________________________________________     Anticoagulation Episode Summary     Current INR goal:  2.0-3.0   TTR:  74.2 % (1 y)   Target end date:  Indefinite   Send INR reminders to:  [facility]    Indications    Atrial fibrillation  unspecified type (H) [I48.91]  Long term current use of anticoagulant therapy  [Z79.01]           Comments:  * Interest in Home meter when insurance changes in Oct 2020         Anticoagulation Care Providers     Provider Role Specialty Phone number    Jolynn Cooper MD Referring Family Medicine [phone]

## 2021-10-08 ENCOUNTER — ANTICOAGULATION THERAPY VISIT (OUTPATIENT)
Dept: ANTICOAGULATION | Facility: CLINIC | Age: 66
End: 2021-10-08

## 2021-10-08 ENCOUNTER — LAB (OUTPATIENT)
Dept: LAB | Facility: CLINIC | Age: 66
End: 2021-10-08
Payer: COMMERCIAL

## 2021-10-08 DIAGNOSIS — I48.91 ATRIAL FIBRILLATION, UNSPECIFIED TYPE (H): ICD-10-CM

## 2021-10-08 DIAGNOSIS — Z79.01 LONG TERM CURRENT USE OF ANTICOAGULANT THERAPY: ICD-10-CM

## 2021-10-08 DIAGNOSIS — I48.91 ATRIAL FIBRILLATION, UNSPECIFIED TYPE (H): Primary | ICD-10-CM

## 2021-10-08 LAB — INR BLD: 3.7 (ref 0.9–1.1)

## 2021-10-08 PROCEDURE — 85610 PROTHROMBIN TIME: CPT

## 2021-10-08 PROCEDURE — 36416 COLLJ CAPILLARY BLOOD SPEC: CPT

## 2021-10-08 NOTE — PROGRESS NOTES
ANTICOAGULATION MANAGEMENT     Joe Mas 65 year old male is on warfarin with supratherapeutic INR result. (Goal INR 2.0-3.0)    Recent labs: (last 7 days)     10/08/21  1109   INR 3.7*       ASSESSMENT     Source(s): Chart Review and Patient/Caregiver Call       Warfarin doses taken: Warfarin taken as instructed    Diet: No new diet changes identified    New illness, injury, or hospitalization: No    Medication/supplement changes: None noted    Signs or symptoms of bleeding or clotting: No    Previous INR: Supratherapeutic    Additional findings: None     PLAN     Recommended plan for no diet, medication or health factor changes affecting INR     Dosing Instructions: Hold dose then Decrease your warfarin dose (8% change) with next INR in 1 week       Summary  As of 10/8/2021    Full warfarin instructions:  10/8: Hold; Otherwise 2.5 mg every Mon, Wed, Fri; 5 mg all other days   Next INR check:  10/15/2021             Telephone call with Joe who verbalizes understanding and agrees to plan    Lab visit scheduled    Education provided: Please call back if any changes to your diet, medications or how you've been taking warfarin and Contact [phone]  with any changes, questions or concerns.     Plan made per [facility] anticoagulation protocol    Halie Birmingham RN  Anticoagulation Clinic  10/8/2021    _______________________________________________________________________     Anticoagulation Episode Summary     Current INR goal:  2.0-3.0   TTR:  72.3 % (1 y)   Target end date:  Indefinite   Send INR reminders to:  [facility]    Indications    Atrial fibrillation  unspecified type (H) [I48.91]  Long term current use of anticoagulant therapy [Z79.01]           Comments:  * Interest in Home meter when insurance changes in Oct 2020         Anticoagulation Care Providers     Provider Role Specialty Phone number    Jolynn Cooper MD Referring Family Medicine [phone]

## 2021-10-15 ENCOUNTER — ANTICOAGULATION THERAPY VISIT (OUTPATIENT)
Dept: FAMILY MEDICINE | Facility: CLINIC | Age: 66
End: 2021-10-15

## 2021-10-15 ENCOUNTER — LAB (OUTPATIENT)
Dept: LAB | Facility: CLINIC | Age: 66
End: 2021-10-15
Payer: COMMERCIAL

## 2021-10-15 DIAGNOSIS — I48.91 ATRIAL FIBRILLATION, UNSPECIFIED TYPE (H): Primary | ICD-10-CM

## 2021-10-15 DIAGNOSIS — Z79.01 LONG TERM CURRENT USE OF ANTICOAGULANT THERAPY: ICD-10-CM

## 2021-10-15 DIAGNOSIS — I48.91 ATRIAL FIBRILLATION, UNSPECIFIED TYPE (H): ICD-10-CM

## 2021-10-15 LAB — INR BLD: 2.2 (ref 0.9–1.1)

## 2021-10-15 PROCEDURE — 36416 COLLJ CAPILLARY BLOOD SPEC: CPT

## 2021-10-15 PROCEDURE — 85610 PROTHROMBIN TIME: CPT

## 2021-10-15 NOTE — PROGRESS NOTES
ANTICOAGULATION MANAGEMENT     Joe Mas 65 year old male is on warfarin with therapeutic INR result. (Goal INR 2.0-3.0)    Recent labs: (last 7 days)     10/15/21  1314   INR 2.2*       ASSESSMENT     Source(s): Chart Review and Patient/Caregiver Call       Warfarin doses taken: Warfarin taken as instructed    Diet: No new diet changes identified    New illness, injury, or hospitalization: No    Medication/supplement changes: None noted    Signs or symptoms of bleeding or clotting: No    Previous INR: Supratherapeutic    Additional findings: None     PLAN     Recommended plan for no diet, medication or health factor changes affecting INR     Dosing Instructions: Continue your current warfarin dose with next INR in 2 weeks       Summary  As of 10/15/2021    Full warfarin instructions:  2.5 mg every Mon, Wed, Fri; 5 mg all other days   Next INR check:  10/29/2021             Telephone call with Joe who verbalizes understanding and agrees to plan    Lab visit scheduled    Education provided: Please call back if any changes to your diet, medications or how you've been taking warfarin    Plan made per [facility] anticoagulation protocol    Sofia Tai RN  Anticoagulation Clinic  10/15/2021    _______________________________________________________________________     Anticoagulation Episode Summary     Current INR goal:  2.0-3.0   TTR:  73.3 % (1 y)   Target end date:  Indefinite   Send INR reminders to:  [facility]LE[facility] YANG    Indications    Atrial fibrillation  unspecified type (H) [I48.91]  Long term current use of anticoagulant therapy [Z79.01]           Comments:  takes in AM         Anticoagulation Care Providers     Provider Role Specialty Phone number    Jolynn Cooper MD Referring Family Medicine [phone]

## 2021-10-18 DIAGNOSIS — E78.2 MIXED HYPERLIPIDEMIA: ICD-10-CM

## 2021-10-19 RX ORDER — ATORVASTATIN CALCIUM 20 MG/1
TABLET, FILM COATED ORAL
Qty: 30 TABLET | Refills: 5 | Status: SHIPPED | OUTPATIENT
Start: 2021-10-19 | End: 2022-02-01

## 2021-10-19 NOTE — TELEPHONE ENCOUNTER
Routing refill request to provider for review/approval because:  Patient needs to be seen because:  Was due for DM check 7/9/21.  Bailey Ruiz RN

## 2021-10-29 ENCOUNTER — LAB (OUTPATIENT)
Dept: LAB | Facility: CLINIC | Age: 66
End: 2021-10-29
Payer: COMMERCIAL

## 2021-10-29 ENCOUNTER — ANTICOAGULATION THERAPY VISIT (OUTPATIENT)
Dept: FAMILY MEDICINE | Facility: CLINIC | Age: 66
End: 2021-10-29

## 2021-10-29 DIAGNOSIS — I48.91 ATRIAL FIBRILLATION, UNSPECIFIED TYPE (H): Primary | ICD-10-CM

## 2021-10-29 DIAGNOSIS — Z79.01 LONG TERM CURRENT USE OF ANTICOAGULANT THERAPY: ICD-10-CM

## 2021-10-29 DIAGNOSIS — I48.91 ATRIAL FIBRILLATION, UNSPECIFIED TYPE (H): ICD-10-CM

## 2021-10-29 LAB — INR BLD: 2.7 (ref 0.9–1.1)

## 2021-10-29 PROCEDURE — 36416 COLLJ CAPILLARY BLOOD SPEC: CPT

## 2021-10-29 PROCEDURE — 85610 PROTHROMBIN TIME: CPT

## 2021-10-29 NOTE — PROGRESS NOTES
ANTICOAGULATION MANAGEMENT     Joe Mas 66 year old male is on warfarin with therapeutic INR result. (Goal INR 2.0-3.0)    Recent labs: (last 7 days)     10/29/21  1136   INR 2.7*       ASSESSMENT     Source(s): Chart Review and Patient/Caregiver Call       Warfarin doses taken: Warfarin taken as instructed    Diet: No new diet changes identified    New illness, injury, or hospitalization: No    Medication/supplement changes: None noted    Signs or symptoms of bleeding or clotting: No    Previous INR: Therapeutic last visit; previously outside of goal range    Additional findings: None     PLAN     Recommended plan for no diet, medication or health factor changes affecting INR     Dosing Instructions: Continue your current warfarin dose with next INR in 3 weeks       Summary  As of 10/29/2021    Full warfarin instructions:  2.5 mg every Mon, Wed, Fri; 5 mg all other days   Next INR check:  11/19/2021             Telephone call with Joe who verbalizes understanding and agrees to plan    Lab visit scheduled    Education provided: Please call back if any changes to your diet, medications or how you've been taking warfarin    Plan made per [facility] anticoagulation protocol    Sofia Tai RN  Anticoagulation Clinic  10/29/2021    _______________________________________________________________________     Anticoagulation Episode Summary     Current INR goal:  2.0-3.0   TTR:  77.1 % (1 y)   Target end date:  Indefinite   Send INR reminders to:  MARIA ELENA YANG    Indications    Atrial fibrillation  unspecified type (H) [I48.91]  Long term current use of anticoagulant therapy [Z79.01]           Comments:  takes in AM         Anticoagulation Care Providers     Provider Role Specialty Phone number    Jolynn Cooper MD Referring Family Medicine [phone]

## 2021-11-02 DIAGNOSIS — J44.1 OBSTRUCTIVE CHRONIC BRONCHITIS WITH EXACERBATION (H): ICD-10-CM

## 2021-11-02 RX ORDER — ALBUTEROL SULFATE 90 UG/1
AEROSOL, METERED RESPIRATORY (INHALATION)
Qty: 18 G | Refills: 0 | Status: SHIPPED | OUTPATIENT
Start: 2021-11-02 | End: 2021-12-14

## 2021-11-14 ENCOUNTER — HEALTH MAINTENANCE LETTER (OUTPATIENT)
Age: 66
End: 2021-11-14

## 2021-11-19 ENCOUNTER — ANTICOAGULATION THERAPY VISIT (OUTPATIENT)
Dept: ANTICOAGULATION | Facility: CLINIC | Age: 66
End: 2021-11-19

## 2021-11-19 ENCOUNTER — LAB (OUTPATIENT)
Dept: LAB | Facility: CLINIC | Age: 66
End: 2021-11-19
Payer: COMMERCIAL

## 2021-11-19 DIAGNOSIS — Z79.01 LONG TERM CURRENT USE OF ANTICOAGULANT THERAPY: ICD-10-CM

## 2021-11-19 DIAGNOSIS — I48.91 ATRIAL FIBRILLATION, UNSPECIFIED TYPE (H): Primary | ICD-10-CM

## 2021-11-19 DIAGNOSIS — I48.91 ATRIAL FIBRILLATION, UNSPECIFIED TYPE (H): ICD-10-CM

## 2021-11-19 LAB — INR BLD: 3 (ref 0.9–1.1)

## 2021-11-19 PROCEDURE — 36416 COLLJ CAPILLARY BLOOD SPEC: CPT

## 2021-11-19 PROCEDURE — 85610 PROTHROMBIN TIME: CPT

## 2021-11-19 NOTE — PROGRESS NOTES
ANTICOAGULATION MANAGEMENT     Joe Mas 66 year old male is on warfarin with therapeutic INR result. (Goal INR 2.0-3.0)    Recent labs: (last 7 days)     11/19/21  1142   INR 3.0*       ASSESSMENT     Source(s): Chart Review and Patient/Caregiver Call       Warfarin doses taken: Warfarin taken as instructed    Diet: No new diet changes identified    New illness, injury, or hospitalization: No    Medication/supplement changes: None noted    Signs or symptoms of bleeding or clotting: No    Previous INR: Therapeutic last visit; previously outside of goal range    Additional findings: None     PLAN     Recommended plan for no diet, medication or health factor changes affecting INR     Dosing Instructions: Continue your current warfarin dose with next INR in 4 weeks       Summary  As of 11/19/2021    Full warfarin instructions:  2.5 mg every Mon, Wed, Fri; 5 mg all other days   Next INR check:  12/17/2021             Telephone call with Joe who verbalizes understanding and agrees to plan    Lab visit scheduled    Education provided: Please call back if any changes to your diet, medications or how you've been taking warfarin and Contact [phone]  with any changes, questions or concerns.     Plan made per [facility] anticoagulation protocol    Lizbet Pazi RN  Anticoagulation Clinic  11/19/2021    _______________________________________________________________________     Anticoagulation Episode Summary     Current INR goal:  2.0-3.0   TTR:  77.3 % (1 y)   Target end date:  Indefinite   Send INR reminders to:  MARIA ELENA YANG    Indications    Atrial fibrillation  unspecified type (H) [I48.91]  Long term current use of anticoagulant therapy [Z79.01]           Comments:  takes in AM         Anticoagulation Care Providers     Provider Role Specialty Phone number    Jolynn Cooper MD Referring Family Medicine [phone]

## 2021-12-17 ENCOUNTER — ANTICOAGULATION THERAPY VISIT (OUTPATIENT)
Dept: ANTICOAGULATION | Facility: CLINIC | Age: 66
End: 2021-12-17

## 2021-12-17 ENCOUNTER — LAB (OUTPATIENT)
Dept: LAB | Facility: CLINIC | Age: 66
End: 2021-12-17
Payer: COMMERCIAL

## 2021-12-17 DIAGNOSIS — I48.91 ATRIAL FIBRILLATION, UNSPECIFIED TYPE (H): Primary | ICD-10-CM

## 2021-12-17 DIAGNOSIS — I48.91 ATRIAL FIBRILLATION, UNSPECIFIED TYPE (H): ICD-10-CM

## 2021-12-17 DIAGNOSIS — Z79.01 LONG TERM CURRENT USE OF ANTICOAGULANT THERAPY: ICD-10-CM

## 2021-12-17 LAB — INR BLD: 3.5 (ref 0.9–1.1)

## 2021-12-17 PROCEDURE — 36416 COLLJ CAPILLARY BLOOD SPEC: CPT

## 2021-12-17 PROCEDURE — 85610 PROTHROMBIN TIME: CPT

## 2021-12-17 NOTE — PROGRESS NOTES
ANTICOAGULATION MANAGEMENT     Joe Mas 66 year old male is on warfarin with supratherapeutic INR result. (Goal INR 2.0-3.0)    Recent labs: (last 7 days)     12/17/21  1126   INR 3.5*       ASSESSMENT     Source(s): Patient/Caregiver Call       Warfarin doses taken: Warfarin taken as instructed    Diet: No new diet changes identified    New illness, injury, or hospitalization: No    Medication/supplement changes: None noted    Signs or symptoms of bleeding or clotting: No    Previous INR: Therapeutic last 2(+) visits    Additional findings: Stress     PLAN     Recommended plan for temporary change(s) affecting INR     Dosing Instructions: Hold dose then continue on maintenance dose with next INR in 2 weeks       Summary  As of 12/17/2021    Full warfarin instructions:  12/17: Hold; 12/18: Hold; Otherwise 2.5 mg every Mon, Wed, Fri; 5 mg all other days   Next INR check:  12/31/2021             Telephone call with Joe who verbalizes understanding and agrees to plan    Lab visit scheduled    Education provided: Please call back if any changes to your diet, medications or how you've been taking warfarin, Monitoring for bleeding signs and symptoms, When to seek medical attention/emergency care and Contact [phone]  with any changes, questions or concerns.     Plan made per [facility] anticoagulation protocol    Lizbet Paiz RN  Anticoagulation Clinic  12/17/2021    _______________________________________________________________________     Anticoagulation Episode Summary     Current INR goal:  2.0-3.0   TTR:  69.6 % (1 y)   Target end date:  Indefinite   Send INR reminders to:  MARIA ELENA YANG    Indications    Atrial fibrillation  unspecified type (H) [I48.91]  Long term current use of anticoagulant therapy [Z79.01]           Comments:  takes in AM         Anticoagulation Care Providers     Provider Role Specialty Phone number    Jolynn Cooper MD Referring Family Medicine [phone]         Anticoagulation Management    Unable to reach Joe today.    Today's INR result of 3.5 is supratherapeutic (goal INR of 2.0-3.0).  Result received from: Clinic Lab    Follow up required to confirm warfarin dose taken and assess for changes    Left message to hold warfarin tonight.      Anticoagulation clinic to follow up    Lizbet Paiz RN

## 2021-12-31 ENCOUNTER — ANTICOAGULATION THERAPY VISIT (OUTPATIENT)
Dept: ANTICOAGULATION | Facility: CLINIC | Age: 66
End: 2021-12-31

## 2021-12-31 ENCOUNTER — LAB (OUTPATIENT)
Dept: LAB | Facility: CLINIC | Age: 66
End: 2021-12-31
Payer: COMMERCIAL

## 2021-12-31 DIAGNOSIS — Z79.01 LONG TERM CURRENT USE OF ANTICOAGULANT THERAPY: ICD-10-CM

## 2021-12-31 DIAGNOSIS — I48.91 ATRIAL FIBRILLATION, UNSPECIFIED TYPE (H): ICD-10-CM

## 2021-12-31 DIAGNOSIS — I48.91 ATRIAL FIBRILLATION, UNSPECIFIED TYPE (H): Primary | ICD-10-CM

## 2021-12-31 LAB — INR BLD: 2 (ref 0.9–1.1)

## 2021-12-31 PROCEDURE — 85610 PROTHROMBIN TIME: CPT

## 2021-12-31 PROCEDURE — 36416 COLLJ CAPILLARY BLOOD SPEC: CPT

## 2021-12-31 NOTE — PROGRESS NOTES
ANTICOAGULATION MANAGEMENT     Joe Mas 66 year old male is on warfarin with therapeutic INR result. (Goal INR 2.0-3.0)    Recent labs: (last 7 days)     12/31/21  1144   INR 2.0*       ASSESSMENT     Source(s): Patient/Caregiver Call       Warfarin doses taken: Warfarin taken as instructed    Diet: No new diet changes identified    New illness, injury, or hospitalization: No    Medication/supplement changes: None noted    Signs or symptoms of bleeding or clotting: No    Previous INR: Supratherapeutic    Additional findings: None     PLAN     Recommended plan for no diet, medication or health factor changes affecting INR     Dosing Instructions: Continue your current warfarin dose with next INR in 3 weeks       Summary  As of 12/31/2021    Full warfarin instructions:  2.5 mg every Mon, Wed, Fri; 5 mg all other days   Next INR check:  1/21/2022             Telephone call with Joe who verbalizes understanding and agrees to plan    Lab visit scheduled    Education provided: Please call back if any changes to your diet, medications or how you've been taking warfarin and Contact [phone]  with any changes, questions or concerns.     Plan made per [facility] anticoagulation protocol    Lizbet Paiz RN  Anticoagulation Clinic  12/31/2021    _______________________________________________________________________     Anticoagulation Episode Summary     Current INR goal:  2.0-3.0   TTR:  69.5 % (1 y)   Target end date:  Indefinite   Send INR reminders to:  MARIA ELENA YANG    Indications    Atrial fibrillation  unspecified type (H) [I48.91]  Long term current use of anticoagulant therapy [Z79.01]           Comments:  takes in AM         Anticoagulation Care Providers     Provider Role Specialty Phone number    Jolynn Cooper MD Referring Family Medicine [phone]

## 2022-01-02 DIAGNOSIS — J44.1 OBSTRUCTIVE CHRONIC BRONCHITIS WITH EXACERBATION (H): ICD-10-CM

## 2022-01-02 NOTE — TELEPHONE ENCOUNTER
Routing albuterol inhaler refill request to Provider because chart indicates that it was filled 12/14/21.  Thank you.  Vern Conley RN

## 2022-01-03 RX ORDER — ALBUTEROL SULFATE 90 UG/1
AEROSOL, METERED RESPIRATORY (INHALATION)
Qty: 18 G | Refills: 0 | Status: SHIPPED | OUTPATIENT
Start: 2022-01-03 | End: 2022-02-10

## 2022-01-09 ENCOUNTER — HEALTH MAINTENANCE LETTER (OUTPATIENT)
Age: 67
End: 2022-01-09

## 2022-01-13 DIAGNOSIS — J44.1 OBSTRUCTIVE CHRONIC BRONCHITIS WITH EXACERBATION (H): ICD-10-CM

## 2022-01-14 DIAGNOSIS — J44.1 OBSTRUCTIVE CHRONIC BRONCHITIS WITH EXACERBATION (H): ICD-10-CM

## 2022-01-17 ENCOUNTER — TELEPHONE (OUTPATIENT)
Dept: FAMILY MEDICINE | Facility: CLINIC | Age: 67
End: 2022-01-17
Payer: COMMERCIAL

## 2022-01-17 DIAGNOSIS — Z12.5 SCREENING FOR PROSTATE CANCER: ICD-10-CM

## 2022-01-17 DIAGNOSIS — N50.89 SCROTAL SWELLING: Primary | ICD-10-CM

## 2022-01-17 RX ORDER — IPRATROPIUM BROMIDE AND ALBUTEROL SULFATE 2.5; .5 MG/3ML; MG/3ML
SOLUTION RESPIRATORY (INHALATION)
Qty: 360 ML | Refills: 2 | Status: SHIPPED | OUTPATIENT
Start: 2022-01-17 | End: 2022-04-18

## 2022-01-17 RX ORDER — IPRATROPIUM BROMIDE AND ALBUTEROL SULFATE 2.5; .5 MG/3ML; MG/3ML
SOLUTION RESPIRATORY (INHALATION)
Qty: 360 ML | Refills: 2 | OUTPATIENT
Start: 2022-01-17

## 2022-01-17 NOTE — TELEPHONE ENCOUNTER
Call placed to patient.  Left detailed voicemail message per request.  Relayed Dr Joshi's recommendations.  Scheduling numbers given for lab appointment, urology appointment and US appointment scheduling.  Call back number given for [facility] care team if additional questions.Bailey Ruiz RN

## 2022-01-17 NOTE — TELEPHONE ENCOUNTER
Reason for call:  Dx with Hydrocele several years ago but now it is getting much worse    Symptom or request: hydrocele  Can't ignore it anymore  Going to the bathroom is hard to get a steady stream  Testicles are enlarged to the point of fixing now.    Duration (how long have symptoms been present): years since symptoms have been troublesome    Have you been treated for this before? Yes    Additional comments: who should I see for this ? Do they do this procedure in the clinic?    Phone Number patient can be reached at:  Home number on file [phone] (home)    Best Time:  any    Can we leave a detailed message on this number:  YES    Call taken on 1/17/2022 at 12:06 PM by Janel oGldstein

## 2022-01-21 ENCOUNTER — ANTICOAGULATION THERAPY VISIT (OUTPATIENT)
Dept: ANTICOAGULATION | Facility: CLINIC | Age: 67
End: 2022-01-21

## 2022-01-21 ENCOUNTER — LAB (OUTPATIENT)
Dept: LAB | Facility: CLINIC | Age: 67
End: 2022-01-21
Payer: COMMERCIAL

## 2022-01-21 DIAGNOSIS — I10 ESSENTIAL HYPERTENSION, BENIGN: ICD-10-CM

## 2022-01-21 DIAGNOSIS — I48.91 ATRIAL FIBRILLATION, UNSPECIFIED TYPE (H): Primary | ICD-10-CM

## 2022-01-21 DIAGNOSIS — E11.8 TYPE 2 DIABETES MELLITUS WITH COMPLICATION, WITHOUT LONG-TERM CURRENT USE OF INSULIN (H): ICD-10-CM

## 2022-01-21 DIAGNOSIS — Z79.01 LONG TERM CURRENT USE OF ANTICOAGULANT THERAPY: ICD-10-CM

## 2022-01-21 DIAGNOSIS — Z12.5 SCREENING FOR PROSTATE CANCER: ICD-10-CM

## 2022-01-21 DIAGNOSIS — I48.91 ATRIAL FIBRILLATION, UNSPECIFIED TYPE (H): ICD-10-CM

## 2022-01-21 LAB
ANION GAP SERPL CALCULATED.3IONS-SCNC: 4 MMOL/L (ref 3–14)
BUN SERPL-MCNC: 16 MG/DL (ref 7–30)
CALCIUM SERPL-MCNC: 9.4 MG/DL (ref 8.5–10.1)
CHLORIDE BLD-SCNC: 104 MMOL/L (ref 94–109)
CO2 SERPL-SCNC: 30 MMOL/L (ref 20–32)
CREAT SERPL-MCNC: 0.88 MG/DL (ref 0.66–1.25)
GFR SERPL CREATININE-BSD FRML MDRD: >90 ML/MIN/1.73M2
GLUCOSE BLD-MCNC: 169 MG/DL (ref 70–99)
HBA1C MFR BLD: 7.6 % (ref 0–5.6)
INR BLD: 2.3 (ref 0.9–1.1)
POTASSIUM BLD-SCNC: 4 MMOL/L (ref 3.4–5.3)
PSA SERPL-MCNC: 2.1 UG/L (ref 0–4)
SODIUM SERPL-SCNC: 138 MMOL/L (ref 133–144)

## 2022-01-21 PROCEDURE — 85610 PROTHROMBIN TIME: CPT

## 2022-01-21 PROCEDURE — 80048 BASIC METABOLIC PNL TOTAL CA: CPT

## 2022-01-21 PROCEDURE — 83036 HEMOGLOBIN GLYCOSYLATED A1C: CPT

## 2022-01-21 PROCEDURE — G0103 PSA SCREENING: HCPCS

## 2022-01-21 PROCEDURE — 36415 COLL VENOUS BLD VENIPUNCTURE: CPT

## 2022-01-21 NOTE — PROGRESS NOTES
ANTICOAGULATION MANAGEMENT     Joe Mas 66 year old male is on warfarin with therapeutic INR result. (Goal INR 2.0-3.0)    Recent labs: (last 7 days)     01/21/22  1144   INR 2.3*       ASSESSMENT     Source(s): Patient/Caregiver Call       Warfarin doses taken: Warfarin taken as instructed    Diet: No new diet changes identified    New illness, injury, or hospitalization: No    Medication/supplement changes: None noted    Signs or symptoms of bleeding or clotting: No    Previous INR: Therapeutic last 2(+) visits    Additional findings: None     PLAN     Recommended plan for no diet, medication or health factor changes affecting INR     Dosing Instructions: Continue your current warfarin dose with next INR in 4 weeks       Summary  As of 1/21/2022    Full warfarin instructions:  2.5 mg every Mon, Wed, Fri; 5 mg all other days   Next INR check:  2/18/2022             Telephone call with Joe who verbalizes understanding and agrees to plan    Lab visit scheduled    Education provided: Please call back if any changes to your diet, medications or how you've been taking warfarin and Contact [phone]  with any changes, questions or concerns.     Plan made per [facility] anticoagulation protocol    Lizbet Paiz RN  Anticoagulation Clinic  1/21/2022    _______________________________________________________________________     Anticoagulation Episode Summary     Current INR goal:  2.0-3.0   TTR:  75.2 % (1 y)   Target end date:  Indefinite   Send INR reminders to:  MARIA ELENA YANG    Indications    Atrial fibrillation  unspecified type (H) [I48.91]  Long term current use of anticoagulant therapy [Z79.01]           Comments:  takes in AM         Anticoagulation Care Providers     Provider Role Specialty Phone number    Jolynn Cooper MD Referring Family Medicine [phone]

## 2022-01-26 ENCOUNTER — HOSPITAL ENCOUNTER (OUTPATIENT)
Dept: ULTRASOUND IMAGING | Facility: CLINIC | Age: 67
Discharge: HOME | End: 2022-01-26
Attending: FAMILY MEDICINE | Admitting: FAMILY MEDICINE
Payer: COMMERCIAL

## 2022-01-26 DIAGNOSIS — N50.89 SCROTAL SWELLING: ICD-10-CM

## 2022-01-26 PROCEDURE — 76870 US EXAM SCROTUM: CPT

## 2022-02-01 ENCOUNTER — OFFICE VISIT (OUTPATIENT)
Dept: FAMILY MEDICINE | Facility: CLINIC | Age: 67
End: 2022-02-01
Payer: COMMERCIAL

## 2022-02-01 VITALS
HEIGHT: 69 IN | BODY MASS INDEX: 39.4 KG/M2 | HEART RATE: 81 BPM | WEIGHT: 266 LBS | TEMPERATURE: 97.7 F | SYSTOLIC BLOOD PRESSURE: 128 MMHG | DIASTOLIC BLOOD PRESSURE: 84 MMHG

## 2022-02-01 DIAGNOSIS — N43.3 HYDROCELE OF TESTIS: ICD-10-CM

## 2022-02-01 DIAGNOSIS — I48.91 ATRIAL FIBRILLATION, UNSPECIFIED TYPE (H): ICD-10-CM

## 2022-02-01 DIAGNOSIS — E11.8 TYPE 2 DIABETES MELLITUS WITH COMPLICATION, WITHOUT LONG-TERM CURRENT USE OF INSULIN (H): Primary | ICD-10-CM

## 2022-02-01 DIAGNOSIS — F32.1 MODERATE MAJOR DEPRESSION (H): ICD-10-CM

## 2022-02-01 DIAGNOSIS — E78.5 HYPERLIPIDEMIA WITH TARGET LDL LESS THAN 100: ICD-10-CM

## 2022-02-01 DIAGNOSIS — R05.9 COUGH: ICD-10-CM

## 2022-02-01 DIAGNOSIS — I48.0 PAROXYSMAL ATRIAL FIBRILLATION (H): ICD-10-CM

## 2022-02-01 DIAGNOSIS — E66.01 MORBID OBESITY (H): ICD-10-CM

## 2022-02-01 DIAGNOSIS — J44.1 OBSTRUCTIVE CHRONIC BRONCHITIS WITH EXACERBATION (H): ICD-10-CM

## 2022-02-01 DIAGNOSIS — E11.9 TYPE 2 DIABETES MELLITUS WITHOUT COMPLICATION, WITHOUT LONG-TERM CURRENT USE OF INSULIN (H): ICD-10-CM

## 2022-02-01 DIAGNOSIS — E78.2 MIXED HYPERLIPIDEMIA: ICD-10-CM

## 2022-02-01 DIAGNOSIS — Z87.891 QUIT SMOKING: ICD-10-CM

## 2022-02-01 DIAGNOSIS — I10 ESSENTIAL HYPERTENSION, BENIGN: ICD-10-CM

## 2022-02-01 LAB
ANION GAP SERPL CALCULATED.3IONS-SCNC: 2 MMOL/L (ref 3–14)
BUN SERPL-MCNC: 20 MG/DL (ref 7–30)
CALCIUM SERPL-MCNC: 9.5 MG/DL (ref 8.5–10.1)
CHLORIDE BLD-SCNC: 105 MMOL/L (ref 94–109)
CHOLEST SERPL-MCNC: 170 MG/DL
CO2 SERPL-SCNC: 31 MMOL/L (ref 20–32)
CREAT SERPL-MCNC: 0.96 MG/DL (ref 0.66–1.25)
CREAT UR-MCNC: 258 MG/DL
FASTING STATUS PATIENT QL REPORTED: NO
GFR SERPL CREATININE-BSD FRML MDRD: 87 ML/MIN/1.73M2
GLUCOSE BLD-MCNC: 197 MG/DL (ref 70–99)
HDLC SERPL-MCNC: 47 MG/DL
LDLC SERPL CALC-MCNC: 93 MG/DL
MICROALBUMIN UR-MCNC: 15 MG/L
MICROALBUMIN/CREAT UR: 5.81 MG/G CR (ref 0–17)
NONHDLC SERPL-MCNC: 123 MG/DL
POTASSIUM BLD-SCNC: 4.6 MMOL/L (ref 3.4–5.3)
SODIUM SERPL-SCNC: 138 MMOL/L (ref 133–144)
TRIGL SERPL-MCNC: 152 MG/DL

## 2022-02-01 PROCEDURE — 90714 TD VACC NO PRESV 7 YRS+ IM: CPT | Performed by: FAMILY MEDICINE

## 2022-02-01 PROCEDURE — 90471 IMMUNIZATION ADMIN: CPT | Performed by: FAMILY MEDICINE

## 2022-02-01 PROCEDURE — 80048 BASIC METABOLIC PNL TOTAL CA: CPT | Performed by: FAMILY MEDICINE

## 2022-02-01 PROCEDURE — 99214 OFFICE O/P EST MOD 30 MIN: CPT | Mod: 25 | Performed by: FAMILY MEDICINE

## 2022-02-01 PROCEDURE — 36415 COLL VENOUS BLD VENIPUNCTURE: CPT | Performed by: FAMILY MEDICINE

## 2022-02-01 PROCEDURE — U0005 INFEC AGEN DETEC AMPLI PROBE: HCPCS | Performed by: FAMILY MEDICINE

## 2022-02-01 PROCEDURE — U0003 INFECTIOUS AGENT DETECTION BY NUCLEIC ACID (DNA OR RNA); SEVERE ACUTE RESPIRATORY SYNDROME CORONAVIRUS 2 (SARS-COV-2) (CORONAVIRUS DISEASE [COVID-19]), AMPLIFIED PROBE TECHNIQUE, MAKING USE OF HIGH THROUGHPUT TECHNOLOGIES AS DESCRIBED BY CMS-2020-01-R: HCPCS | Performed by: FAMILY MEDICINE

## 2022-02-01 PROCEDURE — 80061 LIPID PANEL: CPT | Performed by: FAMILY MEDICINE

## 2022-02-01 PROCEDURE — 82043 UR ALBUMIN QUANTITATIVE: CPT | Performed by: FAMILY MEDICINE

## 2022-02-01 RX ORDER — METFORMIN HCL 500 MG
1000 TABLET, EXTENDED RELEASE 24 HR ORAL
Qty: 180 TABLET | Refills: 3 | Status: SHIPPED | OUTPATIENT
Start: 2022-02-01 | End: 2022-08-08

## 2022-02-01 RX ORDER — METOPROLOL SUCCINATE 50 MG/1
50 TABLET, EXTENDED RELEASE ORAL DAILY
Qty: 90 TABLET | Refills: 3 | Status: SHIPPED | OUTPATIENT
Start: 2022-02-01 | End: 2023-02-03

## 2022-02-01 RX ORDER — ATORVASTATIN CALCIUM 20 MG/1
20 TABLET, FILM COATED ORAL DAILY
Qty: 90 TABLET | Refills: 3 | Status: SHIPPED | OUTPATIENT
Start: 2022-02-01 | End: 2023-02-03

## 2022-02-01 RX ORDER — LOSARTAN POTASSIUM 100 MG/1
100 TABLET ORAL DAILY
Qty: 90 TABLET | Refills: 3 | Status: SHIPPED | OUTPATIENT
Start: 2022-02-01 | End: 2023-02-03

## 2022-02-01 RX ORDER — FLECAINIDE ACETATE 100 MG/1
100 TABLET ORAL 2 TIMES DAILY
Qty: 180 TABLET | Refills: 3 | Status: SHIPPED | OUTPATIENT
Start: 2022-02-01 | End: 2023-02-03

## 2022-02-01 RX ORDER — WARFARIN SODIUM 5 MG/1
TABLET ORAL
Qty: 84 TABLET | Refills: 1 | Status: SHIPPED | OUTPATIENT
Start: 2022-02-01 | End: 2022-03-04

## 2022-02-01 RX ORDER — MOMETASONE FUROATE AND FORMOTEROL FUMARATE DIHYDRATE 200; 5 UG/1; UG/1
AEROSOL RESPIRATORY (INHALATION)
Qty: 39 G | Refills: 3 | Status: SHIPPED | OUTPATIENT
Start: 2022-02-01 | End: 2022-12-28

## 2022-02-01 ASSESSMENT — MIFFLIN-ST. JEOR: SCORE: 1976.95

## 2022-02-01 NOTE — PROGRESS NOTES
Assessment & Plan     Type 2 diabetes mellitus with complication, without long-term current use of insulin (H)  Increase metformin from 500 to 1000/day   Check labs today.   Work on getting daily exercise   Eye exam is due - he has to reschedule it - will call today   See me in 3 months     - Albumin Random Urine Quantitative with Creat Ratio; Future  - Albumin Random Urine Quantitative with Creat Ratio    Essential hypertension, benign  Doing well. Continue current meds. The patient indicates understanding of these issues and agrees with the plan.   - Basic metabolic panel  (Ca, Cl, CO2, Creat, Gluc, K, Na, BUN); Future  - Basic metabolic panel  (Ca, Cl, CO2, Creat, Gluc, K, Na, BUN)  - losartan (COZAAR) 100 MG tablet; Take 1 tablet (100 mg) by mouth daily    Hyperlipidemia with target LDL less than 100  Continue current med. Lipids have been good. Not fasting today   - Lipid panel reflex to direct LDL Fasting; Future  - Lipid panel reflex to direct LDL Fasting    Obstructive chronic bronchitis with exacerbation (H)  Stable  Quit smoking years ago   - mometasone-formoterol (DULERA) 200-5 MCG/ACT inhaler; INHALE TWO PUFFS BY MOUTH TWICE A DAY    Moderate major depression (H)  Doing well  Denies depression. In remission     Atrial fibrillation, unspecified type (H)  On warfarin and metoprolol. Well controlled   - metoprolol succinate ER (TOPROL-XL) 50 MG 24 hr tablet; Take 1 tablet (50 mg) by mouth daily  - warfarin ANTICOAGULANT (JANTOVEN ANTICOAGULANT) 5 MG tablet; TAKE ONE-HALF TABLET BY MOUTH EVERY MWF;  AND ONE TABLET BY MOUTH  ALL OTHER DAYS OR AS DIRECTED BY ANTICOAGULATION CLINIC    Morbid obesity (H)  Discussed increasing fruits/vegies/fiber/water. Three meals a day - with reasonable portion sizes. Do not eat within 3 hours of bed. Increase activity to at least 30 minutes/day.     Hydrocele of testis  Referral made   - Adult Urology Referral; Future    Cough  I doubt covid but will test today to make sure  "  - Symptomatic; Yes; 1/28/2022 COVID-19 Virus (Coronavirus) by PCR Nose; Future  - Symptomatic; Yes; 1/28/2022 COVID-19 Virus (Coronavirus) by PCR Nose    Quit smoking  Discussed aaa screening. He agrees but wants to schedule later this year. Order placed   - US Abdominal Aorta Imaging; Future      BMI:   Estimated body mass index is 39.28 kg/m  as calculated from the following:    Height as of this encounter: 1.753 m (5' 9\").    Weight as of this encounter: 120.7 kg (266 lb).   Weight management plan: Discussed healthy diet and exercise guidelines    Work on weight loss    Return in about 3 months (around 5/1/2022) for Diabetes Visit.    Jolynn Cooper MD  [facility] TREY Diaz is a 66 year old who presents for the following health issues     Diabetes Follow-up      How often are you checking your blood sugar?daily - about 160-170     What concerns do you have today about your diabetes? None     Do you have any of these symptoms? (Select all that apply)  No numbness or tingling in feet.  No redness, sores or blisters on feet.  No complaints of excessive thirst.  No reports of blurry vision.  No significant changes to weight.    Have you had a diabetic eye exam in the last 12 months? Yes - scheduled       BP Readings from Last 2 Encounters:   02/01/22 128/84   04/09/21 132/86     Hemoglobin A1C POCT (%)   Date Value   04/09/2021 6.8 (H)   08/18/2020 6.7 (H)     Hemoglobin A1C (%)   Date Value   01/21/2022 7.6 (H)     LDL Cholesterol Calculated (mg/dL)   Date Value   04/09/2021 94   01/14/2020 104 (H)         How many servings of fruits and vegetables do you eat daily?  0-1    On average, how many sweetened beverages do you drink each day (Examples: soda, juice, sweet tea, etc.  Do NOT count diet or artificially sweetened beverages)?   0    How many days per week do you exercise enough to make your heart beat faster? 3 or less    How many minutes a day do you exercise enough to " "make your heart beat faster? 30 - 60    How many days per week do you miss taking your medication? 0    Elevated A1c  Taking metformin 500/day  Minimal exercise, more carbs in diet     Planning to eat better   Wants to get on the treadmill       Testicular Ultrasound 1/26/22  Testicle is enlarged. Known hydrocele but worsening   Making it hard to urinate because \"penis won't go out\"   Urine splatters      Congested   using inhaler for a few days  Mild cough   No fever  No headache     High stress, caretaking his mom   She broke both hips last year and is in covid wing at the nursing home     Review of Systems   Constitutional, HEENT, cardiovascular, pulmonary, gi and gu systems are negative, except as otherwise noted.      Objective    /84   Pulse 81   Temp 97.7  F (36.5  C) (Tympanic)   Ht 1.753 m (5' 9\")   Wt 120.7 kg (266 lb)   BMI 39.28 kg/m    Body mass index is 39.28 kg/m .  Physical Exam   GENERAL: healthy, alert and no distress  EYES: Eyes grossly normal to inspection, PERRL and conjunctivae and sclerae normal  HENT: ear canals and TM's normal, nose and mouth without ulcers or lesions  HENT: rhinorrhea clear  NECK: no adenopathy, no asymmetry, masses, or scars and thyroid normal to palpation  RESP: lungs clear to auscultation - no rales, rhonchi or wheezes  CV: regular rate and rhythm, normal S1 S2, no S3 or S4, no murmur, click or rub, no peripheral edema and peripheral pulses strong  ABDOMEN: soft, nontender, no hepatosplenomegaly, no masses and bowel sounds normal  MS: no gross musculoskeletal defects noted, no edema  NEURO: Normal strength and tone, mentation intact and speech normal  PSYCH: mentation appears normal, affect normal/bright        "

## 2022-02-01 NOTE — PATIENT INSTRUCTIONS
Increase metformin to 1000 daily  Start daily exercise   Send me eye exam info   See me in three months :)

## 2022-02-01 NOTE — NURSING NOTE
Prior to immunization administration, verified patients identity using patient s name and date of birth. Please see Immunization Activity for additional information.     Screening Questionnaire for Adult Immunization    Are you sick today?   No   Do you have allergies to medications, food, a vaccine component or latex?   No   Have you ever had a serious reaction after receiving a vaccination?   No   Do you have a long-term health problem with heart, lung, kidney, or metabolic disease (e.g., diabetes), asthma, a blood disorder, no spleen, complement component deficiency, a cochlear implant, or a spinal fluid leak?  Are you on long-term aspirin therapy?   No   Do you have cancer, leukemia, HIV/AIDS, or any other immune system problem?   No   Do you have a parent, brother, or sister with an immune system problem?   No   In the past 3 months, have you taken medications that affect  your immune system, such as prednisone, other steroids, or anticancer drugs; drugs for the treatment of rheumatoid arthritis, Crohn s disease, or psoriasis; or have you had radiation treatments?   No   Have you had a seizure, or a brain or other nervous system problem?   No   During the past year, have you received a transfusion of blood or blood    products, or been given immune (gamma) globulin or antiviral drug?   No   For women: Are you pregnant or is there a chance you could become       pregnant during the next month?   No   Have you received any vaccinations in the past 4 weeks?   No     Immunization questionnaire answers were all negative.        Per orders of Dr. Jolynn Cooper, injection of TD given by Natasha Riley LPN. Patient instructed to remain in clinic for 15 minutes afterwards, and to report any adverse reaction to me immediately.       Screening performed by Natasha Riley LPN on 2/1/2022 at 11:20 AM.

## 2022-02-02 ENCOUNTER — PRE VISIT (OUTPATIENT)
Dept: UROLOGY | Facility: CLINIC | Age: 67
End: 2022-02-02
Payer: COMMERCIAL

## 2022-02-02 LAB — SARS-COV-2 RNA RESP QL NAA+PROBE: NEGATIVE

## 2022-02-02 NOTE — TELEPHONE ENCOUNTER
MEDICAL RECORDS REQUEST   [facility]  Urology Clinic  [address] [place] [address]  PHONE: [phone]  Fax: [phone]        FUTURE VISIT INFORMATION                                                   Joe REAL Mas, : 1955 scheduled for future visit at [facility]    APPOINTMENT INFORMATION:    Date: 2022    Provider:  Fidel Moise MD    Reason for Visit/Diagnosis: Hydrocele of testis    REFERRAL INFORMATION:    Referring provider:  Jolynn Cooper MD    Specialty: N/A    Referring providers clinic:  [facility] contact number:  N/A    RECORDS REQUESTED FOR VISIT                                                     NOTES  STATUS/DETAILS   OFFICE NOTE from referring provider  yes, 2022 -- Jolynn Cooper MD -- [facility]   OFFICE NOTE from other specialist  no   DISCHARGE SUMMARY from hospital  no   DISCHARGE REPORT from the ER  no   OPERATIVE REPORT  no   MEDICATION LIST  yes   LABS     URINALYSIS (UA)  no   URINE CYTOLOGY  no   IMAGING (IMAGES & REPORT)  yes, 2022 -- US Testiclar and Scrotum     PRE-VISIT CHECKLIST      Record collection complete Yes   Appointment appropriately scheduled           (right time/right provider) Yes   Joint diagnostic appointment coordinated correctly          (ensure right order & amount of time) Yes   MyChart activation Yes   Questionnaire complete If no, please explain pending

## 2022-02-03 ENCOUNTER — VIRTUAL VISIT (OUTPATIENT)
Dept: UROLOGY | Facility: CLINIC | Age: 67
End: 2022-02-03
Payer: COMMERCIAL

## 2022-02-03 VITALS — BODY MASS INDEX: 39.4 KG/M2 | HEIGHT: 69 IN | WEIGHT: 266 LBS

## 2022-02-03 DIAGNOSIS — N44.2 BENIGN CYST OF TESTIS: Primary | ICD-10-CM

## 2022-02-03 DIAGNOSIS — N43.3 HYDROCELE OF TESTIS: ICD-10-CM

## 2022-02-03 DIAGNOSIS — E11.8 TYPE 2 DIABETES MELLITUS WITH COMPLICATION, WITHOUT LONG-TERM CURRENT USE OF INSULIN (H): ICD-10-CM

## 2022-02-03 DIAGNOSIS — I48.91 ATRIAL FIBRILLATION, UNSPECIFIED TYPE (H): ICD-10-CM

## 2022-02-03 PROCEDURE — 99203 OFFICE O/P NEW LOW 30 MIN: CPT | Mod: 95 | Performed by: UROLOGY

## 2022-02-03 ASSESSMENT — MIFFLIN-ST. JEOR: SCORE: 1976.95

## 2022-02-03 ASSESSMENT — PAIN SCALES - GENERAL: PAINLEVEL: NO PAIN (0)

## 2022-02-03 NOTE — LETTER
2/3/2022       RE: Joe Mas  [address] [place] [id number]     Dear Colleague,    Thank you for referring your patient, Joe Mas, to the [facility]OLOG[facility] [place] at [facility]. Please see a copy of my visit note below.    Joe is a 66 year old who is being evaluated via a billable video visit.      Video Visit Technology for this patient: Tommy Video Visit- Patient was left in waiting room    How would you like to obtain your AVS? MyChart  If the video visit is dropped, the invitation should be resent by: Send to e-mail at: [email or web address]  Will anyone else be joining your video visit? No      Video Start Time: 10:32 AM  Video-Visit Details    Type of service:  Video Visit    Video End Time: 10:47 AM    Originating Location (pt. Location): Home    Distant Location (provider location):  [facility] UROLOGY [facility]     Platform used for Video Visit: [employer]       I am seeing Joe Mas in consultation from Jolynn Cooper  for evaluation of scrotal cysts.    HPI:  Joe Mas is a 66 year old male with large left hydrocele.  This has been gradually getting worse x 20 years.  He is bothered because the left hydrocele amplifies the redundancy of the foreskin, and makes it difficult for him to urinate.  He has a cyst in the right testicle, which is nonpainful, and the size of which is not bothersome.  I advised observation of this.    No history of vasectomy.  No future fertility desired.    I discussed open hydrocele surgery versus a aspiration and sclerosis procedure, he is more interested in the latter.  We discussed the risks and benefits of this procedure.  Discussed risk of recurrence, risk of testicle pain or sensitivity, but this would overall be safer as it would prevent him having to have a general anesthesia.    PAST MEDICAL HX:  Past Medical History:   Diagnosis Date     A-fib (H)  2018     COPD (chronic obstructive pulmonary disease) (H)      Diabetes (H)      Hypertension      NO ACTIVE PROBLEMS        PAST SURG HX:  Past Surgical History:   Procedure Laterality Date     ABDOMEN SURGERY       COLONOSCOPY  2004    Follow up colonoscopy in 5 years     HERNIA REPAIR, UMBILICAL  2001     KNEE SURGERY  1960's    Left        FAMILY HX:  Family History   Problem Relation Age of Onset     Osteoporosis Mother      Heart Disease Mother      Neurologic Disorder Father         passed away from a brain tumor age 48     Cerebrovascular Disease Father      Neurologic Disorder Maternal Grandmother         parkinsons     C.A.D. Maternal Grandmother      Diabetes Maternal Grandmother      Alcohol/Drug Maternal Grandfather      Cancer Maternal Grandfather         esphogus     Cancer Paternal Grandfather         lung     Diabetes Sister      Hypertension Sister      Asthma Daughter      Cerebrovascular Disease No family hx of      Breast Cancer No family hx of      Cancer - colorectal No family hx of        SOCIAL HX:  Social History     Tobacco Use     Smoking status: Former Smoker     Packs/day: 1.00     Years: 25.00     Pack years: 25.00     Types: Cigarettes     Start date: 1975     Quit date: 2000     Years since quittin.1     Smokeless tobacco: Never Used     Tobacco comment: quit smoking    Substance Use Topics     Alcohol use: Yes     Alcohol/week: 0.0 standard drinks     Comment: 2-5 beers per month     Drug use: Yes     Types: Marijuana       MEDICATIONS:  Current Outpatient Medications   Medication Sig     albuterol (PROAIR HFA/PROVENTIL HFA/VENTOLIN HFA) 108 (90 Base) MCG/ACT inhaler INHALE TWO PUFFS BY MOUTH EVERY 6 HOURS AS NEEDED FOR SHORTNESS OF BREATH     ASPIRIN 81 MG OR TABS ONE DAILY     atorvastatin (LIPITOR) 20 MG tablet Take 1 tablet (20 mg) by mouth daily     flecainide (TAMBOCOR) 100 MG tablet Take 1 tablet (100 mg) by mouth 2 times daily     FLUBLOK  QUADRIVALENT 0.5 ML injection PHARMACIST ADMINISTERED     ipratropium - albuterol 0.5 mg/2.5 mg/3 mL (DUONEB) 0.5-2.5 (3) MG/3ML neb solution NEBULIZE CONTENTS OF ONE VIAL EVERY 6 HOURS AS NEEDED FOR SHORTNESS OF BREATH OR WHEEZING     losartan (COZAAR) 100 MG tablet Take 1 tablet (100 mg) by mouth daily     metFORMIN (GLUCOPHAGE-XR) 500 MG 24 hr tablet Take 2 tablets (1,000 mg) by mouth daily (with breakfast) One tablet daily with dinner     metoprolol succinate ER (TOPROL-XL) 50 MG 24 hr tablet Take 1 tablet (50 mg) by mouth daily     mometasone-formoterol (DULERA) 200-5 MCG/ACT inhaler INHALE TWO PUFFS BY MOUTH TWICE A DAY     ONETOUCH ULTRA test strip USE TO TEST BLOOD SUGAR TWO TIMES A DAY     warfarin ANTICOAGULANT (JANTOVEN ANTICOAGULANT) 5 MG tablet TAKE ONE-HALF TABLET BY MOUTH EVERY MWF;  AND ONE TABLET BY MOUTH  ALL OTHER DAYS OR AS DIRECTED BY ANTICOAGULATION CLINIC     No current facility-administered medications for this visit.       ALLERGIES:  Lisinopril      GENERAL PHYSICAL EXAM:   Exam  General- Alert, oriented, nad.  Pleasant and conversant.  Eyes- anicteric, EOMI.  Resps- normal, non-labored.  No cough  Abdomen-  nondistended.   exam- deferred.   Neurological - no tremors  Skin - no discoloration/ lesions noted  Psychiatric - no anxiety, alert & oriented.      The rest of a comprehensive physical examination is deferred due to video visit restrictions.     Imaging/labs reviewed today:  Lab Results   Component Value Date    CR 0.96 02/01/2022    CR 0.88 01/21/2022    CR 0.99 04/09/2021    CR 1.04 01/14/2020    CR 0.96 01/29/2019     Lab Results   Component Value Date    PSA 2.10 01/21/2022     Scrotal ultrasound 1/26/22  IMPRESSION:   1. The right testicle is enlarged by two simple cysts. The larger  measures up to 4.5 cm.  2. Complicated left hydrocele may be the result of previous trauma or  infection.         ASSESSMENT/PLAN:        Left hydrocele, mildly complex    Discussed risks,  benefits, and alternatives of hydrocele management. (aspiration and sclerosis versus open repair.)  He would like aspiration and sclerosis performed.    Advised it's preferred to have him hold blood thinner prior.    He lives near [place], so advised he have Dr. Quiles do this for him.  Pt. is in agreement.      Right intra-testicular cyst.    Advised observation, as this is not painful or bothersome.    Other chronic medical issues of hypertension, hyperlipidemia, COPD, diabetes, A. fib.      Copied cc to Consulting provider Jolynn Cooper        Thank-you for the kind consultation.  Fidel Moise MD     Urological Surgeon    Additional Coding Information:    Problems:  4 -- two or more stable chronic illnesses    Data Reviewed  Review of the result(s) of each unique test - Scrotal ultrasound    Tests ordered/pending: None    Notes from other providers reviewed: reviewed Dr. Cooper's note 2/1/22    Level of risk:  4 -- minor surgery with patient or procedure risks    Time spent:  29 minutes spent on the date of the encounter doing chart review, history and exam, documentation and further activities per the note.  Video visit time included.     Again, thank you for allowing me to participate in the care of your patient.      Sincerely,    Fidel Moise MD

## 2022-02-03 NOTE — PROGRESS NOTES
Joe is a 66 year old who is being evaluated via a billable video visit.      Video Visit Technology for this patient: Tommy Video Visit- Patient was left in waiting room    How would you like to obtain your AVS? Brun[employer]art  If the video visit is dropped, the invitation should be resent by: Send to e-mail at: [email or web address]  Will anyone else be joining your video visit? No      Video Start Time: 10:32 AM  Video-Visit Details    Type of service:  Video Visit    Video End Time: 10:47 AM    Originating Location (pt. Location): Home    Distant Location (provider location):  [facility] UROLOGY CLINIC [place]     Platform used for Video Visit: Parental Health       I am seeing Joe Mas in consultation from Jolynn Cooper  for evaluation of scrotal cysts.    HPI:  Joe Mas is a 66 year old male with large left hydrocele.  This has been gradually getting worse x 20 years.  He is bothered because the left hydrocele amplifies the redundancy of the foreskin, and makes it difficult for him to urinate.  He has a cyst in the right testicle, which is nonpainful, and the size of which is not bothersome.  I advised observation of this.    No history of vasectomy.  No future fertility desired.    I discussed open hydrocele surgery versus a aspiration and sclerosis procedure, he is more interested in the latter.  We discussed the risks and benefits of this procedure.  Discussed risk of recurrence, risk of testicle pain or sensitivity, but this would overall be safer as it would prevent him having to have a general anesthesia.    PAST MEDICAL HX:  Past Medical History:   Diagnosis Date     A-fib (H) 2/27/2018     COPD (chronic obstructive pulmonary disease) (H)      Diabetes (H)      Hypertension      NO ACTIVE PROBLEMS        PAST SURG HX:  Past Surgical History:   Procedure Laterality Date     ABDOMEN SURGERY       COLONOSCOPY  11/12/2004    Follow up colonoscopy in 5 years     HERNIA REPAIR, UMBILICAL  2001      KNEE SURGERY  1960's    Left        FAMILY HX:  Family History   Problem Relation Age of Onset     Osteoporosis Mother      Heart Disease Mother      Neurologic Disorder Father         passed away from a brain tumor age 48     Cerebrovascular Disease Father      Neurologic Disorder Maternal Grandmother         parkinsons     C.A.D. Maternal Grandmother      Diabetes Maternal Grandmother      Alcohol/Drug Maternal Grandfather      Cancer Maternal Grandfather         esphogus     Cancer Paternal Grandfather         lung     Diabetes Sister      Hypertension Sister      Asthma Daughter      Cerebrovascular Disease No family hx of      Breast Cancer No family hx of      Cancer - colorectal No family hx of        SOCIAL HX:  Social History     Tobacco Use     Smoking status: Former Smoker     Packs/day: 1.00     Years: 25.00     Pack years: 25.00     Types: Cigarettes     Start date: 1975     Quit date: 2000     Years since quittin.1     Smokeless tobacco: Never Used     Tobacco comment: quit smoking    Substance Use Topics     Alcohol use: Yes     Alcohol/week: 0.0 standard drinks     Comment: 2-5 beers per month     Drug use: Yes     Types: Marijuana       MEDICATIONS:  Current Outpatient Medications   Medication Sig     albuterol (PROAIR HFA/PROVENTIL HFA/VENTOLIN HFA) 108 (90 Base) MCG/ACT inhaler INHALE TWO PUFFS BY MOUTH EVERY 6 HOURS AS NEEDED FOR SHORTNESS OF BREATH     ASPIRIN 81 MG OR TABS ONE DAILY     atorvastatin (LIPITOR) 20 MG tablet Take 1 tablet (20 mg) by mouth daily     flecainide (TAMBOCOR) 100 MG tablet Take 1 tablet (100 mg) by mouth 2 times daily     FLUBLOK QUADRIVALENT 0.5 ML injection PHARMACIST ADMINISTERED     ipratropium - albuterol 0.5 mg/2.5 mg/3 mL (DUONEB) 0.5-2.5 (3) MG/3ML neb solution NEBULIZE CONTENTS OF ONE VIAL EVERY 6 HOURS AS NEEDED FOR SHORTNESS OF BREATH OR WHEEZING     losartan (COZAAR) 100 MG tablet Take 1 tablet (100 mg) by mouth daily     metFORMIN  (GLUCOPHAGE-XR) 500 MG 24 hr tablet Take 2 tablets (1,000 mg) by mouth daily (with breakfast) One tablet daily with dinner     metoprolol succinate ER (TOPROL-XL) 50 MG 24 hr tablet Take 1 tablet (50 mg) by mouth daily     mometasone-formoterol (DULERA) 200-5 MCG/ACT inhaler INHALE TWO PUFFS BY MOUTH TWICE A DAY     ONETOUCH ULTRA test strip USE TO TEST BLOOD SUGAR TWO TIMES A DAY     warfarin ANTICOAGULANT (JANTOVEN ANTICOAGULANT) 5 MG tablet TAKE ONE-HALF TABLET BY MOUTH EVERY MWF;  AND ONE TABLET BY MOUTH  ALL OTHER DAYS OR AS DIRECTED BY ANTICOAGULATION CLINIC     No current facility-administered medications for this visit.       ALLERGIES:  Lisinopril      GENERAL PHYSICAL EXAM:   Exam  General- Alert, oriented, nad.  Pleasant and conversant.  Eyes- anicteric, EOMI.  Resps- normal, non-labored.  No cough  Abdomen-  nondistended.   exam- deferred.   Neurological - no tremors  Skin - no discoloration/ lesions noted  Psychiatric - no anxiety, alert & oriented.      The rest of a comprehensive physical examination is deferred due to video visit restrictions.     Imaging/labs reviewed today:  Lab Results   Component Value Date    CR 0.96 02/01/2022    CR 0.88 01/21/2022    CR 0.99 04/09/2021    CR 1.04 01/14/2020    CR 0.96 01/29/2019     Lab Results   Component Value Date    PSA 2.10 01/21/2022     Scrotal ultrasound 1/26/22  IMPRESSION:   1. The right testicle is enlarged by two simple cysts. The larger  measures up to 4.5 cm.  2. Complicated left hydrocele may be the result of previous trauma or  infection.         ASSESSMENT/PLAN:        Left hydrocele, mildly complex    Discussed risks, benefits, and alternatives of hydrocele management. (aspiration and sclerosis versus open repair.)  He would like aspiration and sclerosis performed.    Advised it's preferred to have him hold blood thinner prior.    He lives near [place], so advised he have Dr. Quiles do this for him.  Pt. is in agreement.      Right  intra-testicular cyst.    Advised observation, as this is not painful or bothersome.    Other chronic medical issues of hypertension, hyperlipidemia, COPD, diabetes, A. fib.      Copied cc to Consulting provider Jolynn Cooper        Thank-you for the kind consultation.  Fidel Moise MD     Urological Surgeon    Additional Coding Information:    Problems:  4 -- two or more stable chronic illnesses    Data Reviewed  Review of the result(s) of each unique test - Scrotal ultrasound    Tests ordered/pending: None    Notes from other providers reviewed: reviewed Dr. Cooper's note 2/1/22    Level of risk:  4 -- minor surgery with patient or procedure risks    Time spent:  29 minutes spent on the date of the encounter doing chart review, history and exam, documentation and further activities per the note.  Video visit time included.

## 2022-02-03 NOTE — NURSING NOTE
"Chief Complaint   Patient presents with     Consult     hydrocele       Height 1.753 m (5' 9\"), weight 120.7 kg (266 lb). Body mass index is 39.28 kg/m .    Patient Active Problem List   Diagnosis     Obstructive chronic bronchitis with exacerbation (H)     Essential hypertension, benign     Advanced directives, counseling/discussion     Moderate major depression (H)     Type 2 diabetes mellitus with complication, without long-term current use of insulin (H)     CARDIOVASCULAR SCREENING; LDL GOAL LESS THAN 100     Hyperlipidemia with target LDL less than 100     Obesity (BMI 30-39.9)     Tubular adenoma of colon     Long term current use of anticoagulant therapy     Atrial fibrillation, unspecified type (H)     Morbid obesity (H)     JUNG (obstructive sleep apnea)     Quit smoking     Moderate major depression (H)       Allergies   Allergen Reactions     Lisinopril Cough       Current Outpatient Medications   Medication Sig Dispense Refill     albuterol (PROAIR HFA/PROVENTIL HFA/VENTOLIN HFA) 108 (90 Base) MCG/ACT inhaler INHALE TWO PUFFS BY MOUTH EVERY 6 HOURS AS NEEDED FOR SHORTNESS OF BREATH 18 g 0     ASPIRIN 81 MG OR TABS ONE DAILY 100 3     atorvastatin (LIPITOR) 20 MG tablet Take 1 tablet (20 mg) by mouth daily 90 tablet 3     flecainide (TAMBOCOR) 100 MG tablet Take 1 tablet (100 mg) by mouth 2 times daily 180 tablet 3     FLUBLOK QUADRIVALENT 0.5 ML injection PHARMACIST ADMINISTERED 1 Syringe 0     ipratropium - albuterol 0.5 mg/2.5 mg/3 mL (DUONEB) 0.5-2.5 (3) MG/3ML neb solution NEBULIZE CONTENTS OF ONE VIAL EVERY 6 HOURS AS NEEDED FOR SHORTNESS OF BREATH OR WHEEZING 360 mL 2     losartan (COZAAR) 100 MG tablet Take 1 tablet (100 mg) by mouth daily 90 tablet 3     metFORMIN (GLUCOPHAGE-XR) 500 MG 24 hr tablet Take 2 tablets (1,000 mg) by mouth daily (with breakfast) One tablet daily with dinner 180 tablet 3     metoprolol succinate ER (TOPROL-XL) 50 MG 24 hr tablet Take 1 tablet (50 mg) by mouth daily 90 " tablet 3     mometasone-formoterol (DULERA) 200-5 MCG/ACT inhaler INHALE TWO PUFFS BY MOUTH TWICE A DAY 39 g 3     ONETOUCH ULTRA test strip USE TO TEST BLOOD SUGAR TWO TIMES A  each 0     warfarin ANTICOAGULANT (JANTOVEN ANTICOAGULANT) 5 MG tablet TAKE ONE-HALF TABLET BY MOUTH EVERY MWF;  AND ONE TABLET BY MOUTH  ALL OTHER DAYS OR AS DIRECTED BY ANTICOAGULATION CLINIC 84 tablet 1       Social History     Tobacco Use     Smoking status: Former Smoker     Packs/day: 1.00     Years: 25.00     Pack years: 25.00     Types: Cigarettes     Start date: 1975     Quit date: 2000     Years since quittin.1     Smokeless tobacco: Never Used     Tobacco comment: quit smoking    Substance Use Topics     Alcohol use: Yes     Alcohol/week: 0.0 standard drinks     Comment: 2-5 beers per month     Drug use: Yes     Types: Marijuana       Jaylan HOPE  2/3/2022  10:12 AM

## 2022-02-15 ENCOUNTER — OFFICE VISIT (OUTPATIENT)
Dept: UROLOGY | Facility: CLINIC | Age: 67
End: 2022-02-15
Payer: COMMERCIAL

## 2022-02-15 VITALS — SYSTOLIC BLOOD PRESSURE: 129 MMHG | RESPIRATION RATE: 16 BRPM | DIASTOLIC BLOOD PRESSURE: 82 MMHG | HEART RATE: 62 BPM

## 2022-02-15 DIAGNOSIS — N43.3 HYDROCELE IN ADULT: Primary | ICD-10-CM

## 2022-02-15 PROCEDURE — 99212 OFFICE O/P EST SF 10 MIN: CPT | Mod: 95 | Performed by: UROLOGY

## 2022-02-15 NOTE — NURSING NOTE
"Initial /82 (BP Location: Left arm, Patient Position: Chair, Cuff Size: Adult Regular)   Pulse 62   Resp 16  Estimated body mass index is 39.28 kg/m  as calculated from the following:    Height as of 2/3/22: 1.753 m (5' 9\").    Weight as of 2/3/22: 120.7 kg (266 lb). .    Patient is here for a consult for hydrocele.  khalida dubose LPN    "

## 2022-02-15 NOTE — PROGRESS NOTES
Appointment source: New Patient  Patient name: Joe Mas  Urology Staff: Sukh Quiles MD    Subjective: This is a 66 year old year old male complaining of left hemiscrotal enlargement.    Objective: Examination reveals scrotal swelling consistent with the hydrocele noted on a 1/26/2022 testicular ultrasound    Assessment: Symptomatic hydrocele    Plan: We will speak to his primary care physician regarding discontinuation of his anticoagulant in anticipation of hydrocele surgery.    Total time 10 minutes

## 2022-02-17 ENCOUNTER — TRANSFERRED RECORDS (OUTPATIENT)
Dept: HEALTH INFORMATION MANAGEMENT | Facility: CLINIC | Age: 67
End: 2022-02-17
Payer: COMMERCIAL

## 2022-02-17 LAB — RETINOPATHY: NORMAL

## 2022-02-18 ENCOUNTER — LAB (OUTPATIENT)
Dept: LAB | Facility: CLINIC | Age: 67
End: 2022-02-18
Payer: COMMERCIAL

## 2022-02-18 ENCOUNTER — ANTICOAGULATION THERAPY VISIT (OUTPATIENT)
Dept: ANTICOAGULATION | Facility: CLINIC | Age: 67
End: 2022-02-18

## 2022-02-18 DIAGNOSIS — Z79.01 LONG TERM CURRENT USE OF ANTICOAGULANT THERAPY: ICD-10-CM

## 2022-02-18 DIAGNOSIS — I48.91 ATRIAL FIBRILLATION, UNSPECIFIED TYPE (H): ICD-10-CM

## 2022-02-18 DIAGNOSIS — I48.91 ATRIAL FIBRILLATION, UNSPECIFIED TYPE (H): Primary | ICD-10-CM

## 2022-02-18 LAB — INR BLD: 1.9 (ref 0.9–1.1)

## 2022-02-18 PROCEDURE — 36416 COLLJ CAPILLARY BLOOD SPEC: CPT

## 2022-02-18 PROCEDURE — 85610 PROTHROMBIN TIME: CPT

## 2022-02-18 NOTE — PROGRESS NOTES
ANTICOAGULATION MANAGEMENT     Joe Mas 66 year old male is on warfarin with therapeutic INR result. (Goal INR 2.0-3.0)    Recent labs: (last 7 days)     02/18/22  1133   INR 1.9*       ASSESSMENT     Source(s): Patient/Caregiver Call       Warfarin doses taken: Warfarin taken as instructed    Diet: No new diet changes identified    New illness, injury, or hospitalization: No    Medication/supplement changes: Metformin dose increased on 2/1/22 No interaction anticipated    Signs or symptoms of bleeding or clotting: No    Previous INR: Therapeutic last 2(+) visits    Additional findings: Urology procedure in the near future date TBD.     PLAN     Recommended plan for no diet, medication or health factor changes affecting INR     Dosing Instructions: Booster dose then Continue your current warfarin dose with next INR in 2 weeks       Summary  As of 2/18/2022    Full warfarin instructions:  2/18: 5 mg; Otherwise 2.5 mg every Mon, Wed, Fri; 5 mg all other days   Next INR check:  3/4/2022             Telephone call with Joe who verbalizes understanding and agrees to plan    Lab visit scheduled    Education provided: Please call back if any changes to your diet, medications or how you've been taking warfarin, Monitoring for clotting signs and symptoms, When to seek medical attention/emergency care and Contact [phone]  with any changes, questions or concerns.     Plan made per [facility] anticoagulation protocol    Lizbet Paiz RN  Anticoagulation Clinic  2/18/2022    _______________________________________________________________________     Anticoagulation Episode Summary     Current INR goal:  2.0-3.0   TTR:  73.6 % (1 y)   Target end date:  Indefinite   Send INR reminders to:  MARIA ELENA YANG    Indications    Atrial fibrillation  unspecified type (H) [I48.91]  Long term current use of anticoagulant therapy [Z79.01]           Comments:  takes in AM         Anticoagulation Care Providers     Provider Role  Specialty Phone number    Jolynn Cooper MD Referring Family Medicine [phone]

## 2022-02-21 ENCOUNTER — HOSPITAL ENCOUNTER (OUTPATIENT)
Dept: ULTRASOUND IMAGING | Facility: CLINIC | Age: 67
Discharge: HOME | End: 2022-02-21
Attending: FAMILY MEDICINE | Admitting: FAMILY MEDICINE
Payer: COMMERCIAL

## 2022-02-21 DIAGNOSIS — Z87.891 QUIT SMOKING: ICD-10-CM

## 2022-02-21 PROCEDURE — 76775 US EXAM ABDO BACK WALL LIM: CPT

## 2022-02-22 ENCOUNTER — MYC MEDICAL ADVICE (OUTPATIENT)
Dept: UROLOGY | Facility: CLINIC | Age: 67
End: 2022-02-22
Payer: COMMERCIAL

## 2022-03-04 ENCOUNTER — ANTICOAGULATION THERAPY VISIT (OUTPATIENT)
Dept: ANTICOAGULATION | Facility: CLINIC | Age: 67
End: 2022-03-04

## 2022-03-04 ENCOUNTER — LAB (OUTPATIENT)
Dept: LAB | Facility: CLINIC | Age: 67
End: 2022-03-04
Payer: COMMERCIAL

## 2022-03-04 DIAGNOSIS — Z79.01 LONG TERM CURRENT USE OF ANTICOAGULANT THERAPY: ICD-10-CM

## 2022-03-04 DIAGNOSIS — I48.91 ATRIAL FIBRILLATION, UNSPECIFIED TYPE (H): Primary | ICD-10-CM

## 2022-03-04 DIAGNOSIS — I48.91 ATRIAL FIBRILLATION, UNSPECIFIED TYPE (H): ICD-10-CM

## 2022-03-04 LAB — INR BLD: 2 (ref 0.9–1.1)

## 2022-03-04 PROCEDURE — 36416 COLLJ CAPILLARY BLOOD SPEC: CPT

## 2022-03-04 PROCEDURE — 85610 PROTHROMBIN TIME: CPT

## 2022-03-04 RX ORDER — WARFARIN SODIUM 5 MG/1
TABLET ORAL
Qty: 84 TABLET | Refills: 1
Start: 2022-03-04 | End: 2022-03-18

## 2022-03-04 NOTE — TELEPHONE ENCOUNTER
Hello. Can you confirm that you only want him to skip two doses of warfarin or is he to hold it during the weekend too?    Thank you,  Halie Birmingham, RN, BSN, PHN  Anticoagulation Clinic

## 2022-03-04 NOTE — PROGRESS NOTES
ANTICOAGULATION MANAGEMENT     Joe Mas 66 year old male is on warfarin with therapeutic INR result. (Goal INR 2.0-3.0)    Recent labs: (last 7 days)     03/04/22  1137   INR 2.0*       ASSESSMENT       Source(s): Chart Review and Patient/Caregiver Call       Warfarin doses taken: Warfarin taken as instructed    Diet: No new diet changes identified    New illness, injury, or hospitalization: No    Medication/supplement changes: metformin dose increased on 2-1-22 which has potential for interaction; decreasing INR    Signs or symptoms of bleeding or clotting: No    Previous INR: Subtherapeutic    Additional findings: Upcoming surgery/procedure 5-9-22 urology procedure. Message sent to urology to verify hold instructions.       PLAN     Recommended plan for ongoing change(s) affecting INR     Dosing Instructions:  Increase your warfarin dose (9% change) with next INR in 2 weeks       Summary  As of 3/4/2022    Full warfarin instructions:  2.5 mg every Mon, Thu; 5 mg all other days   Next INR check:  3/18/2022             Telephone call with Joe who verbalizes understanding and agrees to plan    Lab visit scheduled    Education provided: Please call back if any changes to your diet, medications or how you've been taking warfarin and Contact [phone]  with any changes, questions or concerns.     Plan made per [facility] anticoagulation protocol    Halie Birmingham RN  Anticoagulation Clinic  3/4/2022    _______________________________________________________________________     Anticoagulation Episode Summary     Current INR goal:  2.0-3.0   TTR:  69.8 % (1 y)   Target end date:  Indefinite   Send INR reminders to:  MARIA ELENA YANG    Indications    Atrial fibrillation  unspecified type (H) [I48.91]  Long term current use of anticoagulant therapy [Z79.01]           Comments:  takes in AM         Anticoagulation Care Providers     Provider Role Specialty Phone number    Jolynn Cooper MD Referring Family Medicine  [phone]

## 2022-03-07 NOTE — TELEPHONE ENCOUNTER
"\"I believe when I wrote this note to him, he was on a Khz-Ziczl-Lqyiat schedule. It looks like either I was mistaken or Dr. Cooper has recently changed this. Dr. Cooper's instructions were for him to hold warfarin for 5 days before the procedure, which is  scheduled for May 9, so his last dose should be May 4.     I can get in touch with the patient if that is easier to let him know this.     Thanks,     April Stuart \"        Response from Urology regarding hold for urology procedure scheduled 5/9/22. Writer updated patient's anticoag calendar and spoke with patient who verbalized understanding.    Cheryl Coats RN, BSN  [facility]  [phone]      "

## 2022-03-09 ENCOUNTER — TRANSFERRED RECORDS (OUTPATIENT)
Dept: HEALTH INFORMATION MANAGEMENT | Facility: CLINIC | Age: 67
End: 2022-03-09

## 2022-03-18 ENCOUNTER — TELEPHONE (OUTPATIENT)
Dept: ANTICOAGULATION | Facility: CLINIC | Age: 67
End: 2022-03-18

## 2022-03-18 ENCOUNTER — ANTICOAGULATION THERAPY VISIT (OUTPATIENT)
Dept: ANTICOAGULATION | Facility: CLINIC | Age: 67
End: 2022-03-18

## 2022-03-18 ENCOUNTER — LAB (OUTPATIENT)
Dept: LAB | Facility: CLINIC | Age: 67
End: 2022-03-18
Payer: COMMERCIAL

## 2022-03-18 DIAGNOSIS — I48.91 ATRIAL FIBRILLATION, UNSPECIFIED TYPE (H): Primary | ICD-10-CM

## 2022-03-18 DIAGNOSIS — I48.91 ATRIAL FIBRILLATION, UNSPECIFIED TYPE (H): ICD-10-CM

## 2022-03-18 DIAGNOSIS — Z79.01 LONG TERM CURRENT USE OF ANTICOAGULANT THERAPY: Primary | ICD-10-CM

## 2022-03-18 DIAGNOSIS — Z79.01 LONG TERM CURRENT USE OF ANTICOAGULANT THERAPY: ICD-10-CM

## 2022-03-18 LAB — INR BLD: 1.9 (ref 0.9–1.1)

## 2022-03-18 PROCEDURE — 36416 COLLJ CAPILLARY BLOOD SPEC: CPT

## 2022-03-18 PROCEDURE — 85610 PROTHROMBIN TIME: CPT

## 2022-03-18 RX ORDER — WARFARIN SODIUM 5 MG/1
TABLET ORAL
Qty: 84 TABLET | Refills: 1 | COMMUNITY
Start: 2022-03-18 | End: 2022-06-02

## 2022-03-18 NOTE — TELEPHONE ENCOUNTER
ANTICOAGULATION MANAGEMENT      Joe Mas due for annual renewal of referral to anticoagulation monitoring. Order pended for your review and signature.      ANTICOAGULATION SUMMARY      Warfarin indication(s)     Atrial fibrillation    Heart valve present?  NO       Current goal range   INR: 2.0-3.0     Goal appropriate for indication? Yes, INR 2-3 appropriate for hx of DVT, PE, hypercoagulable state, Afib, LVAD, or bileaflet AVR without risk factors     Current duration of therapy Indefinite/long term therapy   Time in Therapeutic Range (TTR)  (Goal > 60%) 69.8%       Office visit with referring provider's group within last year yes on 2/1/22       Lizbet Paiz RN

## 2022-03-18 NOTE — PROGRESS NOTES
ANTICOAGULATION MANAGEMENT     Joe Mas 66 year old male is on warfarin with subtherapeutic INR result. (Goal INR 2.0-3.0)    Recent labs: (last 7 days)     03/18/22  1145   INR 1.9*       ASSESSMENT       Source(s): Chart Review    Previous INR was Therapeutic last visit; previously outside of goal range    Medication, diet, health changes since last INR chart reviewed; none identified           PLAN     Recommended plan for no diet, medication or health factor changes affecting INR     Dosing Instructions:  Increase your warfarin dose (8.3% change) with next INR in 2 weeks       Summary  As of 3/18/2022    Full warfarin instructions:  5/4: Hold; 5/5: Hold; 5/6: Hold; 5/7: Hold; 5/8: Hold; Otherwise 2.5 mg every Thu; 5 mg all other days   Next INR check:  4/1/2022             Detailed voice message left for Joe with dosing instructions and follow up date.     Contact [phone]  to schedule and with any changes, questions or concerns.     Education provided: Please call back if any changes to your diet, medications or how you've been taking warfarin, Monitoring for clotting signs and symptoms, When to seek medical attention/emergency care and Contact [phone]  with any changes, questions or concerns.        Plan made per [facility] anticoagulation protocol    Lizbet Paiz RN  Anticoagulation Clinic  3/18/2022    _______________________________________________________________________     Anticoagulation Episode Summary     Current INR goal:  2.0-3.0   TTR:  65.9 % (1 y)   Target end date:  Indefinite   Send INR reminders to:  MARIA ELENA YANG    Indications    Atrial fibrillation  unspecified type (H) [I48.91]  Long term current use of anticoagulant therapy [Z79.01]           Comments:  takes in AM         Anticoagulation Care Providers     Provider Role Specialty Phone number    Jolynn Cooper MD Referring Family Medicine [phone]

## 2022-04-01 ENCOUNTER — ANTICOAGULATION THERAPY VISIT (OUTPATIENT)
Dept: ANTICOAGULATION | Facility: CLINIC | Age: 67
End: 2022-04-01

## 2022-04-01 ENCOUNTER — LAB (OUTPATIENT)
Dept: LAB | Facility: CLINIC | Age: 67
End: 2022-04-01
Payer: COMMERCIAL

## 2022-04-01 DIAGNOSIS — Z79.01 LONG TERM CURRENT USE OF ANTICOAGULANT THERAPY: ICD-10-CM

## 2022-04-01 DIAGNOSIS — I48.91 ATRIAL FIBRILLATION, UNSPECIFIED TYPE (H): Primary | ICD-10-CM

## 2022-04-01 LAB — INR BLD: 3.3 (ref 0.9–1.1)

## 2022-04-01 PROCEDURE — 36416 COLLJ CAPILLARY BLOOD SPEC: CPT

## 2022-04-01 PROCEDURE — 85610 PROTHROMBIN TIME: CPT

## 2022-04-01 NOTE — PROGRESS NOTES
ANTICOAGULATION MANAGEMENT     Joe Mas 66 year old male is on warfarin with supratherapeutic INR result. (Goal INR 2.0-3.0)    Recent labs: (last 7 days)     04/01/22  1150   INR 3.3*       ASSESSMENT       Source(s): Chart Review and Patient/Caregiver Call       Warfarin doses taken: Warfarin taken as instructed    Diet: No new diet changes identified    New illness, injury, or hospitalization: No    Medication/supplement changes: None noted    Signs or symptoms of bleeding or clotting: No    Previous INR: Subtherapeutic    Additional findings: None     PLAN     Recommended plan for no diet, medication or health factor changes affecting INR     Dosing Instructions: continue your current warfarin dose with next INR in 2 weeks   - discussed consuming 1-2 servings of vitamin K rich foods weekly. Pt did confirm he misses eating greens/salads.    Summary  As of 4/1/2022    Full warfarin instructions:  5/4: Hold; 5/5: Hold; 5/6: Hold; 5/7: Hold; 5/8: Hold; Otherwise 2.5 mg every Thu; 5 mg all other days   Next INR check:  4/15/2022             Telephone call with Joe who verbalizes understanding and agrees to plan    Lab visit scheduled    Education provided: Please call back if any changes to your diet, medications or how you've been taking warfarin and Monitoring for bleeding signs and symptoms    Plan made per [facility] anticoagulation protocol    Sofia Tai RN  Anticoagulation Clinic  4/1/2022    _______________________________________________________________________     Anticoagulation Episode Summary     Current INR goal:  2.0-3.0   TTR:  64.8 % (1 y)   Target end date:  Indefinite   Send INR reminders to:  MARIA ELENA YANG    Indications    Atrial fibrillation  unspecified type (H) [I48.91]  Long term current use of anticoagulant therapy [Z79.01]           Comments:  takes in AM         Anticoagulation Care Providers     Provider Role Specialty Phone number    Jolynn Cooper MD Referring Family Medicine  [phone]

## 2022-04-15 ENCOUNTER — LAB (OUTPATIENT)
Dept: LAB | Facility: CLINIC | Age: 67
End: 2022-04-15
Payer: COMMERCIAL

## 2022-04-15 ENCOUNTER — ANTICOAGULATION THERAPY VISIT (OUTPATIENT)
Dept: ANTICOAGULATION | Facility: CLINIC | Age: 67
End: 2022-04-15

## 2022-04-15 DIAGNOSIS — I48.91 ATRIAL FIBRILLATION, UNSPECIFIED TYPE (H): Primary | ICD-10-CM

## 2022-04-15 DIAGNOSIS — Z79.01 LONG TERM CURRENT USE OF ANTICOAGULANT THERAPY: ICD-10-CM

## 2022-04-15 LAB — INR BLD: 2.1 (ref 0.9–1.1)

## 2022-04-15 PROCEDURE — 85610 PROTHROMBIN TIME: CPT

## 2022-04-15 PROCEDURE — 36416 COLLJ CAPILLARY BLOOD SPEC: CPT

## 2022-04-15 NOTE — PROGRESS NOTES
ANTICOAGULATION MANAGEMENT     Joe Mas 66 year old male is on warfarin with therapeutic INR result. (Goal INR 2.0-3.0)    Recent labs: (last 7 days)     04/15/22  1143   INR 2.1*       ASSESSMENT       Source(s): Chart Review and Patient/Caregiver Call       Warfarin doses taken: Warfarin taken as instructed    Diet: No new diet changes identified    New illness, injury, or hospitalization: No    Medication/supplement changes: None noted    Signs or symptoms of bleeding or clotting: No    Previous INR: Supratherapeutic    Additional findings: None     PLAN     Recommended plan for no diet, medication or health factor changes affecting INR     Dosing Instructions: continue your current warfarin dose with next INR in 3 weeks       Summary  As of 4/15/2022    Full warfarin instructions:  5/4: Hold; 5/5: Hold; 5/6: Hold; 5/7: Hold; 5/8: Hold; Otherwise 2.5 mg every Thu; 5 mg all other days   Next INR check:  5/6/2022             Telephone call with Joe who verbalizes understanding and agrees to plan    Lab visit scheduled    Education provided: Please call back if any changes to your diet, medications or how you've been taking warfarin    Plan made per [facility] anticoagulation protocol    Sofia Tai RN  Anticoagulation Clinic  4/15/2022    _______________________________________________________________________     Anticoagulation Episode Summary     Current INR goal:  2.0-3.0   TTR:  63.9 % (1 y)   Target end date:  Indefinite   Send INR reminders to:  MARIA ELENA YANG    Indications    Atrial fibrillation  unspecified type (H) [I48.91]  Long term current use of anticoagulant therapy [Z79.01]           Comments:  takes in AM         Anticoagulation Care Providers     Provider Role Specialty Phone number    Jolynn Cooper MD Referring Family Medicine [phone]

## 2022-04-17 DIAGNOSIS — J44.1 OBSTRUCTIVE CHRONIC BRONCHITIS WITH EXACERBATION (H): ICD-10-CM

## 2022-04-18 RX ORDER — IPRATROPIUM BROMIDE AND ALBUTEROL SULFATE 2.5; .5 MG/3ML; MG/3ML
SOLUTION RESPIRATORY (INHALATION)
Qty: 360 ML | Refills: 1 | Status: SHIPPED | OUTPATIENT
Start: 2022-04-18 | End: 2022-06-16

## 2022-04-29 DIAGNOSIS — I48.91 ATRIAL FIBRILLATION, UNSPECIFIED TYPE (H): ICD-10-CM

## 2022-04-29 RX ORDER — METOPROLOL SUCCINATE 50 MG/1
50 TABLET, EXTENDED RELEASE ORAL DAILY
Qty: 90 TABLET | Refills: 3 | OUTPATIENT
Start: 2022-04-29

## 2022-05-06 ENCOUNTER — TELEPHONE (OUTPATIENT)
Dept: FAMILY MEDICINE | Facility: CLINIC | Age: 67
End: 2022-05-06
Payer: COMMERCIAL

## 2022-05-06 NOTE — TELEPHONE ENCOUNTER
Patient is calling to see if he needs INR done today since he is on a hold for procedure Tuesday.     Please call back SELAM Anglin RN    [facility]tico[facility]

## 2022-05-06 NOTE — TELEPHONE ENCOUNTER
Having a hydrocele drainage on 5-9-22. Patient cancelled INR lab today and wanted to reschedule for 1 week after procedure. Writer has done this.    Halie Birmingham, RN, BSN, PHN

## 2022-05-09 ENCOUNTER — OFFICE VISIT (OUTPATIENT)
Dept: UROLOGY | Facility: CLINIC | Age: 67
End: 2022-05-09
Payer: COMMERCIAL

## 2022-05-09 VITALS
OXYGEN SATURATION: 95 % | TEMPERATURE: 97.6 F | SYSTOLIC BLOOD PRESSURE: 138 MMHG | DIASTOLIC BLOOD PRESSURE: 87 MMHG | HEART RATE: 65 BPM

## 2022-05-09 DIAGNOSIS — N43.3 HYDROCELE IN ADULT: Primary | ICD-10-CM

## 2022-05-09 DIAGNOSIS — J44.1 OBSTRUCTIVE CHRONIC BRONCHITIS WITH EXACERBATION (H): ICD-10-CM

## 2022-05-09 RX ORDER — ALBUTEROL SULFATE 90 UG/1
AEROSOL, METERED RESPIRATORY (INHALATION)
Qty: 18 G | Refills: 3 | Status: SHIPPED | OUTPATIENT
Start: 2022-05-09 | End: 2022-10-06

## 2022-05-09 ASSESSMENT — PAIN SCALES - GENERAL: PAINLEVEL: NO PAIN (0)

## 2022-05-09 NOTE — NURSING NOTE
"Initial /87 (BP Location: Right arm, Patient Position: Sitting, Cuff Size: Adult Large)   Pulse 65   Temp 97.6  F (36.4  C) (Tympanic)   SpO2 95%  Estimated body mass index is 39.28 kg/m  as calculated from the following:    Height as of 2/3/22: 1.753 m (5' 9\").    Weight as of 2/3/22: 120.7 kg (266 lb). .    Heather James LPN on 5/9/2022 at 11:25 AM    "

## 2022-05-10 ENCOUNTER — OFFICE VISIT (OUTPATIENT)
Dept: UROLOGY | Facility: CLINIC | Age: 67
End: 2022-05-10
Payer: COMMERCIAL

## 2022-05-10 VITALS — SYSTOLIC BLOOD PRESSURE: 127 MMHG | OXYGEN SATURATION: 94 % | HEART RATE: 63 BPM | DIASTOLIC BLOOD PRESSURE: 80 MMHG

## 2022-05-10 DIAGNOSIS — N50.82 SCROTAL PAIN: ICD-10-CM

## 2022-05-10 DIAGNOSIS — N43.3 HYDROCELE IN ADULT: Primary | ICD-10-CM

## 2022-05-10 DIAGNOSIS — N43.3 HYDROCELE IN ADULT: ICD-10-CM

## 2022-05-10 PROCEDURE — 55000 DRAINAGE OF HYDROCELE: CPT | Mod: LT | Performed by: UROLOGY

## 2022-05-10 PROCEDURE — 99213 OFFICE O/P EST LOW 20 MIN: CPT | Mod: 25 | Performed by: UROLOGY

## 2022-05-10 NOTE — NURSING NOTE
"Chief Complaint   Patient presents with     Minor Procedure     Hydrocele aspiration with sclerotherapy        Initial /80 (BP Location: Right arm, Patient Position: Chair, Cuff Size: Adult Regular)   Pulse 63   SpO2 94%  Estimated body mass index is 39.28 kg/m  as calculated from the following:    Height as of 2/3/22: 1.753 m (5' 9\").    Weight as of 2/3/22: 120.7 kg (266 lb).  BP completed using cuff size: large   Medications and allergies reviewed.      Jaylyn BOB CMA     "

## 2022-05-10 NOTE — PATIENT INSTRUCTIONS
Per physician instructions.    If you have questions or concerns on any instructions given to you by your provider today or if you need to schedule an appointment, you can reach us at [phone].  Listen to the menu for the Specialty Clinic option.      Thank you!

## 2022-05-12 NOTE — PROGRESS NOTES
Appointment source: Established Patient  Patient name: Joe Mas  Urology Staff: Sukh Quiles MD    Subjective: This is a 66 year old year old male returning for follow up of a left hemiscrotal hydrocele.    Examination today was not compelling for presence of significant fluid accumulation in the left hemiscrotum.  This is despite ultrasonic evidence of a hydrocele.    Objective: Aspiration of the hemiscrotum revealed only about 10 cc of hydrocele fluid.    In view of this no attempt was made to undergo sclerotherapy.    The scrotum appeared symmetrically enlarged and although the left hemiscrotum demonstrated a greater quantity of tissue within the scrotum which was somewhat tender to palpation but could not definitively be defined as testicle versus enlarged epididymis.    There was no suggestion of neoplastic.    Assessment: Previous ultrasound evidence of enlargement of a left hemiscrotal hydrocele with minimal evidence of hydrocele on physical examination today and inability to drain any significant quantity of fluid from the hemiscrotum percutaneously.    Plan: Repeat the ultrasound of the scrotum for assessment of the current status of his hydrocele.    Total time 10 minutes

## 2022-05-12 NOTE — PROGRESS NOTES
Operating room delay prevented availability of Dr. Quiles.  The appointment was rescheduled for the following day.

## 2022-05-16 ENCOUNTER — ANTICOAGULATION THERAPY VISIT (OUTPATIENT)
Dept: ANTICOAGULATION | Facility: CLINIC | Age: 67
End: 2022-05-16

## 2022-05-16 ENCOUNTER — LAB (OUTPATIENT)
Dept: LAB | Facility: CLINIC | Age: 67
End: 2022-05-16
Payer: COMMERCIAL

## 2022-05-16 DIAGNOSIS — Z79.01 LONG TERM CURRENT USE OF ANTICOAGULANT THERAPY: ICD-10-CM

## 2022-05-16 DIAGNOSIS — I48.91 ATRIAL FIBRILLATION, UNSPECIFIED TYPE (H): Primary | ICD-10-CM

## 2022-05-16 LAB — INR BLD: 1.7 (ref 0.9–1.1)

## 2022-05-16 PROCEDURE — 85610 PROTHROMBIN TIME: CPT

## 2022-05-16 PROCEDURE — 36416 COLLJ CAPILLARY BLOOD SPEC: CPT

## 2022-05-16 NOTE — PROGRESS NOTES
ANTICOAGULATION MANAGEMENT     Joe Mas 66 year old male is on warfarin with subtherapeutic INR result. (Goal INR 2.0-3.0)    Recent labs: (last 7 days)     05/16/22  1141   INR 1.7*       ASSESSMENT       Source(s): Chart Review and Patient/Caregiver Call       Warfarin doses taken: pt did not take a booster dose and did take 5 mg on Thur (instructed to hold an extra day due to procedure reschedule)    Diet: No new diet changes identified    New illness, injury, or hospitalization: hydrocele     Medication/supplement changes: None noted    Signs or symptoms of bleeding or clotting: No    Previous INR: Therapeutic last visit; previously outside of goal range    Additional findings: recent hold for procedure     PLAN     Recommended plan for temporary change(s) affecting INR     Dosing Instructions: booster dose then continue your current warfarin dose with next INR in 10 days       Summary  As of 5/16/2022    Full warfarin instructions:  5/16: 7.5 mg; Otherwise 2.5 mg every Thu; 5 mg all other days   Next INR check:  5/26/2022             Telephone call with Joe who verbalizes understanding and agrees to plan    Lab visit scheduled    Education provided: Please call back if any changes to your diet, medications or how you've been taking warfarin and Monitoring for clotting signs and symptoms    Plan made per [facility] anticoagulation protocol    Sofia Tai RN  Anticoagulation Clinic  5/16/2022    _______________________________________________________________________     Anticoagulation Episode Summary     Current INR goal:  2.0-3.0   TTR:  59.3 % (1 y)   Target end date:  Indefinite   Send INR reminders to:  MARIA ELENA YANG    Indications    Atrial fibrillation  unspecified type (H) [I48.91]  Long term current use of anticoagulant therapy [Z79.01]           Comments:  takes in AM         Anticoagulation Care Providers     Provider Role Specialty Phone number    Jolynn Cooper MD Referring Family Medicine  [phone]

## 2022-05-26 ENCOUNTER — LAB (OUTPATIENT)
Dept: LAB | Facility: CLINIC | Age: 67
End: 2022-05-26
Payer: COMMERCIAL

## 2022-05-26 ENCOUNTER — ANTICOAGULATION THERAPY VISIT (OUTPATIENT)
Dept: ANTICOAGULATION | Facility: CLINIC | Age: 67
End: 2022-05-26

## 2022-05-26 DIAGNOSIS — Z79.01 LONG TERM CURRENT USE OF ANTICOAGULANT THERAPY: ICD-10-CM

## 2022-05-26 DIAGNOSIS — I48.91 ATRIAL FIBRILLATION, UNSPECIFIED TYPE (H): Primary | ICD-10-CM

## 2022-05-26 LAB — INR BLD: 4.1 (ref 0.9–1.1)

## 2022-05-26 PROCEDURE — 85610 PROTHROMBIN TIME: CPT

## 2022-05-26 PROCEDURE — 36416 COLLJ CAPILLARY BLOOD SPEC: CPT

## 2022-05-26 NOTE — PROGRESS NOTES
ANTICOAGULATION MANAGEMENT     Joe Mas 66 year old male is on warfarin with supratherapeutic INR result. (Goal INR 2.0-3.0)    Recent labs: (last 7 days)     05/26/22  1144   INR 4.1*       ASSESSMENT       Source(s): Chart Review and Patient/Caregiver Call       Warfarin doses taken: Warfarin taken as instructed    Diet: No new diet changes identified    New illness, injury, or hospitalization: No    Medication/supplement changes: None noted    Signs or symptoms of bleeding or clotting: No    Previous INR: Subtherapeutic    Additional findings: None       PLAN     Recommended plan for no diet, medication or health factor changes affecting INR     Dosing Instructions: hold dose then decrease your warfarin dose (7.7% change) with next INR in 10 days       Summary  As of 5/26/2022    Full warfarin instructions:  5/26: Hold; Otherwise 2.5 mg every Mon, Thu; 5 mg all other days   Next INR check:  6/6/2022             Telephone call with Joe who verbalizes understanding and agrees to plan    Lab visit scheduled    Education provided: Monitoring for bleeding signs and symptoms    Plan made per [facility] anticoagulation protocol    May Bishop RN  Anticoagulation Clinic  5/26/2022    _______________________________________________________________________     Anticoagulation Episode Summary     Current INR goal:  2.0-3.0   TTR:  57.7 % (1 y)   Target end date:  Indefinite   Send INR reminders to:  MARIA ELENA YANG    Indications    Atrial fibrillation  unspecified type (H) [I48.91]  Long term current use of anticoagulant therapy [Z79.01]           Comments:           Anticoagulation Care Providers     Provider Role Specialty Phone number    Jolynn Cooper MD Referring Family Medicine [phone]

## 2022-05-28 DIAGNOSIS — I48.91 ATRIAL FIBRILLATION, UNSPECIFIED TYPE (H): ICD-10-CM

## 2022-06-01 NOTE — TELEPHONE ENCOUNTER
Routing refill request to provider for review/approval because:  Labs out of range:  INR: 4.1 on 5/2022    Edinson Kumari RN

## 2022-06-02 RX ORDER — WARFARIN SODIUM 5 MG/1
TABLET ORAL
Qty: 84 TABLET | Refills: 1 | Status: SHIPPED | OUTPATIENT
Start: 2022-06-02 | End: 2022-11-14

## 2022-06-06 ENCOUNTER — ANTICOAGULATION THERAPY VISIT (OUTPATIENT)
Dept: ANTICOAGULATION | Facility: CLINIC | Age: 67
End: 2022-06-06

## 2022-06-06 ENCOUNTER — LAB (OUTPATIENT)
Dept: LAB | Facility: CLINIC | Age: 67
End: 2022-06-06
Payer: COMMERCIAL

## 2022-06-06 DIAGNOSIS — Z79.01 LONG TERM CURRENT USE OF ANTICOAGULANT THERAPY: ICD-10-CM

## 2022-06-06 DIAGNOSIS — I48.91 ATRIAL FIBRILLATION, UNSPECIFIED TYPE (H): Primary | ICD-10-CM

## 2022-06-06 LAB — INR BLD: 2.1 (ref 0.9–1.1)

## 2022-06-06 PROCEDURE — 36416 COLLJ CAPILLARY BLOOD SPEC: CPT

## 2022-06-06 PROCEDURE — 85610 PROTHROMBIN TIME: CPT

## 2022-06-06 NOTE — PROGRESS NOTES
ANTICOAGULATION MANAGEMENT     Joe Mas 66 year old male is on warfarin with therapeutic INR result. (Goal INR 2.0-3.0)    Recent labs: (last 7 days)     06/06/22  1132   INR 2.1*       ASSESSMENT       Source(s): Chart Review and Patient/Caregiver Call       Warfarin doses taken: Warfarin taken differently, but did not change total weekly dose    Diet: No new diet changes identified    New illness, injury, or hospitalization: No    Medication/supplement changes: None noted    Signs or symptoms of bleeding or clotting: No    Previous INR: Supratherapeutic    Additional findings: None       PLAN     Recommended plan for no diet, medication or health factor changes affecting INR     Dosing Instructions: continue your current warfarin dose with next INR in 3 weeks       Summary  As of 6/6/2022    Full warfarin instructions:  2.5 mg every Mon, Thu; 5 mg all other days   Next INR check:  6/27/2022             Telephone call with Joe who verbalizes understanding and agrees to plan    Lab visit scheduled    Education provided: Goal range and significance of current result and Importance of therapeutic range    Plan made per [facility] anticoagulation protocol    Mellisa Tripathi RN  Anticoagulation Clinic  6/6/2022    _______________________________________________________________________     Anticoagulation Episode Summary     Current INR goal:  2.0-3.0   TTR:  56.0 % (1 y)   Target end date:  Indefinite   Send INR reminders to:  MARIA ELENA YANG    Indications    Atrial fibrillation  unspecified type (H) [I48.91]  Long term current use of anticoagulant therapy [Z79.01]           Comments:           Anticoagulation Care Providers     Provider Role Specialty Phone number    Jolynn Cooper MD Referring Family Medicine [phone]

## 2022-06-16 DIAGNOSIS — J44.1 OBSTRUCTIVE CHRONIC BRONCHITIS WITH EXACERBATION (H): ICD-10-CM

## 2022-06-16 RX ORDER — IPRATROPIUM BROMIDE AND ALBUTEROL SULFATE 2.5; .5 MG/3ML; MG/3ML
SOLUTION RESPIRATORY (INHALATION)
Qty: 360 ML | Refills: 1 | Status: SHIPPED | OUTPATIENT
Start: 2022-06-16 | End: 2023-06-26

## 2022-07-05 ENCOUNTER — LAB (OUTPATIENT)
Dept: LAB | Facility: CLINIC | Age: 67
End: 2022-07-05
Payer: COMMERCIAL

## 2022-07-05 ENCOUNTER — ANTICOAGULATION THERAPY VISIT (OUTPATIENT)
Dept: ANTICOAGULATION | Facility: CLINIC | Age: 67
End: 2022-07-05

## 2022-07-05 DIAGNOSIS — Z79.01 LONG TERM CURRENT USE OF ANTICOAGULANT THERAPY: ICD-10-CM

## 2022-07-05 DIAGNOSIS — I48.91 ATRIAL FIBRILLATION, UNSPECIFIED TYPE (H): Primary | ICD-10-CM

## 2022-07-05 LAB — INR BLD: 2.5 (ref 0.9–1.1)

## 2022-07-05 PROCEDURE — 36416 COLLJ CAPILLARY BLOOD SPEC: CPT

## 2022-07-05 PROCEDURE — 85610 PROTHROMBIN TIME: CPT

## 2022-07-05 NOTE — PROGRESS NOTES
ANTICOAGULATION MANAGEMENT     Joe Mas 66 year old male is on warfarin with therapeutic INR result. (Goal INR 2.0-3.0)    Recent labs: (last 7 days)     07/05/22  1050   INR 2.5*       ASSESSMENT       Source(s): Chart Review    Previous INR was Therapeutic last visit; previously outside of goal range    Medication, diet, health changes since last INR chart reviewed; none identified           PLAN     Recommended plan for no diet, medication or health factor changes affecting INR     Dosing Instructions: continue your current warfarin dose with next INR in 5 weeks       Summary  As of 7/5/2022    Full warfarin instructions:  2.5 mg every Mon, Thu; 5 mg all other days   Next INR check:  8/9/2022             Sent [employer] message with dosing and follow up instructions  Unable to reach by phone and no     Contact [phone]  to schedule and with any changes, questions or concerns.     Education provided: Please call back if any changes to your diet, medications or how you've been taking warfarin and Contact [phone]  with any changes, questions or concerns.     Plan made per [facility] anticoagulation protocol    Halie Birmingham RN  Anticoagulation Clinic  7/5/2022    _______________________________________________________________________     Anticoagulation Episode Summary     Current INR goal:  2.0-3.0   TTR:  56.0 % (1 y)   Target end date:  Indefinite   Send INR reminders to:  MARIA ELENA YANG    Indications    Atrial fibrillation  unspecified type (H) [I48.91]  Long term current use of anticoagulant therapy [Z79.01]           Comments:           Anticoagulation Care Providers     Provider Role Specialty Phone number    Jolynn Cooper MD Referring Family Medicine [phone]

## 2022-08-08 ENCOUNTER — ANTICOAGULATION THERAPY VISIT (OUTPATIENT)
Dept: ANTICOAGULATION | Facility: CLINIC | Age: 67
End: 2022-08-08

## 2022-08-08 ENCOUNTER — OFFICE VISIT (OUTPATIENT)
Dept: FAMILY MEDICINE | Facility: CLINIC | Age: 67
End: 2022-08-08
Payer: COMMERCIAL

## 2022-08-08 VITALS
HEART RATE: 63 BPM | TEMPERATURE: 97.6 F | BODY MASS INDEX: 37.28 KG/M2 | OXYGEN SATURATION: 95 % | DIASTOLIC BLOOD PRESSURE: 74 MMHG | WEIGHT: 251.7 LBS | HEIGHT: 69 IN | SYSTOLIC BLOOD PRESSURE: 120 MMHG

## 2022-08-08 DIAGNOSIS — Z12.11 SCREEN FOR COLON CANCER: ICD-10-CM

## 2022-08-08 DIAGNOSIS — E78.5 HYPERLIPIDEMIA WITH TARGET LDL LESS THAN 100: ICD-10-CM

## 2022-08-08 DIAGNOSIS — E11.9 TYPE 2 DIABETES MELLITUS WITHOUT COMPLICATION, WITHOUT LONG-TERM CURRENT USE OF INSULIN (H): Primary | ICD-10-CM

## 2022-08-08 DIAGNOSIS — Z79.01 LONG TERM CURRENT USE OF ANTICOAGULANT THERAPY: ICD-10-CM

## 2022-08-08 DIAGNOSIS — I10 ESSENTIAL HYPERTENSION, BENIGN: ICD-10-CM

## 2022-08-08 DIAGNOSIS — I48.91 ATRIAL FIBRILLATION, UNSPECIFIED TYPE (H): Primary | ICD-10-CM

## 2022-08-08 LAB
ALBUMIN SERPL-MCNC: 3.9 G/DL (ref 3.4–5)
ALP SERPL-CCNC: 63 U/L (ref 40–150)
ALT SERPL W P-5'-P-CCNC: 36 U/L (ref 0–70)
ANION GAP SERPL CALCULATED.3IONS-SCNC: 7 MMOL/L (ref 3–14)
AST SERPL W P-5'-P-CCNC: 18 U/L (ref 0–45)
BILIRUB SERPL-MCNC: 0.7 MG/DL (ref 0.2–1.3)
BUN SERPL-MCNC: 23 MG/DL (ref 7–30)
CALCIUM SERPL-MCNC: 9.8 MG/DL (ref 8.5–10.1)
CHLORIDE BLD-SCNC: 101 MMOL/L (ref 94–109)
CO2 SERPL-SCNC: 30 MMOL/L (ref 20–32)
CREAT SERPL-MCNC: 1.03 MG/DL (ref 0.66–1.25)
GFR SERPL CREATININE-BSD FRML MDRD: 80 ML/MIN/1.73M2
GLUCOSE BLD-MCNC: 150 MG/DL (ref 70–99)
HBA1C MFR BLD: 6.9 % (ref 0–5.6)
INR BLD: 2.8 (ref 0.9–1.1)
POTASSIUM BLD-SCNC: 4.1 MMOL/L (ref 3.4–5.3)
PROT SERPL-MCNC: 7 G/DL (ref 6.8–8.8)
SODIUM SERPL-SCNC: 138 MMOL/L (ref 133–144)

## 2022-08-08 PROCEDURE — 36415 COLL VENOUS BLD VENIPUNCTURE: CPT | Performed by: FAMILY MEDICINE

## 2022-08-08 PROCEDURE — 85610 PROTHROMBIN TIME: CPT | Performed by: FAMILY MEDICINE

## 2022-08-08 PROCEDURE — 83036 HEMOGLOBIN GLYCOSYLATED A1C: CPT | Performed by: FAMILY MEDICINE

## 2022-08-08 PROCEDURE — 99207 PR FOOT EXAM NO CHARGE: CPT | Performed by: FAMILY MEDICINE

## 2022-08-08 PROCEDURE — 80053 COMPREHEN METABOLIC PANEL: CPT | Performed by: FAMILY MEDICINE

## 2022-08-08 PROCEDURE — 99214 OFFICE O/P EST MOD 30 MIN: CPT | Performed by: FAMILY MEDICINE

## 2022-08-08 PROCEDURE — 36416 COLLJ CAPILLARY BLOOD SPEC: CPT | Performed by: FAMILY MEDICINE

## 2022-08-08 RX ORDER — METFORMIN HCL 500 MG
500 TABLET, EXTENDED RELEASE 24 HR ORAL 2 TIMES DAILY WITH MEALS
Qty: 180 TABLET | Refills: 1 | Status: SHIPPED | OUTPATIENT
Start: 2022-08-08 | End: 2023-02-03

## 2022-08-08 ASSESSMENT — PATIENT HEALTH QUESTIONNAIRE - PHQ9
SUM OF ALL RESPONSES TO PHQ QUESTIONS 1-9: 0
SUM OF ALL RESPONSES TO PHQ QUESTIONS 1-9: 0
10. IF YOU CHECKED OFF ANY PROBLEMS, HOW DIFFICULT HAVE THESE PROBLEMS MADE IT FOR YOU TO DO YOUR WORK, TAKE CARE OF THINGS AT HOME, OR GET ALONG WITH OTHER PEOPLE: NOT DIFFICULT AT ALL

## 2022-08-08 NOTE — PROGRESS NOTES
ANTICOAGULATION MANAGEMENT     Joe Mas 66 year old male is on warfarin with therapeutic INR result. (Goal INR 2.0-3.0)    Recent labs: (last 7 days)     08/08/22  1520   INR 2.8*       ASSESSMENT       Source(s): Chart Review    Previous INR was Therapeutic last 2(+) visits    Medication, diet, health changes since last INR chart reviewed; none identified           PLAN     Recommended plan for no diet, medication or health factor changes affecting INR     Dosing Instructions: Continue your current warfarin dose with next INR in 6 weeks       Summary  As of 8/8/2022    Full warfarin instructions:  2.5 mg every Mon, Thu; 5 mg all other days   Next INR check:  9/19/2022             Sent [employer] message with dosing and follow up instructions. Left VM to return call to [facility] or check [employer].    Contact [phone]  to schedule and with any changes, questions or concerns.     Education provided: Please call back if any changes to your diet, medications or how you've been taking warfarin    Plan made per [facility] anticoagulation protocol    Cheryl Coats RN  Anticoagulation Clinic  8/8/2022    _______________________________________________________________________     Anticoagulation Episode Summary     Current INR goal:  2.0-3.0   TTR:  56.1 % (1 y)   Target end date:  Indefinite   Send INR reminders to:  MARIA ELENA YANG    Indications    Atrial fibrillation  unspecified type (H) [I48.91]  Long term current use of anticoagulant therapy [Z79.01]           Comments:           Anticoagulation Care Providers     Provider Role Specialty Phone number    Jolynn Cooper MD Referring Family Medicine [phone]

## 2022-08-08 NOTE — NURSING NOTE
"Initial /74   Pulse 63   Temp 97.6  F (36.4  C) (Tympanic)   Ht 1.746 m (5' 8.75\")   Wt 114.2 kg (251 lb 11.2 oz)   SpO2 95%   BMI 37.44 kg/m   Estimated body mass index is 37.44 kg/m  as calculated from the following:    Height as of this encounter: 1.746 m (5' 8.75\").    Weight as of this encounter: 114.2 kg (251 lb 11.2 oz). .      "

## 2022-08-08 NOTE — PROGRESS NOTES
A/P:      ICD-10-CM    1. Type 2 diabetes mellitus without complication, without long-term current use of insulin (H)  E11.9 FOOT EXAM     metFORMIN (GLUCOPHAGE XR) 500 MG 24 hr tablet   2. Essential hypertension, benign  I10    3. Hyperlipidemia with target LDL less than 100  E78.5    4. Long term current use of anticoagulant therapy  Z79.01 INR point of care   5. Screen for colon cancer  Z12.11      DM:  A1c now at goal.  Pt tolerating metformin 1000mg daily.  Has made a number of lifestyle changes as well.  Feeling good.  Continue current plan and f/u 6 months    HTN:  Well controlled, continue current meds    Lipids:  On statin    H/o a-fib, anticoagulated:  Pt wondered whether or not  ASA was still needed.  Reviewed chart.  No evidence of CAD noted.  Pt anticoagulated which increases risk of GI bleed.  Advised to stop low dose ASA now as no specific indication present and risk in combination with warfarin      Pt has info to schedule colonoscopy and plans to do so.    Isaias Diaz is a 66 year old, presenting for the following health issues:  Diabetes      History of Present Illness       Diabetes:   He presents for follow up of diabetes.  He is checking home blood glucose two times daily. He checks blood glucose at bedtime.  Blood glucose is sometimes over 200 and never under 70. He is aware of hypoglycemia symptoms including lethargy. He has no concerns regarding his diabetes at this time.  He is having excessive thirst and weight gain.         He eats 0-1 servings of fruits and vegetables daily.He consumes 0 sweetened beverage(s) daily.He exercises with enough effort to increase his heart rate 20 to 29 minutes per day.  He exercises with enough effort to increase his heart rate 5 days per week.   He is taking medications regularly.    Today's PHQ-9         PHQ-9 Total Score: 0    PHQ-9 Q9 Thoughts of better off dead/self-harm past 2 weeks :   Not at all    How difficult have these problems made it  "for you to do your work, take care of things at home, or get along with other people: Not difficult at all       Feels he has made a lot of changes since his last visit.  Has really worked on reducing carbs and improving his diet.  Has also added more regular exercise.  Has been able to lose weight and is feeling really good.  Notes his sugars are generally around 100 when he checks.  Wants to be sure it is safe to take his metformin if his sugars are in this range.    No side effects noted from metformin.  Tolerating well.  No missed doses.    Wonder if he should continue ASA.  Denies any h/o CAD or other known vascular disease.    H/o A-fib.  Has been on rate control and feels this is going well.  Rare episodes of palpitations.  Has not seen cardiology in many years.    Review of Systems   Constitutional, HEENT, cardiovascular, pulmonary, gi and gu systems are negative, except as otherwise noted.      Objective    /74   Pulse 63   Temp 97.6  F (36.4  C) (Tympanic)   Ht 1.746 m (5' 8.75\")   Wt 114.2 kg (251 lb 11.2 oz)   SpO2 95%   BMI 37.44 kg/m    Body mass index is 37.44 kg/m .  Physical Exam   PE:  VS as above   Gen:  WN/WD/WH male in NAD   Heart:  RRR without murmur, nl S1, S2, no rubs or gallops   Lungs CTA gee without rales/ronchi/wheezes   Ext:  No pedal edema  Diabetic foot exam: normal DP and PT pulses, no trophic changes or ulcerative lesions, normal sensory exam and normal monofilament exam      Epic reviewed                .  ..  Answers for HPI/ROS submitted by the patient on 8/8/2022  If you checked off any problems, how difficult have these problems made it for you to do your work, take care of things at home, or get along with other people?: Not difficult at all  PHQ9 TOTAL SCORE: 0      "

## 2022-09-15 ENCOUNTER — LAB (OUTPATIENT)
Dept: LAB | Facility: CLINIC | Age: 67
End: 2022-09-15
Payer: COMMERCIAL

## 2022-09-15 ENCOUNTER — ANTICOAGULATION THERAPY VISIT (OUTPATIENT)
Dept: ANTICOAGULATION | Facility: CLINIC | Age: 67
End: 2022-09-15

## 2022-09-15 DIAGNOSIS — I48.91 ATRIAL FIBRILLATION, UNSPECIFIED TYPE (H): Primary | ICD-10-CM

## 2022-09-15 DIAGNOSIS — Z79.01 LONG TERM CURRENT USE OF ANTICOAGULANT THERAPY: ICD-10-CM

## 2022-09-15 LAB — INR BLD: 2.8 (ref 0.9–1.1)

## 2022-09-15 PROCEDURE — 36416 COLLJ CAPILLARY BLOOD SPEC: CPT

## 2022-09-15 PROCEDURE — 85610 PROTHROMBIN TIME: CPT

## 2022-09-15 NOTE — PROGRESS NOTES
ANTICOAGULATION MANAGEMENT     Joe Mas 66 year old male is on warfarin with therapeutic INR result. (Goal INR 2.0-3.0)    Recent labs: (last 7 days)     09/15/22  1345   INR 2.8*       ASSESSMENT       Source(s): Chart Review    Previous INR was Therapeutic last 2(+) visits    Medication, diet, health changes since last INR chart reviewed; none identified           PLAN     Recommended plan for no diet, medication or health factor changes affecting INR     Dosing Instructions: Continue your current warfarin dose with next INR in 6 weeks       Summary  As of 9/15/2022    Full warfarin instructions:  2.5 mg every Mon, Thu; 5 mg all other days   Next INR check:  10/27/2022             Sent [employer] message with dosing and follow up instructions  VM left to call [facility] or check [employer]    Contact [phone]  to schedule and with any changes, questions or concerns.     Education provided: Please call back if any changes to your diet, medications or how you've been taking warfarin and Contact [phone]  with any changes, questions or concerns.     Plan made per [facility] anticoagulation protocol    Halie Birmingham RN  Anticoagulation Clinic  9/15/2022    _______________________________________________________________________     Anticoagulation Episode Summary     Current INR goal:  2.0-3.0   TTR:  62.5 % (1 y)   Target end date:  Indefinite   Send INR reminders to:  MARIA ELENA YANG    Indications    Atrial fibrillation  unspecified type (H) [I48.91]  Long term current use of anticoagulant therapy [Z79.01]           Comments:           Anticoagulation Care Providers     Provider Role Specialty Phone number    Jolynn Cooper MD Referring Family Medicine [phone]

## 2022-10-06 DIAGNOSIS — J44.1 OBSTRUCTIVE CHRONIC BRONCHITIS WITH EXACERBATION (H): ICD-10-CM

## 2022-10-06 RX ORDER — ALBUTEROL SULFATE 90 UG/1
AEROSOL, METERED RESPIRATORY (INHALATION)
Qty: 18 G | Refills: 1 | Status: SHIPPED | OUTPATIENT
Start: 2022-10-06 | End: 2022-11-21

## 2022-10-24 ENCOUNTER — TELEPHONE (OUTPATIENT)
Dept: FAMILY MEDICINE | Facility: CLINIC | Age: 67
End: 2022-10-24

## 2022-10-24 DIAGNOSIS — I48.91 ATRIAL FIBRILLATION, UNSPECIFIED TYPE (H): Primary | ICD-10-CM

## 2022-10-24 DIAGNOSIS — Z79.01 LONG TERM CURRENT USE OF ANTICOAGULANT THERAPY: ICD-10-CM

## 2022-10-24 NOTE — TELEPHONE ENCOUNTER
Fax received from [facility] requesting a 4 day hold of warfarin for colonoscopy on 11/22/22.    Routing to [facility] for review.    [facility]-RN hold orders line: [phone].    JAVIER Browning, RN  Anticoagulation Clinic

## 2022-10-27 ENCOUNTER — ANTICOAGULATION THERAPY VISIT (OUTPATIENT)
Dept: ANTICOAGULATION | Facility: CLINIC | Age: 67
End: 2022-10-27

## 2022-10-27 ENCOUNTER — LAB (OUTPATIENT)
Dept: LAB | Facility: CLINIC | Age: 67
End: 2022-10-27
Payer: COMMERCIAL

## 2022-10-27 DIAGNOSIS — Z79.01 LONG TERM CURRENT USE OF ANTICOAGULANT THERAPY: ICD-10-CM

## 2022-10-27 DIAGNOSIS — I48.91 ATRIAL FIBRILLATION, UNSPECIFIED TYPE (H): Primary | ICD-10-CM

## 2022-10-27 LAB — INR BLD: 3.6 (ref 0.9–1.1)

## 2022-10-27 PROCEDURE — 85610 PROTHROMBIN TIME: CPT

## 2022-10-27 PROCEDURE — 36416 COLLJ CAPILLARY BLOOD SPEC: CPT

## 2022-10-27 NOTE — PROGRESS NOTES
ANTICOAGULATION MANAGEMENT     Joe Mas 67 year old male is on warfarin with supratherapeutic INR result. (Goal INR 2.0-3.0)    Recent labs: (last 7 days)     10/27/22  1130   INR 3.6*       ASSESSMENT       Source(s): Chart Review and Patient/Caregiver Call       Warfarin doses taken: Warfarin taken as instructed    Diet: No new diet changes identified    New illness, injury, or hospitalization: No    Medication/supplement changes: None noted    Signs or symptoms of bleeding or clotting: No    Previous INR: Therapeutic last 2(+) visits    Additional findings: Upcoming surgery/procedure colonoscopy on 11/22/22- procedure plan is already being wokred on and will notify patient once finalized.        PLAN     Recommended plan for no diet, medication or health factor changes affecting INR     Dosing Instructions: hold dose then continue your current warfarin dose with next INR in 2 weeks       Summary  As of 10/27/2022    Full warfarin instructions:  10/27: Hold; Otherwise 2.5 mg every Mon, Thu; 5 mg all other days; Starting 10/27/2022   Next INR check:  11/10/2022             Telephone call with Joe who verbalizes understanding and agrees to plan    Lab visit scheduled    Education provided:     Symptom monitoring: monitoring for bleeding signs and symptoms and when to seek medical attention/emergency care    Contact [phone]  with any changes, questions or concerns.     Plan made per [facility] anticoagulation protocol    Cheryl Coats RN  Anticoagulation Clinic  10/27/2022    _______________________________________________________________________     Anticoagulation Episode Summary     Current INR goal:  2.0-3.0   TTR:  61.2 % (1 y)   Target end date:  Indefinite   Send INR reminders to:  MARIA ELENA YANG    Indications    Atrial fibrillation  unspecified type (H) [I48.91]  Long term current use of anticoagulant therapy [Z79.01]           Comments:           Anticoagulation Care Providers     Provider Role  Specialty Phone number    Jolynn Cooper MD Referring Family Medicine [phone]

## 2022-10-27 NOTE — TELEPHONE ENCOUNTER
"WALLY-PROCEDURAL ANTICOAGULATION  MANAGEMENT    ASSESSMENT     Warfarin interruption plan for colonoscopy on .    Indication for Anticoagulation: Atrial Fibrillation      [id number] = 3 (Hypertension, Age 65-74 and Diabetes)     Hx of atrial fibrillation 2018     Patient has not bridged in the past     Wally-Procedure Risk stratification for thromboembolism: low (2017 [facility] periprocedure pathway for NVAF Expert Consensus)    NVAF: 2017 [facility] periprocedure pathway for NVAF advises NO bridge for low risk stratification ([id number] score <=4 and no prior hx of stroke, TIA or systemic embolism)     RECOMMENDATION       INR check 10/27 lab appointment     Pre-Procedure:  o Hold warfarin for 4 days, until after procedure startin/18   o No Bridge      Post-Procedure:  o Resume warfarin dose if okay with provider doing procedure on night of procedure,  PM: 10 mg x 1, then resume 2.5 mg Mon, Thu, and 5 mg all other days   o Recheck INR ~ 7 days after resuming warfarin     Plan routed to referring provider for approval  ?   Pat Cardona    SUBJECTIVE/OBJECTIVE     Joe Mas, a 67 year old male    Goal INR Range: 2.0-3.0     Patient bridged in past: No    Wt Readings from Last 3 Encounters:   22 114.2 kg (251 lb 11.2 oz)   22 120.7 kg (266 lb)   22 120.7 kg (266 lb)      Ideal body weight: 70.1 kg (154 lb 9.6 oz)  Adjusted ideal body weight: 87.7 kg (193 lb 7 oz)     Estimated body mass index is 37.44 kg/m  as calculated from the following:    Height as of 22: 1.746 m (5' 8.75\").    Weight as of 22: 114.2 kg (251 lb 11.2 oz).    Lab Results   Component Value Date    INR 2.8 (H) 09/15/2022    INR 2.8 (H) 2022    INR 2.5 (H) 2022     Lab Results   Component Value Date    HGB 16.1 10/04/2011    HCT 48.2 10/04/2011     10/04/2011     2011     Lab Results   Component Value Date    CR 1.03 2022    CR 0.96 2022    CR 0.88 2022     CrCl " cannot be calculated (Patient's most recent lab result is older than the maximum 30 days allowed.).

## 2022-10-28 ENCOUNTER — LAB (OUTPATIENT)
Dept: FAMILY MEDICINE | Facility: CLINIC | Age: 67
End: 2022-10-28
Attending: FAMILY MEDICINE
Payer: COMMERCIAL

## 2022-10-28 ENCOUNTER — TELEPHONE (OUTPATIENT)
Dept: FAMILY MEDICINE | Facility: CLINIC | Age: 67
End: 2022-10-28

## 2022-10-28 ENCOUNTER — E-VISIT (OUTPATIENT)
Dept: FAMILY MEDICINE | Facility: CLINIC | Age: 67
End: 2022-10-28
Payer: COMMERCIAL

## 2022-10-28 DIAGNOSIS — J06.9 UPPER RESPIRATORY TRACT INFECTION, UNSPECIFIED TYPE: Primary | ICD-10-CM

## 2022-10-28 DIAGNOSIS — J06.9 UPPER RESPIRATORY TRACT INFECTION, UNSPECIFIED TYPE: ICD-10-CM

## 2022-10-28 LAB
FLUAV AG SPEC QL IA: NEGATIVE
FLUBV AG SPEC QL IA: NEGATIVE
SARS-COV-2 RNA RESP QL NAA+PROBE: NEGATIVE

## 2022-10-28 PROCEDURE — U0005 INFEC AGEN DETEC AMPLI PROBE: HCPCS

## 2022-10-28 PROCEDURE — 99421 OL DIG E/M SVC 5-10 MIN: CPT | Performed by: FAMILY MEDICINE

## 2022-10-28 PROCEDURE — U0003 INFECTIOUS AGENT DETECTION BY NUCLEIC ACID (DNA OR RNA); SEVERE ACUTE RESPIRATORY SYNDROME CORONAVIRUS 2 (SARS-COV-2) (CORONAVIRUS DISEASE [COVID-19]), AMPLIFIED PROBE TECHNIQUE, MAKING USE OF HIGH THROUGHPUT TECHNOLOGIES AS DESCRIBED BY CMS-2020-01-R: HCPCS

## 2022-10-28 PROCEDURE — 87804 INFLUENZA ASSAY W/OPTIC: CPT | Mod: 59 | Performed by: FAMILY MEDICINE

## 2022-10-28 NOTE — TELEPHONE ENCOUNTER
Message has been left on [facility] confidential RN line.    Ann Bahena, PharmD BCACP  Anticoagulation Clinical Pharmacist

## 2022-10-28 NOTE — TELEPHONE ENCOUNTER
Pt called explaining that he has been with his daughter all week and she was sick.  Yesterday, he took her to ED and she tested positive for infl A.    Pt woke today with low grade fever and chest feels like it's burning.    Pt has history of respiratory issues.  He asks to be seen.    Recommend e-visit to address.   Appt arranged.      Christine Weems RN   [facility]    Appointment line: [phone], available 24 hours.  [facility], 24 hour nurse line, available by calling clinic after hours at [phone] and following prompts.

## 2022-10-31 ENCOUNTER — HOSPITAL ENCOUNTER (EMERGENCY)
Facility: CLINIC | Age: 67
Discharge: HOME | End: 2022-10-31
Attending: EMERGENCY MEDICINE | Admitting: EMERGENCY MEDICINE
Payer: COMMERCIAL

## 2022-10-31 ENCOUNTER — DOCUMENTATION ONLY (OUTPATIENT)
Dept: ANTICOAGULATION | Facility: CLINIC | Age: 67
End: 2022-10-31

## 2022-10-31 VITALS
TEMPERATURE: 98 F | DIASTOLIC BLOOD PRESSURE: 93 MMHG | SYSTOLIC BLOOD PRESSURE: 157 MMHG | BODY MASS INDEX: 36.29 KG/M2 | HEIGHT: 69 IN | HEART RATE: 68 BPM | WEIGHT: 245 LBS | RESPIRATION RATE: 15 BRPM | OXYGEN SATURATION: 95 %

## 2022-10-31 DIAGNOSIS — I10 BENIGN ESSENTIAL HYPERTENSION: ICD-10-CM

## 2022-10-31 LAB
ANION GAP SERPL CALCULATED.3IONS-SCNC: 10 MMOL/L (ref 7–15)
BASOPHILS # BLD AUTO: 0 10E3/UL (ref 0–0.2)
BASOPHILS NFR BLD AUTO: 0 %
BUN SERPL-MCNC: 13.4 MG/DL (ref 8–23)
CALCIUM SERPL-MCNC: 10 MG/DL (ref 8.8–10.2)
CHLORIDE SERPL-SCNC: 98 MMOL/L (ref 98–107)
CREAT SERPL-MCNC: 0.82 MG/DL (ref 0.67–1.17)
DEPRECATED HCO3 PLAS-SCNC: 27 MMOL/L (ref 22–29)
EOSINOPHIL # BLD AUTO: 0.2 10E3/UL (ref 0–0.7)
EOSINOPHIL NFR BLD AUTO: 3 %
ERYTHROCYTE [DISTWIDTH] IN BLOOD BY AUTOMATED COUNT: 12 % (ref 10–15)
GFR SERPL CREATININE-BSD FRML MDRD: >90 ML/MIN/1.73M2
GLUCOSE SERPL-MCNC: 223 MG/DL (ref 70–99)
HCT VFR BLD AUTO: 42.5 % (ref 40–53)
HGB BLD-MCNC: 14.4 G/DL (ref 13.3–17.7)
IMM GRANULOCYTES # BLD: 0.1 10E3/UL
IMM GRANULOCYTES NFR BLD: 1 %
LYMPHOCYTES # BLD AUTO: 1.6 10E3/UL (ref 0.8–5.3)
LYMPHOCYTES NFR BLD AUTO: 23 %
MCH RBC QN AUTO: 31.2 PG (ref 26.5–33)
MCHC RBC AUTO-ENTMCNC: 33.9 G/DL (ref 31.5–36.5)
MCV RBC AUTO: 92 FL (ref 78–100)
MONOCYTES # BLD AUTO: 0.7 10E3/UL (ref 0–1.3)
MONOCYTES NFR BLD AUTO: 10 %
NEUTROPHILS # BLD AUTO: 4.4 10E3/UL (ref 1.6–8.3)
NEUTROPHILS NFR BLD AUTO: 63 %
NRBC # BLD AUTO: 0 10E3/UL
NRBC BLD AUTO-RTO: 0 /100
PLATELET # BLD AUTO: 170 10E3/UL (ref 150–450)
POTASSIUM SERPL-SCNC: 4 MMOL/L (ref 3.4–5.3)
RBC # BLD AUTO: 4.61 10E6/UL (ref 4.4–5.9)
SODIUM SERPL-SCNC: 135 MMOL/L (ref 136–145)
WBC # BLD AUTO: 7 10E3/UL (ref 4–11)

## 2022-10-31 PROCEDURE — 36415 COLL VENOUS BLD VENIPUNCTURE: CPT | Performed by: EMERGENCY MEDICINE

## 2022-10-31 PROCEDURE — 99283 EMERGENCY DEPT VISIT LOW MDM: CPT | Performed by: EMERGENCY MEDICINE

## 2022-10-31 PROCEDURE — 99284 EMERGENCY DEPT VISIT MOD MDM: CPT | Performed by: EMERGENCY MEDICINE

## 2022-10-31 PROCEDURE — 250N000013 HC RX MED GY IP 250 OP 250 PS 637: Performed by: EMERGENCY MEDICINE

## 2022-10-31 PROCEDURE — 82310 ASSAY OF CALCIUM: CPT | Performed by: EMERGENCY MEDICINE

## 2022-10-31 PROCEDURE — 85014 HEMATOCRIT: CPT | Performed by: EMERGENCY MEDICINE

## 2022-10-31 RX ORDER — METOPROLOL SUCCINATE 25 MG/1
50 TABLET, EXTENDED RELEASE ORAL DAILY
Status: DISCONTINUED | OUTPATIENT
Start: 2022-10-31 | End: 2022-10-31 | Stop reason: HOSPADM

## 2022-10-31 RX ADMIN — METOPROLOL SUCCINATE 50 MG: 25 TABLET, FILM COATED, EXTENDED RELEASE ORAL at 08:11

## 2022-10-31 ASSESSMENT — ACTIVITIES OF DAILY LIVING (ADL): ADLS_ACUITY_SCORE: 35

## 2022-10-31 NOTE — TELEPHONE ENCOUNTER
Veodin message sent to patient with hold instructions, patient also has an INR scheduled 11/10/22 to review again if needed. Dosing calendar has been updated.     Cheryl Coats RN, BSN  [facility]  [phone]

## 2022-10-31 NOTE — ED PROVIDER NOTES
History     Chief Complaint   Patient presents with     Hypertension     HPI  Joe Mas is a 67 year old male with past medical history significant for hypertension type 2 diabetes hyperlipidemia history of atrial fibrillation obstructive sleep apnea who presents the emergency department complaining of high blood pressure and shakiness.  Patient states he ran out of his blood pressure medications accidentally and has not taken it since Thursday.  The metoprolol prescription is ready to be picked up this morning.  Patient is feeling a bit shaky and thinks he went into A. fib.  He denies any fevers or chills has not any headache he is not any chest pain or significant shortness of breath denies any chest pain he is not any nausea vomiting diarrhea or abdominal pain denies any bowel or bladder dysfunction has not had any focal numbness weakness in extremity.  He denies any leg swelling rash or calf pain.    Allergies:  Allergies   Allergen Reactions     Lisinopril Cough       Problem List:    Patient Active Problem List    Diagnosis Date Noted     Hydrocele in adult 05/10/2022     Priority: Medium     Moderate major depression (H) 02/01/2022     Priority: Medium     Quit smoking 04/09/2021     Priority: Medium     a few years ago  30+ pack year hx        JUNG (obstructive sleep apnea) 05/11/2018     Priority: Medium     Moderate to possible severe JUNG, potential for significant hypoxemia and borderline sustained hypoxemia   - Suspect AHI was actually under-reported given suspected sleep onset was not until ~4am (recording from [place] to 9am).      Home Sleep Apnea Testing - 5/4/2018: 259 lbs 0 oz: AHI 15.6/hr. Supine AHI 15.6/hr.   Oxygen Codey of 81%.  Baseline 88.5%.  Sp02 =< 88% for majority of recording  He slept on his back (11.9%), prone (0%), left (5.8) and right (72.8%) sides.   Analysis time: 483.1 minutes.            Long term current use of anticoagulant therapy 02/27/2018     Priority: Medium     Atrial  fibrillation, unspecified type (H) 02/27/2018     Priority: Medium     Morbid obesity (H) 02/27/2018     Priority: Medium     Tubular adenoma of colon 08/16/2017     Priority: Medium     Collected: 8/11/2017   Received: 8/14/2017   Reported: 8/15/2017 17:28   Ordering Phy(s): AL MORELAND     For improved result formatting, select 'View Enhanced Report Format'   under Linked Documents section.     SPECIMEN(S):   A: Colon polyps at 20 cm   B: Colon polyp, ascending     FINAL DIAGNOSIS:   A.  Colon at 20 cm, grossly biopsies:   -Tubular adenoma and fragments of hyperplastic polyp   - Negative for high-grade dysplasia and malignancy.     B.  Right colon, mucosal biopsy:   - Tubular adenoma.   - Negative for high-grade dysplasia and malignancy.        Obesity (BMI 30-39.9) 10/24/2015     Priority: Medium     Hyperlipidemia with target LDL less than 100 02/14/2014     Priority: Medium     Diagnosis updated by automated process. Provider to review and confirm.       Type 2 diabetes mellitus with complication, without long-term current use of insulin (H) 10/29/2013     Priority: Medium     CARDIOVASCULAR SCREENING; LDL GOAL LESS THAN 100 10/29/2013     Priority: Medium     Moderate major depression (H) 02/20/2013     Priority: Medium     Advanced directives, counseling/discussion 11/03/2011     Priority: Medium     Advance Directive Problem List Overview:   Name Relationship Phone    Primary Health Care Agent            Alternative Health Care Agent          Discussed advance care planning with patient; information given to patient to review.     Flavia Duggan CMA, HC Facilitator    11/3/2011          Obstructive chronic bronchitis with exacerbation (H) 02/22/2007     Priority: Medium     Essential hypertension, benign 02/22/2007     Priority: Medium        Past Medical History:    Past Medical History:   Diagnosis Date     A-fib (H) 2/27/2018     COPD (chronic obstructive pulmonary disease) (H)      Diabetes (H)       Hypertension      NO ACTIVE PROBLEMS        Past Surgical History:    Past Surgical History:   Procedure Laterality Date     ABDOMEN SURGERY       COLONOSCOPY  2004    Follow up colonoscopy in 5 years     HERNIA REPAIR, UMBILICAL  2001     KNEE SURGERY  1960's    Left       Family History:    Family History   Problem Relation Age of Onset     Osteoporosis Mother      Heart Disease Mother      Neurologic Disorder Father         passed away from a brain tumor age 48     Cerebrovascular Disease Father      Neurologic Disorder Maternal Grandmother         parkinsons     C.A.D. Maternal Grandmother      Diabetes Maternal Grandmother      Alcohol/Drug Maternal Grandfather      Cancer Maternal Grandfather         esphogus     Cancer Paternal Grandfather         lung     Diabetes Sister      Hypertension Sister      Asthma Daughter      Cerebrovascular Disease No family hx of      Breast Cancer No family hx of      Cancer - colorectal No family hx of        Social History:  Marital Status:  Single [1]  Social History     Tobacco Use     Smoking status: Former     Packs/day: 1.00     Years: 25.00     Pack years: 25.00     Types: Cigarettes     Start date: 1975     Quit date: 2000     Years since quittin.8     Smokeless tobacco: Never     Tobacco comments:     quit smoking    Substance Use Topics     Alcohol use: Yes     Alcohol/week: 0.0 standard drinks     Comment: 2-5 beers per month     Drug use: Yes     Types: Marijuana        Medications:    albuterol (PROAIR HFA/PROVENTIL HFA/VENTOLIN HFA) 108 (90 Base) MCG/ACT inhaler  atorvastatin (LIPITOR) 20 MG tablet  flecainide (TAMBOCOR) 100 MG tablet  ipratropium - albuterol 0.5 mg/2.5 mg/3 mL (DUONEB) 0.5-2.5 (3) MG/3ML neb solution  losartan (COZAAR) 100 MG tablet  metFORMIN (GLUCOPHAGE XR) 500 MG 24 hr tablet  metoprolol succinate ER (TOPROL-XL) 50 MG 24 hr tablet  mometasone-formoterol (DULERA) 200-5 MCG/ACT inhaler  GrowOp TechnologyTOUCH ULTRA test  "strip  warfarin ANTICOAGULANT (JANTOVEN ANTICOAGULANT) 5 MG tablet          Review of Systems  All systems reviewed and other than pertinent positives and negatives in HPI all other systems are negative.  Physical Exam   BP: (!) 187/123  Pulse: 97  Temp: 98  F (36.7  C)  Resp: 18  Height: 175.3 cm (5' 9\")  Weight: 111.1 kg (245 lb)  SpO2: 93 %      Physical Exam  Vitals and nursing note reviewed.   Constitutional:       Appearance: Normal appearance.   HENT:      Head: Normocephalic and atraumatic.      Nose: Nose normal.      Mouth/Throat:      Mouth: Mucous membranes are moist.      Pharynx: Oropharynx is clear.   Eyes:      Conjunctiva/sclera: Conjunctivae normal.   Cardiovascular:      Rate and Rhythm: Normal rate and regular rhythm.      Pulses: Normal pulses.      Heart sounds: Normal heart sounds. No murmur heard.  Pulmonary:      Effort: Pulmonary effort is normal.      Breath sounds: Normal breath sounds. No stridor. No wheezing or rhonchi.   Abdominal:      General: Abdomen is flat. Bowel sounds are normal. There is no distension.      Palpations: Abdomen is soft.      Tenderness: There is no guarding or rebound.      Hernia: No hernia is present.   Musculoskeletal:         General: No swelling or tenderness. Normal range of motion.      Cervical back: Normal range of motion and neck supple.      Right lower leg: No edema.      Left lower leg: No edema.   Skin:     General: Skin is warm and dry.      Capillary Refill: Capillary refill takes less than 2 seconds.      Coloration: Skin is not pale.      Findings: No bruising or erythema.   Neurological:      General: No focal deficit present.      Mental Status: He is alert and oriented to person, place, and time.      Sensory: No sensory deficit.      Motor: No weakness.      Coordination: Coordination normal.   Psychiatric:         Mood and Affect: Mood normal.         ED Course                 Procedures              Critical Care time:  none           "         Medications - No data to display    Assessments & Plan (with Medical Decision Making) records were reviewed.  Labs were obtained.  Patient was given Toprol ER.  He was observed for some time.  CBC was unremarkable.  Base metabolic panel without significant abnormality.  Patient is not having chest pain or shortness of breath I do not think EKG or cardiac work-up is warranted.  I did observe patient for some time and his blood pressure came down to the 150s over 90s.  He has a refill waiting for him later today he will get this filled and continue with Toprol-XL and will get rechecked with his blood pressure with his primary care over the next few days.  If any shortness of breath chest pain numbness weakness in extremity or other symptoms present patient will return for further evaluation and care.  Patient at this time feels comfortable with this plan.     I have reviewed the nursing notes.    I have reviewed the findings, diagnosis, plan and need for follow up with the patient.       Discharge Medication List as of 10/31/2022  9:28 AM          Final diagnoses:   Benign essential hypertension       10/31/2022   [facility] EMERGENCY DEPT     Trevin Miller MD  11/02/22 2009

## 2022-10-31 NOTE — PROGRESS NOTES
ANTICOAGULATION  MANAGEMENT: Discharge Review    Joe Mas chart reviewed for anticoagulation continuity of care    Emergency room visit on 10/31/22 for benign essential hypertension.    Discharge disposition: Home    Results:    Recent labs: (last 7 days)     10/27/22  1130   INR 3.6*     Anticoagulation inpatient management:     not applicable     Anticoagulation discharge instructions:     Warfarin dosing: home regimen continued   Bridging: No   INR goal change: No      Medication changes affecting anticoagulation: No    Additional factors affecting anticoagulation: No     PLAN     No adjustment to anticoagulation plan needed    Recommended follow up is scheduled  Patient not contacted    No adjustment to Anticoagulation Calendar was required    Cheryl Coats RN

## 2022-10-31 NOTE — DISCHARGE INSTRUCTIONS
Return if symptoms worsen or new symptoms develop.  Follow-up with primary care physician next available.  Drink plenty of fluids.  Continue to take your blood pressure medication as directed and if any chest pain shortness of breath focal numbness weakness any extremity occur please return for further evaluation and care.  Failure prescription and begin taking your medications as directed.

## 2022-10-31 NOTE — ED TRIAGE NOTES
Pt presents with elevated BP after not taking BP meds since Thursday. Pt has a filled script at his pharmacy. Pt takes metoprolol ER 50 mg. Reports feeling shakier than normal. Denies pain. Hx of Afib     Triage Assessment     Row Name 10/31/22 0721       Triage Assessment (Adult)    Airway WDL WDL       Respiratory WDL    Respiratory WDL WDL       Skin Circulation/Temperature WDL    Skin Circulation/Temperature WDL WDL       Cardiac WDL    Cardiac WDL WDL       Peripheral/Neurovascular WDL    Peripheral Neurovascular WDL WDL       Cognitive/Neuro/Behavioral WDL    Cognitive/Neuro/Behavioral WDL WDL

## 2022-11-11 ENCOUNTER — LAB (OUTPATIENT)
Dept: LAB | Facility: CLINIC | Age: 67
End: 2022-11-11
Payer: COMMERCIAL

## 2022-11-11 ENCOUNTER — ANTICOAGULATION THERAPY VISIT (OUTPATIENT)
Dept: ANTICOAGULATION | Facility: CLINIC | Age: 67
End: 2022-11-11

## 2022-11-11 DIAGNOSIS — I48.91 ATRIAL FIBRILLATION, UNSPECIFIED TYPE (H): Primary | ICD-10-CM

## 2022-11-11 DIAGNOSIS — Z79.01 LONG TERM CURRENT USE OF ANTICOAGULANT THERAPY: ICD-10-CM

## 2022-11-11 LAB — INR BLD: 2.9 (ref 0.9–1.1)

## 2022-11-11 PROCEDURE — 85610 PROTHROMBIN TIME: CPT

## 2022-11-11 PROCEDURE — 36416 COLLJ CAPILLARY BLOOD SPEC: CPT

## 2022-11-11 NOTE — PROGRESS NOTES
ANTICOAGULATION MANAGEMENT     Joe Mas 67 year old male is on warfarin with therapeutic INR result. (Goal INR 2.0-3.0)    Recent labs: (last 7 days)     11/11/22  1145   INR 2.9*       ASSESSMENT       Source(s): Chart Review and Patient/Caregiver Call       Warfarin doses taken: Warfarin taken differently, but did not change total weekly dose Reports he takes 2.5 mg TU/TH    Diet: No new diet changes identified    New illness, injury, or hospitalization: No    Medication/supplement changes: None noted    Signs or symptoms of bleeding or clotting: No    Previous INR: Supratherapeutic    Additional findings: Upcoming surgery/procedure 11/22 colonoscopy- holding 4 days, no bridge       PLAN     Recommended plan for no diet, medication or health factor changes affecting INR     Dosing Instructions: Continue your current warfarin dose with 4 day hold 11/18-11/21, booster dose after procedure as long as ok with surgeon, then continue with current dosewith next INR in 3 weeks       Summary  As of 11/11/2022    Full warfarin instructions:  11/18: Hold; 11/19: Hold; 11/20: Hold; 11/21: Hold; 11/22: 10 mg; Otherwise 2.5 mg every Tue, Thu; 5 mg all other days; Starting 11/11/2022   Next INR check:  12/2/2022             Telephone call with Joe who verbalizes understanding and agrees to plan and who agrees to plan and repeated back plan correctly    Lab visit scheduled    Education provided:     Symptom monitoring: monitoring for bleeding signs and symptoms and monitoring for clotting signs and symptoms    Contact [phone]  with any changes, questions or concerns.     Plan made per [facility] anticoagulation protocol    May Bishop RN  Anticoagulation Clinic  11/11/2022    _______________________________________________________________________     Anticoagulation Episode Summary     Current INR goal:  2.0-3.0   TTR:  57.7 % (1 y)   Target end date:  Indefinite   Send INR reminders to:  MARIA ELENA Guillen     Atrial fibrillation  unspecified type (H) [I48.91]  Long term current use of anticoagulant therapy [Z79.01]           Comments:           Anticoagulation Care Providers     Provider Role Specialty Phone number    Jolynn Cooper MD Referring Family Medicine [phone]

## 2022-11-15 ENCOUNTER — E-VISIT (OUTPATIENT)
Dept: FAMILY MEDICINE | Facility: CLINIC | Age: 67
End: 2022-11-15
Payer: COMMERCIAL

## 2022-11-15 DIAGNOSIS — U07.1 INFECTION DUE TO 2019 NOVEL CORONAVIRUS: Primary | ICD-10-CM

## 2022-11-15 PROCEDURE — 99421 OL DIG E/M SVC 5-10 MIN: CPT | Mod: CS | Performed by: FAMILY MEDICINE

## 2022-11-18 DIAGNOSIS — J44.1 OBSTRUCTIVE CHRONIC BRONCHITIS WITH EXACERBATION (H): ICD-10-CM

## 2022-11-18 RX ORDER — ALBUTEROL SULFATE 90 UG/1
AEROSOL, METERED RESPIRATORY (INHALATION)
Qty: 18 G | Refills: 1 | OUTPATIENT
Start: 2022-11-18

## 2022-11-18 NOTE — TELEPHONE ENCOUNTER
"Should have refills on file     Last Written Prescription Date: 10/6/22  Last Fill Quantity: 18, # refills: 1  Last office visit provider: 8/8/22        Requested Prescriptions   Pending Prescriptions Disp Refills     albuterol (PROAIR HFA/PROVENTIL HFA/VENTOLIN HFA) 108 (90 Base) MCG/ACT inhaler [Pharmacy Med Name: ALBUTEROL SULFATE  AERS] 18 g 1     Sig: INHALE TWO PUFFS BY MOUTH EVERY 6 HOURS AS NEEDED FOR SHORTNESS OF BREATH       Asthma Maintenance Inhalers - Anticholinergics Passed - 11/18/2022  5:01 AM        Passed - Patient is age 12 years or older        Passed - Recent (12 mo) or future (30 days) visit within the authorizing provider's specialty     Patient has had an office visit with the authorizing provider or a provider within the authorizing providers department within the previous 12 mos or has a future within next 30 days. See \"Patient Info\" tab in inbasket, or \"Choose Columns\" in Meds & Orders section of the refill encounter.              Passed - Medication is active on med list       Short-Acting Beta Agonist Inhalers Protocol  Passed - 11/18/2022  5:01 AM        Passed - Patient is age 12 or older        Passed - Recent (12 mo) or future (30 days) visit within the authorizing provider's specialty     Patient has had an office visit with the authorizing provider or a provider within the authorizing providers department within the previous 12 mos or has a future within next 30 days. See \"Patient Info\" tab in inbasket, or \"Choose Columns\" in Meds & Orders section of the refill encounter.              Passed - Medication is active on med list                 Rhonda Chandler RN 11/18/22 12:04 PM  "

## 2022-11-21 DIAGNOSIS — J44.1 OBSTRUCTIVE CHRONIC BRONCHITIS WITH EXACERBATION (H): ICD-10-CM

## 2022-11-21 RX ORDER — ALBUTEROL SULFATE 90 UG/1
AEROSOL, METERED RESPIRATORY (INHALATION)
Qty: 18 G | Refills: 0 | Status: SHIPPED | OUTPATIENT
Start: 2022-11-21 | End: 2023-01-25

## 2022-11-21 NOTE — TELEPHONE ENCOUNTER
Previous refill request was denied saying that there's refills on file. Patient has no refills left. Already used up the refill. Thank you!    See Robby  Pharmacy Technician, [place]  [facility]  Phone: [phone]  Fax: [phone]

## 2022-11-25 ENCOUNTER — HOSPITAL ENCOUNTER (EMERGENCY)
Facility: CLINIC | Age: 67
Discharge: HOME | End: 2022-11-25
Attending: EMERGENCY MEDICINE | Admitting: EMERGENCY MEDICINE
Payer: COMMERCIAL

## 2022-11-25 ENCOUNTER — APPOINTMENT (OUTPATIENT)
Dept: GENERAL RADIOLOGY | Facility: CLINIC | Age: 67
End: 2022-11-25
Attending: FAMILY MEDICINE
Payer: COMMERCIAL

## 2022-11-25 VITALS
RESPIRATION RATE: 18 BRPM | OXYGEN SATURATION: 93 % | BODY MASS INDEX: 35.84 KG/M2 | SYSTOLIC BLOOD PRESSURE: 127 MMHG | HEART RATE: 60 BPM | TEMPERATURE: 99.8 F | DIASTOLIC BLOOD PRESSURE: 82 MMHG | HEIGHT: 69 IN | WEIGHT: 242 LBS

## 2022-11-25 DIAGNOSIS — R06.02 SHORTNESS OF BREATH: ICD-10-CM

## 2022-11-25 DIAGNOSIS — J44.1 OBSTRUCTIVE CHRONIC BRONCHITIS WITH EXACERBATION (H): ICD-10-CM

## 2022-11-25 DIAGNOSIS — Z86.16 HISTORY OF COVID-19: ICD-10-CM

## 2022-11-25 PROCEDURE — 99283 EMERGENCY DEPT VISIT LOW MDM: CPT | Mod: 25 | Performed by: EMERGENCY MEDICINE

## 2022-11-25 PROCEDURE — 99284 EMERGENCY DEPT VISIT MOD MDM: CPT | Performed by: EMERGENCY MEDICINE

## 2022-11-25 PROCEDURE — 71046 X-RAY EXAM CHEST 2 VIEWS: CPT

## 2022-11-25 RX ORDER — ASCORBIC ACID 100 MG
100 TABLET,CHEWABLE ORAL DAILY
Status: ON HOLD | COMMUNITY
End: 2023-06-07

## 2022-11-25 ASSESSMENT — ACTIVITIES OF DAILY LIVING (ADL): ADLS_ACUITY_SCORE: 33

## 2022-11-26 NOTE — ED TRIAGE NOTES
HERE CONCERNED FOR SOA, HAS BEEN HAVING COUGHING FITS EVER SINCE HAVING COVID OVER 2 WKS AGO & FEELS LIKE HE HAS FLUID ON HIS LUNGS. WHEN NOT COUGHING BREATHES OK, SATS 91-92% ON RA. DENIES FEVER LOW GRADE TEMP ON ARRIVAL 99.8.      Triage Assessment       Row Name 11/25/22 5693       Triage Assessment (Adult)    Airway WDL WDL       Respiratory WDL    Respiratory WDL X;all    Rhythm/Pattern, Respiratory unlabored    Expansion/Accessory Muscles/Retractions no use of accessory muscles;no retractions    Nailbeds no discoloration    Cough Frequency frequent    Cough Type dry;no productive sputum       Skin Circulation/Temperature WDL    Skin Circulation/Temperature WDL WDL       Cardiac WDL    Cardiac WDL WDL       Peripheral/Neurovascular WDL    Peripheral Neurovascular WDL WDL       Cognitive/Neuro/Behavioral WDL    Cognitive/Neuro/Behavioral WDL WDL

## 2022-11-26 NOTE — ED PROVIDER NOTES
"  History     Chief Complaint   Patient presents with     Shortness of Breath     Cough     HPI  Joe Mas is a 67 year old male with history of recent COVID-19 illness obesity, COPD, obstructive sleep apnea, atrial fibrillation and anticoagulated on Coumadin, depression, DM 2 and hypertension who presents to emergency department for evaluation 2 brief episodes of transient coughing spells with shortness of breath and feeling like he could not catch his breath.  He felt like he had to \"calm myself down\" to catch his breath during 2 brief episodes of severe coughing with bringing up sputum which is followed.  He reports that his recent productive cough from confirmed COVID-19 illness diagnosed on 11/11/2022, 2 weeks ago, had resolved and he has not recently been bringing up green sputum and tested negative for COVID-19 2 days ago.  No new URI illness.  No fever, chills, chest pain or hemoptysis.  No leg pain or swelling.  No other pertinent history or acute complaints or concerns.    Allergies:  Allergies   Allergen Reactions     Lisinopril Cough       Problem List:    Patient Active Problem List    Diagnosis Date Noted     Hydrocele in adult 05/10/2022     Priority: Medium     Moderate major depression (H) 02/01/2022     Priority: Medium     Quit smoking 04/09/2021     Priority: Medium     a few years ago  30+ pack year hx        JUNG (obstructive sleep apnea) 05/11/2018     Priority: Medium     Moderate to possible severe JUNG, potential for significant hypoxemia and borderline sustained hypoxemia   - Suspect AHI was actually under-reported given suspected sleep onset was not until ~4am (recording from [place] to 9am).      Home Sleep Apnea Testing - 5/4/2018: 259 lbs 0 oz: AHI 15.6/hr. Supine AHI 15.6/hr.   Oxygen Codey of 81%.  Baseline 88.5%.  Sp02 =< 88% for majority of recording  He slept on his back (11.9%), prone (0%), left (5.8) and right (72.8%) sides.   Analysis time: 483.1 minutes.            Long term " current use of anticoagulant therapy 02/27/2018     Priority: Medium     Atrial fibrillation, unspecified type (H) 02/27/2018     Priority: Medium     Morbid obesity (H) 02/27/2018     Priority: Medium     Tubular adenoma of colon 08/16/2017     Priority: Medium     Collected: 8/11/2017   Received: 8/14/2017   Reported: 8/15/2017 17:28   Ordering Phy(s): AL MORELAND     For improved result formatting, select 'View Enhanced Report Format'   under Linked Documents section.     SPECIMEN(S):   A: Colon polyps at 20 cm   B: Colon polyp, ascending     FINAL DIAGNOSIS:   A.  Colon at 20 cm, grossly biopsies:   -Tubular adenoma and fragments of hyperplastic polyp   - Negative for high-grade dysplasia and malignancy.     B.  Right colon, mucosal biopsy:   - Tubular adenoma.   - Negative for high-grade dysplasia and malignancy.        Obesity (BMI 30-39.9) 10/24/2015     Priority: Medium     Hyperlipidemia with target LDL less than 100 02/14/2014     Priority: Medium     Diagnosis updated by automated process. Provider to review and confirm.       Type 2 diabetes mellitus with complication, without long-term current use of insulin (H) 10/29/2013     Priority: Medium     CARDIOVASCULAR SCREENING; LDL GOAL LESS THAN 100 10/29/2013     Priority: Medium     Moderate major depression (H) 02/20/2013     Priority: Medium     Advanced directives, counseling/discussion 11/03/2011     Priority: Medium     Advance Directive Problem List Overview:   Name Relationship Phone    Primary Health Care Agent            Alternative Health Care Agent          Discussed advance care planning with patient; information given to patient to review.     Flavia Duggan CMA, HC Facilitator    11/3/2011        Shashank Matthews MD  11/26/22 0255         Obstructive chronic bronchitis with exacerbation (H) 02/22/2007     Priority: Medium     Essential hypertension, benign 02/22/2007     Priority: Medium        Past Medical History:    Past Medical  History:   Diagnosis Date     A-fib (H) 2018     COPD (chronic obstructive pulmonary disease) (H)      Diabetes (H)      Hypertension      NO ACTIVE PROBLEMS        Past Surgical History:    Past Surgical History:   Procedure Laterality Date     ABDOMEN SURGERY       COLONOSCOPY  2004    Follow up colonoscopy in 5 years     HERNIA REPAIR, UMBILICAL  2001     KNEE SURGERY  1960's    Left       Family History:    Family History   Problem Relation Age of Onset     Osteoporosis Mother      Heart Disease Mother      Neurologic Disorder Father         passed away from a brain tumor age 48     Cerebrovascular Disease Father      Neurologic Disorder Maternal Grandmother         parkinsons     C.A.D. Maternal Grandmother      Diabetes Maternal Grandmother      Alcohol/Drug Maternal Grandfather      Cancer Maternal Grandfather         esphogus     Cancer Paternal Grandfather         lung     Diabetes Sister      Hypertension Sister      Asthma Daughter      Cerebrovascular Disease No family hx of      Breast Cancer No family hx of      Cancer - colorectal No family hx of        Social History:  Marital Status:  Single [1]  Social History     Tobacco Use     Smoking status: Former     Packs/day: 1.00     Years: 25.00     Pack years: 25.00     Types: Cigarettes     Start date: 1975     Quit date: 2000     Years since quittin.9     Smokeless tobacco: Never     Tobacco comments:     quit smoking    Substance Use Topics     Alcohol use: Yes     Alcohol/week: 0.0 standard drinks     Comment: 2-5 beers per month     Drug use: Yes     Types: Marijuana        Medications:    albuterol (PROAIR HFA/PROVENTIL HFA/VENTOLIN HFA) 108 (90 Base) MCG/ACT inhaler  Ascorbic Acid (VITAMIN C) 100 MG CHEW  atorvastatin (LIPITOR) 20 MG tablet  flecainide (TAMBOCOR) 100 MG tablet  ipratropium - albuterol 0.5 mg/2.5 mg/3 mL (DUONEB) 0.5-2.5 (3) MG/3ML neb solution  losartan (COZAAR) 100 MG tablet  metFORMIN (GLUCOPHAGE XR)  "500 MG 24 hr tablet  metoprolol succinate ER (TOPROL-XL) 50 MG 24 hr tablet  mometasone-formoterol (DULERA) 200-5 MCG/ACT inhaler  ONETOUCH ULTRA test strip  Thiamine HCl (VITAMIN B-1 PO)  VITAMIN D PO  warfarin ANTICOAGULANT (JANTOVEN ANTICOAGULANT) 5 MG tablet          Review of Systems  As mentioned above in the history present illness.  All other systems were reviewed and are negative.    Physical Exam   BP: (!) 153/89  Pulse: 79  Temp: 99.8  F (37.7  C)  Resp: 18  Height: 175.3 cm (5' 9\")  Weight: 109.8 kg (242 lb)  SpO2: 92 %      Physical Exam  Vitals and nursing note reviewed.   Constitutional:       General: He is not in acute distress.     Appearance: Normal appearance. He is well-developed. He is not ill-appearing or diaphoretic.   HENT:      Head: Normocephalic and atraumatic.      Right Ear: External ear normal.      Left Ear: External ear normal.      Nose: Nose normal.   Eyes:      General: No scleral icterus.     Extraocular Movements: Extraocular movements intact.      Conjunctiva/sclera: Conjunctivae normal.   Neck:      Trachea: No tracheal deviation.   Cardiovascular:      Rate and Rhythm: Normal rate and regular rhythm.      Heart sounds: Normal heart sounds. No murmur heard.    No friction rub. No gallop.   Pulmonary:      Effort: Pulmonary effort is normal. No tachypnea, accessory muscle usage, prolonged expiration, respiratory distress or retractions.      Breath sounds: Decreased air movement (Speaks in normal sentences and reports that his breathing and respiratory status is at baseline) present. No stridor. Decreased breath sounds (Mild to moderately decreased breath sounds bilaterally, symmetrically, consistent with his history of COPD) present. No wheezing, rhonchi or rales.   Musculoskeletal:         General: No tenderness. Normal range of motion.      Cervical back: Normal range of motion and neck supple.      Right lower leg: No tenderness. No edema.      Left lower leg: No " tenderness. No edema.   Skin:     General: Skin is warm and dry.      Coloration: Skin is not pale.      Findings: No erythema or rash.   Neurological:      General: No focal deficit present.      Mental Status: He is alert and oriented to person, place, and time.      Coordination: Coordination normal.   Psychiatric:         Mood and Affect: Mood normal.         Behavior: Behavior normal.         ED Course                 Procedures                Results for orders placed or performed during the hospital encounter of 11/25/22 (from the past 24 hour(s))   Chest XR,  PA & LAT    Narrative    EXAM: XR CHEST 2 VIEWS  LOCATION: [facility]  DATE/TIME: 11/25/2022 7:38 PM    INDICATION: cough  post COVID  COMPARISON: None.      Impression    IMPRESSION: Normal heart size. No pleural effusion or pneumothorax. No evidence for CHF or pneumonia. Bandlike density at the right base may represent an area of subsegmental atelectasis or scarring.     I independently reviewed the X-rays: Agree with the Radiologist's interpretation.    Medications - No data to display     No recurrence of shortness of breath or other symptoms in the ED and he declined further evaluation after having a chest x-ray performed.  He agrees to return should he have recurrent symptoms, or for chest pain or any other new problems or concerns    Assessments & Plan (with Medical Decision Making)   67-year-old male with history of COPD, JUNG and morbid obesity with recent COVID-19 illness who presents to the ED for evaluation of 2 brief transient episodes of severe coughing paroxysms with shortness of breath which promptly resolved when he stopped coughing.  No associated chest pain or other anginal symptoms and I doubt pneumonia, atypical ACS, dysrhythmia, PE/DVT, or other emergent disease process.  No respiratory distress or hypoxia in the emergency department.  Chest x-ray shows no acute abnormality.  He appears appropriate and  stable for discharge home with instructions for supportive care and recheck in primary care clinic next week.  I will make a primary care referral to facilitate his follow-up in clinic next week.  He was provided instructions for supportive care and will return for recurrent shortness of breath, chest pain or as needed for worsened condition or worsening symptoms, or new problems or concerns.      I have reviewed the nursing notes.    I have reviewed the findings, diagnosis, plan and need for follow up with the patient.    Discharge Medication List as of 11/25/2022 10:20 PM          Final diagnoses:   Shortness of breath - With coughing spells x 2   Obstructive chronic bronchitis with exacerbation (H)   History of COVID-19 - Earlier this month       11/25/2022   [facility] EMERGENCY DEPT

## 2022-11-28 ENCOUNTER — TELEPHONE (OUTPATIENT)
Dept: ANTICOAGULATION | Facility: CLINIC | Age: 67
End: 2022-11-28

## 2022-11-28 NOTE — TELEPHONE ENCOUNTER
ANTICOAGULATION  MANAGEMENT: Discharge Review    Joe Mas chart reviewed for anticoagulation continuity of care    Emergency room visit on 11/25 for SOB.    Discharge disposition: Home    Results:    No results for input(s): INR, [id number], F2, ALMWH, AAUFH in the last 168 hours.  Anticoagulation inpatient management:     not applicable     Anticoagulation discharge instructions:     Warfarin dosing: home regimen continued   Bridging: No   INR goal change: No      Medication changes affecting anticoagulation: No    Additional factors affecting anticoagulation: No     PLAN     No adjustment to anticoagulation plan needed    Patient not contacted - INR scheduled for 12/2    No adjustment to Anticoagulation Calendar was required    Sofia Tai RN

## 2022-12-02 ENCOUNTER — LAB (OUTPATIENT)
Dept: LAB | Facility: CLINIC | Age: 67
End: 2022-12-02
Payer: COMMERCIAL

## 2022-12-02 ENCOUNTER — ANTICOAGULATION THERAPY VISIT (OUTPATIENT)
Dept: ANTICOAGULATION | Facility: CLINIC | Age: 67
End: 2022-12-02

## 2022-12-02 DIAGNOSIS — Z79.01 LONG TERM CURRENT USE OF ANTICOAGULANT THERAPY: ICD-10-CM

## 2022-12-02 DIAGNOSIS — I48.91 ATRIAL FIBRILLATION, UNSPECIFIED TYPE (H): Primary | ICD-10-CM

## 2022-12-02 LAB — INR BLD: 3 (ref 0.9–1.1)

## 2022-12-02 PROCEDURE — 36416 COLLJ CAPILLARY BLOOD SPEC: CPT

## 2022-12-02 PROCEDURE — 85610 PROTHROMBIN TIME: CPT

## 2022-12-02 NOTE — PROGRESS NOTES
ANTICOAGULATION MANAGEMENT     Joe Mas 67 year old male is on warfarin with therapeutic INR result. (Goal INR 2.0-3.0)    Recent labs: (last 7 days)     12/02/22  1118   INR 3.0*       ASSESSMENT       Source(s): Chart Review and Patient/Caregiver Call       Warfarin doses taken: Warfarin taken as instructed    Diet: No new diet changes identified    New illness, injury, or hospitalization: No    Medication/supplement changes: None noted    Signs or symptoms of bleeding or clotting: No    Previous INR: Therapeutic last visit; previously outside of goal range    Additional findings: None       PLAN     Recommended plan for no diet, medication or health factor changes affecting INR     Dosing Instructions: Continue your current warfarin dose with next INR in 2 weeks       Summary  As of 12/2/2022    Full warfarin instructions:  2.5 mg every Tue, Thu; 5 mg all other days; Starting 12/2/2022   Next INR check:  12/21/2022             Telephone call with Joe who verbalizes understanding and agrees to plan and who agrees to plan and repeated back plan correctly    Lab visit scheduled    Education provided:     Please call back if any changes to your diet, medications or how you've been taking warfarin    Plan made per [facility] anticoagulation protocol    Jaylin Salazar, RN  Anticoagulation Clinic  12/2/2022    _______________________________________________________________________     Anticoagulation Episode Summary     Current INR goal:  2.0-3.0   TTR:  61.2 % (1 y)   Target end date:  Indefinite   Send INR reminders to:  MARIA ELENA YANG    Indications    Atrial fibrillation  unspecified type (H) [I48.91]  Long term current use of anticoagulant therapy [Z79.01]           Comments:           Anticoagulation Care Providers     Provider Role Specialty Phone number    Jolynn Cooper MD Referring Family Medicine [phone]

## 2022-12-21 ENCOUNTER — LAB (OUTPATIENT)
Dept: LAB | Facility: CLINIC | Age: 67
End: 2022-12-21
Payer: COMMERCIAL

## 2022-12-21 ENCOUNTER — ANTICOAGULATION THERAPY VISIT (OUTPATIENT)
Dept: ANTICOAGULATION | Facility: CLINIC | Age: 67
End: 2022-12-21

## 2022-12-21 DIAGNOSIS — Z79.01 LONG TERM CURRENT USE OF ANTICOAGULANT THERAPY: ICD-10-CM

## 2022-12-21 DIAGNOSIS — I48.91 ATRIAL FIBRILLATION, UNSPECIFIED TYPE (H): Primary | ICD-10-CM

## 2022-12-21 LAB — INR BLD: 2.8 (ref 0.9–1.1)

## 2022-12-21 PROCEDURE — 36415 COLL VENOUS BLD VENIPUNCTURE: CPT

## 2022-12-21 PROCEDURE — 85610 PROTHROMBIN TIME: CPT

## 2022-12-21 NOTE — PROGRESS NOTES
ANTICOAGULATION MANAGEMENT     Joe Mas 67 year old male is on warfarin with therapeutic INR result. (Goal INR 2.0-3.0)    Recent labs: (last 7 days)     12/21/22  1151   INR 2.8*       ASSESSMENT       Source(s): Chart Review and Patient/Caregiver Call       Warfarin doses taken: Warfarin taken as instructed    Diet: No new diet changes identified    New illness, injury, or hospitalization: No    Medication/supplement changes: None noted    Signs or symptoms of bleeding or clotting: No    Previous INR: Therapeutic last 2(+) visits    Additional findings: None       PLAN     Recommended plan for no diet, medication or health factor changes affecting INR     Dosing Instructions: Continue your current warfarin dose with next INR in 6 weeks       Summary  As of 12/21/2022    Full warfarin instructions:  2.5 mg every Tue, Thu; 5 mg all other days   Next INR check:  2/3/2023             Telephone call with Joe who verbalizes understanding and agrees to plan    Lab visit scheduled    Education provided:     Please call back if any changes to your diet, medications or how you've been taking warfarin    Contact [phone]  with any changes, questions or concerns.     Plan made per [facility] anticoagulation protocol    Lizbet VANEGAS RN  Anticoagulation Clinic  12/21/2022    _______________________________________________________________________     Anticoagulation Episode Summary     Current INR goal:  2.0-3.0   TTR:  66.4 % (1 y)   Target end date:  Indefinite   Send INR reminders to:  MARIA ELENA YANG    Indications    Atrial fibrillation  unspecified type (H) [I48.91]  Long term current use of anticoagulant therapy [Z79.01]           Comments:           Anticoagulation Care Providers     Provider Role Specialty Phone number    Jolynn Cooper MD Referring Family Medicine [phone]

## 2022-12-28 DIAGNOSIS — J44.1 OBSTRUCTIVE CHRONIC BRONCHITIS WITH EXACERBATION (H): ICD-10-CM

## 2022-12-28 RX ORDER — MOMETASONE FUROATE AND FORMOTEROL FUMARATE DIHYDRATE 200; 5 UG/1; UG/1
AEROSOL RESPIRATORY (INHALATION)
Qty: 39 G | Refills: 3 | Status: SHIPPED | OUTPATIENT
Start: 2022-12-28 | End: 2023-02-03

## 2022-12-28 NOTE — TELEPHONE ENCOUNTER
"Requested Prescriptions   Pending Prescriptions Disp Refills     DULERA 200-5 MCG/ACT inhaler [Pharmacy Med Name: DULERA 200-5MCG/ACT AERO] 39 g 3     Sig: INHALE TWO PUFFS BY MOUTH TWICE A DAY       Long-Acting Beta Agonist Inhalers Protocol  Failed - 12/28/2022  5:01 AM        Failed - Order for Serevent, Striverdi, or Foradil and pt has steroid inhaler        Passed - Patient is age 12 or older        Passed - Recent (12 mo) or future (30 days) visit within the authorizing provider's specialty     Patient has had an office visit with the authorizing provider or a provider within the authorizing providers department within the previous 12 mos or has a future within next 30 days. See \"Patient Info\" tab in inbasket, or \"Choose Columns\" in Meds & Orders section of the refill encounter.              Passed - Medication is active on med list       Inhaled Steroids Protocol Passed - 12/28/2022  5:01 AM        Passed - Patient is age 12 or older        Passed - Recent (12 mo) or future (30 days) visit within the authorizing provider's specialty     Patient has had an office visit with the authorizing provider or a provider within the authorizing providers department within the previous 12 mos or has a future within next 30 days. See \"Patient Info\" tab in inbasket, or \"Choose Columns\" in Meds & Orders section of the refill encounter.              Passed - Medication is active on med list             "

## 2023-01-02 ENCOUNTER — TRANSFERRED RECORDS (OUTPATIENT)
Dept: MULTI SPECIALTY CLINIC | Facility: CLINIC | Age: 68
End: 2023-01-02

## 2023-01-02 LAB — RETINOPATHY: NEGATIVE

## 2023-01-20 DIAGNOSIS — J44.1 OBSTRUCTIVE CHRONIC BRONCHITIS WITH EXACERBATION (H): ICD-10-CM

## 2023-01-25 RX ORDER — ALBUTEROL SULFATE 90 UG/1
AEROSOL, METERED RESPIRATORY (INHALATION)
Qty: 18 G | Refills: 0 | Status: SHIPPED | OUTPATIENT
Start: 2023-01-25 | End: 2023-02-03

## 2023-02-03 ENCOUNTER — LAB (OUTPATIENT)
Dept: LAB | Facility: CLINIC | Age: 68
End: 2023-02-03
Payer: COMMERCIAL

## 2023-02-03 ENCOUNTER — DOCUMENTATION ONLY (OUTPATIENT)
Dept: ANTICOAGULATION | Facility: CLINIC | Age: 68
End: 2023-02-03

## 2023-02-03 ENCOUNTER — OFFICE VISIT (OUTPATIENT)
Dept: FAMILY MEDICINE | Facility: CLINIC | Age: 68
End: 2023-02-03
Payer: COMMERCIAL

## 2023-02-03 ENCOUNTER — ANTICOAGULATION THERAPY VISIT (OUTPATIENT)
Dept: ANTICOAGULATION | Facility: CLINIC | Age: 68
End: 2023-02-03

## 2023-02-03 VITALS
BODY MASS INDEX: 36.2 KG/M2 | HEART RATE: 73 BPM | HEIGHT: 69 IN | SYSTOLIC BLOOD PRESSURE: 124 MMHG | OXYGEN SATURATION: 93 % | TEMPERATURE: 97.3 F | WEIGHT: 244.4 LBS | DIASTOLIC BLOOD PRESSURE: 80 MMHG

## 2023-02-03 DIAGNOSIS — Z12.11 SCREEN FOR COLON CANCER: ICD-10-CM

## 2023-02-03 DIAGNOSIS — E78.2 MIXED HYPERLIPIDEMIA: ICD-10-CM

## 2023-02-03 DIAGNOSIS — I48.91 ATRIAL FIBRILLATION, UNSPECIFIED TYPE (H): ICD-10-CM

## 2023-02-03 DIAGNOSIS — E11.8 TYPE 2 DIABETES MELLITUS WITH COMPLICATION, WITHOUT LONG-TERM CURRENT USE OF INSULIN (H): ICD-10-CM

## 2023-02-03 DIAGNOSIS — E78.5 HYPERLIPIDEMIA WITH TARGET LDL LESS THAN 100: ICD-10-CM

## 2023-02-03 DIAGNOSIS — E11.9 TYPE 2 DIABETES MELLITUS WITHOUT COMPLICATION, WITHOUT LONG-TERM CURRENT USE OF INSULIN (H): ICD-10-CM

## 2023-02-03 DIAGNOSIS — F32.1 MODERATE MAJOR DEPRESSION (H): ICD-10-CM

## 2023-02-03 DIAGNOSIS — I10 ESSENTIAL HYPERTENSION, BENIGN: ICD-10-CM

## 2023-02-03 DIAGNOSIS — E66.01 MORBID OBESITY (H): ICD-10-CM

## 2023-02-03 DIAGNOSIS — I48.91 ATRIAL FIBRILLATION (H): ICD-10-CM

## 2023-02-03 DIAGNOSIS — I48.0 PAROXYSMAL ATRIAL FIBRILLATION (H): ICD-10-CM

## 2023-02-03 DIAGNOSIS — I48.91 ATRIAL FIBRILLATION, UNSPECIFIED TYPE (H): Primary | ICD-10-CM

## 2023-02-03 DIAGNOSIS — J44.1 OBSTRUCTIVE CHRONIC BRONCHITIS WITH EXACERBATION (H): ICD-10-CM

## 2023-02-03 DIAGNOSIS — Z79.01 LONG TERM CURRENT USE OF ANTICOAGULANT THERAPY: ICD-10-CM

## 2023-02-03 DIAGNOSIS — I10 ESSENTIAL HYPERTENSION, BENIGN: Primary | ICD-10-CM

## 2023-02-03 LAB
ANION GAP SERPL CALCULATED.3IONS-SCNC: 11 MMOL/L (ref 7–15)
BUN SERPL-MCNC: 16.4 MG/DL (ref 8–23)
CALCIUM SERPL-MCNC: 10.2 MG/DL (ref 8.8–10.2)
CHLORIDE SERPL-SCNC: 102 MMOL/L (ref 98–107)
CHOLEST SERPL-MCNC: 176 MG/DL
CREAT SERPL-MCNC: 0.88 MG/DL (ref 0.67–1.17)
CREAT UR-MCNC: 303.9 MG/DL
DEPRECATED HCO3 PLAS-SCNC: 27 MMOL/L (ref 22–29)
GFR SERPL CREATININE-BSD FRML MDRD: >90 ML/MIN/1.73M2
GLUCOSE SERPL-MCNC: 115 MG/DL (ref 70–99)
HBA1C MFR BLD: 6.4 % (ref 0–5.6)
HDLC SERPL-MCNC: 59 MG/DL
INR BLD: 2.3 (ref 0.9–1.1)
LDLC SERPL CALC-MCNC: 83 MG/DL
MICROALBUMIN UR-MCNC: 28.6 MG/L
MICROALBUMIN/CREAT UR: 9.41 MG/G CR (ref 0–17)
NONHDLC SERPL-MCNC: 117 MG/DL
POTASSIUM SERPL-SCNC: 4.3 MMOL/L (ref 3.4–5.3)
SODIUM SERPL-SCNC: 140 MMOL/L (ref 136–145)
TRIGL SERPL-MCNC: 168 MG/DL

## 2023-02-03 PROCEDURE — 36416 COLLJ CAPILLARY BLOOD SPEC: CPT

## 2023-02-03 PROCEDURE — 82043 UR ALBUMIN QUANTITATIVE: CPT

## 2023-02-03 PROCEDURE — 99214 OFFICE O/P EST MOD 30 MIN: CPT | Performed by: FAMILY MEDICINE

## 2023-02-03 PROCEDURE — 80048 BASIC METABOLIC PNL TOTAL CA: CPT

## 2023-02-03 PROCEDURE — 83036 HEMOGLOBIN GLYCOSYLATED A1C: CPT

## 2023-02-03 PROCEDURE — 82570 ASSAY OF URINE CREATININE: CPT

## 2023-02-03 PROCEDURE — 36415 COLL VENOUS BLD VENIPUNCTURE: CPT

## 2023-02-03 PROCEDURE — 85610 PROTHROMBIN TIME: CPT

## 2023-02-03 PROCEDURE — 80061 LIPID PANEL: CPT

## 2023-02-03 RX ORDER — METOPROLOL SUCCINATE 50 MG/1
50 TABLET, EXTENDED RELEASE ORAL DAILY
Qty: 90 TABLET | Refills: 3 | Status: SHIPPED | OUTPATIENT
Start: 2023-02-03 | End: 2023-06-15

## 2023-02-03 RX ORDER — METFORMIN HCL 500 MG
500 TABLET, EXTENDED RELEASE 24 HR ORAL 2 TIMES DAILY WITH MEALS
Qty: 180 TABLET | Refills: 3 | Status: SHIPPED | OUTPATIENT
Start: 2023-02-03 | End: 2023-03-01

## 2023-02-03 RX ORDER — LOSARTAN POTASSIUM 100 MG/1
100 TABLET ORAL DAILY
Qty: 90 TABLET | Refills: 3 | Status: SHIPPED | OUTPATIENT
Start: 2023-02-03 | End: 2024-01-02

## 2023-02-03 RX ORDER — ATORVASTATIN CALCIUM 20 MG/1
20 TABLET, FILM COATED ORAL DAILY
Qty: 90 TABLET | Refills: 3 | Status: SHIPPED | OUTPATIENT
Start: 2023-02-03 | End: 2024-01-02

## 2023-02-03 RX ORDER — MOMETASONE FUROATE AND FORMOTEROL FUMARATE DIHYDRATE 200; 5 UG/1; UG/1
2 AEROSOL RESPIRATORY (INHALATION) 2 TIMES DAILY
Qty: 39 G | Refills: 3 | Status: SHIPPED | OUTPATIENT
Start: 2023-02-03 | End: 2024-02-06

## 2023-02-03 RX ORDER — FLECAINIDE ACETATE 100 MG/1
100 TABLET ORAL 2 TIMES DAILY
Qty: 180 TABLET | Refills: 3 | Status: SHIPPED | OUTPATIENT
Start: 2023-02-03 | End: 2024-01-08

## 2023-02-03 RX ORDER — ALBUTEROL SULFATE 90 UG/1
AEROSOL, METERED RESPIRATORY (INHALATION)
Qty: 18 G | Refills: 1 | Status: SHIPPED | OUTPATIENT
Start: 2023-02-03 | End: 2023-05-18

## 2023-02-03 NOTE — PROGRESS NOTES
ANTICOAGULATION MANAGEMENT     Joe Mas 67 year old male is on warfarin with therapeutic INR result. (Goal INR 2.0-3.0)    Recent labs: (last 7 days)     02/03/23  0800   INR 2.3*       ASSESSMENT       Source(s): Chart Review and Patient/Caregiver Call       Warfarin doses taken: Warfarin taken as instructed    Diet: No new diet changes identified    New illness, injury, or hospitalization: No    Medication/supplement changes: None noted    Signs or symptoms of bleeding or clotting: No    Previous INR: Therapeutic last 2(+) visits    Additional findings: changing dosing from 2.5 mg Tues/Thurs to 2.5 mg Mon/Thurs       PLAN     Recommended plan for no diet, medication or health factor changes affecting INR     Dosing Instructions: Continue your current warfarin dose with next INR in 6 weeks       Summary  As of 2/3/2023    Full warfarin instructions:  2.5 mg every Mon, Thu; 5 mg all other days   Next INR check:  3/17/2023             Telephone call with Joe who verbalizes understanding and agrees to plan    Lab visit scheduled    Education provided:     Please call back if any changes to your diet, medications or how you've been taking warfarin    Contact [phone]  with any changes, questions or concerns.     Plan made per [facility] anticoagulation protocol    Halie Birmingham RN  Anticoagulation Clinic  2/3/2023    _______________________________________________________________________     Anticoagulation Episode Summary     Current INR goal:  2.0-3.0   TTR:  66.6 % (1 y)   Target end date:  Indefinite   Send INR reminders to:  MARIA ELENA YANG    Indications    Atrial fibrillation  unspecified type (H) [I48.91]  Long term current use of anticoagulant therapy [Z79.01]           Comments:           Anticoagulation Care Providers     Provider Role Specialty Phone number    Jolynn Cooper MD Referring Family Medicine [phone]

## 2023-02-03 NOTE — PROGRESS NOTES
Assessment & Plan     Essential hypertension, benign  Doing well. Check labs. meds refilled   - Basic metabolic panel  (Ca, Cl, CO2, Creat, Gluc, K, Na, BUN); Future  - losartan (COZAAR) 100 MG tablet; Take 1 tablet (100 mg) by mouth daily    Type 2 diabetes mellitus with complication, without long-term current use of insulin (H)  a1c looks good. Congratulated him on weight loss! Eye exam done. meds refilled.  - Hemoglobin A1c; Future  - Lipid panel reflex to direct LDL Fasting; Future  - Basic metabolic panel  (Ca, Cl, CO2, Creat, Gluc, K, Na, BUN); Future  - Albumin Random Urine Quantitative with Creat Ratio; Future    Hyperlipidemia with target LDL less than 100  Check labs. meds refilled   - Lipid panel reflex to direct LDL Fasting; Future    Screen for colon cancer  He has to reschedule it - will call today     Obstructive chronic bronchitis with exacerbation (H)  Using dulera. Rare use of albuterol.   - mometasone-formoterol (DULERA) 200-5 MCG/ACT inhaler; Inhale 2 puffs into the lungs 2 times daily  - albuterol (PROAIR HFA/PROVENTIL HFA/VENTOLIN HFA) 108 (90 Base) MCG/ACT inhaler; INHALE TWO PUFFS BY MOUTH EVERY 6 HOURS AS NEEDED FOR SHORTNESS OF BREATH    Moderate major depression (H)  Stable. Denies depression today     Atrial fibrillation, unspecified type (H)  Well controlled rate on metoprolol   - metoprolol succinate ER (TOPROL XL) 50 MG 24 hr tablet; Take 1 tablet (50 mg) by mouth daily    Morbid obesity (H)  He is working on weight loss - this will help his diabetes   Body mass index is 36.62 kg/m .     Type 2 diabetes mellitus without complication, without long-term current use of insulin (H)  No lows but will let me know if he gets any. Continue current dose of metformin.   - metFORMIN (GLUCOPHAGE XR) 500 MG 24 hr tablet; Take 1 tablet (500 mg) by mouth 2 times daily (with meals)    Paroxysmal atrial fibrillation (H)  Has a cards appointment next month. Wants to talk about ablation   - flecainide  "(TAMBOCOR) 100 MG tablet; Take 1 tablet (100 mg) by mouth 2 times daily    Mixed hyperlipidemia  On statin  - atorvastatin (LIPITOR) 20 MG tablet; Take 1 tablet (20 mg) by mouth daily    BMI:   Estimated body mass index is 36.62 kg/m  as calculated from the following:    Height as of this encounter: 1.74 m (5' 8.5\").    Weight as of this encounter: 110.9 kg (244 lb 6.4 oz).   Weight management plan: Discussed healthy diet and exercise guidelines    Regular exercise    Return in about 6 months (around 8/3/2023) for Diabetes Visit.    Jolynn Cooper MD  [facility] TREY Diaz is a 67 year old, presenting for the following health issues:  Diabetes      History of Present Illness       Reason for visit:  6 month testing/a1c ect.    He eats 2-3 servings of fruits and vegetables daily.He consumes 0 sweetened beverage(s) daily.He exercises with enough effort to increase his heart rate 30 to 60 minutes per day.  He exercises with enough effort to increase his heart rate 5 days per week.   He is taking medications regularly.       Diabetes Follow-up    How often are you checking your blood sugar? Two times daily usually 100-150  Blood sugar testing frequency justification:  Patient checks depending on what he eats   What time of day are you checking your blood sugars (select all that apply)?  Before meals  Have you had any blood sugars above 200?  No  Have you had any blood sugars below 70?  No    What symptoms do you notice when your blood sugar is low?  None and Not applicable    What concerns do you have today about your diabetes? None     Do you have any of these symptoms? (Select all that apply)  Weight loss lost about 30lbs in the past year     Have you had a diabetic eye exam in the last 12 months? Yes- at [facility]     Wt Readings from Last 4 Encounters:   02/03/23 110.9 kg (244 lb 6.4 oz)   11/25/22 109.8 kg (242 lb)   10/31/22 111.1 kg (245 lb)   08/08/22 114.2 kg (251 lb 11.2 oz) " "        BP Readings from Last 2 Encounters:   02/03/23 124/80   11/25/22 127/82     Hemoglobin A1C (%)   Date Value   02/03/2023 6.4 (H)   08/08/2022 6.9 (H)   04/09/2021 6.8 (H)   08/18/2020 6.7 (H)     LDL Cholesterol Calculated (mg/dL)   Date Value   02/01/2022 93   04/09/2021 94   01/14/2020 104 (H)               A fib - frustrated with bruising  Thinking about doing an ablation   Has appointment with cardiology in march    Had to cancel colonoscopy due to having the flu   Needs to reschedule     Had eye exam done jan 2023 - dr freddy TIRADO at [facility]. No retinopathy - sent to [facility]       Review of Systems   Constitutional, HEENT, cardiovascular, pulmonary, gi and gu systems are negative, except as otherwise noted.      Objective    /80   Pulse 73   Temp 97.3  F (36.3  C) (Tympanic)   Ht 1.74 m (5' 8.5\")   Wt 110.9 kg (244 lb 6.4 oz)   SpO2 93%   BMI 36.62 kg/m    Body mass index is 36.62 kg/m .  Physical Exam   GENERAL: healthy, alert and no distress  EYES: Eyes grossly normal to inspection, PERRL and conjunctivae and sclerae normal  NECK: no adenopathy, no asymmetry, masses, or scars and thyroid normal to palpation  RESP: lungs clear to auscultation - no rales, rhonchi or wheezes  CV: regular rate and rhythm, normal S1 S2, no S3 or S4, no murmur, click or rub, no peripheral edema and peripheral pulses strong  MS: no gross musculoskeletal defects noted, no edema  NEURO: Normal strength and tone, mentation intact and speech normal  PSYCH: mentation appears normal, affect normal/bright                "

## 2023-02-03 NOTE — PROGRESS NOTES
ANTICOAGULATION CLINIC REFERRAL RENEWAL REQUEST       An annual renewal order is required for all patients referred to [facility].?  Please review and sign the pended referral order for Joe Mas.       ANTICOAGULATION SUMMARY      Warfarin indication(s)   Atrial Fibrillation    Mechanical heart valve present?  NO       Current goal range   INR: 2.0-3.0     Goal appropriate for indication? Goal INR 2-3, standard for indication(s) above     Time in Therapeutic Range (TTR)  (Goal > 60%) 66.6%       Office visit with referring provider's group within last year yes on 2-3-23       Halie Birmingham RN  [facility]

## 2023-02-08 ENCOUNTER — TELEPHONE (OUTPATIENT)
Dept: ANTICOAGULATION | Facility: CLINIC | Age: 68
End: 2023-02-08
Payer: COMMERCIAL

## 2023-02-08 ENCOUNTER — TELEPHONE (OUTPATIENT)
Dept: FAMILY MEDICINE | Facility: CLINIC | Age: 68
End: 2023-02-08
Payer: COMMERCIAL

## 2023-02-08 DIAGNOSIS — I48.91 ATRIAL FIBRILLATION, UNSPECIFIED TYPE (H): Primary | ICD-10-CM

## 2023-02-08 DIAGNOSIS — Z79.01 LONG TERM CURRENT USE OF ANTICOAGULANT THERAPY: ICD-10-CM

## 2023-02-08 NOTE — TELEPHONE ENCOUNTER
Called and spoke to patient.  He rescheduled colonoscopy to 3/9/23.  Reviewed PharmD hold plan with patient.  Hold warfarin for 4 days and no bridge.  Resume warfarin on 3/9 with 10mg x1 then normal dose and recheck INR on 3/17.  Patient is scheduled for tooth extraction on 4/3/23 and told him we can discuss that further after the colonoscopy.  Kashif FELIZ

## 2023-02-08 NOTE — TELEPHONE ENCOUNTER
Reason for Call:  INR follow up    Detailed comments: Patient has colonoscopy and dental appointments coming up and is wondering about stopping Warfarin- Please call.     Phone Number Patient can be reached at: Home number on file [phone] (home)    Best Time: any    Can we leave a detailed message on this number? YES    Call taken on 2/8/2023 at 1:08 PM by Billie Hobbs

## 2023-02-15 ENCOUNTER — TRANSFERRED RECORDS (OUTPATIENT)
Dept: HEALTH INFORMATION MANAGEMENT | Facility: CLINIC | Age: 68
End: 2023-02-15
Payer: COMMERCIAL

## 2023-02-15 DIAGNOSIS — I48.91 ATRIAL FIBRILLATION, UNSPECIFIED TYPE (H): Primary | ICD-10-CM

## 2023-02-15 DIAGNOSIS — Z79.899 ENCOUNTER FOR MONITORING FLECAINIDE THERAPY: ICD-10-CM

## 2023-02-15 DIAGNOSIS — Z51.81 ENCOUNTER FOR MONITORING FLECAINIDE THERAPY: ICD-10-CM

## 2023-02-15 LAB — RETINOPATHY: NEGATIVE

## 2023-02-28 NOTE — PROGRESS NOTES
CARDIOLOGY CONSULT    REASON FOR CONSULT: Paroxysmal A-fib    PRIMARY CARE PHYSICIAN:  Jolynn Cooper    HISTORY OF PRESENT ILLNESS:  67-year-old male seen for paroxysmal afib.  He has hypertension, type 2 diabetes, and dyslipidemia.        A. fib was diagnosed in February 2018 at a primary care appointment, HR of 97.  Warfarin and metoprolol were started.        Echo 3/2018 (sinus rhythm) showed EF 60%, mild LVH, normal RV, normal LA size, no valve disease.       7 day Zio patch March 2018 showed sinus rhythm, 20% paroxysmal A. fib with average heart rate 108, up to 12 hours, rare ectopy.      He thinks A-fib is increased over the past 1 year.  He now has symptoms once or twice per week lasting several hours.  Resting heart rate will be 70 or so.  When he has fatigue and palpitations, heart rate will be in the low 100s.  He has been on the same flecainide dose for many years.    PAST MEDICAL HISTORY:  Past Medical History:   Diagnosis Date     A-fib (H) 2/27/2018     COPD (chronic obstructive pulmonary disease) (H)      Diabetes (H)      Hypertension      NO ACTIVE PROBLEMS        MEDICATIONS:  Current Outpatient Medications   Medication     albuterol (PROAIR HFA/PROVENTIL HFA/VENTOLIN HFA) 108 (90 Base) MCG/ACT inhaler     Ascorbic Acid (VITAMIN C) 100 MG CHEW     atorvastatin (LIPITOR) 20 MG tablet     flecainide (TAMBOCOR) 100 MG tablet     ipratropium - albuterol 0.5 mg/2.5 mg/3 mL (DUONEB) 0.5-2.5 (3) MG/3ML neb solution     losartan (COZAAR) 100 MG tablet     metFORMIN (GLUCOPHAGE XR) 500 MG 24 hr tablet     metoprolol succinate ER (TOPROL XL) 50 MG 24 hr tablet     mometasone-formoterol (DULERA) 200-5 MCG/ACT inhaler     ONETOUCH ULTRA test strip     Thiamine HCl (VITAMIN B-1 PO)     VITAMIN D PO     warfarin ANTICOAGULANT (JANTOVEN ANTICOAGULANT) 5 MG tablet     No current facility-administered medications for this visit.       ALLERGIES:  Allergies   Allergen Reactions     Lisinopril Cough       SOCIAL  HISTORY:  I have reviewed this patient's social history and updated it with pertinent information if needed. Joe Mas  reports that he quit smoking about 23 years ago. His smoking use included cigarettes. He started smoking about 48 years ago. He has a 25.00 pack-year smoking history. He has never used smokeless tobacco. He reports current alcohol use. He reports current drug use. Drug: Marijuana.    FAMILY HISTORY:  I have reviewed this patient's family history and updated it with pertinent information if needed.   Family History   Problem Relation Age of Onset     Osteoporosis Mother      Heart Disease Mother      Neurologic Disorder Father         passed away from a brain tumor age 48     Cerebrovascular Disease Father      Neurologic Disorder Maternal Grandmother         parkinsons     C.A.D. Maternal Grandmother      Diabetes Maternal Grandmother      Alcohol/Drug Maternal Grandfather      Cancer Maternal Grandfather         esphogus     Cancer Paternal Grandfather         lung     Diabetes Sister      Hypertension Sister      Asthma Daughter      Cerebrovascular Disease No family hx of      Breast Cancer No family hx of      Cancer - colorectal No family hx of        REVIEW OF SYSTEMS:  Constitutional:  No weight loss, fever, chills  HEENT:  Eyes:  No visual loss, blurred vision, double vision or yellow sclerae. No hearing loss, sneezing, congestion, runny nose or sore throat.  Skin:  No rash or itching.  Cardiovascular: per HPI  Respiratory: per HPI  GI:  No anorexia, nausea, vomiting or diarrhea. No abdominal pain or blood.  :  No dysurea, hematuria  Neurologic:  No headache, paralysis, ataxia, numbness or tingling in the extremities. No change in bowel or bladder control.  Musculoskeletal:  No muscle pain  Hematologic:  No bleeding or bruising.  Lymphatics:  No enlarged nodes. No history of splenectomy.  Endocrine:  No reports of sweating, cold or heat intolerance. No polyuria or  polydipsia.  Allergies:  No history of asthma, hives, eczema or rhinitis.    PHYSICAL EXAM:  /85   Pulse 66   Wt 112 kg (247 lb)   SpO2 95%   BMI 37.01 kg/m      Constitutional: awake, alert, no distress  Eyes: PERRL, sclera nonicteric  ENT: trachea midline  Respiratory: Lungs clear  Cardiovascular: Regular rate and rhythm, no murmurs, distant sounds  GI: nondistended, nontender, bowel sounds present  Lymph/Hematologic: no lymphadenopathy  Skin: dry, no rash  Musculoskeletal: good muscle tone, strength 5/5 in upper and lower extremities  Neurologic: no focal deficits  Neuropsychiatric: appropriate affact    DATA:  Labs:   February 2023: Creatinine 0.9  Recent Labs   Lab Test 02/03/23  0800 02/01/22  1014 03/15/16  0850 02/17/15  0759   CHOL 176 170   < > 203*   HDL 59 47   < > 65   LDL 83 93   < > 99   TRIG 168* 152*   < > 193*   CHOLHDLRATIO  --   --   --  3.1    < > = values in this interval not displayed.     EKG: March 2: Sinus rhythm, rate 65, normal axis and intervals    ASSESSMENT:  67-year-old male seen for paroxysmal A-fib.  His A-fib symptoms have increased.  He is asking about ablation.  We mostly talked about this today.  He would probably be a good candidate since he has only paroxysmal A-fib a few times per week.  He is agreeable to EP referral who can talk about this more.  Echo will also be done to ensure there is no change in significant valve status or ejection fraction.    RECOMMENDATIONS:  1.  Paroxysmal A-fib  -Refer to EP to discuss ablation  -Otherwise continue medications including flecainide, metoprolol, warfarin  -Echo    Follow-up in 1 year with general cardiology.    Breezy Daniel MD  Cardiology - [facility]  Pager:  [phone]  Text Page  March 2, 2023

## 2023-03-02 ENCOUNTER — OFFICE VISIT (OUTPATIENT)
Dept: CARDIOLOGY | Facility: CLINIC | Age: 68
End: 2023-03-02
Payer: COMMERCIAL

## 2023-03-02 ENCOUNTER — HOSPITAL ENCOUNTER (OUTPATIENT)
Dept: CARDIOLOGY | Facility: CLINIC | Age: 68
Discharge: HOME | End: 2023-03-02
Attending: INTERNAL MEDICINE | Admitting: INTERNAL MEDICINE
Payer: COMMERCIAL

## 2023-03-02 VITALS
OXYGEN SATURATION: 95 % | WEIGHT: 247 LBS | BODY MASS INDEX: 37.01 KG/M2 | SYSTOLIC BLOOD PRESSURE: 126 MMHG | HEART RATE: 66 BPM | DIASTOLIC BLOOD PRESSURE: 85 MMHG

## 2023-03-02 DIAGNOSIS — Z79.899 ENCOUNTER FOR MONITORING FLECAINIDE THERAPY: ICD-10-CM

## 2023-03-02 DIAGNOSIS — I48.0 PAROXYSMAL ATRIAL FIBRILLATION (H): Primary | ICD-10-CM

## 2023-03-02 DIAGNOSIS — Z51.81 ENCOUNTER FOR MONITORING FLECAINIDE THERAPY: ICD-10-CM

## 2023-03-02 DIAGNOSIS — I48.91 ATRIAL FIBRILLATION, UNSPECIFIED TYPE (H): ICD-10-CM

## 2023-03-02 PROCEDURE — 99204 OFFICE O/P NEW MOD 45 MIN: CPT | Performed by: INTERNAL MEDICINE

## 2023-03-02 PROCEDURE — 93010 ELECTROCARDIOGRAM REPORT: CPT | Performed by: INTERNAL MEDICINE

## 2023-03-02 PROCEDURE — 93005 ELECTROCARDIOGRAM TRACING: CPT | Performed by: CLINICAL EXERCISE PHYSIOLOGIST

## 2023-03-02 NOTE — PATIENT INSTRUCTIONS
Will set up appointment with Dr. Barrientos, electrophysiologist to discuss ablation.    Echocardiogram  Will schedule an echocardiogram, or ultrasound of the heart.  This looks at the size and function of the heart and valves.  It generally takes about 20-30 minutes.  This will tell if there is any reduced heart function or problem with any of the valves.

## 2023-03-02 NOTE — LETTER
3/2/2023    Jolynn Cooper MD  [address] [facility] [address]    RE: Joedominique Mas       Dear Colleague,     I had the pleasure of seeing Joe Mas in the [facility].  CARDIOLOGY CONSULT    REASON FOR CONSULT: Paroxysmal A-fib    PRIMARY CARE PHYSICIAN:  Jolynn Cooper    HISTORY OF PRESENT ILLNESS:  67-year-old male seen for paroxysmal afib.  He has hypertension, type 2 diabetes, and dyslipidemia.        A. fib was diagnosed in February 2018 at a primary care appointment, HR of 97.  Warfarin and metoprolol were started.        Echo 3/2018 (sinus rhythm) showed EF 60%, mild LVH, normal RV, normal LA size, no valve disease.       7 day Zio patch March 2018 showed sinus rhythm, 20% paroxysmal A. fib with average heart rate 108, up to 12 hours, rare ectopy.      He thinks A-fib is increased over the past 1 year.  He now has symptoms once or twice per week lasting several hours.  Resting heart rate will be 70 or so.  When he has fatigue and palpitations, heart rate will be in the low 100s.  He has been on the same flecainide dose for many years.    PAST MEDICAL HISTORY:  Past Medical History:   Diagnosis Date     A-fib (H) 2/27/2018     COPD (chronic obstructive pulmonary disease) (H)      Diabetes (H)      Hypertension      NO ACTIVE PROBLEMS        MEDICATIONS:  Current Outpatient Medications   Medication     albuterol (PROAIR HFA/PROVENTIL HFA/VENTOLIN HFA) 108 (90 Base) MCG/ACT inhaler     Ascorbic Acid (VITAMIN C) 100 MG CHEW     atorvastatin (LIPITOR) 20 MG tablet     flecainide (TAMBOCOR) 100 MG tablet     ipratropium - albuterol 0.5 mg/2.5 mg/3 mL (DUONEB) 0.5-2.5 (3) MG/3ML neb solution     losartan (COZAAR) 100 MG tablet     metFORMIN (GLUCOPHAGE XR) 500 MG 24 hr tablet     metoprolol succinate ER (TOPROL XL) 50 MG 24 hr tablet     mometasone-formoterol (DULERA) 200-5 MCG/ACT inhaler     ONETOUCH ULTRA test strip     Thiamine HCl (VITAMIN B-1 PO)     VITAMIN D PO      warfarin ANTICOAGULANT (JANTOVEN ANTICOAGULANT) 5 MG tablet     No current facility-administered medications for this visit.       ALLERGIES:  Allergies   Allergen Reactions     Lisinopril Cough       SOCIAL HISTORY:  I have reviewed this patient's social history and updated it with pertinent information if needed. Joe Mas  reports that he quit smoking about 23 years ago. His smoking use included cigarettes. He started smoking about 48 years ago. He has a 25.00 pack-year smoking history. He has never used smokeless tobacco. He reports current alcohol use. He reports current drug use. Drug: Marijuana.    FAMILY HISTORY:  I have reviewed this patient's family history and updated it with pertinent information if needed.   Family History   Problem Relation Age of Onset     Osteoporosis Mother      Heart Disease Mother      Neurologic Disorder Father         passed away from a brain tumor age 48     Cerebrovascular Disease Father      Neurologic Disorder Maternal Grandmother         parkinsons     C.A.D. Maternal Grandmother      Diabetes Maternal Grandmother      Alcohol/Drug Maternal Grandfather      Cancer Maternal Grandfather         esphogus     Cancer Paternal Grandfather         lung     Diabetes Sister      Hypertension Sister      Asthma Daughter      Cerebrovascular Disease No family hx of      Breast Cancer No family hx of      Cancer - colorectal No family hx of        REVIEW OF SYSTEMS:  Constitutional:  No weight loss, fever, chills  HEENT:  Eyes:  No visual loss, blurred vision, double vision or yellow sclerae. No hearing loss, sneezing, congestion, runny nose or sore throat.  Skin:  No rash or itching.  Cardiovascular: per HPI  Respiratory: per HPI  GI:  No anorexia, nausea, vomiting or diarrhea. No abdominal pain or blood.  :  No dysurea, hematuria  Neurologic:  No headache, paralysis, ataxia, numbness or tingling in the extremities. No change in bowel or bladder control.  Musculoskeletal:  No  muscle pain  Hematologic:  No bleeding or bruising.  Lymphatics:  No enlarged nodes. No history of splenectomy.  Endocrine:  No reports of sweating, cold or heat intolerance. No polyuria or polydipsia.  Allergies:  No history of asthma, hives, eczema or rhinitis.    PHYSICAL EXAM:  /85   Pulse 66   Wt 112 kg (247 lb)   SpO2 95%   BMI 37.01 kg/m      Constitutional: awake, alert, no distress  Eyes: PERRL, sclera nonicteric  ENT: trachea midline  Respiratory: Lungs clear  Cardiovascular: Regular rate and rhythm, no murmurs, distant sounds  GI: nondistended, nontender, bowel sounds present  Lymph/Hematologic: no lymphadenopathy  Skin: dry, no rash  Musculoskeletal: good muscle tone, strength 5/5 in upper and lower extremities  Neurologic: no focal deficits  Neuropsychiatric: appropriate affact    DATA:  Labs:   February 2023: Creatinine 0.9  Recent Labs   Lab Test 02/03/23  0800 02/01/22  1014 03/15/16  0850 02/17/15  0759   CHOL 176 170   < > 203*   HDL 59 47   < > 65   LDL 83 93   < > 99   TRIG 168* 152*   < > 193*   CHOLHDLRATIO  --   --   --  3.1    < > = values in this interval not displayed.     EKG: March 2: Sinus rhythm, rate 65, normal axis and intervals    ASSESSMENT:  67-year-old male seen for paroxysmal A-fib.  His A-fib symptoms have increased.  He is asking about ablation.  We mostly talked about this today.  He would probably be a good candidate since he has only paroxysmal A-fib a few times per week.  He is agreeable to EP referral who can talk about this more.  Echo will also be done to ensure there is no change in significant valve status or ejection fraction.    RECOMMENDATIONS:  1.  Paroxysmal A-fib  -Refer to EP to discuss ablation  -Otherwise continue medications including flecainide, metoprolol, warfarin  -Echo    Follow-up in 1 year with general cardiology.    Breezy Daniel MD  Cardiology - [facility]  Pager:  [phone]  Text Page  March 2, 2023    Thank you for allowing me to  participate in the care of your patient.      Sincerely,     Breezy Daniel MD     [facility]  cc:   No referring provider defined for this encounter.

## 2023-03-17 ENCOUNTER — ANTICOAGULATION THERAPY VISIT (OUTPATIENT)
Dept: ANTICOAGULATION | Facility: CLINIC | Age: 68
End: 2023-03-17

## 2023-03-17 ENCOUNTER — LAB (OUTPATIENT)
Dept: LAB | Facility: CLINIC | Age: 68
End: 2023-03-17
Payer: COMMERCIAL

## 2023-03-17 DIAGNOSIS — I48.91 ATRIAL FIBRILLATION, UNSPECIFIED TYPE (H): Primary | ICD-10-CM

## 2023-03-17 DIAGNOSIS — Z79.01 LONG TERM CURRENT USE OF ANTICOAGULANT THERAPY: ICD-10-CM

## 2023-03-17 DIAGNOSIS — I48.91 ATRIAL FIBRILLATION (H): ICD-10-CM

## 2023-03-17 LAB — INR BLD: 1.7 (ref 0.9–1.1)

## 2023-03-17 PROCEDURE — 85610 PROTHROMBIN TIME: CPT

## 2023-03-17 PROCEDURE — 36416 COLLJ CAPILLARY BLOOD SPEC: CPT

## 2023-03-17 NOTE — PROGRESS NOTES
ANTICOAGULATION MANAGEMENT     Joe Mas 67 year old male is on warfarin with subtherapeutic INR result. (Goal INR 2.0-3.0)    Recent labs: (last 7 days)     03/17/23  1149   INR 1.7*       ASSESSMENT       Source(s): Chart Review    Previous INR was Therapeutic last 2(+) visits    Medication, diet, health changes since last INR chart reviewed; none identified     Held 3/5-3/8 for colonoscopy       PLAN     Recommended plan for temporary change(s) affecting INR     Dosing Instructions: Continue your current warfarin dose with next INR in 1 week       Summary  As of 3/17/2023    Full warfarin instructions:  2.5 mg every Mon, Thu; 5 mg all other days   Next INR check:  3/21/2023             Detailed voice message left for Joe with dosing instructions and follow up date.   Sent [employer] message with dosing and follow up instructions    Check at provider office visit    Education provided:     Contact [phone] with any changes, questions or concerns.     Plan made per [facility] anticoagulation protocol    Palma Adam RN  Anticoagulation Clinic  3/17/2023    _______________________________________________________________________     Anticoagulation Episode Summary     Current INR goal:  2.0-3.0   TTR:  70.0 % (1 y)   Target end date:  Indefinite   Send INR reminders to:  MARIA ELENA YANG    Indications    Atrial fibrillation  unspecified type (H) [I48.91]  Long term current use of anticoagulant therapy [Z79.01]           Comments:           Anticoagulation Care Providers     Provider Role Specialty Phone number    Jolynn Cooper MD Referring Family Medicine [phone]

## 2023-03-20 NOTE — PROGRESS NOTES
Patient returned HST.   Infliximab Pregnancy And Lactation Text: This medication is Pregnancy Category B and is considered safe during pregnancy. It is unknown if this medication is excreted in breast milk.

## 2023-03-21 ENCOUNTER — OFFICE VISIT (OUTPATIENT)
Dept: FAMILY MEDICINE | Facility: CLINIC | Age: 68
End: 2023-03-21
Payer: COMMERCIAL

## 2023-03-21 VITALS
DIASTOLIC BLOOD PRESSURE: 83 MMHG | HEART RATE: 61 BPM | WEIGHT: 251.3 LBS | HEIGHT: 69 IN | OXYGEN SATURATION: 97 % | TEMPERATURE: 96.3 F | SYSTOLIC BLOOD PRESSURE: 144 MMHG | BODY MASS INDEX: 37.22 KG/M2

## 2023-03-21 DIAGNOSIS — I48.91 ATRIAL FIBRILLATION, UNSPECIFIED TYPE (H): ICD-10-CM

## 2023-03-21 DIAGNOSIS — E66.9 OBESITY (BMI 30-39.9): ICD-10-CM

## 2023-03-21 DIAGNOSIS — G47.33 OSA (OBSTRUCTIVE SLEEP APNEA): ICD-10-CM

## 2023-03-21 DIAGNOSIS — I10 ESSENTIAL HYPERTENSION, BENIGN: ICD-10-CM

## 2023-03-21 DIAGNOSIS — F33.42 RECURRENT MAJOR DEPRESSIVE DISORDER, IN FULL REMISSION (H): ICD-10-CM

## 2023-03-21 DIAGNOSIS — E11.8 TYPE 2 DIABETES MELLITUS WITH COMPLICATION, WITHOUT LONG-TERM CURRENT USE OF INSULIN (H): ICD-10-CM

## 2023-03-21 DIAGNOSIS — H25.9 AGE-RELATED CATARACT OF BOTH EYES, UNSPECIFIED AGE-RELATED CATARACT TYPE: ICD-10-CM

## 2023-03-21 DIAGNOSIS — Z01.818 PREOP GENERAL PHYSICAL EXAM: Primary | ICD-10-CM

## 2023-03-21 PROCEDURE — 99214 OFFICE O/P EST MOD 30 MIN: CPT | Performed by: FAMILY MEDICINE

## 2023-03-21 ASSESSMENT — PATIENT HEALTH QUESTIONNAIRE - PHQ9
SUM OF ALL RESPONSES TO PHQ QUESTIONS 1-9: 1
10. IF YOU CHECKED OFF ANY PROBLEMS, HOW DIFFICULT HAVE THESE PROBLEMS MADE IT FOR YOU TO DO YOUR WORK, TAKE CARE OF THINGS AT HOME, OR GET ALONG WITH OTHER PEOPLE: NOT DIFFICULT AT ALL
SUM OF ALL RESPONSES TO PHQ QUESTIONS 1-9: 1

## 2023-03-21 NOTE — PROGRESS NOTES
[facility]  [address] [facility] [address]  Phone: [phone]  Primary Provider: Aisha Cooper  Pre-op Performing Provider: AISHA COOPER      PREOPERATIVE EVALUATION:  Today's date: 3/21/2023    Joe Mas is a 67 year old male who presents for a preoperative evaluation.    Surgical Information:  Surgery/Procedure: Phacoemulsification/intraocular Lens/Possible Anterior Vitrectomy  03/29/23( Left eye) 04/ 11/23  ( right eye)  Surgery Location: [facility]   Surgeon: Dr. Sanches   Surgery Date: see above  Time of Surgery: TBD  Where patient plans to recover: At home with family  Fax number for surgical facility: [phone]    Type of Anesthesia Anticipated: Topical/MAC    Assessment & Plan     The proposed surgical procedure is considered LOW risk.    Preop general physical exam      Age-related cataract of both eyes, unspecified age-related cataract type      Essential hypertension, benign  A little high today - he feels this is due to stress/anxiety about being here.  Will send me some home data and bring in home machine for calibration check     Recurrent major depressive disorder, in full remission (H)  Stable, doing well     Atrial fibrillation, unspecified type (H)  Ok to stay on warfarin for these surgeries      JUNG (obstructive sleep apnea)  Using cpap     Type 2 diabetes mellitus with complication, without long-term current use of insulin (H)  Lab Results   Component Value Date    A1C 6.4 02/03/2023    A1C 6.9 08/08/2022    A1C 7.6 01/21/2022    A1C 6.8 04/09/2021    A1C 6.7 08/18/2020    A1C 7.0 01/14/2020    A1C 6.6 07/05/2019    A1C 8.1 01/29/2019          Obesity (BMI 30-39.9)               Risks and Recommendations:  The patient has the following additional risks and recommendations for perioperative complications:   - No identified additional risk factors other than previously addressed    Medication Instructions:  Patient is to take  all scheduled medications on the day of surgery    RECOMMENDATION:  APPROVAL GIVEN to proceed with proposed procedure, without further diagnostic evaluation.          Subjective     HPI related to upcoming procedure: increasingly blurry vision, bilateral cataracts     Preop Questions 3/20/2023   1. Have you ever had a heart attack or stroke? No   2. Have you ever had surgery on your heart or blood vessels, such as a stent placement, a coronary artery bypass, or surgery on an artery in your head, neck, heart, or legs? No   3. Do you have chest pain with activity? No   4. Do you have a history of  heart failure? No   5. Do you currently have a cold, bronchitis or symptoms of other infection? No   6. Do you have a cough, shortness of breath, or wheezing? No   7. Do you or anyone in your family have previous history of blood clots? No   8. Do you or does anyone in your family have a serious bleeding problem such as prolonged bleeding following surgeries or cuts? No   9. Have you ever had problems with anemia or been told to take iron pills? No   10. Have you had any abnormal blood loss such as black, tarry or bloody stools? No   11. Have you ever had a blood transfusion? No   12. Are you willing to have a blood transfusion if it is medically needed before, during, or after your surgery? Yes   13. Have you or any of your relatives ever had problems with anesthesia? No   14. Do you have sleep apnea, excessive snoring or daytime drowsiness? YES - uses cpap   14a. Do you have a CPAP machine? No   15. Do you have any artifical heart valves or other implanted medical devices like a pacemaker, defibrillator, or continuous glucose monitor? No   16. Do you have artificial joints? No   17. Are you allergic to latex? No       Health Care Directive:  Patient does not have a Health Care Directive or Living Will: Discussed advance care planning with patient; however, patient declined at this time.    Preoperative Review of :    reviewed - no record of controlled substances prescribed.      Status of Chronic Conditions:  See problem list for active medical problems.  Problems all longstanding and stable, except as noted/documented.  See ROS for pertinent symptoms related to these conditions.      Review of Systems  Constitutional, neuro, ENT, endocrine, pulmonary, cardiac, gastrointestinal, genitourinary, musculoskeletal, integument and psychiatric systems are negative, except as otherwise noted.    Patient Active Problem List    Diagnosis Date Noted     Hydrocele in adult 05/10/2022     Priority: Medium     Moderate major depression (H) 02/01/2022     Priority: Medium     Quit smoking 04/09/2021     Priority: Medium     a few years ago  30+ pack year hx        JUNG (obstructive sleep apnea) 05/11/2018     Priority: Medium     Moderate to possible severe JUNG, potential for significant hypoxemia and borderline sustained hypoxemia   - Suspect AHI was actually under-reported given suspected sleep onset was not until ~4am (recording from [place] to 9am).      Home Sleep Apnea Testing - 5/4/2018: 259 lbs 0 oz: AHI 15.6/hr. Supine AHI 15.6/hr.   Oxygen Codey of 81%.  Baseline 88.5%.  Sp02 =< 88% for majority of recording  He slept on his back (11.9%), prone (0%), left (5.8) and right (72.8%) sides.   Analysis time: 483.1 minutes.            Long term current use of anticoagulant therapy 02/27/2018     Priority: Medium     Atrial fibrillation, unspecified type (H) 02/27/2018     Priority: Medium     Morbid obesity (H) 02/27/2018     Priority: Medium     Tubular adenoma of colon 08/16/2017     Priority: Medium     Collected: 8/11/2017   Received: 8/14/2017   Reported: 8/15/2017 17:28   Ordering Phy(s): AL MORELAND     For improved result formatting, select 'View Enhanced Report Format'   under Linked Documents section.     SPECIMEN(S):   A: Colon polyps at 20 cm   B: Colon polyp, ascending     FINAL DIAGNOSIS:   A.  Colon at 20 cm, grossly biopsies:    -Tubular adenoma and fragments of hyperplastic polyp   - Negative for high-grade dysplasia and malignancy.     B.  Right colon, mucosal biopsy:   - Tubular adenoma.   - Negative for high-grade dysplasia and malignancy.        Obesity (BMI 30-39.9) 10/24/2015     Priority: Medium     Hyperlipidemia with target LDL less than 100 02/14/2014     Priority: Medium     Diagnosis updated by automated process. Provider to review and confirm.       Type 2 diabetes mellitus with complication, without long-term current use of insulin (H) 10/29/2013     Priority: Medium     CARDIOVASCULAR SCREENING; LDL GOAL LESS THAN 100 10/29/2013     Priority: Medium     Moderate major depression (H) 02/20/2013     Priority: Medium     Advanced directives, counseling/discussion 11/03/2011     Priority: Medium     Advance Directive Problem List Overview:   Name Relationship Phone    Primary Health Care Agent            Alternative Health Care Agent          Discussed advance care planning with patient; information given to patient to review.     Flavia Duggan [facility], HC Facilitator    11/3/2011          Obstructive chronic bronchitis with exacerbation (H) 02/22/2007     Priority: Medium     Essential hypertension, benign 02/22/2007     Priority: Medium      Past Medical History:   Diagnosis Date     A-fib (H) 2/27/2018     COPD (chronic obstructive pulmonary disease) (H)      Diabetes (H)      Hypertension      NO ACTIVE PROBLEMS      Past Surgical History:   Procedure Laterality Date     ABDOMEN SURGERY       COLONOSCOPY  11/12/2004    Follow up colonoscopy in 5 years     HERNIA REPAIR, UMBILICAL  2001     KNEE SURGERY  1960's    Left     Current Outpatient Medications   Medication Sig Dispense Refill     albuterol (PROAIR HFA/PROVENTIL HFA/VENTOLIN HFA) 108 (90 Base) MCG/ACT inhaler INHALE TWO PUFFS BY MOUTH EVERY 6 HOURS AS NEEDED FOR SHORTNESS OF BREATH 18 g 1     Ascorbic Acid (VITAMIN C) 100 MG CHEW Take 100 mg by mouth daily        "atorvastatin (LIPITOR) 20 MG tablet Take 1 tablet (20 mg) by mouth daily 90 tablet 3     flecainide (TAMBOCOR) 100 MG tablet Take 1 tablet (100 mg) by mouth 2 times daily 180 tablet 3     ipratropium - albuterol 0.5 mg/2.5 mg/3 mL (DUONEB) 0.5-2.5 (3) MG/3ML neb solution NEBULIZE THE CONTENTS OF 1 VIAL EVERY 6 HOURS AS NEEDED FOR SHORTNESS OF BREATH OR WHEEZING 360 mL 1     losartan (COZAAR) 100 MG tablet Take 1 tablet (100 mg) by mouth daily 90 tablet 3     metFORMIN (GLUCOPHAGE XR) 500 MG 24 hr tablet Take 2 tablets (1,000 mg) by mouth 2 times daily (with meals) 180 tablet 3     metoprolol succinate ER (TOPROL XL) 50 MG 24 hr tablet Take 1 tablet (50 mg) by mouth daily 90 tablet 3     mometasone-formoterol (DULERA) 200-5 MCG/ACT inhaler Inhale 2 puffs into the lungs 2 times daily 39 g 3     ONETOUCH ULTRA test strip USE TO TEST BLOOD SUGAR TWO TIMES A DAY (Patient not taking: Reported on 3/2/2023) 200 each 0     Thiamine HCl (VITAMIN B-1 PO) Take 50 mg by mouth daily       VITAMIN D PO Take 1 tablet by mouth daily       warfarin ANTICOAGULANT (JANTOVEN ANTICOAGULANT) 5 MG tablet TAKE ONE-HALF TABLET BY MOUTH EVERY MONDAY AND TAKE ONE TABLET BY MOUTH  ALL OTHER DAYS OR AS DIRECTED BY ANTICOAGULATION CLINIC (Patient taking differently: 1/2 tablet po on tues and thurs and one tablet every other day of the week.) 84 tablet 1       Allergies   Allergen Reactions     Lisinopril Cough        Social History     Tobacco Use     Smoking status: Former     Packs/day: 1.00     Years: 25.00     Pack years: 25.00     Types: Cigarettes     Start date: 1975     Quit date: 2000     Years since quittin.2     Smokeless tobacco: Never     Tobacco comments:     quit smoking    Substance Use Topics     Alcohol use: Yes     Comment: 2-5 beers per month         Objective     BP (!) 144/83   Pulse 61   Temp (!) 96.3  F (35.7  C) (Tympanic)   Ht 1.74 m (5' 8.5\")   Wt 114 kg (251 lb 4.8 oz)   SpO2 97%   BMI 37.65 " kg/m     BP Readings from Last 6 Encounters:   03/21/23 (!) 144/83   03/02/23 126/85   02/03/23 124/80   11/25/22 127/82   10/31/22 (!) 157/93   08/08/22 120/74         Physical Exam    GENERAL APPEARANCE: healthy, alert and no distress     EYES: EOMI,  PERRL     HENT: ear canals and TM's normal and nose and mouth without ulcers or lesions     NECK: no adenopathy, no asymmetry, masses, or scars and thyroid normal to palpation     RESP: lungs clear to auscultation - no rales, rhonchi or wheezes     CV: regular rates and rhythm, normal S1 S2, no S3 or S4 and no murmur, click or rub     ABDOMEN:  soft, nontender, no HSM or masses and bowel sounds normal     MS: extremities normal- no gross deformities noted, no evidence of inflammation in joints, FROM in all extremities.     SKIN: no suspicious lesions or rashes     NEURO: Normal strength and tone, sensory exam grossly normal, mentation intact and speech normal     PSYCH: mentation appears normal. and affect normal/bright     LYMPHATICS: No cervical adenopathy    Recent Labs   Lab Test 03/17/23  1149 02/03/23  0800 11/11/22  1145 10/31/22  0815 08/08/22  1520 08/08/22  1006   HGB  --   --   --  14.4  --   --    PLT  --   --   --  170  --   --    INR 1.7* 2.3*   < >  --    < >  --    NA  --  140  --  135*  --  138   POTASSIUM  --  4.3  --  4.0  --  4.1   CR  --  0.88  --  0.82  --  1.03   A1C  --  6.4*  --   --   --  6.9*    < > = values in this interval not displayed.        Diagnostics:  No labs were ordered during this visit.   No EKG this visit, completed in the last 90 days. - attached     Revised Cardiac Risk Index (RCRI):  The patient has the following serious cardiovascular risks for perioperative complications:   - No serious cardiac risks = 0 points     RCRI Interpretation: 0 points: Class I (very low risk - 0.4% complication rate)           Signed Electronically by: Jolynn Cooper MD  Copy of this evaluation report is provided to requesting physician.        Answers for HPI/ROS submitted by the patient on 3/21/2023  If you checked off any problems, how difficult have these problems made it for you to do your work, take care of things at home, or get along with other people?: Not difficult at all  PHQ9 TOTAL SCORE: 1

## 2023-03-21 NOTE — PATIENT INSTRUCTIONS
For informational purposes only. Not to replace the advice of your health care provider. Copyright   2003,  [place] [employer] [facility]. All rights reserved. Clinically reviewed by Abigail Alba MD. [place] [id number] - REV .  Preparing for Your Surgery  Getting started  A nurse will call you to review your health history and instructions. They will give you an arrival time based on your scheduled surgery time. Please be ready to share:    Your doctor's clinic name and phone number    Your medical, surgical, and anesthesia history    A list of allergies and sensitivities    A list of medicines, including herbal treatments and over-the-counter drugs    Whether the patient has a legal guardian (ask how to send us the papers in advance)  Please tell us if you're pregnant--or if there's any chance you might be pregnant. Some surgeries may injure a fetus (unborn baby), so they require a pregnancy test. Surgeries that are safe for a fetus don't always need a test, and you can choose whether to have one.   If you have a child who's having surgery, please ask for a copy of Preparing for Your Child's Surgery.    Preparing for surgery    Within 10 to 30 days of surgery: Have a pre-op exam (sometimes called an H&P, or History and Physical). This can be done at a clinic or pre-operative center.  ? If you're having a , you may not need this exam. Talk to your care team.    At your pre-op exam, talk to your care team about all medicines you take. If you need to stop any medicines before surgery, ask when to start taking them again.  ? We do this for your safety. Many medicines can make you bleed too much during surgery. Some change how well surgery (anesthesia) drugs work.    Call your insurance company to let them know you're having surgery. (If you don't have insurance, call [phone].)    Call your clinic if there's any change in your health. This includes signs of a cold or flu (sore throat, runny nose,  cough, rash, fever). It also includes a scrape or scratch near the surgery site.    If you have questions on the day of surgery, call your hospital or surgery center.  Eating and drinking guidelines  For your safety: Unless your surgeon tells you otherwise, follow the guidelines below.    Eat and drink as usual until 8 hours before you arrive for surgery. After that, no food or milk.    Drink clear liquids until 2 hours before you arrive. These are liquids you can see through, like water, Gatorade, and Propel Water. They also include plain black coffee and tea (no cream or milk), candy, and breath mints. You can spit out gum when you arrive.    If you drink alcohol: Stop drinking it the night before surgery.    If your care team tells you to take medicine on the morning of surgery, it's okay to take it with a sip of water.  Preventing infection    Shower or bathe the night before and morning of your surgery. Follow the instructions your clinic gave you. (If no instructions, use regular soap.)    Don't shave or clip hair near your surgery site. We'll remove the hair if needed.    Don't smoke or vape the morning of surgery. You may chew nicotine gum up to 2 hours before surgery. A nicotine patch is okay.  ? Note: Some surgeries require you to completely quit smoking and nicotine. Check with your surgeon.    Your care team will make every effort to keep you safe from infection. We will:  ? Clean our hands often with soap and water (or an alcohol-based hand rub).  ? Clean the skin at your surgery site with a special soap that kills germs.  ? Give you a special gown to keep you warm. (Cold raises the risk of infection.)  ? Wear special hair covers, masks, gowns and gloves during surgery.  ? Give antibiotic medicine, if prescribed. Not all surgeries need antibiotics.  What to bring on the day of surgery    Photo ID and insurance card    Copy of your health care directive, if you have one    Glasses and hearing aids (bring  cases)  ? You can't wear contacts during surgery    Inhaler and eye drops, if you use them (tell us about these when you arrive)    CPAP machine or breathing device, if you use them    A few personal items, if spending the night    If you have . . .  ? A pacemaker, ICD (cardiac defibrillator) or other implant: Bring the ID card.  ? An implanted stimulator: Bring the remote control.  ? A legal guardian: Bring a copy of the certified (court-stamped) guardianship papers.  Please remove any jewelry, including body piercings. Leave jewelry and other valuables at home.  If you're going home the day of surgery    You must have a responsible adult drive you home. They should stay with you overnight as well.    If you don't have someone to stay with you, and you aren't safe to go home alone, we may keep you overnight. Insurance often won't pay for this.  After surgery  If it's hard to control your pain or you need more pain medicine, please call your surgeon's office.  Questions?   If you have any questions for your care team, list them here: _________________________________________________________________________________________________________________________________________________________________________ ____________________________________ ____________________________________ ____________________________________

## 2023-03-22 ENCOUNTER — MYC MEDICAL ADVICE (OUTPATIENT)
Dept: ANTICOAGULATION | Facility: CLINIC | Age: 68
End: 2023-03-22
Payer: COMMERCIAL

## 2023-03-22 NOTE — TELEPHONE ENCOUNTER
ANTICOAGULATION     Joe Mas is overdue for INR check.     [employer] message sent    Palma Adam RN

## 2023-03-23 ENCOUNTER — ANTICOAGULATION THERAPY VISIT (OUTPATIENT)
Dept: ANTICOAGULATION | Facility: CLINIC | Age: 68
End: 2023-03-23

## 2023-03-23 ENCOUNTER — LAB (OUTPATIENT)
Dept: LAB | Facility: CLINIC | Age: 68
End: 2023-03-23
Payer: COMMERCIAL

## 2023-03-23 DIAGNOSIS — I48.91 ATRIAL FIBRILLATION (H): ICD-10-CM

## 2023-03-23 DIAGNOSIS — Z79.01 LONG TERM CURRENT USE OF ANTICOAGULANT THERAPY: ICD-10-CM

## 2023-03-23 DIAGNOSIS — I48.91 ATRIAL FIBRILLATION, UNSPECIFIED TYPE (H): Primary | ICD-10-CM

## 2023-03-23 LAB — INR BLD: 2.6 (ref 0.9–1.1)

## 2023-03-23 PROCEDURE — 36415 COLL VENOUS BLD VENIPUNCTURE: CPT

## 2023-03-23 PROCEDURE — 85610 PROTHROMBIN TIME: CPT

## 2023-03-23 NOTE — PROGRESS NOTES
ANTICOAGULATION MANAGEMENT     Joe Mas 67 year old male is on warfarin with therapeutic INR result. (Goal INR 2.0-3.0)    Recent labs: (last 7 days)     03/23/23  1148   INR 2.6*       ASSESSMENT       Source(s): Chart Review and Patient/Caregiver Call       Warfarin doses taken: Warfarin taken as instructed, recently held for 4 days for colonoscopy on 3/9, no bridge    Diet: No new diet changes identified    New illness, injury, or hospitalization: No    Medication/supplement changes: None noted    Signs or symptoms of bleeding or clotting: No    Previous INR: Subtherapeutic    Additional findings: Upcoming surgery/procedure 3/29 and 4/11 eye surgery, no warfarin hold needed, patient moved tooth extraction to 4/25 ([facility] recommended no warfarin hold and check INR 3-5 days prior to extraction to ensure in goal range, INR check on day of extraction if required by dentist)         PLAN     Recommended plan for temporary change(s) affecting INR     Dosing Instructions: Continue your current warfarin dose with next INR in 4 weeks       Summary  As of 3/23/2023    Full warfarin instructions:  2.5 mg every Mon, Thu; 5 mg all other days   Next INR check:  4/20/2023             Telephone call with Joe who verbalizes understanding and agrees to plan    Patient elected to schedule next visit 4/20, patient didn't want to check it sooner then 4/20    Education provided:     Goal range and lab monitoring: goal range and significance of current result    Contact [phone] with any changes, questions or concerns.     Plan made with [facility] Pharmacist Emiliana Johnson, RN  Anticoagulation Clinic  3/23/2023    _______________________________________________________________________     Anticoagulation Episode Summary     Current INR goal:  2.0-3.0   TTR:  70.2 % (1 y)   Target end date:  Indefinite   Send INR reminders to:  MARIA ELENA YANG    Indications    Atrial fibrillation  unspecified type (H)  [I48.91]  Long term current use of anticoagulant therapy [Z79.01]           Comments:           Anticoagulation Care Providers     Provider Role Specialty Phone number    Jolynn Cooper MD Referring Family Medicine [phone]

## 2023-04-16 ENCOUNTER — HEALTH MAINTENANCE LETTER (OUTPATIENT)
Age: 68
End: 2023-04-16

## 2023-04-20 ENCOUNTER — ANTICOAGULATION THERAPY VISIT (OUTPATIENT)
Dept: ANTICOAGULATION | Facility: CLINIC | Age: 68
End: 2023-04-20

## 2023-04-20 ENCOUNTER — LAB (OUTPATIENT)
Dept: LAB | Facility: CLINIC | Age: 68
End: 2023-04-20
Payer: COMMERCIAL

## 2023-04-20 ENCOUNTER — HOSPITAL ENCOUNTER (OUTPATIENT)
Dept: CARDIOLOGY | Facility: CLINIC | Age: 68
Discharge: HOME | End: 2023-04-20
Attending: INTERNAL MEDICINE | Admitting: INTERNAL MEDICINE
Payer: COMMERCIAL

## 2023-04-20 DIAGNOSIS — I48.91 ATRIAL FIBRILLATION, UNSPECIFIED TYPE (H): Primary | ICD-10-CM

## 2023-04-20 DIAGNOSIS — Z79.01 LONG TERM CURRENT USE OF ANTICOAGULANT THERAPY: ICD-10-CM

## 2023-04-20 DIAGNOSIS — I48.91 ATRIAL FIBRILLATION (H): ICD-10-CM

## 2023-04-20 DIAGNOSIS — I48.0 PAROXYSMAL ATRIAL FIBRILLATION (H): ICD-10-CM

## 2023-04-20 LAB
INR BLD: 1.9 (ref 0.9–1.1)
LVEF ECHO: NORMAL

## 2023-04-20 PROCEDURE — 255N000002 HC RX 255 OP 636: Performed by: INTERNAL MEDICINE

## 2023-04-20 PROCEDURE — 93306 TTE W/DOPPLER COMPLETE: CPT | Mod: 26 | Performed by: INTERNAL MEDICINE

## 2023-04-20 PROCEDURE — 85610 PROTHROMBIN TIME: CPT

## 2023-04-20 PROCEDURE — 36416 COLLJ CAPILLARY BLOOD SPEC: CPT

## 2023-04-20 PROCEDURE — 999N000208 ECHOCARDIOGRAM COMPLETE

## 2023-04-20 RX ADMIN — HUMAN ALBUMIN MICROSPHERES AND PERFLUTREN 2 ML: 10; .22 INJECTION, SOLUTION INTRAVENOUS at 13:06

## 2023-04-20 NOTE — PROGRESS NOTES
ANTICOAGULATION MANAGEMENT     Joe Mas 67 year old male is on warfarin with subtherapeutic INR result. (Goal INR 2.0-3.0)    Recent labs: (last 7 days)     04/20/23  1140   INR 1.9*       ASSESSMENT       Source(s): Chart Review and Patient/Caregiver Call       Warfarin doses taken: Warfarin taken as instructed    Diet: No new diet changes identified    Medication/supplement changes: None noted    New illness, injury, or hospitalization: No    Signs or symptoms of bleeding or clotting: No    Previous INR: Therapeutic last visit; previously outside of goal range    Additional findings: Upcoming surgery/procedure Tooth extraction on 4/25, no hold required         PLAN     Recommended plan for no diet, medication or health factor changes affecting INR     Dosing Instructions: Continue your current warfarin dose with next INR in 2 weeks       Summary  As of 4/20/2023    Full warfarin instructions:  2.5 mg every Mon, Thu; 5 mg all other days   Next INR check:  5/4/2023             Telephone call with Joe who verbalizes understanding and agrees to plan    Patient offered & declined to schedule next visit    Education provided:     Goal range and lab monitoring: goal range and significance of current result    Contact [phone] with any changes, questions or concerns.     Plan made per [facility] anticoagulation protocol    Jolynn Johnson RN  Anticoagulation Clinic  4/20/2023    _______________________________________________________________________     Anticoagulation Episode Summary     Current INR goal:  2.0-3.0   TTR:  70.9 % (1 y)   Target end date:  Indefinite   Send INR reminders to:  MARIA ELENA YANG    Indications    Atrial fibrillation  unspecified type (H) [I48.91]  Long term current use of anticoagulant therapy [Z79.01]           Comments:           Anticoagulation Care Providers     Provider Role Specialty Phone number    Jolynn Cooper MD Referring Family Medicine [phone]

## 2023-05-02 ENCOUNTER — VIRTUAL VISIT (OUTPATIENT)
Dept: CARDIOLOGY | Facility: CLINIC | Age: 68
End: 2023-05-02
Attending: INTERNAL MEDICINE
Payer: COMMERCIAL

## 2023-05-02 VITALS
HEART RATE: 73 BPM | DIASTOLIC BLOOD PRESSURE: 75 MMHG | SYSTOLIC BLOOD PRESSURE: 132 MMHG | WEIGHT: 248 LBS | BODY MASS INDEX: 37.16 KG/M2

## 2023-05-02 DIAGNOSIS — I48.0 PAROXYSMAL ATRIAL FIBRILLATION (H): ICD-10-CM

## 2023-05-02 PROCEDURE — 99212 OFFICE O/P EST SF 10 MIN: CPT | Mod: 95 | Performed by: INTERNAL MEDICINE

## 2023-05-02 NOTE — PROGRESS NOTES
Electrophysiology/ Virtual Clinic Note         H&P and Plan:   Patient opted to have a virtual visit due to ongoing issues related to ongoing COVID-19 virus pandemic and to minimize social interaction (based on CDC guidelines).      Visit details:  Patient has given verbal consent for this visit.    Interview was done talking and seeing patient via Internet.    Mode of transmission: [employer], [employer].  Visit start time: 10:20 AM  Visit stop time: 10:37 AM  Provider location: Office.  Patient location: Home.       REASON FOR VISIT: Electrophysiology evaluation.      HISTORY OF PRESENT ILLNESS: 67-year-old gentleman with a history of hypertension, hyperlipidemia, diabetes, obstructive sleep apnea and paroxysmal atrial fibrillation, who is here for consideration for ablation procedure.    Patient has a long history of paroxysmal A-fib.  He has been on chronic therapy with flecainide and metoprolol.  Most recently, he was evaluated in cardiology clinic and was referred here for consideration for ablation due to complaints of increasing frequency of A-fib.    Today, he informs that he has at least 1 episode of A-fib per week.  He seems to be very symptomatic with the episodes, complaining of palpitation and inability to sleep.  Informs the episodes may last for 8 hours.    He denies any other symptoms of chest pain, shortness of breath, near-syncope or syncope.    EKG obtained in March showed sinus rhythm with QRS measuring 180 ms.    PREVIOUS STUDIES (personally reviewed):    -Echo (4/20/2023): Normal LV function.  EF of 60 to 65%.  No significant valve disease.    ASSESSMENT AND PLAN:   1.    Paroxysmal atrial fibrillation.  He failed rhythm control strategy with flecainide and metoprolol.  We discussed possibly of ablation procedure.    I explained the ablation procedure in details.  He understand there is a 70% success rate and 3% risk of cases here procedure.    After discussion, he would like to think about the  procedure.  However, he is considering to have it done on 06/02.      We will call him in a couple days to finalize plan.  She will need a chest CT and MICHAEL prior to the procedure.    2.  Embolic prevention.  CHADS-VASc score of 3.  Continue anticoagulation with Eliquis indefinitely.        Jay Barrientos MD    Physical Exam:  Vitals: /75   Pulse 73   Wt 112.5 kg (248 lb)   BMI 37.16 kg/m      Constitutional:  Pt is in no acute distress.  Normal body habitus, upright.  HEAD: Normocephalic. No evidence of injury or laceration.   SKIN: Skin normal color with no lesions or eruptions. No xanthelasma.  ENT/Mouth:  Membranes moist. No nasal discharge or bleeding gums.   Neck:  Supple, normal JVP, no evidence of thyromegaly.  Chest/Lungs: No audible wheezing or labored breathing. Normal chest wall expansion. No cough.   Cardiovascular: Normal jugular venous pressure. No evidence of pitting edema bilaterally.   Abdomen: No evidence of abdominal distention. No observed jaundice.  Eyes: PERRL, EOMI. No scleral icterus, normal conjunctivae.  Extremities:  No lower extremity edema noticed.  No cyanosis or clubbing noted.   Neurologic: Normal arm motion bilateral.  No tremors. No evidence of focal defect.   Psychiatric: Alert and oriented x3, calm.         CURRENT MEDICATIONS:  Current Outpatient Medications   Medication Sig Dispense Refill     albuterol (PROAIR HFA/PROVENTIL HFA/VENTOLIN HFA) 108 (90 Base) MCG/ACT inhaler INHALE TWO PUFFS BY MOUTH EVERY 6 HOURS AS NEEDED FOR SHORTNESS OF BREATH 18 g 1     Ascorbic Acid (VITAMIN C) 100 MG CHEW Take 100 mg by mouth daily       atorvastatin (LIPITOR) 20 MG tablet Take 1 tablet (20 mg) by mouth daily 90 tablet 3     flecainide (TAMBOCOR) 100 MG tablet Take 1 tablet (100 mg) by mouth 2 times daily 180 tablet 3     ipratropium - albuterol 0.5 mg/2.5 mg/3 mL (DUONEB) 0.5-2.5 (3) MG/3ML neb solution NEBULIZE THE CONTENTS OF 1 VIAL EVERY 6 HOURS AS NEEDED FOR SHORTNESS OF BREATH  OR WHEEZING 360 mL 1     losartan (COZAAR) 100 MG tablet Take 1 tablet (100 mg) by mouth daily 90 tablet 3     metFORMIN (GLUCOPHAGE XR) 500 MG 24 hr tablet Take 2 tablets (1,000 mg) by mouth 2 times daily (with meals) 180 tablet 3     metoprolol succinate ER (TOPROL XL) 50 MG 24 hr tablet Take 1 tablet (50 mg) by mouth daily 90 tablet 3     mometasone-formoterol (DULERA) 200-5 MCG/ACT inhaler Inhale 2 puffs into the lungs 2 times daily 39 g 3     Omega-3 Fatty Acids (FISH OIL PO) 500 mg fatty acids. 2,000 mg       Thiamine HCl (VITAMIN B-1 PO) Take 50 mg by mouth daily       VITAMIN D PO Take 1 tablet by mouth daily       warfarin ANTICOAGULANT (JANTOVEN ANTICOAGULANT) 5 MG tablet TAKE ONE-HALF TABLET BY MOUTH EVERY MONDAY AND TAKE ONE TABLET BY MOUTH  ALL OTHER DAYS OR AS DIRECTED BY ANTICOAGULATION CLINIC (Patient taking differently: 1/2 tablet po on tues and thurs and one tablet every other day of the week.) 84 tablet 1     ONETOUCH ULTRA test strip USE TO TEST BLOOD SUGAR TWO TIMES A DAY (Patient not taking: Reported on 3/2/2023) 200 each 0       ALLERGIES     Allergies   Allergen Reactions     Lisinopril Cough       PAST MEDICAL HISTORY:  Past Medical History:   Diagnosis Date     A-fib (H) 2/27/2018     COPD (chronic obstructive pulmonary disease) (H)      Diabetes (H)      Hypertension      NO ACTIVE PROBLEMS        PAST SURGICAL HISTORY:  Past Surgical History:   Procedure Laterality Date     ABDOMEN SURGERY       COLONOSCOPY  11/12/2004    Follow up colonoscopy in 5 years     HERNIA REPAIR, UMBILICAL  2001     KNEE SURGERY  1960's    Left       FAMILY HISTORY:  Family History   Problem Relation Age of Onset     Osteoporosis Mother      Heart Disease Mother      Neurologic Disorder Father         passed away from a brain tumor age 48     Cerebrovascular Disease Father      Neurologic Disorder Maternal Grandmother         parkinsons     C.A.D. Maternal Grandmother      Diabetes Maternal Grandmother       Alcohol/Drug Maternal Grandfather      Cancer Maternal Grandfather         esphogus     Cancer Paternal Grandfather         lung     Diabetes Sister      Hypertension Sister      Asthma Daughter      Cerebrovascular Disease No family hx of      Breast Cancer No family hx of      Cancer - colorectal No family hx of        SOCIAL HISTORY:  Social History     Socioeconomic History     Marital status: Single     Spouse name: None     Number of children: 2     Years of education: 12+     Highest education level: None   Occupational History     Employer: [employer]   Tobacco Use     Smoking status: Former     Packs/day: 1.00     Years: 25.00     Pack years: 25.00     Types: Cigarettes     Start date: 1975     Quit date: 2000     Years since quittin.3     Smokeless tobacco: Never     Tobacco comments:     quit smoking    Vaping Use     Vaping status: Never Used   Substance and Sexual Activity     Alcohol use: Yes     Comment: 2-5 beers per month     Drug use: Yes     Types: Marijuana     Sexual activity: Not Currently     Partners: Female     Birth control/protection: Condom   Other Topics Concern     Parent/sibling w/ CABG, MI or angioplasty before 65F 55M? Yes     Comment: Brother       Review of Systems:  Skin:        Eyes:       ENT:       Respiratory:  Positive for dyspnea on exertion  Cardiovascular:  Negative for;palpitations;chest pain;edema;syncope or near-syncope;fatigue;lightheadedness;dizziness    Gastroenterology:      Genitourinary:       Musculoskeletal:       Neurologic:       Psychiatric:       Heme/Lymph/Imm:       Endocrine:           Recent Lab Results:  LIPID RESULTS:  Lab Results   Component Value Date    CHOL 176 2023    CHOL 182 2021    HDL 59 2023    HDL 62 2021    LDL 83 2023    LDL 94 2021    TRIG 168 (H) 2023    TRIG 132 2021    CHOLHDLRATIO 3.1 2015       LIVER ENZYME RESULTS:  Lab Results   Component Value Date    AST 18  08/08/2022    AST 26 02/17/2015    ALT 36 08/08/2022    ALT 59 02/17/2015       CBC RESULTS:  Lab Results   Component Value Date    WBC 7.0 10/31/2022    WBC 8.6 10/04/2011    RBC 4.61 10/31/2022    RBC 5.17 10/04/2011    HGB 14.4 10/31/2022    HGB 16.1 10/04/2011    HCT 42.5 10/31/2022    HCT 48.2 10/04/2011    MCV 92 10/31/2022    MCV 93.2 10/04/2011    MCH 31.2 10/31/2022    MCH 31.2 10/04/2011    MCHC 33.9 10/31/2022    MCHC 33.4 10/04/2011    RDW 12.0 10/31/2022    RDW 13.2 10/04/2011     10/31/2022     10/04/2011     06/14/2011       BMP RESULTS:  Lab Results   Component Value Date     02/03/2023     04/09/2021    POTASSIUM 4.3 02/03/2023    POTASSIUM 4.1 08/08/2022    POTASSIUM 4.2 04/09/2021    CHLORIDE 102 02/03/2023    CHLORIDE 101 08/08/2022    CHLORIDE 104 04/09/2021    CO2 27 02/03/2023    CO2 30 08/08/2022    CO2 25 04/09/2021    ANIONGAP 11 02/03/2023    ANIONGAP 7 08/08/2022    ANIONGAP 8 04/09/2021     (H) 02/03/2023     (H) 08/08/2022     (H) 04/09/2021    BUN 16.4 02/03/2023    BUN 23 08/08/2022    BUN 17 04/09/2021    CR 0.88 02/03/2023    CR 0.99 04/09/2021    GFRESTIMATED >90 02/03/2023    GFRESTIMATED 79 04/09/2021    GFRESTBLACK >90 04/09/2021    SARA 10.2 02/03/2023    SARA 9.7 04/09/2021        A1C RESULTS:  Lab Results   Component Value Date    A1C 6.4 (H) 02/03/2023    A1C 6.8 (H) 04/09/2021       INR RESULTS:  Lab Results   Component Value Date    INR 1.9 (H) 04/20/2023    INR 2.6 (H) 03/23/2023    INR 3.00 (H) 07/08/2021    INR 2.80 (H) 06/10/2021         ECHOCARDIOGRAM  No results found for this or any previous visit (from the past 8760 hour(s)).      No orders of the defined types were placed in this encounter.    No orders of the defined types were placed in this encounter.    There are no discontinued medications.      Encounter Diagnosis   Name Primary?     Paroxysmal atrial fibrillation (H)          CC  Breezy Daniel,  MD  [facility]  [address] [place] [address]            Joe is a 67 year old who is being evaluated via a billable video visit.      How would you like to obtain your AVS? MyChart  If the video visit is dropped, the invitation should be resent by: Text to cell phone: [phone]  Will anyone else be joining your video visit? No      Video-Visit Details    Type of service:  Video Visit   Video Start Time:   Video End Time:    Originating Location (pt. Location):   Distant Location (provider location):    Platform used for Video Visit:

## 2023-05-02 NOTE — LETTER
5/2/2023    Jolynn Cooper MD  [address] [facility] [address]    RE: Joe Mas       Dear Colleague,     I had the pleasure of seeing Joe Mas in the [facility].  Electrophysiology/ Virtual Clinic Note         H&P and Plan:   Patient opted to have a virtual visit due to ongoing issues related to ongoing COVID-19 virus pandemic and to minimize social interaction (based on CDC guidelines).      Visit details:  Patient has given verbal consent for this visit.    Interview was done talking and seeing patient via Internet.    Mode of transmission: [employer], [employer].  Visit start time: 10:20 AM  Visit stop time: 10:37 AM  Provider location: Office.  Patient location: Home.       REASON FOR VISIT: Electrophysiology evaluation.      HISTORY OF PRESENT ILLNESS: 67-year-old gentleman with a history of hypertension, hyperlipidemia, diabetes, obstructive sleep apnea and paroxysmal atrial fibrillation, who is here for consideration for ablation procedure.    Patient has a long history of paroxysmal A-fib.  He has been on chronic therapy with flecainide and metoprolol.  Most recently, he was evaluated in cardiology clinic and was referred here for consideration for ablation due to complaints of increasing frequency of A-fib.    Today, he informs that he has at least 1 episode of A-fib per week.  He seems to be very symptomatic with the episodes, complaining of palpitation and inability to sleep.  Informs the episodes may last for 8 hours.    He denies any other symptoms of chest pain, shortness of breath, near-syncope or syncope.    EKG obtained in March showed sinus rhythm with QRS measuring 180 ms.    PREVIOUS STUDIES (personally reviewed):    -Echo (4/20/2023): Normal LV function.  EF of 60 to 65%.  No significant valve disease.    ASSESSMENT AND PLAN:   1.    Paroxysmal atrial fibrillation.  He failed rhythm control strategy with flecainide and metoprolol.  We discussed possibly of  ablation procedure.    I explained the ablation procedure in details.  He understand there is a 70% success rate and 3% risk of cases here procedure.    After discussion, he would like to think about the procedure.  However, he is considering to have it done on 06/02.      We will call him in a couple days to finalize plan.  She will need a chest CT and MICHAEL prior to the procedure.    2.  Embolic prevention.  CHADS-VASc score of 3.  Continue anticoagulation with Eliquis indefinitely.        Jay Barrientos MD    Physical Exam:  Vitals: /75   Pulse 73   Wt 112.5 kg (248 lb)   BMI 37.16 kg/m      Constitutional:  Pt is in no acute distress.  Normal body habitus, upright.  HEAD: Normocephalic. No evidence of injury or laceration.   SKIN: Skin normal color with no lesions or eruptions. No xanthelasma.  ENT/Mouth:  Membranes moist. No nasal discharge or bleeding gums.   Neck:  Supple, normal JVP, no evidence of thyromegaly.  Chest/Lungs: No audible wheezing or labored breathing. Normal chest wall expansion. No cough.   Cardiovascular: Normal jugular venous pressure. No evidence of pitting edema bilaterally.   Abdomen: No evidence of abdominal distention. No observed jaundice.  Eyes: PERRL, EOMI. No scleral icterus, normal conjunctivae.  Extremities:  No lower extremity edema noticed.  No cyanosis or clubbing noted.   Neurologic: Normal arm motion bilateral.  No tremors. No evidence of focal defect.   Psychiatric: Alert and oriented x3, calm.         CURRENT MEDICATIONS:  Current Outpatient Medications   Medication Sig Dispense Refill    albuterol (PROAIR HFA/PROVENTIL HFA/VENTOLIN HFA) 108 (90 Base) MCG/ACT inhaler INHALE TWO PUFFS BY MOUTH EVERY 6 HOURS AS NEEDED FOR SHORTNESS OF BREATH 18 g 1    Ascorbic Acid (VITAMIN C) 100 MG CHEW Take 100 mg by mouth daily      atorvastatin (LIPITOR) 20 MG tablet Take 1 tablet (20 mg) by mouth daily 90 tablet 3    flecainide (TAMBOCOR) 100 MG tablet Take 1 tablet (100 mg) by  mouth 2 times daily 180 tablet 3    ipratropium - albuterol 0.5 mg/2.5 mg/3 mL (DUONEB) 0.5-2.5 (3) MG/3ML neb solution NEBULIZE THE CONTENTS OF 1 VIAL EVERY 6 HOURS AS NEEDED FOR SHORTNESS OF BREATH OR WHEEZING 360 mL 1    losartan (COZAAR) 100 MG tablet Take 1 tablet (100 mg) by mouth daily 90 tablet 3    metFORMIN (GLUCOPHAGE XR) 500 MG 24 hr tablet Take 2 tablets (1,000 mg) by mouth 2 times daily (with meals) 180 tablet 3    metoprolol succinate ER (TOPROL XL) 50 MG 24 hr tablet Take 1 tablet (50 mg) by mouth daily 90 tablet 3    mometasone-formoterol (DULERA) 200-5 MCG/ACT inhaler Inhale 2 puffs into the lungs 2 times daily 39 g 3    Omega-3 Fatty Acids (FISH OIL PO) 500 mg fatty acids. 2,000 mg      Thiamine HCl (VITAMIN B-1 PO) Take 50 mg by mouth daily      VITAMIN D PO Take 1 tablet by mouth daily      warfarin ANTICOAGULANT ([employer] OncologyTOVEN ANTICOAGULANT) 5 MG tablet TAKE ONE-HALF TABLET BY MOUTH EVERY MONDAY AND TAKE ONE TABLET BY MOUTH  ALL OTHER DAYS OR AS DIRECTED BY ANTICOAGULATION CLINIC (Patient taking differently: 1/2 tablet po on tues and thurs and one tablet every other day of the week.) 84 tablet 1    ONETOUCH ULTRA test strip USE TO TEST BLOOD SUGAR TWO TIMES A DAY (Patient not taking: Reported on 3/2/2023) 200 each 0       ALLERGIES     Allergies   Allergen Reactions    Lisinopril Cough       PAST MEDICAL HISTORY:  Past Medical History:   Diagnosis Date    A-fib (H) 2/27/2018    COPD (chronic obstructive pulmonary disease) (H)     Diabetes (H)     Hypertension     NO ACTIVE PROBLEMS        PAST SURGICAL HISTORY:  Past Surgical History:   Procedure Laterality Date    ABDOMEN SURGERY      COLONOSCOPY  11/12/2004    Follow up colonoscopy in 5 years    HERNIA REPAIR, UMBILICAL  2001    KNEE SURGERY  1960's    Left       FAMILY HISTORY:  Family History   Problem Relation Age of Onset    Osteoporosis Mother     Heart Disease Mother     Neurologic Disorder Father         passed away from a brain tumor age  48    Cerebrovascular Disease Father     Neurologic Disorder Maternal Grandmother         parkinsons    C.ABalD. Maternal Grandmother     Diabetes Maternal Grandmother     Alcohol/Drug Maternal Grandfather     Cancer Maternal Grandfather         esphogus    Cancer Paternal Grandfather         lung    Diabetes Sister     Hypertension Sister     Asthma Daughter     Cerebrovascular Disease No family hx of     Breast Cancer No family hx of     Cancer - colorectal No family hx of        SOCIAL HISTORY:  Social History     Socioeconomic History    Marital status: Single     Spouse name: None    Number of children: 2    Years of education: 12+    Highest education level: None   Occupational History     Employer: [employer]   Tobacco Use    Smoking status: Former     Packs/day: 1.00     Years: 25.00     Pack years: 25.00     Types: Cigarettes     Start date: 1975     Quit date: 2000     Years since quittin.3    Smokeless tobacco: Never    Tobacco comments:     quit smoking    Vaping Use    Vaping status: Never Used   Substance and Sexual Activity    Alcohol use: Yes     Comment: 2-5 beers per month    Drug use: Yes     Types: Marijuana    Sexual activity: Not Currently     Partners: Female     Birth control/protection: Condom   Other Topics Concern    Parent/sibling w/ CABG, MI or angioplasty before 65F 55M? Yes     Comment: Brother       Review of Systems:  Skin:        Eyes:       ENT:       Respiratory:  Positive for dyspnea on exertion  Cardiovascular:  Negative for;palpitations;chest pain;edema;syncope or near-syncope;fatigue;lightheadedness;dizziness    Gastroenterology:      Genitourinary:       Musculoskeletal:       Neurologic:       Psychiatric:       Heme/Lymph/Imm:       Endocrine:           Recent Lab Results:  LIPID RESULTS:  Lab Results   Component Value Date    CHOL 176 2023    CHOL 182 2021    HDL 59 2023    HDL 62 2021    LDL 83 2023    LDL 94 2021    TRIG  168 (H) 02/03/2023    TRIG 132 04/09/2021    CHOLHDLRATIO 3.1 02/17/2015       LIVER ENZYME RESULTS:  Lab Results   Component Value Date    AST 18 08/08/2022    AST 26 02/17/2015    ALT 36 08/08/2022    ALT 59 02/17/2015       CBC RESULTS:  Lab Results   Component Value Date    WBC 7.0 10/31/2022    WBC 8.6 10/04/2011    RBC 4.61 10/31/2022    RBC 5.17 10/04/2011    HGB 14.4 10/31/2022    HGB 16.1 10/04/2011    HCT 42.5 10/31/2022    HCT 48.2 10/04/2011    MCV 92 10/31/2022    MCV 93.2 10/04/2011    MCH 31.2 10/31/2022    MCH 31.2 10/04/2011    MCHC 33.9 10/31/2022    MCHC 33.4 10/04/2011    RDW 12.0 10/31/2022    RDW 13.2 10/04/2011     10/31/2022     10/04/2011     06/14/2011       BMP RESULTS:  Lab Results   Component Value Date     02/03/2023     04/09/2021    POTASSIUM 4.3 02/03/2023    POTASSIUM 4.1 08/08/2022    POTASSIUM 4.2 04/09/2021    CHLORIDE 102 02/03/2023    CHLORIDE 101 08/08/2022    CHLORIDE 104 04/09/2021    CO2 27 02/03/2023    CO2 30 08/08/2022    CO2 25 04/09/2021    ANIONGAP 11 02/03/2023    ANIONGAP 7 08/08/2022    ANIONGAP 8 04/09/2021     (H) 02/03/2023     (H) 08/08/2022     (H) 04/09/2021    BUN 16.4 02/03/2023    BUN 23 08/08/2022    BUN 17 04/09/2021    CR 0.88 02/03/2023    CR 0.99 04/09/2021    GFRESTIMATED >90 02/03/2023    GFRESTIMATED 79 04/09/2021    GFRESTBLACK >90 04/09/2021    SARA 10.2 02/03/2023    SARA 9.7 04/09/2021        A1C RESULTS:  Lab Results   Component Value Date    A1C 6.4 (H) 02/03/2023    A1C 6.8 (H) 04/09/2021       INR RESULTS:  Lab Results   Component Value Date    INR 1.9 (H) 04/20/2023    INR 2.6 (H) 03/23/2023    INR 3.00 (H) 07/08/2021    INR 2.80 (H) 06/10/2021         ECHOCARDIOGRAM  No results found for this or any previous visit (from the past 8760 hour(s)).      No orders of the defined types were placed in this encounter.    No orders of the defined types were placed in this encounter.    There are  no discontinued medications.      Encounter Diagnosis   Name Primary?    Paroxysmal atrial fibrillation (H)        CC  Breezy Daniel MD  [facility]  [address] [place] [address]    Joe is a 67 year old who is being evaluated via a billable video visit.      How would you like to obtain your AVS? MyChart  If the video visit is dropped, the invitation should be resent by: Text to cell phone: [phone]  Will anyone else be joining your video visit? No      Video-Visit Details    Type of service:  Video Visit   Video Start Time:   Video End Time:    Originating Location (pt. Location):   Distant Location (provider location):    Platform used for Video Visit:         Thank you for allowing me to participate in the care of your patient.      Sincerely,     Jay Barrientos MD     [facility]  cc:   Breezy Dainel MD  [facility]  [address] [place] [address]

## 2023-05-03 ENCOUNTER — ANTICOAGULATION THERAPY VISIT (OUTPATIENT)
Dept: ANTICOAGULATION | Facility: CLINIC | Age: 68
End: 2023-05-03

## 2023-05-03 ENCOUNTER — NURSE TRIAGE (OUTPATIENT)
Dept: NURSING | Facility: CLINIC | Age: 68
End: 2023-05-03

## 2023-05-03 ENCOUNTER — OFFICE VISIT (OUTPATIENT)
Dept: FAMILY MEDICINE | Facility: CLINIC | Age: 68
End: 2023-05-03
Payer: COMMERCIAL

## 2023-05-03 VITALS
TEMPERATURE: 98.3 F | BODY MASS INDEX: 37.16 KG/M2 | OXYGEN SATURATION: 92 % | SYSTOLIC BLOOD PRESSURE: 134 MMHG | HEART RATE: 69 BPM | WEIGHT: 250.9 LBS | RESPIRATION RATE: 18 BRPM | HEIGHT: 69 IN | DIASTOLIC BLOOD PRESSURE: 82 MMHG

## 2023-05-03 DIAGNOSIS — I48.91 ATRIAL FIBRILLATION, UNSPECIFIED TYPE (H): ICD-10-CM

## 2023-05-03 DIAGNOSIS — I48.91 ATRIAL FIBRILLATION, UNSPECIFIED TYPE (H): Primary | ICD-10-CM

## 2023-05-03 DIAGNOSIS — J44.1 OBSTRUCTIVE CHRONIC BRONCHITIS WITH EXACERBATION (H): Primary | ICD-10-CM

## 2023-05-03 DIAGNOSIS — Z79.01 LONG TERM CURRENT USE OF ANTICOAGULANT THERAPY: ICD-10-CM

## 2023-05-03 PROBLEM — D12.2 BENIGN NEOPLASM OF ASCENDING COLON: Status: ACTIVE | Noted: 2023-03-13

## 2023-05-03 PROBLEM — D12.0 BENIGN NEOPLASM OF CECUM: Status: ACTIVE | Noted: 2023-03-13

## 2023-05-03 PROBLEM — D12.3 BENIGN NEOPLASM OF TRANSVERSE COLON: Status: ACTIVE | Noted: 2023-03-13

## 2023-05-03 PROBLEM — K57.30 DIVERTICULAR DISEASE OF LARGE INTESTINE: Status: ACTIVE | Noted: 2023-03-09

## 2023-05-03 LAB — INR BLD: 2.9 (ref 0.9–1.1)

## 2023-05-03 PROCEDURE — 85610 PROTHROMBIN TIME: CPT | Performed by: PHYSICIAN ASSISTANT

## 2023-05-03 PROCEDURE — 99213 OFFICE O/P EST LOW 20 MIN: CPT | Performed by: PHYSICIAN ASSISTANT

## 2023-05-03 PROCEDURE — 36416 COLLJ CAPILLARY BLOOD SPEC: CPT | Performed by: PHYSICIAN ASSISTANT

## 2023-05-03 RX ORDER — PREDNISONE 20 MG/1
TABLET ORAL
Qty: 18 TABLET | Refills: 0 | Status: ON HOLD | OUTPATIENT
Start: 2023-05-03 | End: 2023-06-07

## 2023-05-03 RX ORDER — DOXYCYCLINE 100 MG/1
100 CAPSULE ORAL 2 TIMES DAILY
Qty: 14 CAPSULE | Refills: 0 | Status: SHIPPED | OUTPATIENT
Start: 2023-05-03 | End: 2023-05-10

## 2023-05-03 NOTE — PATIENT INSTRUCTIONS
Prednisone steroid  Doxycycline antibiotics   Continue your dulera  Continue nebulizer as needed  Please follow up if symptoms worsen or are not improving

## 2023-05-03 NOTE — PROGRESS NOTES
ANTICOAGULATION MANAGEMENT     Joe Mas 67 year old male is on warfarin with therapeutic INR result. (Goal INR 2.0-3.0)    Recent labs: (last 7 days)     05/03/23  1559   INR 2.9*       ASSESSMENT       Source(s): Chart Review and Patient/Caregiver Call       Warfarin doses taken: Warfarin taken as instructed    Diet: No new diet changes identified    Medication/supplement changes: 5/3 Prednisone 9 day taper dose, 5/3 doxycycline for 7 days, monitor INR more closely     New illness, injury, or hospitalization: Yes: 5/3 COPD exacerbation     Signs or symptoms of bleeding or clotting: No    Previous result: Subtherapeutic    Additional findings: None         PLAN     Recommended plan for temporary change(s) affecting INR     Dosing Instructions: partial hold then continue your current warfarin dose with next INR in 1 week       Summary  As of 5/3/2023    Full warfarin instructions:  5/3: 2.5 mg; Otherwise 2.5 mg every Mon, Thu; 5 mg all other days   Next INR check:  5/10/2023             Telephone call with Joe who verbalizes understanding and agrees to plan    Lab visit scheduled    Education provided:     Goal range and lab monitoring: goal range and significance of current result    Contact [phone] with any changes, questions or concerns.     Plan made per [facility] anticoagulation protocol    Jolynn Johnson RN  Anticoagulation Clinic  5/3/2023    _______________________________________________________________________     Anticoagulation Episode Summary     Current INR goal:  2.0-3.0   TTR:  73.2 % (1 y)   Target end date:  Indefinite   Send INR reminders to:  MARIA ELENA YANG    Indications    Atrial fibrillation  unspecified type (H) [I48.91]  Long term current use of anticoagulant therapy [Z79.01]           Comments:           Anticoagulation Care Providers     Provider Role Specialty Phone number    Jolynn Cooper MD Referring Family Medicine [phone]

## 2023-05-03 NOTE — TELEPHONE ENCOUNTER
Call placed to patient     Appointment scheduled for today at 1510 with Mireille   Patient verbalized understanding   No further questions/concerns    Edinson Kumari RN

## 2023-05-03 NOTE — TELEPHONE ENCOUNTER
"Nurse Triage SBAR    Is this a 2nd Level Triage? YES, LICENSED PRACTITIONER REVIEW IS REQUIRED  Disposition to see provider with in 4 hours, no available appointments per Central Scheduling. Please call patient at [phone] (home)     Situation:     Patient calling requesting to be worked into appointment for a cough.    Background:     History of COPD    Assessment:     Patient calling reporting productive cough starting yesterday 5/2/23.  Stating sputum is \"sticky\" green.  Mild shortness of breath.  Frequent coughing.  Afebrile.   Denies chest pain or pressure.  Stating he has started to use his nebulizer and inhaler.   Reporting some improvement with inhaler.  Denies other symptoms.    Protocol Recommended Disposition:   See provider with in 4 hours.     Recommendation:     Please advise on working patient into appointment.     Routed to provider    Does the patient meet one of the following criteria for ADS visit consideration? 16+ years old, with an [facility] PCP     TIP  Providers, please consider if this condition is appropriate for management at one of our Acute and Diagnostic Services sites.     If patient is a good candidate, please use dotphrase <dot>triageresponse and select Refer to ADS to document.    Reason for Disposition    [1] MILD difficulty breathing (e.g., minimal/no SOB at rest, SOB with walking, pulse <100) AND [2] still present when not coughing    Additional Information    Negative: SEVERE difficulty breathing (e.g., struggling for each breath, speaks in single words)    Negative: Bluish (or gray) lips or face now    Negative: [1] Difficulty breathing AND [2] exposure to flames, smoke, or fumes    Negative: [1] Stridor AND [2] difficulty breathing    Negative: Sounds like a life-threatening emergency to the triager    Negative: [1] Previous asthma attacks AND [2] this feels like asthma attack    Negative: Dry cough (non-productive;  no sputum or minimal clear sputum)    Negative: [1] MODERATE " difficulty breathing (e.g., speaks in phrases, SOB even at rest, pulse 100-120) AND [2] still present when not coughing    Negative: Chest pain  (Exception: MILD central chest pain, present only when coughing)    Negative: Patient sounds very sick or weak to the triager    Protocols used: COUGH - ACUTE [id number]

## 2023-05-03 NOTE — TELEPHONE ENCOUNTER
Spoke with Mireille - eugene to see patient in person in virtual slot    Call placed to patient and confirmed in person appointment today with Mireille  No further questions/concerns    Edinson Kumari RN

## 2023-05-03 NOTE — PROGRESS NOTES
"  Assessment & Plan     ASSESSMENT/PLAN:      ICD-10-CM    1. Obstructive chronic bronchitis with exacerbation (H)  J44.1 COPD ACTION PLAN     predniSONE (DELTASONE) 20 MG tablet     doxycycline hyclate (VIBRAMYCIN) 100 MG capsule     Adult Pulmonary Medicine Referral      2. Atrial fibrillation (H)  I48.91 INR point of care        Will check INR today due to medications/illness.    Treat for COPD exacerbation.    Neck swelling: discussed with Dr. Joshi who also examined patient. We suspect hyperinflation of lungs due to COPD, body habitus. Pulmonology referral placed. Discussed weight loss, patient has lost 30 lb then regained 10 lb. If difficulties contact us, consider weight management clinic.    Patient Instructions   Prednisone steroid  Doxycycline antibiotics   Continue your dulera  Continue nebulizer as needed  Please follow up if symptoms worsen or are not improving    HOMER [facility] TREY Diaz is a 67 year old, presenting for the following health issues:  Cough      History of Present Illness       Reason for visit:  Cough  Symptom onset:  1-3 days ago  Symptom intensity:  Moderate  Symptom progression:  Staying the same  Had these symptoms before:  Yes  Has tried/received treatment for these symptoms:  Yes  Previous treatment was successful:  Yes  Prior treatment description:  Antibiotics  What makes it worse:  No  What makes it better:  Rest    He eats 0-1 servings of fruits and vegetables daily.He consumes 0 sweetened beverage(s) daily.He exercises with enough effort to increase his heart rate 10 to 19 minutes per day.  He exercises with enough effort to increase his heart rate 3 or less days per week.   He is taking medications regularly.       Patient calling reporting productive cough starting yesterday 5/2/23. Tested negative for covid  Stating sputum is \"sticky\" green.  Mild shortness of breath.  Frequent coughing.  Afebrile.   Denies chest pain " "or pressure.  Stating he has started to use his nebulizer and inhaler.   Reporting some improvement with inhaler.  Denies other symptoms.    ++++++++++++++++++++++++++++++++++    He has COPD, on dulera. He has been doing duoneb 4-6 x/day. Helps but wears off. Loosens the cough. Productive green sputum. He is having wheezing. 100.1 temp this AM, chills; 100.7 this afternoon.    He has noticed swelling nontender both sides of his neck at least 8 months iwthout change  Recent echocardiogram    He has controlled diabetes      Review of Systems   Other than noted above, general, HEENT, respiratory, cardiac, MS, and gastrointestinal systems are negative.       Objective    /82   Pulse 69   Temp 98.3  F (36.8  C) (Tympanic)   Resp 18   Ht 1.74 m (5' 8.5\")   Wt 113.8 kg (250 lb 14.4 oz)   SpO2 92%   BMI 37.59 kg/m    Body mass index is 37.59 kg/m .  Physical Exam   GENERAL: healthy, alert and no distress  EYES: Eyes grossly normal to inspection, PERRL and conjunctivae and sclerae normal  HENT: ear canals and TM's normal, nose and mouth without ulcers or lesions  NECK: no adenopathy, no asymmetry, masses, or scars and thyroid normal to palpation  POSITIVE bilateral supraclavicular areas show fluctuant swelling - feels air filled  RESP: POSITIVE generalized limited air flow/expiratory wheeze. No crackles/rhonchi  CV: regular rate and rhythm, normal S1 S2, no S3 or S4, no murmur, click or rub, no peripheral edema and peripheral pulses strong  ABDOMEN: soft, nontender, no hepatosplenomegaly, no masses and bowel sounds normal  MS: no gross musculoskeletal defects noted, no edema    Patient well appearing, breathing easily in clinic, speaking in full sentences easily, no signs of cyanosis or respiratory distress.                   "

## 2023-05-03 NOTE — TELEPHONE ENCOUNTER
Agree that he should be seen sometime today (not necessary in the next 4 hours as recommended by protocol).    Thanks    EB  Provider Response to 2nd Level Triage Request    I have reviewed the RN documentation. My recommendation is:  Patient to be seen in clinic in person today.

## 2023-05-05 DIAGNOSIS — E11.9 TYPE 2 DIABETES MELLITUS WITHOUT COMPLICATION, WITHOUT LONG-TERM CURRENT USE OF INSULIN (H): ICD-10-CM

## 2023-05-08 RX ORDER — LANCING DEVICE/LANCETS
KIT MISCELLANEOUS
Qty: 100 EACH | Refills: 2 | Status: ON HOLD | OUTPATIENT
Start: 2023-05-08 | End: 2023-06-07

## 2023-05-08 RX ORDER — BLOOD SUGAR DIAGNOSTIC
STRIP MISCELLANEOUS
Qty: 200 STRIP | Refills: 0 | Status: ON HOLD | OUTPATIENT
Start: 2023-05-08 | End: 2023-06-07

## 2023-05-09 ENCOUNTER — TELEPHONE (OUTPATIENT)
Dept: CARDIOLOGY | Facility: CLINIC | Age: 68
End: 2023-05-09
Payer: COMMERCIAL

## 2023-05-09 DIAGNOSIS — I48.0 PAROXYSMAL ATRIAL FIBRILLATION (H): Primary | ICD-10-CM

## 2023-05-10 ENCOUNTER — LAB (OUTPATIENT)
Dept: LAB | Facility: CLINIC | Age: 68
End: 2023-05-10
Payer: COMMERCIAL

## 2023-05-10 ENCOUNTER — ANTICOAGULATION THERAPY VISIT (OUTPATIENT)
Dept: ANTICOAGULATION | Facility: CLINIC | Age: 68
End: 2023-05-10

## 2023-05-10 DIAGNOSIS — Z79.01 LONG TERM CURRENT USE OF ANTICOAGULANT THERAPY: ICD-10-CM

## 2023-05-10 DIAGNOSIS — I48.91 ATRIAL FIBRILLATION, UNSPECIFIED TYPE (H): ICD-10-CM

## 2023-05-10 DIAGNOSIS — I48.91 ATRIAL FIBRILLATION, UNSPECIFIED TYPE (H): Primary | ICD-10-CM

## 2023-05-10 LAB — INR BLD: 2.1 (ref 0.9–1.1)

## 2023-05-10 PROCEDURE — 36416 COLLJ CAPILLARY BLOOD SPEC: CPT

## 2023-05-10 PROCEDURE — 85610 PROTHROMBIN TIME: CPT

## 2023-05-10 NOTE — PROGRESS NOTES
ANTICOAGULATION MANAGEMENT     Joe Mas 67 year old male is on warfarin with therapeutic INR result. (Goal INR 2.0-3.0)    Recent labs: (last 7 days)     05/10/23  1139   INR 2.1*       ASSESSMENT       Source(s): Chart Review and Patient/Caregiver Call       Warfarin doses taken: Warfarin taken as instructed    Diet: No new diet changes identified    Medication/supplement changes: None noted - pt has completed prednisone taper and course of doxycyline.     New illness, injury, or hospitalization: No    Signs or symptoms of bleeding or clotting: No    Previous result: Therapeutic last visit; previously outside of goal range    Additional findings: Upcoming surgery/procedure Ablation scheduled on 6/7/23. Consulted with [facility], pt will need to be therapeutic prior to the ablation and will need weekly INRs x4 following procedure.         PLAN     Recommended plan for no diet, medication or health factor changes affecting INR     Dosing Instructions: Continue your current warfarin dose with next INR in 2 weeks       Summary  As of 5/10/2023    Full warfarin instructions:  2.5 mg every Mon, Thu; 5 mg all other days   Next INR check:  5/24/2023             Telephone call with Joe who verbalizes understanding and agrees to plan    Lab visit scheduled    Education provided:     Goal range and lab monitoring: goal range and significance of current result and Importance of therapeutic range    The importance of having therapeutic INR prior to ablation and weekly INRs following ablation.     Plan made with [facility] Pharmacist Roberta Ferris, RN  Anticoagulation Clinic  5/10/2023    _______________________________________________________________________     Anticoagulation Episode Summary     Current INR goal:  2.0-3.0   TTR:  75.1 % (1 y)   Target end date:  Indefinite   Send INR reminders to:  MARIA ELENA YANG    Indications    Atrial fibrillation  unspecified type (H) [I48.91]  Long term current use of  anticoagulant therapy [Z79.01]           Comments:           Anticoagulation Care Providers     Provider Role Specialty Phone number    Jolynn Cooper MD Referring Family Medicine [phone]

## 2023-05-18 ENCOUNTER — MYC MEDICAL ADVICE (OUTPATIENT)
Dept: FAMILY MEDICINE | Facility: CLINIC | Age: 68
End: 2023-05-18

## 2023-05-18 ENCOUNTER — OFFICE VISIT (OUTPATIENT)
Dept: FAMILY MEDICINE | Facility: CLINIC | Age: 68
End: 2023-05-18
Payer: COMMERCIAL

## 2023-05-18 VITALS
DIASTOLIC BLOOD PRESSURE: 80 MMHG | RESPIRATION RATE: 20 BRPM | WEIGHT: 246 LBS | TEMPERATURE: 97.7 F | HEART RATE: 72 BPM | SYSTOLIC BLOOD PRESSURE: 130 MMHG | OXYGEN SATURATION: 92 % | BODY MASS INDEX: 36.43 KG/M2 | HEIGHT: 69 IN

## 2023-05-18 DIAGNOSIS — J44.1 OBSTRUCTIVE CHRONIC BRONCHITIS WITH EXACERBATION (H): ICD-10-CM

## 2023-05-18 PROCEDURE — 99214 OFFICE O/P EST MOD 30 MIN: CPT | Performed by: NURSE PRACTITIONER

## 2023-05-18 RX ORDER — ALBUTEROL SULFATE 90 UG/1
AEROSOL, METERED RESPIRATORY (INHALATION)
Qty: 18 G | Refills: 1 | Status: SHIPPED | OUTPATIENT
Start: 2023-05-18 | End: 2023-05-18

## 2023-05-18 RX ORDER — ALBUTEROL SULFATE 90 UG/1
AEROSOL, METERED RESPIRATORY (INHALATION)
Qty: 18 G | Refills: 1 | Status: SHIPPED | OUTPATIENT
Start: 2023-05-18 | End: 2023-06-23

## 2023-05-18 ASSESSMENT — PAIN SCALES - GENERAL: PAINLEVEL: NO PAIN (0)

## 2023-05-18 NOTE — PROGRESS NOTES
"  Assessment & Plan     Obstructive chronic bronchitis with exacerbation (H)  - albuterol (PROAIR HFA/PROVENTIL HFA/VENTOLIN HFA) 108 (90 Base) MCG/ACT inhaler; INHALE TWO PUFFS INTO THE LUNGS EVERY 6 HOURS AS NEEDED FOR SHORTNESS OF BREATH. Pt provided with spacer and instructions as pt does not have one. Pt demonstrates proper use.      - amoxicillin-clavulanate (AUGMENTIN) 875-125 MG tablet; Take 1 tablet by mouth 2 times daily for 10 days.  Pt declines CXR at this time. Pt to return or f/u w PCP if not improved or cough worsens. Will consider imaging at that time.                   Maria Elena Mauro RN, NP student  [facility] MYCHAL Diaz is a 67 year old, presenting for the following health issues:  Cough  Pt states was placed on \"PO steroids and antibiotics,\" pt states finished both as prescribed and had improvement x3 days with dec mucous and cough. Pt states now with 2 days of cough and \"slight greenish\" productive cough. Concerned with \"infection not totally cleared up.\" Pt expresses concern about his upcoming surgery and \"wants to get rid of this.\" No recent fever or chills.      History of Present Illness       Reason for visit:  Congestion    He eats 0-1 servings of fruits and vegetables daily.He consumes 0 sweetened beverage(s) daily.He exercises with enough effort to increase his heart rate 10 to 19 minutes per day.  He exercises with enough effort to increase his heart rate 3 or less days per week.   He is taking medications regularly.       Acute Illness  Acute illness concerns: recheck from last office visit 5/3- obstructive chronic bronchitis with exacerbation  Onset/Duration:  Symptoms returned two days ago  Symptoms:  Fever: No  Chills/Sweats: No  Headache (location?): No  Sinus Pressure: No  Conjunctivitis:  No  Ear Pain: no  Rhinorrhea: No  Congestion: YES  Sore Throat: No  Cough: YES-productive of green sputum  Wheeze: YES  Decreased Appetite: No  Nausea: " "No  Vomiting: No  Diarrhea: YES- a little today  Dysuria/Freq.: No  Dysuria or Hematuria: No  Fatigue/Achiness: YES  Sick/Strep Exposure: No  Therapies tried and outcome: prednisone and antibiotics 5/3- took about a week, but then felt really good;  Nebulizer every 4-5 hours    Needs refill of albuterol inhaler          Review of Systems   Constitutional, HEENT, cardiovascular, pulmonary, gi and gu systems are negative, except as otherwise noted.      Objective    /80 (BP Location: Right arm, Cuff Size: Adult Large)   Pulse 72   Temp 97.7  F (36.5  C) (Tympanic)   Resp 20   Ht 1.74 m (5' 8.5\")   Wt 111.6 kg (246 lb)   SpO2 92%   BMI 36.86 kg/m    Body mass index is 36.86 kg/m .  Physical Exam   GENERAL: healthy, alert and no distress  EYES: Eyes grossly normal to inspection, PERRL and conjunctivae and sclerae normal  NECK: no adenopathy, no asymmetry, masses, or scars and thyroid normal to palpation  RESP: lungs clear to auscultation - no rales, rhonchi or wheezes. UAC noted. No distress, unlabored.   CV: regular rate and rhythm, normal S1 S2, no S3 or S4, no murmur                  "

## 2023-05-18 NOTE — PATIENT INSTRUCTIONS
Start Augmentin for 10 days.  Continue inhalers as prescribed.  If not improving, we need to get imaging of your chest.

## 2023-05-18 NOTE — TELEPHONE ENCOUNTER
Prescription approved per [facility] Refill Protocol.  Tianna Orourke RN on 5/18/2023 at 12:13 PM

## 2023-05-18 NOTE — TELEPHONE ENCOUNTER
Patient's call transferred to author    Patient states he would like to have a provider review if a refill of steroid and antibiotic would be okay as he had a recent visit with Mireille regarding cough     Patient states cough and symptoms were almost resolved but not fully when he finished the treatment course  Now, cough has worsened and is coughing up green sputum     Patient concerned as he has a cardiac echo scheduled 2 weeks from now and a cardiac ablation 2 days after that     Forwarding to Dr. Cooper to review    Edinson Kumari RN

## 2023-05-18 NOTE — H&P (VIEW-ONLY)
"  Assessment & Plan     Obstructive chronic bronchitis with exacerbation (H)  - albuterol (PROAIR HFA/PROVENTIL HFA/VENTOLIN HFA) 108 (90 Base) MCG/ACT inhaler; INHALE TWO PUFFS INTO THE LUNGS EVERY 6 HOURS AS NEEDED FOR SHORTNESS OF BREATH. Pt provided with spacer and instructions as pt does not have one. Pt demonstrates proper use.      - amoxicillin-clavulanate (AUGMENTIN) 875-125 MG tablet; Take 1 tablet by mouth 2 times daily for 10 days.  Pt declines CXR at this time. Pt to return or f/u w PCP if not improved or cough worsens. Will consider imaging at that time.                   Maria Elena Mauro RN, NP student  [facility] MYCHAL Diaz is a 67 year old, presenting for the following health issues:  Cough  Pt states was placed on \"PO steroids and antibiotics,\" pt states finished both as prescribed and had improvement x3 days with dec mucous and cough. Pt states now with 2 days of cough and \"slight greenish\" productive cough. Concerned with \"infection not totally cleared up.\" Pt expresses concern about his upcoming surgery and \"wants to get rid of this.\" No recent fever or chills.      History of Present Illness       Reason for visit:  Congestion    He eats 0-1 servings of fruits and vegetables daily.He consumes 0 sweetened beverage(s) daily.He exercises with enough effort to increase his heart rate 10 to 19 minutes per day.  He exercises with enough effort to increase his heart rate 3 or less days per week.   He is taking medications regularly.       Acute Illness  Acute illness concerns: recheck from last office visit 5/3- obstructive chronic bronchitis with exacerbation  Onset/Duration:  Symptoms returned two days ago  Symptoms:  Fever: No  Chills/Sweats: No  Headache (location?): No  Sinus Pressure: No  Conjunctivitis:  No  Ear Pain: no  Rhinorrhea: No  Congestion: YES  Sore Throat: No  Cough: YES-productive of green sputum  Wheeze: YES  Decreased Appetite: No  Nausea: " "No  Vomiting: No  Diarrhea: YES- a little today  Dysuria/Freq.: No  Dysuria or Hematuria: No  Fatigue/Achiness: YES  Sick/Strep Exposure: No  Therapies tried and outcome: prednisone and antibiotics 5/3- took about a week, but then felt really good;  Nebulizer every 4-5 hours    Needs refill of albuterol inhaler          Review of Systems   Constitutional, HEENT, cardiovascular, pulmonary, gi and gu systems are negative, except as otherwise noted.      Objective    /80 (BP Location: Right arm, Cuff Size: Adult Large)   Pulse 72   Temp 97.7  F (36.5  C) (Tympanic)   Resp 20   Ht 1.74 m (5' 8.5\")   Wt 111.6 kg (246 lb)   SpO2 92%   BMI 36.86 kg/m    Body mass index is 36.86 kg/m .  Physical Exam   GENERAL: healthy, alert and no distress  EYES: Eyes grossly normal to inspection, PERRL and conjunctivae and sclerae normal  NECK: no adenopathy, no asymmetry, masses, or scars and thyroid normal to palpation  RESP: lungs clear to auscultation - no rales, rhonchi or wheezes. UAC noted. No distress, unlabored.   CV: regular rate and rhythm, normal S1 S2, no S3 or S4, no murmur                  "

## 2023-05-18 NOTE — TELEPHONE ENCOUNTER
I agree with in person reevaluation.  Looks like he was seen in [place] today    Dr. Makayla Oh, DO

## 2023-05-24 ENCOUNTER — LAB (OUTPATIENT)
Dept: LAB | Facility: CLINIC | Age: 68
End: 2023-05-24
Payer: COMMERCIAL

## 2023-05-24 ENCOUNTER — ANTICOAGULATION THERAPY VISIT (OUTPATIENT)
Dept: ANTICOAGULATION | Facility: CLINIC | Age: 68
End: 2023-05-24

## 2023-05-24 DIAGNOSIS — Z79.01 LONG TERM CURRENT USE OF ANTICOAGULANT THERAPY: ICD-10-CM

## 2023-05-24 DIAGNOSIS — I48.91 ATRIAL FIBRILLATION, UNSPECIFIED TYPE (H): ICD-10-CM

## 2023-05-24 DIAGNOSIS — I48.91 ATRIAL FIBRILLATION, UNSPECIFIED TYPE (H): Primary | ICD-10-CM

## 2023-05-24 LAB — INR BLD: 2.5 (ref 0.9–1.1)

## 2023-05-24 PROCEDURE — 85610 PROTHROMBIN TIME: CPT

## 2023-05-24 PROCEDURE — 36416 COLLJ CAPILLARY BLOOD SPEC: CPT

## 2023-05-24 NOTE — PROGRESS NOTES
ANTICOAGULATION MANAGEMENT     Joe Mas 67 year old male is on warfarin with therapeutic INR result. (Goal INR 2.0-3.0)    Recent labs: (last 7 days)     05/24/23  1141   INR 2.5*       ASSESSMENT       Source(s): Chart Review and Patient/Caregiver Call       Warfarin doses taken: Warfarin taken as instructed    Diet: No new diet changes identified    Medication/supplement changes: None noted    New illness, injury, or hospitalization: No    Signs or symptoms of bleeding or clotting: No    Previous result: Therapeutic last 2(+) visits    Additional findings: Upcoming surgery/procedure afib ablation on 6/7         PLAN     Recommended plan for no diet, medication or health factor changes affecting INR     Dosing Instructions: Continue your current warfarin dose with next INR in 2 weeks       Summary  As of 5/24/2023    Full warfarin instructions:  2.5 mg every Mon, Thu; 5 mg all other days   Next INR check:  6/7/2023             Telephone call with Joe who verbalizes understanding and agrees to plan    will check at procedure    Education provided:     Contact [phone] with any changes, questions or concerns.     Plan made per [facility] anticoagulation protocol    Palma Adam RN  Anticoagulation Clinic  5/24/2023    _______________________________________________________________________     Anticoagulation Episode Summary     Current INR goal:  2.0-3.0   TTR:  77.8 % (1 y)   Target end date:  Indefinite   Send INR reminders to:  MARIA ELENA YANG    Indications    Atrial fibrillation  unspecified type (H) [I48.91]  Long term current use of anticoagulant therapy [Z79.01]           Comments:           Anticoagulation Care Providers     Provider Role Specialty Phone number    Jolynn Cooper MD Referring Family Medicine [phone]

## 2023-06-01 ENCOUNTER — ANCILLARY ORDERS (OUTPATIENT)
Dept: CARDIOLOGY | Facility: CLINIC | Age: 68
End: 2023-06-01

## 2023-06-01 DIAGNOSIS — I48.0 PAROXYSMAL ATRIAL FIBRILLATION (H): ICD-10-CM

## 2023-06-02 DIAGNOSIS — J44.1 OBSTRUCTIVE CHRONIC BRONCHITIS WITH EXACERBATION (H): ICD-10-CM

## 2023-06-02 RX ORDER — ALBUTEROL SULFATE 90 UG/1
AEROSOL, METERED RESPIRATORY (INHALATION)
Qty: 18 G | Refills: 1 | OUTPATIENT
Start: 2023-06-02

## 2023-06-02 NOTE — TELEPHONE ENCOUNTER
Request for refill denied. Patient should have refills on file.   Tianna Orourke RN on 6/2/2023 at 4:19 PM

## 2023-06-05 ENCOUNTER — HOSPITAL ENCOUNTER (OUTPATIENT)
Dept: CARDIOLOGY | Facility: CLINIC | Age: 68
Discharge: HOME | End: 2023-06-05
Attending: INTERNAL MEDICINE | Admitting: INTERNAL MEDICINE
Payer: COMMERCIAL

## 2023-06-05 VITALS — OXYGEN SATURATION: 92 % | HEART RATE: 62 BPM | SYSTOLIC BLOOD PRESSURE: 148 MMHG | DIASTOLIC BLOOD PRESSURE: 87 MMHG

## 2023-06-05 DIAGNOSIS — I48.0 PAROXYSMAL ATRIAL FIBRILLATION (H): ICD-10-CM

## 2023-06-05 LAB
CREAT BLD-MCNC: 0.9 MG/DL (ref 0.7–1.3)
CREAT BLD-MCNC: <0.2 MG/DL (ref 0.7–1.3)
GFR SERPL CREATININE-BSD FRML MDRD: >60 ML/MIN/1.73M2

## 2023-06-05 PROCEDURE — 75572 CT HRT W/3D IMAGE: CPT | Mod: 26 | Performed by: INTERNAL MEDICINE

## 2023-06-05 PROCEDURE — 75572 CT HRT W/3D IMAGE: CPT

## 2023-06-05 PROCEDURE — 250N000011 HC RX IP 250 OP 636: Performed by: INTERNAL MEDICINE

## 2023-06-05 PROCEDURE — 82565 ASSAY OF CREATININE: CPT

## 2023-06-05 RX ORDER — ACYCLOVIR 200 MG/1
0-1 CAPSULE ORAL
Status: DISCONTINUED | OUTPATIENT
Start: 2023-06-05 | End: 2023-06-06 | Stop reason: HOSPADM

## 2023-06-05 RX ORDER — METOPROLOL TARTRATE 1 MG/ML
5-15 INJECTION, SOLUTION INTRAVENOUS
Status: DISCONTINUED | OUTPATIENT
Start: 2023-06-05 | End: 2023-06-06 | Stop reason: HOSPADM

## 2023-06-05 RX ORDER — DIPHENHYDRAMINE HYDROCHLORIDE 50 MG/ML
25-50 INJECTION INTRAMUSCULAR; INTRAVENOUS
Status: DISCONTINUED | OUTPATIENT
Start: 2023-06-05 | End: 2023-06-06 | Stop reason: HOSPADM

## 2023-06-05 RX ORDER — DIPHENHYDRAMINE HCL 25 MG
25 CAPSULE ORAL
Status: DISCONTINUED | OUTPATIENT
Start: 2023-06-05 | End: 2023-06-06 | Stop reason: HOSPADM

## 2023-06-05 RX ORDER — IVABRADINE 5 MG/1
5-15 TABLET, FILM COATED ORAL
Status: DISCONTINUED | OUTPATIENT
Start: 2023-06-05 | End: 2023-06-06 | Stop reason: HOSPADM

## 2023-06-05 RX ORDER — METOPROLOL TARTRATE 25 MG/1
25-100 TABLET, FILM COATED ORAL
Status: DISCONTINUED | OUTPATIENT
Start: 2023-06-05 | End: 2023-06-06 | Stop reason: HOSPADM

## 2023-06-05 RX ORDER — NITROGLYCERIN 0.4 MG/1
0.4 TABLET SUBLINGUAL
Status: DISCONTINUED | OUTPATIENT
Start: 2023-06-05 | End: 2023-06-06 | Stop reason: HOSPADM

## 2023-06-05 RX ORDER — DILTIAZEM HYDROCHLORIDE 5 MG/ML
10-15 INJECTION INTRAVENOUS
Status: DISCONTINUED | OUTPATIENT
Start: 2023-06-05 | End: 2023-06-06 | Stop reason: HOSPADM

## 2023-06-05 RX ORDER — ONDANSETRON 2 MG/ML
4 INJECTION INTRAMUSCULAR; INTRAVENOUS
Status: DISCONTINUED | OUTPATIENT
Start: 2023-06-05 | End: 2023-06-06 | Stop reason: HOSPADM

## 2023-06-05 RX ORDER — IOPAMIDOL 755 MG/ML
500 INJECTION, SOLUTION INTRAVASCULAR ONCE
Status: COMPLETED | OUTPATIENT
Start: 2023-06-05 | End: 2023-06-05

## 2023-06-05 RX ORDER — METHYLPREDNISOLONE SODIUM SUCCINATE 125 MG/2ML
125 INJECTION, POWDER, LYOPHILIZED, FOR SOLUTION INTRAMUSCULAR; INTRAVENOUS
Status: DISCONTINUED | OUTPATIENT
Start: 2023-06-05 | End: 2023-06-06 | Stop reason: HOSPADM

## 2023-06-05 RX ORDER — DILTIAZEM HCL 60 MG
120 TABLET ORAL
Status: DISCONTINUED | OUTPATIENT
Start: 2023-06-05 | End: 2023-06-06 | Stop reason: HOSPADM

## 2023-06-05 RX ADMIN — IOPAMIDOL 200 ML: 755 INJECTION, SOLUTION INTRAVENOUS at 14:32

## 2023-06-06 ENCOUNTER — TELEPHONE (OUTPATIENT)
Dept: CARDIOLOGY | Facility: CLINIC | Age: 68
End: 2023-06-06
Payer: COMMERCIAL

## 2023-06-06 ENCOUNTER — TELEPHONE (OUTPATIENT)
Dept: MEDSURG UNIT | Facility: CLINIC | Age: 68
End: 2023-06-06
Payer: COMMERCIAL

## 2023-06-06 DIAGNOSIS — I48.0 PAROXYSMAL ATRIAL FIBRILLATION (H): Primary | ICD-10-CM

## 2023-06-06 RX ORDER — LIDOCAINE 40 MG/G
CREAM TOPICAL
Status: CANCELLED | OUTPATIENT
Start: 2023-06-06

## 2023-06-06 RX ORDER — SODIUM CHLORIDE, SODIUM LACTATE, POTASSIUM CHLORIDE, CALCIUM CHLORIDE 600; 310; 30; 20 MG/100ML; MG/100ML; MG/100ML; MG/100ML
INJECTION, SOLUTION INTRAVENOUS CONTINUOUS
Status: CANCELLED | OUTPATIENT
Start: 2023-06-06

## 2023-06-06 NOTE — TELEPHONE ENCOUNTER
Spoke to pt who is aware of arrival at 1030 and is aware or where his is to check in at. Pt reminded to have nothing to eat up to 8 hours prior to procedure and may have clears up to 2 hours before arrival time.  Pt takes his Metformin tonight and will check to see is his blood sugars allow him to take 500 or 1000 mg.  Pt will hold any over the counter vitamins.  Pt has a  to and from procedure. Pt is aware that he will go home if there are no complications, able to void, no bleeding and no pain issues. No other questions at this time. Mariajose

## 2023-06-07 ENCOUNTER — HOSPITAL ENCOUNTER (OUTPATIENT)
Facility: CLINIC | Age: 68
Discharge: HOME | End: 2023-06-07
Admitting: INTERNAL MEDICINE
Payer: COMMERCIAL

## 2023-06-07 ENCOUNTER — ANESTHESIA (OUTPATIENT)
Dept: CARDIOLOGY | Facility: CLINIC | Age: 68
End: 2023-06-07
Payer: COMMERCIAL

## 2023-06-07 ENCOUNTER — ANESTHESIA EVENT (OUTPATIENT)
Dept: CARDIOLOGY | Facility: CLINIC | Age: 68
End: 2023-06-07
Payer: COMMERCIAL

## 2023-06-07 VITALS
RESPIRATION RATE: 18 BRPM | WEIGHT: 247 LBS | DIASTOLIC BLOOD PRESSURE: 92 MMHG | OXYGEN SATURATION: 95 % | SYSTOLIC BLOOD PRESSURE: 153 MMHG | HEIGHT: 69 IN | HEART RATE: 64 BPM | TEMPERATURE: 98 F | BODY MASS INDEX: 36.58 KG/M2

## 2023-06-07 DIAGNOSIS — I48.0 PAROXYSMAL ATRIAL FIBRILLATION (H): ICD-10-CM

## 2023-06-07 LAB
ACT BLD: 165 SECONDS (ref 74–150)
ACT BLD: 305 SECONDS (ref 74–150)
ACT BLD: 339 SECONDS (ref 74–150)
ACT BLD: 339 SECONDS (ref 74–150)
ANION GAP SERPL CALCULATED.3IONS-SCNC: 9 MMOL/L (ref 7–15)
BUN SERPL-MCNC: 12.2 MG/DL (ref 8–23)
CALCIUM SERPL-MCNC: 9.9 MG/DL (ref 8.8–10.2)
CHLORIDE SERPL-SCNC: 102 MMOL/L (ref 98–107)
CREAT SERPL-MCNC: 0.85 MG/DL (ref 0.67–1.17)
DEPRECATED HCO3 PLAS-SCNC: 27 MMOL/L (ref 22–29)
ERYTHROCYTE [DISTWIDTH] IN BLOOD BY AUTOMATED COUNT: 12.3 % (ref 10–15)
GFR SERPL CREATININE-BSD FRML MDRD: >90 ML/MIN/1.73M2
GLUCOSE BLDC GLUCOMTR-MCNC: 170 MG/DL (ref 70–99)
GLUCOSE SERPL-MCNC: 157 MG/DL (ref 70–99)
HCT VFR BLD AUTO: 45.9 % (ref 40–53)
HGB BLD-MCNC: 15.1 G/DL (ref 13.3–17.7)
INR BLD: 1.9 (ref 0.9–1.1)
MCH RBC QN AUTO: 31.2 PG (ref 26.5–33)
MCHC RBC AUTO-ENTMCNC: 32.9 G/DL (ref 31.5–36.5)
MCV RBC AUTO: 95 FL (ref 78–100)
PLATELET # BLD AUTO: 210 10E3/UL (ref 150–450)
POTASSIUM SERPL-SCNC: 4.5 MMOL/L (ref 3.4–5.3)
RBC # BLD AUTO: 4.84 10E6/UL (ref 4.4–5.9)
SODIUM SERPL-SCNC: 138 MMOL/L (ref 136–145)
WBC # BLD AUTO: 7.5 10E3/UL (ref 4–11)

## 2023-06-07 PROCEDURE — 250N000011 HC RX IP 250 OP 636: Performed by: ANESTHESIOLOGY

## 2023-06-07 PROCEDURE — 370N000017 HC ANESTHESIA TECHNICAL FEE, PER MIN: Performed by: INTERNAL MEDICINE

## 2023-06-07 PROCEDURE — 36415 COLL VENOUS BLD VENIPUNCTURE: CPT | Performed by: INTERNAL MEDICINE

## 2023-06-07 PROCEDURE — 82310 ASSAY OF CALCIUM: CPT | Performed by: INTERNAL MEDICINE

## 2023-06-07 PROCEDURE — 85014 HEMATOCRIT: CPT | Performed by: INTERNAL MEDICINE

## 2023-06-07 PROCEDURE — 82962 GLUCOSE BLOOD TEST: CPT

## 2023-06-07 PROCEDURE — 93656 COMPRE EP EVAL ABLTJ ATR FIB: CPT | Performed by: INTERNAL MEDICINE

## 2023-06-07 PROCEDURE — 93655 ICAR CATH ABLTJ DSCRT ARRHYT: CPT | Performed by: INTERNAL MEDICINE

## 2023-06-07 PROCEDURE — 999N000071 HC STATISTIC HEART CATH LAB OR EP LAB

## 2023-06-07 PROCEDURE — 258N000003 HC RX IP 258 OP 636: Performed by: ANESTHESIOLOGY

## 2023-06-07 PROCEDURE — C1730 CATH, EP, 19 OR FEW ELECT: HCPCS | Performed by: INTERNAL MEDICINE

## 2023-06-07 PROCEDURE — C1894 INTRO/SHEATH, NON-LASER: HCPCS | Performed by: INTERNAL MEDICINE

## 2023-06-07 PROCEDURE — 272N000001 HC OR GENERAL SUPPLY STERILE: Performed by: INTERNAL MEDICINE

## 2023-06-07 PROCEDURE — 85610 PROTHROMBIN TIME: CPT | Performed by: INTERNAL MEDICINE

## 2023-06-07 PROCEDURE — 93010 ELECTROCARDIOGRAM REPORT: CPT | Mod: 59 | Performed by: INTERNAL MEDICINE

## 2023-06-07 PROCEDURE — 250N000011 HC RX IP 250 OP 636: Performed by: INTERNAL MEDICINE

## 2023-06-07 PROCEDURE — 36591 DRAW BLOOD OFF VENOUS DEVICE: CPT

## 2023-06-07 PROCEDURE — 250N000013 HC RX MED GY IP 250 OP 250 PS 637: Performed by: ANESTHESIOLOGY

## 2023-06-07 PROCEDURE — 999N000054 HC STATISTIC EKG NON-CHARGEABLE

## 2023-06-07 PROCEDURE — 250N000009 HC RX 250: Performed by: ANESTHESIOLOGY

## 2023-06-07 PROCEDURE — C1887 CATHETER, GUIDING: HCPCS | Performed by: INTERNAL MEDICINE

## 2023-06-07 PROCEDURE — 258N000003 HC RX IP 258 OP 636: Performed by: INTERNAL MEDICINE

## 2023-06-07 PROCEDURE — C1733 CATH, EP, OTHR THAN COOL-TIP: HCPCS | Performed by: INTERNAL MEDICINE

## 2023-06-07 PROCEDURE — 999N000184 HC STATISTIC TELEMETRY

## 2023-06-07 PROCEDURE — 93005 ELECTROCARDIOGRAM TRACING: CPT

## 2023-06-07 PROCEDURE — 250N000025 HC SEVOFLURANE, PER MIN: Performed by: INTERNAL MEDICINE

## 2023-06-07 PROCEDURE — 250N000009 HC RX 250: Performed by: INTERNAL MEDICINE

## 2023-06-07 PROCEDURE — C1732 CATH, EP, DIAG/ABL, 3D/VECT: HCPCS | Performed by: INTERNAL MEDICINE

## 2023-06-07 PROCEDURE — 85347 COAGULATION TIME ACTIVATED: CPT | Mod: 91

## 2023-06-07 PROCEDURE — C1759 CATH, INTRA ECHOCARDIOGRAPHY: HCPCS | Performed by: INTERNAL MEDICINE

## 2023-06-07 PROCEDURE — 82374 ASSAY BLOOD CARBON DIOXIDE: CPT | Performed by: INTERNAL MEDICINE

## 2023-06-07 RX ORDER — ONDANSETRON 2 MG/ML
INJECTION INTRAMUSCULAR; INTRAVENOUS PRN
Status: DISCONTINUED | OUTPATIENT
Start: 2023-06-07 | End: 2023-06-07

## 2023-06-07 RX ORDER — WARFARIN SODIUM 2.5 MG/1
2.5 TABLET ORAL
COMMUNITY
End: 2024-02-06

## 2023-06-07 RX ORDER — SODIUM CHLORIDE, SODIUM LACTATE, POTASSIUM CHLORIDE, CALCIUM CHLORIDE 600; 310; 30; 20 MG/100ML; MG/100ML; MG/100ML; MG/100ML
INJECTION, SOLUTION INTRAVENOUS CONTINUOUS
Status: DISCONTINUED | OUTPATIENT
Start: 2023-06-07 | End: 2023-06-07 | Stop reason: HOSPADM

## 2023-06-07 RX ORDER — FENTANYL CITRATE 50 UG/ML
INJECTION, SOLUTION INTRAMUSCULAR; INTRAVENOUS PRN
Status: DISCONTINUED | OUTPATIENT
Start: 2023-06-07 | End: 2023-06-07

## 2023-06-07 RX ORDER — DEXAMETHASONE SODIUM PHOSPHATE 4 MG/ML
INJECTION, SOLUTION INTRA-ARTICULAR; INTRALESIONAL; INTRAMUSCULAR; INTRAVENOUS; SOFT TISSUE PRN
Status: DISCONTINUED | OUTPATIENT
Start: 2023-06-07 | End: 2023-06-07

## 2023-06-07 RX ORDER — HEPARIN SODIUM 1000 [USP'U]/ML
INJECTION, SOLUTION INTRAVENOUS; SUBCUTANEOUS
Status: DISCONTINUED | OUTPATIENT
Start: 2023-06-07 | End: 2023-06-07 | Stop reason: HOSPADM

## 2023-06-07 RX ORDER — NALOXONE HYDROCHLORIDE 0.4 MG/ML
0.4 INJECTION, SOLUTION INTRAMUSCULAR; INTRAVENOUS; SUBCUTANEOUS
Status: DISCONTINUED | OUTPATIENT
Start: 2023-06-07 | End: 2023-06-07 | Stop reason: HOSPADM

## 2023-06-07 RX ORDER — IPRATROPIUM BROMIDE AND ALBUTEROL SULFATE 2.5; .5 MG/3ML; MG/3ML
3 SOLUTION RESPIRATORY (INHALATION) ONCE
Status: COMPLETED | OUTPATIENT
Start: 2023-06-07 | End: 2023-06-07

## 2023-06-07 RX ORDER — DOBUTAMINE HYDROCHLORIDE 200 MG/100ML
5-40 INJECTION INTRAVENOUS CONTINUOUS PRN
Status: DISCONTINUED | OUTPATIENT
Start: 2023-06-07 | End: 2023-06-07 | Stop reason: HOSPADM

## 2023-06-07 RX ORDER — ALBUTEROL SULFATE 90 UG/1
AEROSOL, METERED RESPIRATORY (INHALATION) PRN
Status: DISCONTINUED | OUTPATIENT
Start: 2023-06-07 | End: 2023-06-07

## 2023-06-07 RX ORDER — GLYCOPYRROLATE 0.2 MG/ML
INJECTION, SOLUTION INTRAMUSCULAR; INTRAVENOUS PRN
Status: DISCONTINUED | OUTPATIENT
Start: 2023-06-07 | End: 2023-06-07

## 2023-06-07 RX ORDER — CEFAZOLIN SODIUM 1 G/3ML
1 INJECTION, POWDER, FOR SOLUTION INTRAMUSCULAR; INTRAVENOUS
Status: DISCONTINUED | OUTPATIENT
Start: 2023-06-07 | End: 2023-06-07 | Stop reason: HOSPADM

## 2023-06-07 RX ORDER — LIDOCAINE HYDROCHLORIDE 20 MG/ML
INJECTION, SOLUTION INFILTRATION; PERINEURAL PRN
Status: DISCONTINUED | OUTPATIENT
Start: 2023-06-07 | End: 2023-06-07

## 2023-06-07 RX ORDER — WARFARIN SODIUM 5 MG/1
5 TABLET ORAL
COMMUNITY
End: 2023-07-05

## 2023-06-07 RX ORDER — MIDAZOLAM HCL IN 0.9 % NACL/PF 1 MG/ML
.5-6 PLASTIC BAG, INJECTION (ML) INTRAVENOUS CONTINUOUS PRN
Status: DISCONTINUED | OUTPATIENT
Start: 2023-06-07 | End: 2023-06-07 | Stop reason: HOSPADM

## 2023-06-07 RX ORDER — NEOSTIGMINE METHYLSULFATE 1 MG/ML
VIAL (ML) INJECTION PRN
Status: DISCONTINUED | OUTPATIENT
Start: 2023-06-07 | End: 2023-06-07

## 2023-06-07 RX ORDER — NALOXONE HYDROCHLORIDE 0.4 MG/ML
0.2 INJECTION, SOLUTION INTRAMUSCULAR; INTRAVENOUS; SUBCUTANEOUS
Status: DISCONTINUED | OUTPATIENT
Start: 2023-06-07 | End: 2023-06-07 | Stop reason: HOSPADM

## 2023-06-07 RX ORDER — DEXMEDETOMIDINE HYDROCHLORIDE 4 UG/ML
INJECTION, SOLUTION INTRAVENOUS PRN
Status: DISCONTINUED | OUTPATIENT
Start: 2023-06-07 | End: 2023-06-07

## 2023-06-07 RX ORDER — HEPARIN SODIUM 200 [USP'U]/100ML
100-500 INJECTION, SOLUTION INTRAVENOUS CONTINUOUS PRN
Status: DISCONTINUED | OUTPATIENT
Start: 2023-06-07 | End: 2023-06-07 | Stop reason: HOSPADM

## 2023-06-07 RX ORDER — LIDOCAINE 40 MG/G
CREAM TOPICAL
Status: DISCONTINUED | OUTPATIENT
Start: 2023-06-07 | End: 2023-06-07 | Stop reason: HOSPADM

## 2023-06-07 RX ORDER — HEPARIN SODIUM 200 [USP'U]/100ML
100-600 INJECTION, SOLUTION INTRAVENOUS CONTINUOUS PRN
Status: DISCONTINUED | OUTPATIENT
Start: 2023-06-07 | End: 2023-06-07 | Stop reason: HOSPADM

## 2023-06-07 RX ORDER — OXYCODONE AND ACETAMINOPHEN 5; 325 MG/1; MG/1
1 TABLET ORAL EVERY 4 HOURS PRN
Status: DISCONTINUED | OUTPATIENT
Start: 2023-06-07 | End: 2023-06-07 | Stop reason: HOSPADM

## 2023-06-07 RX ORDER — PROTAMINE SULFATE 10 MG/ML
INJECTION, SOLUTION INTRAVENOUS
Status: DISCONTINUED | OUTPATIENT
Start: 2023-06-07 | End: 2023-06-07 | Stop reason: HOSPADM

## 2023-06-07 RX ORDER — PROPOFOL 10 MG/ML
INJECTION, EMULSION INTRAVENOUS PRN
Status: DISCONTINUED | OUTPATIENT
Start: 2023-06-07 | End: 2023-06-07

## 2023-06-07 RX ADMIN — Medication 5 MG: at 14:42

## 2023-06-07 RX ADMIN — FENTANYL CITRATE 100 MCG: 50 INJECTION, SOLUTION INTRAMUSCULAR; INTRAVENOUS at 12:34

## 2023-06-07 RX ADMIN — DEXAMETHASONE SODIUM PHOSPHATE 8 MG: 4 INJECTION, SOLUTION INTRA-ARTICULAR; INTRALESIONAL; INTRAMUSCULAR; INTRAVENOUS; SOFT TISSUE at 12:45

## 2023-06-07 RX ADMIN — PROPOFOL 150 MG: 10 INJECTION, EMULSION INTRAVENOUS at 12:34

## 2023-06-07 RX ADMIN — ROCURONIUM BROMIDE 50 MG: 50 INJECTION, SOLUTION INTRAVENOUS at 12:34

## 2023-06-07 RX ADMIN — MIDAZOLAM 1 MG: 1 INJECTION INTRAMUSCULAR; INTRAVENOUS at 12:29

## 2023-06-07 RX ADMIN — DEXMEDETOMIDINE HYDROCHLORIDE 4 MCG: 200 INJECTION INTRAVENOUS at 13:38

## 2023-06-07 RX ADMIN — DEXMEDETOMIDINE HYDROCHLORIDE 4 MCG: 200 INJECTION INTRAVENOUS at 13:59

## 2023-06-07 RX ADMIN — MIDAZOLAM 1 MG: 1 INJECTION INTRAMUSCULAR; INTRAVENOUS at 13:38

## 2023-06-07 RX ADMIN — IPRATROPIUM BROMIDE AND ALBUTEROL SULFATE 3 ML: .5; 3 SOLUTION RESPIRATORY (INHALATION) at 12:11

## 2023-06-07 RX ADMIN — PROPOFOL 40 MG: 10 INJECTION, EMULSION INTRAVENOUS at 13:31

## 2023-06-07 RX ADMIN — ONDANSETRON 4 MG: 2 INJECTION INTRAMUSCULAR; INTRAVENOUS at 14:42

## 2023-06-07 RX ADMIN — LIDOCAINE HYDROCHLORIDE 100 MG: 20 INJECTION, SOLUTION INFILTRATION; PERINEURAL at 12:34

## 2023-06-07 RX ADMIN — GLYCOPYRROLATE 0.2 MG: 0.2 INJECTION, SOLUTION INTRAMUSCULAR; INTRAVENOUS at 13:41

## 2023-06-07 RX ADMIN — PHENYLEPHRINE HYDROCHLORIDE 0.3 MCG/KG/MIN: 10 INJECTION INTRAVENOUS at 12:45

## 2023-06-07 RX ADMIN — GLYCOPYRROLATE 0.8 MG: 0.2 INJECTION, SOLUTION INTRAMUSCULAR; INTRAVENOUS at 14:42

## 2023-06-07 RX ADMIN — SODIUM CHLORIDE, POTASSIUM CHLORIDE, SODIUM LACTATE AND CALCIUM CHLORIDE: 600; 310; 30; 20 INJECTION, SOLUTION INTRAVENOUS at 10:46

## 2023-06-07 RX ADMIN — ALBUTEROL SULFATE 6 PUFF: 90 AEROSOL, METERED RESPIRATORY (INHALATION) at 12:42

## 2023-06-07 RX ADMIN — DEXMEDETOMIDINE HYDROCHLORIDE 4 MCG: 200 INJECTION INTRAVENOUS at 13:31

## 2023-06-07 ASSESSMENT — ACTIVITIES OF DAILY LIVING (ADL)
ADLS_ACUITY_SCORE: 35

## 2023-06-07 ASSESSMENT — LIFESTYLE VARIABLES: TOBACCO_USE: 1

## 2023-06-07 ASSESSMENT — ENCOUNTER SYMPTOMS: DYSRHYTHMIAS: 1

## 2023-06-07 NOTE — DISCHARGE INSTRUCTIONS
A Fib Ablation Discharge Instructions    After you go home:    Have an adult stay with you until tomorrow.  You may eat your normal diet, unless your doctor tells you otherwise.  RELAX and take it easy for 3 days.        For 24-48 hours (due to the sedation you received):    DO NOT DRIVE FOR 2 DAYS!   Do NOT make any important or legal decisions.  Do NOT drive or operate machines at home or at work.  Do NOT drink alcohol.    Care of Puncture Site:    Check the puncture site severy 1-2 hours while awake.  For 2-3 days, when you cough, sneeze, laugh or move your bowels, hold your hand over the puncture sites and press firmly.  Please remove Dressing after 24 hours.  Then apply a band aid daily for at least 3 days (if needed). If there is minor oozing, apply another band aid and remove it after 12 hours.   It is normal to have a small bruise or a small lump that is present under the skin . This will go away on its own after 3-4 weeks.   You may shower. Do NOT take a bath, or use a hot tub or pool until groin site heals, which may take up to a week.  Do NOT scrub the site. Do NOT use lotion or powder near the puncture site.    Activity:    NO bending, stooping over or squatting for 3 days  Do NOT lift, push or pull more than 10 pounds (equal to a gallon of milk) for 3 days.  NO repetitive motions such as loading , vacuuming, raking, shoveling, going up and down the stairs.     Bleeding:    If you start bleeding from the groin site, lie down flat and press firmly on the site for 10 minutes or until bleeding stops.   Once bleeding stops, lay flat for 1-2 hours.  Call your A Fib nurse if bleeding does not stop or after hours will need to go to ER.       Go to ER or Call 911 right away if you have heavy bleeding or bleeding that does not stop.    Medications:    Take your medications, including blood thinners, unless your doctor tells you not to.  If you have stopped any other medicines, check with your nurse or  provider about when to restart them.  If you have PAIN or a TIGHTNESS in your chest, you MAY take Tylenol (acetaminophen) and if this does not help, you MAY take Advil (ibuprofen-400 mg with food).  If you have constipation or prone to constipation,  take a fiber supplement, ie metamucil or stool softners.    Call the A Fib RN if:    Chest pain not relieved by acetaminophen or ibuprofen  Difficulty swallowing and/or coughing up blood  Shortness of breath  Increased groin pain or a large or growing hard lump around the site.  Groin site is red, swollen, hot or tender.  Blood or fluid is draining from the groin site.  You have chills or a fever greater than 101 F (38 C).  Your leg feels numb, cool or changes color.  If groin pain is not relieved by Tylenol or Ibuprofen.  Recurrent irregular or fast heart rate lasting over 2-3 hours.  Any questions or concerns.    Heart rhythms:    You may have some irregular heartbeats. These feel very strong. They may make you feel that the A Fib is going to start again.  Give it time. The irregular beats should occur less often.    Follow Up Appointments:    Yazmin Price on 6/15/23 at 12:30 with and EKG  Dr Wallis on 9/19/23 at 11:00 with and EKG Video visit from [facility]   A Fib clinic RN's Sue Lackey [phone] (Mon-Fri, 8:00-4:30)   [phone] Option 2 (7 days a week) after hours for on call Cardiologist.

## 2023-06-07 NOTE — Clinical Note
Hemodynamic equipment used: 5 lead ECG, Gigabit SquaredK With 3 Leads, Machine BP Cuff and pulse oximeter probe.

## 2023-06-07 NOTE — ANESTHESIA PREPROCEDURE EVALUATION
Anesthesia Pre-Procedure Evaluation    Patient: Joe Mas   MRN: [id number] : 1955        Procedure : Procedure(s):  Ablation Atrial Fibrilation          Past Medical History:   Diagnosis Date     A-fib (H) 2018     COPD (chronic obstructive pulmonary disease) (H)      Diabetes (H)      Hypertension       Past Surgical History:   Procedure Laterality Date     ABDOMEN SURGERY       COLONOSCOPY  2004    Follow up colonoscopy in 5 years     HERNIA REPAIR, UMBILICAL  2001     KNEE SURGERY  's    Left      Allergies   Allergen Reactions     Lisinopril Cough      Social History     Tobacco Use     Smoking status: Former     Packs/day: 1.00     Years: 25.00     Pack years: 25.00     Types: Cigarettes     Start date: 1975     Quit date: 2000     Years since quittin.4     Smokeless tobacco: Never     Tobacco comments:     quit smoking    Vaping Use     Vaping status: Never Used   Substance Use Topics     Alcohol use: Yes     Comment: 2-5 beers per month      Wt Readings from Last 1 Encounters:   23 111.6 kg (246 lb)        Anesthesia Evaluation            ROS/MED HX  ENT/Pulmonary:     (+) sleep apnea, tobacco use, Past use, recent URI,     Neurologic:       Cardiovascular:     (+) Dyslipidemia hypertension-----dysrhythmias, a-fib, Previous cardiac testing   Echo: Date: Results:  Interpretation Summary     Technically challenging study due to patient body habitus, even with the use  of contrast imaging.     Left ventricular size, global systolic function, and wall motion are normal,  estimated LVEF 60-65%.  Right ventricular global function is normal.  No significant valvular abnormalities.  Mild aortic root dilatation. Max diameter of the visualized portion 3.9 cm.  ______________________________________________________________________________  Left Ventricle  The left ventricle is normal in size. There is mild to moderate concentric  left ventricular hypertrophy. Left  ventricular systolic function is normal.  The visual ejection fraction is 60-65%. Left ventricular diastolic function is  normal. No regional wall motion abnormalities noted.     Right Ventricle  The right ventricle is normal in structure, function and size.     Atria  Normal left atrial size. Right atrial size is normal. There is no color  Doppler evidence of an atrial shunt.     Mitral Valve  The mitral valve is normal in structure and function.     Tricuspid Valve  The tricuspid valve is not well visualized, but is grossly normal. Right  ventricular systolic pressure could not be approximated due to inadequate  tricuspid regurgitation.     Aortic Valve  The aortic valve is normal in structure and function.     Pulmonic Valve  The pulmonic valve is not well seen, but is grossly normal.     Vessels  Mild aortic root dilatation. Max diameter of the visualized portion 3.9 cm.  The inferior vena cava was normal in size with preserved respiratory  variability.     Pericardium  There is no pericardial effusion. Prominent pericardial/epicardial fat  present.  Stress Test: Date: Results:    ECG Reviewed: Date: Results:    Cath: Date: Results:      METS/Exercise Tolerance:     Hematologic:       Musculoskeletal:       GI/Hepatic:    (-) GERD   Renal/Genitourinary:       Endo:     (+) type II DM, Obesity,     Psychiatric/Substance Use:     (+) psychiatric history depression Recreational drug usage: Cannabis.    Infectious Disease:       Malignancy:       Other:            Physical Exam    Airway        Mallampati: III   TM distance: > 3 FB   Neck ROM: full   Mouth opening: > 3 cm    Respiratory Devices and Support         Dental       (+) Minor Abnormalities - some fillings, tiny chips      Cardiovascular          Rhythm and rate: irregular     Pulmonary           (+) rhonchi           OUTSIDE LABS:  CBC:   Lab Results   Component Value Date    WBC 7.0 10/31/2022    WBC 8.6 10/04/2011    HGB 14.4 10/31/2022    HGB 16.1  10/04/2011    HCT 42.5 10/31/2022    HCT 48.2 10/04/2011     10/31/2022     10/04/2011     BMP:   Lab Results   Component Value Date     02/03/2023     (L) 10/31/2022    POTASSIUM 4.3 02/03/2023    POTASSIUM 4.0 10/31/2022    CHLORIDE 102 02/03/2023    CHLORIDE 98 10/31/2022    CO2 27 02/03/2023    CO2 27 10/31/2022    BUN 16.4 02/03/2023    BUN 13.4 10/31/2022    CR 0.9 06/05/2023    CR <0.2 (L) 06/05/2023     (H) 02/03/2023     (H) 10/31/2022     COAGS:   Lab Results   Component Value Date    INR 2.5 (H) 05/24/2023     POC:   Lab Results   Component Value Date     (H) 08/11/2017     HEPATIC:   Lab Results   Component Value Date    ALBUMIN 3.9 08/08/2022    PROTTOTAL 7.0 08/08/2022    ALT 36 08/08/2022    AST 18 08/08/2022    GGT 38 10/04/2011    ALKPHOS 63 08/08/2022    BILITOTAL 0.7 08/08/2022     OTHER:   Lab Results   Component Value Date    A1C 6.4 (H) 02/03/2023    SARA 10.2 02/03/2023    TSH 2.66 02/27/2018       Anesthesia Plan    ASA Status:  2   NPO Status:  NPO Appropriate    Anesthesia Type: General.     - Airway: ETT   Induction: Intravenous, Propofol.   Maintenance: Inhalation.   Techniques and Equipment:     - Airway: Video-Laryngoscope         Consents    Anesthesia Plan(s) and associated risks, benefits, and realistic alternatives discussed. Questions answered and patient/representative(s) expressed understanding.    - Discussed:     - Discussed with:  Patient         Postoperative Care    Pain management: IV analgesics, Oral pain medications.   PONV prophylaxis: Ondansetron (or other 5HT-3), Dexamethasone or Solumedrol     Comments:                Aston Alaniz MD

## 2023-06-07 NOTE — ANESTHESIA PROCEDURE NOTES
Airway       Patient location during procedure: OR       Procedure Start/Stop Times: 6/7/2023 12:37 PM  Staff -        CRNA: Maureen Conner APRN CRNA       Performed By: CRNA  Consent for Airway        Urgency: elective  Indications and Patient Condition       Indications for airway management: dandre-procedural       Induction type:intravenous       Mask difficulty assessment: 2 - vent by mask + OA or adjuvant +/- NMBA    Final Airway Details       Final airway type: endotracheal airway       Successful airway: ETT - single  Endotracheal Airway Details        ETT size (mm): 8.0       Cuffed: yes       Successful intubation technique: video laryngoscopy       VL Blade Size: Basilio 4       Grade View of Cords: 1       Adjucts: stylet       Position: Right       Measured from: gums/teeth       Secured at (cm): 24       Bite block used: Soft    Post intubation assessment        Placement verified by: capnometry, equal breath sounds and chest rise        Number of attempts at approach: 1       Number of other approaches attempted: 0       Secured with: pink tape       Ease of procedure: easy       Dentition: Unchanged    Medication(s) Administered   Medication Administration Time: 6/7/2023 12:37 PM

## 2023-06-07 NOTE — PROGRESS NOTES
Care Suites Admission Nursing Note    Patient Information  Name: Joe Mas  Age: 67 year old  Reason for admission: A-fib Ablation  Care Suites arrival time: 1015    Visitor Information  Name: Kosta     Patient Admission/Assessment   Pre-procedure assessment complete: Yes  If abnormal assessment/labs, provider notified: N/A  NPO: Yes  Medications held per instructions/orders: Yes  Consent: obtained  If applicable, pregnancy test status: deferred  Patient oriented to room: Yes  Education/questions answered: Yes  Plan/other: getting checked in for their procedure     Discharge Planning  Discharge name/phone number: Kosta [phone]  Overnight post sedation caregiver: Kosta  Discharge location: home    Lupe Merritt RN

## 2023-06-07 NOTE — ANESTHESIA POSTPROCEDURE EVALUATION
Patient: Joe Mas    Procedure: Procedure(s):  Ablation Atrial Fibrilation       Anesthesia Type:  General    Note:  Disposition: Inpatient   Postop Pain Control: Uneventful            Sign Out: Well controlled pain   PONV: No   Neuro/Psych: Uneventful            Sign Out: Acceptable/Baseline neuro status   Airway/Respiratory: Uneventful            Sign Out: Acceptable/Baseline resp. status   CV/Hemodynamics: Uneventful            Sign Out: Acceptable CV status   Other NRE: NONE   DID A NON-ROUTINE EVENT OCCUR? No           Last vitals:  Vitals Value Taken Time   /75 06/07/23 1600   Temp 36.7  C (98  F) 06/07/23 1500   Pulse 63 06/07/23 1607   Resp 34 06/07/23 1607   SpO2 91 % 06/07/23 1606   Vitals shown include unvalidated device data.    Electronically Signed By: Aston Alaniz MD  June 7, 2023  4:24 PM

## 2023-06-07 NOTE — PROGRESS NOTES
1505 Report received from Lupe Azar RN. Pt to PACU @ 1500.  1600 Report received from TRINI Joseph in PACU.  1620 Pt returned from PACU. A/O. Gauze drsg CDI to right groin puncture sites. Stopcock intact. No oozing or hematoma noted. Area soft & flat. Pt denies pain. Pt instructed on activity restrictions while on bedrest. Verbal understanding received from pt. Pt's daughter at bedside. Detailed update given.  1745 Pt taking diet & flds well. No complaints.  1850 Discharge teaching & instructions given to both pt & daughter w/ verbal understanding received. All questions & concerns addressed.  1857 Stopcock removed. Quick clot drsg, gauze & tegaderm applied. Area soft & flat.  1903 OOB - steady on feet. Ambulated in halls to bathroom with good khushboo. No change in puncture site assessment with activity.  1940 Pt discharged per w/c to private vehicle. All personal belongings taken w/ pt.

## 2023-06-07 NOTE — PRE-PROCEDURE
GENERAL PRE-PROCEDURE:   Procedure:  Afib ablation  Date/Time:  6/7/2023 10:49 AM    Written consent obtained?: Yes    Risks and benefits: Risks, benefits and alternatives were discussed    Consent given by:  Patient  Patient states understanding of procedure being performed: Yes    Patient's understanding of procedure matches consent: Yes    Procedure consent matches procedure scheduled: Yes    Expected level of sedation:  Moderate  Appropriately NPO:  Yes  Mallampati  :  Grade 2- soft palate, base of uvula, tonsillar pillars, and portion of posterior pharyngeal wall visible  Lungs:  Lungs clear with good breath sounds bilaterally  Heart:  Normal heart sounds and rate  History & Physical reviewed:  History and physical reviewed and no updates needed  Statement of review:  I have reviewed the lab findings, diagnostic data, medications, and the plan for sedation

## 2023-06-07 NOTE — ANESTHESIA CARE TRANSFER NOTE
Patient: Joe Mas    Procedure: Procedure(s):  Ablation Atrial Fibrilation       Diagnosis: PAF  Diagnosis Additional Information: No value filed.    Anesthesia Type:   General     Note:    Oropharynx: oropharynx clear of all foreign objects and spontaneously breathing  Level of Consciousness: awake  Oxygen Supplementation: face mask  Level of Supplemental Oxygen (L/min / FiO2): 6  Independent Airway: airway patency satisfactory and stable  Dentition: dentition unchanged  Vital Signs Stable: post-procedure vital signs reviewed and stable  Report to RN Given: handoff report given  Patient transferred to: PACU    Handoff Report: Identifed the Patient, Identified the Reponsible Provider, Reviewed the pertinent medical history, Discussed the surgical course, Reviewed Intra-OP anesthesia mangement and issues during anesthesia, Set expectations for post-procedure period and Allowed opportunity for questions and acknowledgement of understanding      Vitals:  Vitals Value Taken Time   /77 06/07/23 1458   Temp     Pulse 68 06/07/23 1500   Resp 21 06/07/23 1500   SpO2 97 % 06/07/23 1500   Vitals shown include unvalidated device data.    Electronically Signed By: J LUIS Grande CRNA  June 7, 2023  3:02 PM

## 2023-06-07 NOTE — INTERVAL H&P NOTE
"I have reviewed the surgical (or preoperative) H&P that is linked to this encounter, and examined the patient. There are no significant changes    Clinical Conditions Present on Arrival:  Clinically Significant Risk Factors Present on Admission               # Acute Kidney Injury, unspecified: based on a >150% or 0.3 mg/dL increase in last creatinine compared to past 90 day average, will monitor renal function  # Drug Induced Coagulation Defect: home medication list includes an anticoagulant medication   # Obesity: Estimated body mass index is 36.48 kg/m  as calculated from the following:    Height as of this encounter: 1.753 m (5' 9\").    Weight as of this encounter: 112 kg (247 lb).       "

## 2023-06-08 ENCOUNTER — TELEPHONE (OUTPATIENT)
Dept: FAMILY MEDICINE | Facility: CLINIC | Age: 68
End: 2023-06-08
Payer: COMMERCIAL

## 2023-06-08 ENCOUNTER — TELEPHONE (OUTPATIENT)
Dept: CARDIOLOGY | Facility: CLINIC | Age: 68
End: 2023-06-08
Payer: COMMERCIAL

## 2023-06-08 ENCOUNTER — TELEPHONE (OUTPATIENT)
Dept: ANTICOAGULATION | Facility: CLINIC | Age: 68
End: 2023-06-08
Payer: COMMERCIAL

## 2023-06-08 DIAGNOSIS — I48.0 PAROXYSMAL ATRIAL FIBRILLATION (H): Primary | ICD-10-CM

## 2023-06-08 DIAGNOSIS — Z79.01 LONG TERM CURRENT USE OF ANTICOAGULANT THERAPY: ICD-10-CM

## 2023-06-08 DIAGNOSIS — I48.91 ATRIAL FIBRILLATION, UNSPECIFIED TYPE (H): Primary | ICD-10-CM

## 2023-06-08 RX ORDER — PANTOPRAZOLE SODIUM 40 MG/1
40 TABLET, DELAYED RELEASE ORAL DAILY
Qty: 30 TABLET | Refills: 0 | Status: SHIPPED | OUTPATIENT
Start: 2023-06-08 | End: 2023-07-10

## 2023-06-08 NOTE — TELEPHONE ENCOUNTER
ANTICOAGULATION  MANAGEMENT: Discharge Review    Joe Mas chart reviewed for anticoagulation continuity of care    Outpatient surgery/procedure on 6/7/23 for afib ablation.    Discharge disposition: Home    Results:    Recent labs: (last 7 days)     06/07/23  1048   INR 1.9*     Anticoagulation inpatient management:     not applicable     Anticoagulation discharge instructions:     Warfarin dosing: home regimen continued   Bridging: No   INR goal change: No      Medication changes affecting anticoagulation: Yes: 6/8 starting Protonix, can increase INR    Additional factors affecting anticoagulation: No     PLAN     1045:  Spoke to patient.  Scheduled INR for 6/23/23.    Sent [employer]le message    Anticoagulation Calendar updated    Jolynn Johnson RN

## 2023-06-08 NOTE — TELEPHONE ENCOUNTER
General Call    Contacts       Type Contact Phone/Fax    06/08/2023 09:50 AM CDT Phone (Incoming) Joe Mas (Self) [phone] (M)        Reason for Call:  Patient had INR done yesterday/6/7/23 and it was a bit low/patient is needing to know his next route of care    What are your questions or concerns:  Next route of care    Date of last appointment with provider: 6/7/23    Could we send this information to you in [facility] or would you prefer to receive a phone call?:   Patient would prefer a phone call   Okay to leave a detailed message?: Yes at Cell number on file:    Telephone Information:   Mobile [phone]

## 2023-06-08 NOTE — TELEPHONE ENCOUNTER
Message left for patient in follow up to afib ablation on 6/7.  Awaiting return call.  TRINI Haynes

## 2023-06-08 NOTE — TELEPHONE ENCOUNTER
Patient returned call.  Reports feeling good.  Denies CP, SOB or lightheadedness.  Dressing intact on right groin site.  Instructed patient to remove today and monitor site.    Verbal orders per Dr Barrientos--Protonix 40mg po every day for 30 days.  Updated medication list in [facility] and sent to preferred pharmacy.  Reviewed discharge instructions and follow up appointments with patient.  Having no difficulty with voiding.  Taking in fluids and food without difficulty.  Patient provided verbal understanding regarding discharge instructions and follow up apptBal Haynes RN

## 2023-06-09 LAB
ATRIAL RATE - MUSE: 52 BPM
ATRIAL RATE - MUSE: 65 BPM
DIASTOLIC BLOOD PRESSURE - MUSE: NORMAL MMHG
DIASTOLIC BLOOD PRESSURE - MUSE: NORMAL MMHG
INTERPRETATION ECG - MUSE: NORMAL
INTERPRETATION ECG - MUSE: NORMAL
P AXIS - MUSE: 67 DEGREES
P AXIS - MUSE: 70 DEGREES
PR INTERVAL - MUSE: 170 MS
PR INTERVAL - MUSE: 184 MS
QRS DURATION - MUSE: 90 MS
QRS DURATION - MUSE: 98 MS
QT - MUSE: 378 MS
QT - MUSE: 428 MS
QTC - MUSE: 393 MS
QTC - MUSE: 398 MS
R AXIS - MUSE: -8 DEGREES
R AXIS - MUSE: 226 DEGREES
SYSTOLIC BLOOD PRESSURE - MUSE: NORMAL MMHG
SYSTOLIC BLOOD PRESSURE - MUSE: NORMAL MMHG
T AXIS - MUSE: 88 DEGREES
T AXIS - MUSE: 90 DEGREES
VENTRICULAR RATE- MUSE: 52 BPM
VENTRICULAR RATE- MUSE: 65 BPM

## 2023-06-15 ENCOUNTER — OFFICE VISIT (OUTPATIENT)
Dept: CARDIOLOGY | Facility: CLINIC | Age: 68
End: 2023-06-15
Payer: COMMERCIAL

## 2023-06-15 VITALS
HEART RATE: 59 BPM | BODY MASS INDEX: 36.43 KG/M2 | WEIGHT: 246 LBS | HEIGHT: 69 IN | DIASTOLIC BLOOD PRESSURE: 80 MMHG | OXYGEN SATURATION: 95 % | RESPIRATION RATE: 17 BRPM | SYSTOLIC BLOOD PRESSURE: 122 MMHG

## 2023-06-15 DIAGNOSIS — Z79.899 ENCOUNTER FOR MONITORING FLECAINIDE THERAPY: ICD-10-CM

## 2023-06-15 DIAGNOSIS — I48.0 PAROXYSMAL ATRIAL FIBRILLATION (H): Primary | ICD-10-CM

## 2023-06-15 DIAGNOSIS — I10 ESSENTIAL HYPERTENSION, BENIGN: ICD-10-CM

## 2023-06-15 DIAGNOSIS — Z51.81 ENCOUNTER FOR MONITORING FLECAINIDE THERAPY: ICD-10-CM

## 2023-06-15 PROCEDURE — 99213 OFFICE O/P EST LOW 20 MIN: CPT | Performed by: NURSE PRACTITIONER

## 2023-06-15 PROCEDURE — 93000 ELECTROCARDIOGRAM COMPLETE: CPT | Performed by: NURSE PRACTITIONER

## 2023-06-15 RX ORDER — METOPROLOL SUCCINATE 50 MG/1
25 TABLET, EXTENDED RELEASE ORAL DAILY
Qty: 90 TABLET | Refills: 3
Start: 2023-06-15 | End: 2024-01-22

## 2023-06-15 NOTE — LETTER
6/15/2023    Jolynn Cooper MD  [address] [facility] [address]    RE: Joe Mas       Dear Colleague,     I had the pleasure of seeing Joe Mas in the [facility].  HPI:  Joe Mas is a delightful 67-year-old gentleman with a history of hypertension, hyperlipidemia, diabetes, obstructive sleep apnea and paroxysmal atrial fibrillation, who is here for consideration for ablation procedure.     Patient has a long history of paroxysmal A-fib.  He has been on chronic therapy with flecainide and metoprolol. Most recently, he was evaluated in cardiology clinic and was referred here for consideration for ablation due to complaints of increasing frequency of symptomatic A-fib.     He was seen in consultation by Dr. Barrientos and brought to the EP lab on 6/7/2023.  He underwent a successful EPS and CTI/PVI ablation.  Patient tolerated the procedure well.    Overall, he is felt well.  1 tachycardic episode 2 days after the procedure.  Heart rate increased to 136 bpm.  He had mild right-sided chest pain.  Both of these have resolved.  Patient states now that his pulse rate at home fluctuates in the high 40s, but mostly in the 50s.  Energy level is good.  Right femoral access site is completely healed.    Twelve-lead EKG today shows sinus bradycardia at 56 bpm.  ms    Echocardiogram 4/20/2023 showed mild aortic root dilatation otherwise, EF was 60 to 65% without wall motion or valvular abnormalities noted.    Plan:   1. symptomatic atrial fibrillation  Despite being on metoprolol and flecainide therapy  Successful PVI and CTI ablation therapy with Dr. Barrientos    Has some symptoms of fatigue and wondering if it is related to heart rates in the 50s.  I will decrease metoprolol ER to 25 mg daily.  Hoping his energy level will improve some.      He would like to start walking on his treadmill.  Of asked that he start with just small increments and increase as tolerated.    Continue  Protonix for 1 month then discontinue.  Patient hopeful flecainide can be discontinued after the 3-month visit.    Patient is to remain on warfarin therapy.  [id number] score 3 (age, HTN, diabetes)    2. HTN  Stable on losartan 100 mg daily, metoprolol ER will be decreased to 25 mg daily    3.  Obesity  Patient hopeful to minimize medications over time.  Weight loss and exercise would help bring this to fruition.  BMI is at 36.  Continue to increase exercise as tolerated.    He will follow-up with Dr. Wallis in the fall.  Dr. Barrientos is relocating out of the [facility].  I have asked that he contact us in the interim with questions or concerns.    Yazmin Price, NP, APRN CNP            Today's clinic visit entailed:  Review of the result(s) of each unique test - EKG  Prescription drug management  No LOS data to display   Time spent by me doing chart review, history and exam, documentation and further activities per the note  Provider  Link to MDM Help Grid     The level of medical decision making during this visit was of moderate complexity.      Orders Placed This Encounter   Procedures    EKG 12-lead complete w/read - Clinics (performed today)       Orders Placed This Encounter   Medications    metoprolol succinate ER (TOPROL XL) 50 MG 24 hr tablet     Sig: Take 0.5 tablets (25 mg) by mouth daily     Dispense:  90 tablet     Refill:  3       Medications Discontinued During This Encounter   Medication Reason    metoprolol succinate ER (TOPROL XL) 50 MG 24 hr tablet          Encounter Diagnosis   Name Primary?    Atrial fibrillation, unspecified type (H) Yes       CURRENT MEDICATIONS:  Current Outpatient Medications   Medication Sig Dispense Refill    albuterol (PROAIR HFA/PROVENTIL HFA/VENTOLIN HFA) 108 (90 Base) MCG/ACT inhaler INHALE TWO PUFFS INTO THE LUNGS EVERY 6 HOURS AS NEEDED FOR SHORTNESS OF BREATH 18 g 1    atorvastatin (LIPITOR) 20 MG tablet Take 1 tablet (20 mg) by mouth daily 90 tablet 3    flecainide  (TAMBOCOR) 100 MG tablet Take 1 tablet (100 mg) by mouth 2 times daily 180 tablet 3    ipratropium - albuterol 0.5 mg/2.5 mg/3 mL (DUONEB) 0.5-2.5 (3) MG/3ML neb solution NEBULIZE THE CONTENTS OF 1 VIAL EVERY 6 HOURS AS NEEDED FOR SHORTNESS OF BREATH OR WHEEZING 360 mL 1    losartan (COZAAR) 100 MG tablet Take 1 tablet (100 mg) by mouth daily 90 tablet 3    metFORMIN (GLUCOPHAGE XR) 500 MG 24 hr tablet Take 2 tablets (1,000 mg) by mouth 2 times daily (with meals) 180 tablet 3    metoprolol succinate ER (TOPROL XL) 50 MG 24 hr tablet Take 0.5 tablets (25 mg) by mouth daily 90 tablet 3    mometasone-formoterol (DULERA) 200-5 MCG/ACT inhaler Inhale 2 puffs into the lungs 2 times daily 39 g 3    Omega-3 Fatty Acids (FISH OIL PO) 500 mg fatty acids. 2,000 mg      pantoprazole (PROTONIX) 40 MG EC tablet Take 1 tablet (40 mg) by mouth daily x30 days then discontinue 30 tablet 0    VITAMIN D PO Take 1 tablet by mouth daily      warfarin ANTICOAGULANT (COUMADIN) 2.5 MG tablet Take 2.5 mg by mouth twice a week On Mondays & Thursdays      warfarin ANTICOAGULANT (COUMADIN) 5 MG tablet Take 5 mg by mouth five times a week Tues, Wed, Fri, Sat & Sun         ALLERGIES     Allergies   Allergen Reactions    Lisinopril Cough       PAST MEDICAL HISTORY:  Past Medical History:   Diagnosis Date    A-fib (H) 02/27/2018    COPD (chronic obstructive pulmonary disease) (H)     Diabetes (H)     Hypertension        PAST SURGICAL HISTORY:  Past Surgical History:   Procedure Laterality Date    ABDOMEN SURGERY      COLONOSCOPY  11/12/2004    Follow up colonoscopy in 5 years    EP ABLATION FOCAL AFIB N/A 6/7/2023    Procedure: Ablation Atrial Fibrilation;  Surgeon: Jay Barrientos MD;  Location:  [facility]ARD[facility] CATH LAB    HERNIA REPAIR, UMBILICAL  2001    KNEE SURGERY  1960's    Left       FAMILY HISTORY:  Family History   Problem Relation Age of Onset    Osteoporosis Mother     Heart Disease Mother     Neurologic Disorder Father          "passed away from a brain tumor age 48    Cerebrovascular Disease Father     Neurologic Disorder Maternal Grandmother         parkinsons    C.A.D. Maternal Grandmother     Diabetes Maternal Grandmother     Alcohol/Drug Maternal Grandfather     Cancer Maternal Grandfather         esphogus    Cancer Paternal Grandfather         lung    Diabetes Sister     Hypertension Sister     Asthma Daughter     Cerebrovascular Disease No family hx of     Breast Cancer No family hx of     Cancer - colorectal No family hx of        SOCIAL HISTORY:  Social History     Socioeconomic History    Marital status: Single    Number of children: 2    Years of education: 12+   Occupational History     Employer: [employer]   Tobacco Use    Smoking status: Former     Packs/day: 1.00     Years: 25.00     Pack years: 25.00     Types: Cigarettes     Start date: 1975     Quit date: 2000     Years since quittin.4    Smokeless tobacco: Never    Tobacco comments:     quit smoking 2010   Vaping Use    Vaping status: Never Used   Substance and Sexual Activity    Alcohol use: Yes     Comment: 2-5 beers per month    Drug use: Yes     Types: Marijuana    Sexual activity: Not Currently     Partners: Female     Birth control/protection: Condom   Other Topics Concern    Parent/sibling w/ CABG, MI or angioplasty before 65F 55M? Yes     Comment: Brother       Review of Systems:  Skin:  Positive for bruising     Eyes:  Negative      ENT:  Negative      Respiratory:  Positive for sleep apnea     Cardiovascular:  Negative      Gastroenterology: Negative      Genitourinary:  Negative      Musculoskeletal:  Negative      Neurologic:  Negative      Psychiatric:  Negative      Heme/Lymph/Imm:  Positive for fever    Endocrine:  Negative        Physical Exam:  Vitals: /80   Pulse 59   Resp 17   Ht 1.753 m (5' 9\")   Wt 111.6 kg (246 lb)   SpO2 95%   BMI 36.33 kg/m      Constitutional:  cooperative, alert and oriented, well developed, well nourished, " in no acute distress        Skin:  warm and dry to the touch;no apparent skin lesions or masses noted          Head:  normocephalic, no masses or lesions        Eyes:  pupils equal and round        Lymph:      ENT:  no pallor or cyanosis, dentition good        Neck:  JVP normal        Respiratory:  normal breath sounds, clear to auscultation, normal A-P diameter, normal symmetry, normal respiratory excursion, no use of accessory muscles         Cardiac: regular rhythm;normal S1 and S2;no murmurs, gallops or rubs detected                not assessed this visit                                        GI:  not assessed this visit        Extremities and Muscular Skeletal:  no deformities, clubbing, cyanosis, erythema observed;no edema              Neurological:  no gross motor deficits        Psych:  Alert and Oriented x 3        CC  No referring provider defined for this encounter.    Thank you for allowing me to participate in the care of your patient.      Sincerely,     Yazmin Price NP, APRN CNP     [facility]

## 2023-06-15 NOTE — PROGRESS NOTES
HPI:  Joe Mas is a delightful 67-year-old gentleman with a history of hypertension, hyperlipidemia, diabetes, obstructive sleep apnea and paroxysmal atrial fibrillation, who is here for consideration for ablation procedure.     Patient has a long history of paroxysmal A-fib.  He has been on chronic therapy with flecainide and metoprolol. Most recently, he was evaluated in cardiology clinic and was referred here for consideration for ablation due to complaints of increasing frequency of symptomatic A-fib.     He was seen in consultation by Dr. Barrientos and brought to the EP lab on 6/7/2023.  He underwent a successful EPS and CTI/PVI ablation.  Patient tolerated the procedure well.    Overall, he is felt well.  1 tachycardic episode 2 days after the procedure.  Heart rate increased to 136 bpm.  He had mild right-sided chest pain.  Both of these have resolved.  Patient states now that his pulse rate at home fluctuates in the high 40s, but mostly in the 50s.  Energy level is good.  Right femoral access site is completely healed.    Twelve-lead EKG today shows sinus bradycardia at 56 bpm.  ms    Echocardiogram 4/20/2023 showed mild aortic root dilatation otherwise, EF was 60 to 65% without wall motion or valvular abnormalities noted.    Plan:   1. symptomatic atrial fibrillation  Despite being on metoprolol and flecainide therapy  Successful PVI and CTI ablation therapy with Dr. Barrientos    Has some symptoms of fatigue and wondering if it is related to heart rates in the 50s.  I will decrease metoprolol ER to 25 mg daily.  Hoping his energy level will improve some.      He would like to start walking on his treadmill.  Of asked that he start with just small increments and increase as tolerated.    Continue Protonix for 1 month then discontinue.  Patient hopeful flecainide can be discontinued after the 3-month visit.    Patient is to remain on warfarin therapy.  [id number] score 3 (age, HTN, diabetes)    2.  HTN  Stable on losartan 100 mg daily, metoprolol ER will be decreased to 25 mg daily    3.  Obesity  Patient hopeful to minimize medications over time.  Weight loss and exercise would help bring this to fruition.  BMI is at 36.  Continue to increase exercise as tolerated.    He will follow-up with Dr. Wallis in the fall.  Dr. Barrientos is relocating out of the [facility].  I have asked that he contact us in the interim with questions or concerns.    Yazmin Price, NP, APRN CNP            Today's clinic visit entailed:  Review of the result(s) of each unique test - EKG  Prescription drug management  No LOS data to display   Time spent by me doing chart review, history and exam, documentation and further activities per the note  Provider  Link to MDM Help Grid     The level of medical decision making during this visit was of moderate complexity.      Orders Placed This Encounter   Procedures     EKG 12-lead complete w/read - Clinics (performed today)       Orders Placed This Encounter   Medications     metoprolol succinate ER (TOPROL XL) 50 MG 24 hr tablet     Sig: Take 0.5 tablets (25 mg) by mouth daily     Dispense:  90 tablet     Refill:  3       Medications Discontinued During This Encounter   Medication Reason     metoprolol succinate ER (TOPROL XL) 50 MG 24 hr tablet          Encounter Diagnosis   Name Primary?     Atrial fibrillation, unspecified type (H) Yes       CURRENT MEDICATIONS:  Current Outpatient Medications   Medication Sig Dispense Refill     albuterol (PROAIR HFA/PROVENTIL HFA/VENTOLIN HFA) 108 (90 Base) MCG/ACT inhaler INHALE TWO PUFFS INTO THE LUNGS EVERY 6 HOURS AS NEEDED FOR SHORTNESS OF BREATH 18 g 1     atorvastatin (LIPITOR) 20 MG tablet Take 1 tablet (20 mg) by mouth daily 90 tablet 3     flecainide (TAMBOCOR) 100 MG tablet Take 1 tablet (100 mg) by mouth 2 times daily 180 tablet 3     ipratropium - albuterol 0.5 mg/2.5 mg/3 mL (DUONEB) 0.5-2.5 (3) MG/3ML neb solution NEBULIZE THE CONTENTS OF 1  VIAL EVERY 6 HOURS AS NEEDED FOR SHORTNESS OF BREATH OR WHEEZING 360 mL 1     losartan (COZAAR) 100 MG tablet Take 1 tablet (100 mg) by mouth daily 90 tablet 3     metFORMIN (GLUCOPHAGE XR) 500 MG 24 hr tablet Take 2 tablets (1,000 mg) by mouth 2 times daily (with meals) 180 tablet 3     metoprolol succinate ER (TOPROL XL) 50 MG 24 hr tablet Take 0.5 tablets (25 mg) by mouth daily 90 tablet 3     mometasone-formoterol (DULERA) 200-5 MCG/ACT inhaler Inhale 2 puffs into the lungs 2 times daily 39 g 3     Omega-3 Fatty Acids (FISH OIL PO) 500 mg fatty acids. 2,000 mg       pantoprazole (PROTONIX) 40 MG EC tablet Take 1 tablet (40 mg) by mouth daily x30 days then discontinue 30 tablet 0     VITAMIN D PO Take 1 tablet by mouth daily       warfarin ANTICOAGULANT (COUMADIN) 2.5 MG tablet Take 2.5 mg by mouth twice a week On Mondays & Thursdays       warfarin ANTICOAGULANT (COUMADIN) 5 MG tablet Take 5 mg by mouth five times a week Tues, Wed, Fri, Sat & Sun         ALLERGIES     Allergies   Allergen Reactions     Lisinopril Cough       PAST MEDICAL HISTORY:  Past Medical History:   Diagnosis Date     A-fib (H) 02/27/2018     COPD (chronic obstructive pulmonary disease) (H)      Diabetes (H)      Hypertension        PAST SURGICAL HISTORY:  Past Surgical History:   Procedure Laterality Date     ABDOMEN SURGERY       COLONOSCOPY  11/12/2004    Follow up colonoscopy in 5 years     EP ABLATION FOCAL AFIB N/A 6/7/2023    Procedure: Ablation Atrial Fibrilation;  Surgeon: Jay Barrientos MD;  Location:  [facility]     HERNIA REPAIR, UMBILICAL  2001     KNEE SURGERY  1960's    Left       FAMILY HISTORY:  Family History   Problem Relation Age of Onset     Osteoporosis Mother      Heart Disease Mother      Neurologic Disorder Father         passed away from a brain tumor age 48     Cerebrovascular Disease Father      Neurologic Disorder Maternal Grandmother         parkinsons     C.A.D. Maternal Grandmother       "Diabetes Maternal Grandmother      Alcohol/Drug Maternal Grandfather      Cancer Maternal Grandfather         esphogus     Cancer Paternal Grandfather         lung     Diabetes Sister      Hypertension Sister      Asthma Daughter      Cerebrovascular Disease No family hx of      Breast Cancer No family hx of      Cancer - colorectal No family hx of        SOCIAL HISTORY:  Social History     Socioeconomic History     Marital status: Single     Number of children: 2     Years of education: 12+   Occupational History     Employer: [employer]   Tobacco Use     Smoking status: Former     Packs/day: 1.00     Years: 25.00     Pack years: 25.00     Types: Cigarettes     Start date: 1975     Quit date: 2000     Years since quittin.4     Smokeless tobacco: Never     Tobacco comments:     quit smoking    Vaping Use     Vaping status: Never Used   Substance and Sexual Activity     Alcohol use: Yes     Comment: 2-5 beers per month     Drug use: Yes     Types: Marijuana     Sexual activity: Not Currently     Partners: Female     Birth control/protection: Condom   Other Topics Concern     Parent/sibling w/ CABG, MI or angioplasty before 65F 55M? Yes     Comment: Brother       Review of Systems:  Skin:  Positive for bruising     Eyes:  Negative      ENT:  Negative      Respiratory:  Positive for sleep apnea     Cardiovascular:  Negative      Gastroenterology: Negative      Genitourinary:  Negative      Musculoskeletal:  Negative      Neurologic:  Negative      Psychiatric:  Negative      Heme/Lymph/Imm:  Positive for fever    Endocrine:  Negative        Physical Exam:  Vitals: /80   Pulse 59   Resp 17   Ht 1.753 m (5' 9\")   Wt 111.6 kg (246 lb)   SpO2 95%   BMI 36.33 kg/m      Constitutional:  cooperative, alert and oriented, well developed, well nourished, in no acute distress        Skin:  warm and dry to the touch;no apparent skin lesions or masses noted          Head:  normocephalic, no masses or " lesions        Eyes:  pupils equal and round        Lymph:      ENT:  no pallor or cyanosis, dentition good        Neck:  JVP normal        Respiratory:  normal breath sounds, clear to auscultation, normal A-P diameter, normal symmetry, normal respiratory excursion, no use of accessory muscles         Cardiac: regular rhythm;normal S1 and S2;no murmurs, gallops or rubs detected                not assessed this visit                                        GI:  not assessed this visit        Extremities and Muscular Skeletal:  no deformities, clubbing, cyanosis, erythema observed;no edema              Neurological:  no gross motor deficits        Psych:  Alert and Oriented x 3        CC  No referring provider defined for this encounter.

## 2023-06-23 ENCOUNTER — ANTICOAGULATION THERAPY VISIT (OUTPATIENT)
Dept: ANTICOAGULATION | Facility: CLINIC | Age: 68
End: 2023-06-23

## 2023-06-23 ENCOUNTER — LAB (OUTPATIENT)
Dept: LAB | Facility: CLINIC | Age: 68
End: 2023-06-23
Payer: COMMERCIAL

## 2023-06-23 DIAGNOSIS — Z79.01 LONG TERM CURRENT USE OF ANTICOAGULANT THERAPY: ICD-10-CM

## 2023-06-23 DIAGNOSIS — I48.91 ATRIAL FIBRILLATION, UNSPECIFIED TYPE (H): ICD-10-CM

## 2023-06-23 DIAGNOSIS — I48.91 ATRIAL FIBRILLATION, UNSPECIFIED TYPE (H): Primary | ICD-10-CM

## 2023-06-23 LAB — INR BLD: 3.2 (ref 0.9–1.1)

## 2023-06-23 PROCEDURE — 36416 COLLJ CAPILLARY BLOOD SPEC: CPT

## 2023-06-23 PROCEDURE — 85610 PROTHROMBIN TIME: CPT

## 2023-06-23 NOTE — PROGRESS NOTES
ANTICOAGULATION MANAGEMENT     Joe Mas 67 year old male is on warfarin with supratherapeutic INR result. (Goal INR 2.0-3.0)    Recent labs: (last 7 days)     06/23/23  1154   INR 3.2*       ASSESSMENT       Source(s): Chart Review and Patient/Caregiver Call       Warfarin doses taken: Warfarin taken as instructed    Diet: No new diet changes identified    Medication/supplement changes: 6/8 Protonix started, 30 days, can increase INR    New illness, injury, or hospitalization: s/p afib ablation on 6/7    Signs or symptoms of bleeding or clotting: No    Previous result: Subtherapeutic    Additional findings: None         PLAN     Recommended plan for ongoing change(s) affecting INR     Dosing Instructions: decrease your warfarin dose (8.3% change) with next INR in 2 weeks.  Patient will still be on Protonix for the next 2 weeks then he might need to return to previous schedule once he stops Protonix.         Summary  As of 6/23/2023    Full warfarin instructions:  2.5 mg every Mon, Wed, Fri; 5 mg all other days   Next INR check:  7/7/2023             Telephone call with Joe who verbalizes understanding and agrees to plan    Lab visit scheduled    Education provided:     Goal range and lab monitoring: goal range and significance of current result    Contact [phone] with any changes, questions or concerns.     Plan made per [facility] anticoagulation protocol    Jolynn Johnson RN  Anticoagulation Clinic  6/23/2023    _______________________________________________________________________     Anticoagulation Episode Summary     Current INR goal:  2.0-3.0   TTR:  78.5 % (1 y)   Target end date:  Indefinite   Send INR reminders to:  MARIA ELENA YANG    Indications    Atrial fibrillation  unspecified type (H) [I48.91]  Long term current use of anticoagulant therapy [Z79.01]           Comments:           Anticoagulation Care Providers     Provider Role Specialty Phone number    Jolynn Cooper MD Referring  Family Medicine [phone]

## 2023-06-26 DIAGNOSIS — J44.1 OBSTRUCTIVE CHRONIC BRONCHITIS WITH EXACERBATION (H): ICD-10-CM

## 2023-06-26 RX ORDER — NEBULIZER ACCESSORIES
KIT MISCELLANEOUS
COMMUNITY
End: 2023-06-26

## 2023-06-26 NOTE — TELEPHONE ENCOUNTER
Patient said his nebulizer is not working well and would like a new one. Thank you!    See Robby  Pharmacy Technician, Elise  [facility]  Phone: [phone]  Fax: [phone]

## 2023-06-28 RX ORDER — IPRATROPIUM BROMIDE AND ALBUTEROL SULFATE 2.5; .5 MG/3ML; MG/3ML
SOLUTION RESPIRATORY (INHALATION)
Qty: 360 ML | Refills: 1 | Status: SHIPPED | OUTPATIENT
Start: 2023-06-28 | End: 2024-01-22

## 2023-06-28 RX ORDER — NEBULIZER ACCESSORIES
KIT MISCELLANEOUS
Qty: 1 KIT | Refills: 1 | Status: SHIPPED | OUTPATIENT
Start: 2023-06-28

## 2023-06-28 NOTE — TELEPHONE ENCOUNTER
Routing refill request to provider for review/approval because:  Drug not on the [facility] refill protocol   PCP to review    Edinson Kumari RN

## 2023-07-01 DIAGNOSIS — J44.1 OBSTRUCTIVE CHRONIC BRONCHITIS WITH EXACERBATION (H): ICD-10-CM

## 2023-07-03 DIAGNOSIS — I48.91 ATRIAL FIBRILLATION, UNSPECIFIED TYPE (H): Primary | ICD-10-CM

## 2023-07-03 RX ORDER — ALBUTEROL SULFATE 90 UG/1
AEROSOL, METERED RESPIRATORY (INHALATION)
Qty: 18 G | Refills: 1 | Status: SHIPPED | OUTPATIENT
Start: 2023-07-03 | End: 2024-01-22

## 2023-07-03 NOTE — TELEPHONE ENCOUNTER
Prescription approved per [facility] Refill Protocol.  Rnezo Bettencourt RN

## 2023-07-05 RX ORDER — WARFARIN SODIUM 5 MG/1
TABLET ORAL
Qty: 84 TABLET | Refills: 1 | Status: SHIPPED | OUTPATIENT
Start: 2023-07-05 | End: 2024-01-22

## 2023-07-05 NOTE — TELEPHONE ENCOUNTER
"Routing refill request to provider for review/approval because:  Medication is reported/historical        Requested Prescriptions   Pending Prescriptions Disp Refills     JANTOVEN ANTICOAGULANT 5 MG tablet [Pharmacy Med Name: JANTOVEN 5MG TABS] 84 tablet 1     Sig: TAKE ONE-HALF TABLET BY MOUTH EVERY MONDAY AND TAKE ONE TABLET BY MOUTH  ALL OTHER DAYS OR AS DIRECTED BY ANTICOAGULATION CLINIC       Vitamin K Antagonists Failed - 7/3/2023  6:15 PM        Failed - INR is within goal in the past 6 weeks     Confirm INR is within goal in the past 6 weeks.     Recent Labs   Lab Test 06/23/23  1154   INR 3.2*                       Passed - Recent (12 mo) or future (30 days) visit within the authorizing provider's specialty     Patient has had an office visit with the authorizing provider or a provider within the authorizing providers department within the previous 12 mos or has a future within next 30 days. See \"Patient Info\" tab in inbasket, or \"Choose Columns\" in Meds & Orders section of the refill encounter.              Passed - Medication is active on med list        Passed - Patient is 18 years of age or older                 Edinson Kumari RN 07/05/23 9:18 AM  "

## 2023-07-07 ENCOUNTER — ANTICOAGULATION THERAPY VISIT (OUTPATIENT)
Dept: ANTICOAGULATION | Facility: CLINIC | Age: 68
End: 2023-07-07

## 2023-07-07 ENCOUNTER — LAB (OUTPATIENT)
Dept: LAB | Facility: CLINIC | Age: 68
End: 2023-07-07
Payer: COMMERCIAL

## 2023-07-07 DIAGNOSIS — I48.91 ATRIAL FIBRILLATION, UNSPECIFIED TYPE (H): ICD-10-CM

## 2023-07-07 LAB — INR BLD: 1.9 (ref 0.9–1.1)

## 2023-07-07 PROCEDURE — 85610 PROTHROMBIN TIME: CPT

## 2023-07-07 PROCEDURE — 36416 COLLJ CAPILLARY BLOOD SPEC: CPT

## 2023-07-07 NOTE — PROGRESS NOTES
ANTICOAGULATION MANAGEMENT     Joe Mas 67 year old male is on warfarin with subtherapeutic INR result. (Goal INR 2.0-3.0)    Recent labs: (last 7 days)     07/07/23  1141   INR 1.9*       ASSESSMENT       Source(s): Chart Review and Patient/Caregiver Call       Warfarin doses taken: Warfarin taken as instructed    Diet: No new diet changes identified    Medication/supplement changes: pt stopped protonix 2-3 days ago. Maintenance dose was reduced on 6/23/23 since protonix can interact by increasing INR.    New illness, injury, or hospitalization: No    Signs or symptoms of bleeding or clotting: Yes: Pt reports that he notes more bruising while on warfarin. Pt wants to talk to his doctor about decreasing the INR goal range when he sees him in October.Pt reports that the ablation went well on 6/7/23    Previous result: Supratherapeutic    Additional findings: Maintenance dose increased back to previous dose         PLAN     Recommended plan for ongoing change(s) affecting INR     Dosing Instructions: Increase your warfarin dose (9.1% change) with next INR in 2 weeks       Summary  As of 7/7/2023    Full warfarin instructions:  2.5 mg every Mon, Thu; 5 mg all other days   Next INR check:  7/21/2023             Telephone call with Joe who agrees to plan and repeated back plan correctly    Lab visit scheduled    Education provided:     Contact [phone] with any changes, questions or concerns.     Plan made per [facility] anticoagulation protocol    Betty Portillo RN  Anticoagulation Clinic  7/7/2023    _______________________________________________________________________     Anticoagulation Episode Summary     Current INR goal:  2.0-3.0   TTR:  77.6 % (1 y)   Target end date:  Indefinite   Send INR reminders to:  MARIA ELENA YANG    Indications    Atrial fibrillation  unspecified type (H) [I48.91]  Long term current use of anticoagulant therapy [Z79.01]           Comments:           Anticoagulation Care Providers      Provider Role Specialty Phone number    Jolynn Cooper MD Referring Family Medicine [phone]

## 2023-07-10 DIAGNOSIS — I48.0 PAROXYSMAL ATRIAL FIBRILLATION (H): ICD-10-CM

## 2023-07-10 RX ORDER — PANTOPRAZOLE SODIUM 40 MG/1
40 TABLET, DELAYED RELEASE ORAL DAILY
Qty: 5 TABLET | Refills: 0 | Status: SHIPPED | OUTPATIENT
Start: 2023-07-10 | End: 2023-08-04

## 2023-07-20 ENCOUNTER — MYC MEDICAL ADVICE (OUTPATIENT)
Dept: PHARMACY | Facility: OTHER | Age: 68
End: 2023-07-20
Payer: COMMERCIAL

## 2023-07-21 ENCOUNTER — ANTICOAGULATION THERAPY VISIT (OUTPATIENT)
Dept: ANTICOAGULATION | Facility: CLINIC | Age: 68
End: 2023-07-21

## 2023-07-21 ENCOUNTER — LAB (OUTPATIENT)
Dept: LAB | Facility: CLINIC | Age: 68
End: 2023-07-21
Payer: COMMERCIAL

## 2023-07-21 DIAGNOSIS — E11.8 TYPE 2 DIABETES MELLITUS WITH COMPLICATION, WITHOUT LONG-TERM CURRENT USE OF INSULIN (H): Primary | ICD-10-CM

## 2023-07-21 DIAGNOSIS — I48.91 ATRIAL FIBRILLATION, UNSPECIFIED TYPE (H): Primary | ICD-10-CM

## 2023-07-21 DIAGNOSIS — Z79.01 LONG TERM CURRENT USE OF ANTICOAGULANT THERAPY: ICD-10-CM

## 2023-07-21 DIAGNOSIS — I48.91 ATRIAL FIBRILLATION, UNSPECIFIED TYPE (H): ICD-10-CM

## 2023-07-21 LAB — INR BLD: 2.4 (ref 0.9–1.1)

## 2023-07-21 PROCEDURE — 85610 PROTHROMBIN TIME: CPT

## 2023-07-21 PROCEDURE — 36416 COLLJ CAPILLARY BLOOD SPEC: CPT

## 2023-07-21 NOTE — PROGRESS NOTES
ANTICOAGULATION MANAGEMENT     Joe Mas 67 year old male is on warfarin with therapeutic INR result. (Goal INR 2.0-3.0)    Recent labs: (last 7 days)     07/21/23  1145   INR 2.4*       ASSESSMENT     Source(s): Chart Review  Previous INR was Supratherapeutic  Medication, diet, health changes since last INR chart reviewed; none identified         PLAN     Recommended plan for no diet, medication or health factor changes affecting INR     Dosing Instructions: Continue your current warfarin dose with next INR in 2 weeks       Summary  As of 7/21/2023      Full warfarin instructions:  2.5 mg every Mon, Thu; 5 mg all other days   Next INR check:  8/4/2023               Detailed voice message left for Joe with dosing instructions and follow up date.     Check at provider office visit    Education provided:   Contact [phone] with any changes, questions or concerns.     Plan made per [facility] anticoagulation protocol    Steve Gary RN  Anticoagulation Clinic  7/21/2023    _______________________________________________________________________     Anticoagulation Episode Summary       Current INR goal:  2.0-3.0   TTR:  76.8 % (1 y)   Target end date:  Indefinite   Send INR reminders to:  MARIA ELENA YANG    Indications    Atrial fibrillation  unspecified type (H) [I48.91]  Long term current use of anticoagulant therapy [Z79.01]             Comments:               Anticoagulation Care Providers       Provider Role Specialty Phone number    Jolynn Cooper MD Referring Family Medicine [phone]

## 2023-07-31 ASSESSMENT — ENCOUNTER SYMPTOMS
NAUSEA: 0
JOINT SWELLING: 0
ABDOMINAL PAIN: 0
COUGH: 1
DIZZINESS: 0
CHILLS: 0
FREQUENCY: 1
DYSURIA: 0
PARESTHESIAS: 0
SHORTNESS OF BREATH: 1
FEVER: 0
ARTHRALGIAS: 0
HEMATURIA: 0
WEAKNESS: 0
HEARTBURN: 0
HEMATOCHEZIA: 0
NERVOUS/ANXIOUS: 0
MYALGIAS: 0
SORE THROAT: 0
DIARRHEA: 0
PALPITATIONS: 0
HEADACHES: 0
CONSTIPATION: 0
EYE PAIN: 0

## 2023-07-31 ASSESSMENT — ACTIVITIES OF DAILY LIVING (ADL): CURRENT_FUNCTION: NO ASSISTANCE NEEDED

## 2023-07-31 NOTE — TELEPHONE ENCOUNTER
I called patient for attempt #2. Left a voicemail with the main [facility] phone number [phone] for patient to call back for scheduling.    Emiliana Adams, PharmD, BCACP  Medication Therapy Management Pharmacist

## 2023-08-04 ENCOUNTER — APPOINTMENT (OUTPATIENT)
Dept: LAB | Facility: CLINIC | Age: 68
End: 2023-08-04
Payer: COMMERCIAL

## 2023-08-04 ENCOUNTER — OFFICE VISIT (OUTPATIENT)
Dept: FAMILY MEDICINE | Facility: CLINIC | Age: 68
End: 2023-08-04
Payer: COMMERCIAL

## 2023-08-04 ENCOUNTER — ANTICOAGULATION THERAPY VISIT (OUTPATIENT)
Dept: ANTICOAGULATION | Facility: CLINIC | Age: 68
End: 2023-08-04

## 2023-08-04 VITALS
HEART RATE: 69 BPM | WEIGHT: 247.9 LBS | OXYGEN SATURATION: 93 % | RESPIRATION RATE: 16 BRPM | BODY MASS INDEX: 36.61 KG/M2 | TEMPERATURE: 97.6 F | DIASTOLIC BLOOD PRESSURE: 80 MMHG | SYSTOLIC BLOOD PRESSURE: 124 MMHG

## 2023-08-04 DIAGNOSIS — E11.8 TYPE 2 DIABETES MELLITUS WITH COMPLICATION, WITHOUT LONG-TERM CURRENT USE OF INSULIN (H): ICD-10-CM

## 2023-08-04 DIAGNOSIS — I48.91 ATRIAL FIBRILLATION, UNSPECIFIED TYPE (H): Primary | ICD-10-CM

## 2023-08-04 DIAGNOSIS — Z98.890 S/P ABLATION OF ATRIAL FIBRILLATION: ICD-10-CM

## 2023-08-04 DIAGNOSIS — J44.1 OBSTRUCTIVE CHRONIC BRONCHITIS WITH EXACERBATION (H): ICD-10-CM

## 2023-08-04 DIAGNOSIS — Z86.79 S/P ABLATION OF ATRIAL FIBRILLATION: ICD-10-CM

## 2023-08-04 DIAGNOSIS — Z79.01 LONG TERM CURRENT USE OF ANTICOAGULANT THERAPY: ICD-10-CM

## 2023-08-04 DIAGNOSIS — G47.33 OSA (OBSTRUCTIVE SLEEP APNEA): ICD-10-CM

## 2023-08-04 DIAGNOSIS — I48.91 ATRIAL FIBRILLATION, UNSPECIFIED TYPE (H): ICD-10-CM

## 2023-08-04 DIAGNOSIS — Z00.00 ENCOUNTER FOR MEDICARE ANNUAL WELLNESS EXAM: Primary | ICD-10-CM

## 2023-08-04 LAB
HBA1C MFR BLD: 6.8 % (ref 0–5.6)
HOLD SPECIMEN: NORMAL
INR BLD: 2.2 (ref 0.9–1.1)

## 2023-08-04 PROCEDURE — 36415 COLL VENOUS BLD VENIPUNCTURE: CPT | Performed by: FAMILY MEDICINE

## 2023-08-04 PROCEDURE — 91312 COVID-19 BIVALENT 12+ (PFIZER): CPT | Performed by: FAMILY MEDICINE

## 2023-08-04 PROCEDURE — 36416 COLLJ CAPILLARY BLOOD SPEC: CPT | Performed by: FAMILY MEDICINE

## 2023-08-04 PROCEDURE — G0438 PPPS, INITIAL VISIT: HCPCS | Performed by: FAMILY MEDICINE

## 2023-08-04 PROCEDURE — 0124A COVID-19 BIVALENT 12+ (PFIZER): CPT | Performed by: FAMILY MEDICINE

## 2023-08-04 PROCEDURE — 85610 PROTHROMBIN TIME: CPT | Performed by: FAMILY MEDICINE

## 2023-08-04 PROCEDURE — 83036 HEMOGLOBIN GLYCOSYLATED A1C: CPT | Performed by: FAMILY MEDICINE

## 2023-08-04 ASSESSMENT — ENCOUNTER SYMPTOMS
SHORTNESS OF BREATH: 1
PARESTHESIAS: 0
ABDOMINAL PAIN: 0
FEVER: 0
JOINT SWELLING: 0
DIARRHEA: 0
ARTHRALGIAS: 0
NAUSEA: 0
NERVOUS/ANXIOUS: 0
MYALGIAS: 0
EYE PAIN: 0
WEAKNESS: 0
HEMATURIA: 0
CONSTIPATION: 0
CHILLS: 0
DIZZINESS: 0
HEARTBURN: 0
DYSURIA: 0
FREQUENCY: 1
HEMATOCHEZIA: 0
PALPITATIONS: 0
HEADACHES: 0
COUGH: 1
SORE THROAT: 0

## 2023-08-04 ASSESSMENT — PATIENT HEALTH QUESTIONNAIRE - PHQ9
10. IF YOU CHECKED OFF ANY PROBLEMS, HOW DIFFICULT HAVE THESE PROBLEMS MADE IT FOR YOU TO DO YOUR WORK, TAKE CARE OF THINGS AT HOME, OR GET ALONG WITH OTHER PEOPLE: NOT DIFFICULT AT ALL
SUM OF ALL RESPONSES TO PHQ QUESTIONS 1-9: 0
SUM OF ALL RESPONSES TO PHQ QUESTIONS 1-9: 0

## 2023-08-04 ASSESSMENT — ACTIVITIES OF DAILY LIVING (ADL): CURRENT_FUNCTION: NO ASSISTANCE NEEDED

## 2023-08-04 NOTE — PROGRESS NOTES
ANTICOAGULATION MANAGEMENT     Joe Mas 67 year old male is on warfarin with therapeutic INR result. (Goal INR 2.0-3.0)    Recent labs: (last 7 days)     08/04/23  0918   INR 2.2*       ASSESSMENT     Source(s): Chart Review  Previous INR was Therapeutic last visit; previously outside of goal range  Medication, diet, health changes since last INR chart reviewed; none identified         PLAN     Recommended plan for no diet, medication or health factor changes affecting INR     Dosing Instructions: Continue your current warfarin dose with next INR in 3 weeks       Summary  As of 8/4/2023      Full warfarin instructions:  2.5 mg every Mon, Thu; 5 mg all other days   Next INR check:  8/25/2023               Detailed voice message left for Joe with dosing instructions and follow up date.   Sent [employer] message with dosing and follow up instructions    Contact [phone] to schedule and with any changes, questions or concerns.     Education provided:   Please call back if any changes to your diet, medications or how you've been taking warfarin  Goal range and lab monitoring: goal range and significance of current result  Written instructions provided  Contact [phone] with any changes, questions or concerns.     Plan made per [facility] anticoagulation protocol    Makayla Wilson RN  Anticoagulation Clinic  8/4/2023    _______________________________________________________________________     Anticoagulation Episode Summary       Current INR goal:  2.0-3.0   TTR:  76.8 % (1 y)   Target end date:  Indefinite   Send INR reminders to:  MARIA ELENA YANG    Indications    Atrial fibrillation  unspecified type (H) [I48.91]  Long term current use of anticoagulant therapy [Z79.01]             Comments:               Anticoagulation Care Providers       Provider Role Specialty Phone number    Jolynn Cooper MD Referring Family Medicine [phone]

## 2023-08-04 NOTE — PROGRESS NOTES
"SUBJECTIVE:   Joe is a 67 year old who presents for Preventive Visit.      8/4/2023     9:07 AM   Additional Questions   Roomed by NELLIE Arredondo   Accompanied by self       Are you in the first 12 months of your Medicare coverage?  No    Healthy Habits:     In general, how would you rate your overall health?  Fair    Frequency of exercise:  2-3 days/week    Duration of exercise:  15-30 minutes    Do you usually eat at least 4 servings of fruit and vegetables a day, include whole grains    & fiber and avoid regularly eating high fat or \"junk\" foods?  Yes    Taking medications regularly:  Yes    Medication side effects:  None    Ability to successfully perform activities of daily living:  No assistance needed    Home Safety:  Throw rugs in the hallway and lack of grab bars in the bathroom    Hearing Impairment:  No hearing concerns    In the past 6 months, have you been bothered by leaking of urine?  No    In general, how would you rate your overall mental or emotional health?  Fair    Additional concerns today:  No      Have you ever done Advance Care Planning? (For example, a Health Directive, POLST, or a discussion with a medical provider or your loved ones about your wishes): No, advance care planning information given to patient to review.  Patient plans to discuss their wishes with loved ones or provider.      Fall risk  Fallen 2 or more times in the past year?: No  Any fall with injury in the past year?: No    Cognitive Screening   1) Repeat 3 items (Leader, Season, Table)    2) Clock draw: NORMAL  3) 3 item recall: Recalls 1 object   Results: NORMAL clock, 1-2 items recalled: COGNITIVE IMPAIRMENT LESS LIKELY    Mini-CogTM Copyright GIOVANNA Saxena. Licensed by the author for use in [facility]; reprinted with permission (marco [email or web address]). All rights reserved.      Do you have sleep apnea, excessive snoring or daytime drowsiness? : yes    Reviewed and updated as needed this visit by clinical staff           "        Reviewed and updated as needed this visit by Provider                 Social History     Tobacco Use    Smoking status: Former     Packs/day: 1.00     Years: 25.00     Pack years: 25.00     Types: Cigarettes     Start date: 1975     Quit date: 2000     Years since quittin.6    Smokeless tobacco: Never    Tobacco comments:     quit smoking    Substance Use Topics    Alcohol use: Yes     Comment: 2-5 beers per month             2023     9:27 AM   Alcohol Use   Prescreen: >3 drinks/day or >7 drinks/week? No       Do you have a current opioid prescription? No  Do you use any other controlled substances or medications that are not prescribed by a provider? None      Current providers sharing in care for this patient include:   Patient Care Team:  Jolynn Cooper MD as PCP - General (Family Practice)  Jolynn Cooper MD as Assigned PCP  Fidel Moise MD as MD (Urology)  LEATHA Quiles MD as Assigned Surgical Provider  Yazmin Price APRN CNP as Assigned Heart and Vascular Provider  No Ref-Primary, Physician    The following health maintenance items are reviewed in Epic and correct as of today:  Health Maintenance   Topic Date Due    MEDICARE ANNUAL WELLNESS VISIT  10/26/2020    COVID-19 Vaccine (6 - Moderna series) 2023    ADVANCE CARE PLANNING  2023    DIABETIC FOOT EXAM  2023    INFLUENZA VACCINE (1) 2023    LIPID  2024    MICROALBUMIN  2024    ANNUAL REVIEW OF HM ORDERS  2024    A1C  2024    PHQ-9  2024    EYE EXAM  02/15/2024    COLORECTAL CANCER SCREENING  2024    BMP  2024    FALL RISK ASSESSMENT  2024    Pneumococcal Vaccine: 65+ Years (3 - PPSV23 if available, else PCV20) 10/07/2024    DTAP/TDAP/TD IMMUNIZATION (3 - Td or Tdap) 2032    SPIROMETRY  Completed    HEPATITIS C SCREENING  Completed    COPD ACTION PLAN  Completed    DEPRESSION ACTION PLAN  Completed    ZOSTER IMMUNIZATION  Completed     "AORTIC ANEURYSM SCREENING (SYSTEM ASSIGNED)  Completed    IPV IMMUNIZATION  Aged Out    MENINGITIS IMMUNIZATION  Aged Out       Review of Systems   Constitutional:  Negative for chills and fever.   HENT:  Negative for congestion, ear pain, hearing loss and sore throat.    Eyes:  Negative for pain and visual disturbance.   Respiratory:  Positive for cough and shortness of breath.    Cardiovascular:  Negative for chest pain, palpitations and peripheral edema.   Gastrointestinal:  Negative for abdominal pain, constipation, diarrhea, heartburn, hematochezia and nausea.   Genitourinary:  Positive for frequency, impotence and urgency. Negative for dysuria, genital sores, hematuria and penile discharge.   Musculoskeletal:  Negative for arthralgias, joint swelling and myalgias.   Skin:  Negative for rash.   Neurological:  Negative for dizziness, weakness, headaches and paresthesias.   Psychiatric/Behavioral:  Negative for mood changes. The patient is not nervous/anxious.        OBJECTIVE:   /80   Pulse 69   Temp 97.6  F (36.4  C) (Tympanic)   Resp 16   Wt 112.4 kg (247 lb 14.4 oz)   SpO2 93%   BMI 36.61 kg/m   Estimated body mass index is 36.33 kg/m  as calculated from the following:    Height as of 6/15/23: 1.753 m (5' 9\").    Weight as of 6/15/23: 111.6 kg (246 lb).  Physical Exam  GENERAL: healthy, alert and no distress  EYES: Eyes grossly normal to inspection, PERRL and conjunctivae and sclerae normal  HENT: ear canals and TM's normal, nose and mouth without ulcers or lesions  NECK: no adenopathy, no asymmetry, masses, or scars and thyroid normal to palpation  RESP: lungs clear to auscultation - no rales, rhonchi or wheezes  CV: regular rate and rhythm, normal S1 S2, no S3 or S4, no murmur, click or rub, no peripheral edema and peripheral pulses strong  ABDOMEN: soft, nontender, no hepatosplenomegaly, no masses and bowel sounds normal  MS: no gross musculoskeletal defects noted, no edema  SKIN: no suspicious " "lesions or rashes  NEURO: Normal strength and tone, mentation intact and speech normal  PSYCH: mentation appears normal, affect normal/bright  Diabetic foot exam: normal DP and PT pulses, no trophic changes or ulcerative lesions, and normal sensory exam      ASSESSMENT / PLAN:   (Z00.00) Encounter for Medicare annual wellness exam  (primary encounter diagnosis)  Comment: We discussed exercise, diet, sleep, vaccines       (E11.8) Type 2 diabetes mellitus with complication, without long-term current use of insulin (H)  Comment: doing well on current meds. The patient indicates understanding of these issues and agrees with the plan.   Plan:     (I48.91) Atrial fibrillation, unspecified type (H)  Comment: happy that he had ablation - feeling very good   Plan:       (J44.1) Obstructive chronic bronchitis with exacerbation (H)  Comment: stable. Not smoking anymore   Plan:     (G47.33) JUNG (obstructive sleep apnea)  Comment: using his cpap   Plan:       COUNSELING:  Reviewed preventive health counseling, as reflected in patient instructions       Regular exercise       Healthy diet/nutrition      BMI:   Estimated body mass index is 36.33 kg/m  as calculated from the following:    Height as of 6/15/23: 1.753 m (5' 9\").    Weight as of 6/15/23: 111.6 kg (246 lb).   Weight management plan: Discussed healthy diet and exercise guidelines      He reports that he quit smoking about 23 years ago. His smoking use included cigarettes. He started smoking about 48 years ago. He has a 25.00 pack-year smoking history. He has never used smokeless tobacco.      Appropriate preventive services were discussed with this patient, including applicable screening as appropriate for cardiovascular disease, diabetes, osteopenia/osteoporosis, and glaucoma.  As appropriate for age/gender, discussed screening for colorectal cancer, prostate cancer, breast cancer, and cervical cancer. Checklist reviewing preventive services available has been given to " the patient.    Reviewed patients plan of care and provided an AVS. The Basic Care Plan (routine screening as documented in Health Maintenance) for oJe meets the Care Plan requirement. This Care Plan has been established and reviewed with the Patient.          Jolynn Cooper MD  [facility]    Identified Health Risks:  I have reviewed Opioid Use Disorder and Substance Use Disorder risk factors and made any needed referrals.

## 2023-08-04 NOTE — PATIENT INSTRUCTIONS
Patient Education   Personalized Prevention Plan  You are due for the preventive services outlined below.  Your care team is available to assist you in scheduling these services.  If you have already completed any of these items, please share that information with your care team to update in your medical record.  Health Maintenance Due   Topic Date Due     Annual Wellness Visit  10/26/2020     COVID-19 Vaccine (6 - Moderna series) 01/27/2023     Discuss Advance Care Planning  02/27/2023     Diabetic Foot Exam  08/08/2023

## 2023-08-04 NOTE — PROGRESS NOTES
SUBJECTIVE:   Joe is a 67 year old who presents for Preventive Visit.      2023     9:07 AM   Additional Questions   Roomed by NELLIE Arredondo   Accompanied by ***       Are you in the first 12 months of your Medicare coverage?  { :724923}    HPI      Have you ever done Advance Care Planning? (For example, a Health Directive, POLST, or a discussion with a medical provider or your loved ones about your wishes): { :255343}    {Hearing Test Done (Optional):300399}   Fall risk  { :404279}  {If any of the above assessments are answered yes, consider ordering appropriate referrals (Optional):719171}  Cognitive Screening { :553741}    {Do you have sleep apnea, excessive snoring or daytime drowsiness? (Optional):510811}    Reviewed and updated as needed this visit by clinical staff                  Reviewed and updated as needed this visit by Provider                 Social History     Tobacco Use     Smoking status: Former     Packs/day: 1.00     Years: 25.00     Pack years: 25.00     Types: Cigarettes     Start date: 1975     Quit date: 2000     Years since quittin.6     Smokeless tobacco: Never     Tobacco comments:     quit smoking    Substance Use Topics     Alcohol use: Yes     Comment: 2-5 beers per month     {Rooming staff  Click this link to complete the Prescreen if response below is not for today's visit  Alcohol Use Prescreen >3 drinks/day or > 7 drinks/week.  If the prescreen question answer is YES, complete the full AUDIT  :664357}        2023     9:27 AM   Alcohol Use   Prescreen: >3 drinks/day or >7 drinks/week? No   {add AUDIT responses (Optional) (A score of 7 for adult men is an indication of hazardous drinking; a score of 8 or more is an indication of an alcohol use disorder.  A score of 7 or more for adult women is an indication of hazardous drinking or an alchohol use disorder):706967}  Do you have a current opioid prescription? { :760166}  Do you use any other controlled  substances or medications that are not prescribed by a provider? {Substance Use :011246}  {Provider  If there are gaps in the social history shown above, please follow the link and refresh the note Link to Social and Substance History :104377}    {Outside tests to abstract? :359783}    {additional problems to add (Optional):302279}    Current providers sharing in care for this patient include: {Rooming staff:  Please update Care Team from storyboard, refresh this note and then delete this statement}  Patient Care Team:  Jolynn Cooper MD as PCP - General (Family Practice)  Jolynn Cooper MD as Assigned PCP  Fidel Moise MD as MD (Urology)  LEATHA Quiles MD as Assigned Surgical Provider  Yazmin Price APRN CNP as Assigned Heart and Vascular Provider  No Ref-Primary, Physician    The following health maintenance items are reviewed in Epic and correct as of today:  Health Maintenance   Topic Date Due     MEDICARE ANNUAL WELLNESS VISIT  10/26/2020     COVID-19 Vaccine (6 - Moderna series) 01/27/2023     ADVANCE CARE PLANNING  02/27/2023     DIABETIC FOOT EXAM  08/08/2023     INFLUENZA VACCINE (1) 09/01/2023     LIPID  02/03/2024     MICROALBUMIN  02/03/2024     ANNUAL REVIEW OF HM ORDERS  02/03/2024     A1C  02/04/2024     PHQ-9  02/04/2024     EYE EXAM  02/15/2024     COLORECTAL CANCER SCREENING  03/09/2024     BMP  06/07/2024     FALL RISK ASSESSMENT  08/04/2024     Pneumococcal Vaccine: 65+ Years (3 - PPSV23 if available, else PCV20) 10/07/2024     DTAP/TDAP/TD IMMUNIZATION (3 - Td or Tdap) 02/01/2032     SPIROMETRY  Completed     HEPATITIS C SCREENING  Completed     COPD ACTION PLAN  Completed     DEPRESSION ACTION PLAN  Completed     ZOSTER IMMUNIZATION  Completed     AORTIC ANEURYSM SCREENING (SYSTEM ASSIGNED)  Completed     IPV IMMUNIZATION  Aged Out     MENINGITIS IMMUNIZATION  Aged Out     {Chronicprobdata (optional):030017}  {Decision Support (Optional):439123}        Review of Systems  {ROS  "COMP (Optional):794300}    OBJECTIVE:   There were no vitals taken for this visit. Estimated body mass index is 36.33 kg/m  as calculated from the following:    Height as of 6/15/23: 1.753 m (5' 9\").    Weight as of 6/15/23: 111.6 kg (246 lb).  Physical Exam  {Exam (Optional) :320430}    {Diagnostic Test Results (Optional):385164}    ASSESSMENT / PLAN:   {Diag Picklist:483984}    {Patient advised of split billing (Optional):353340}      COUNSELING:  {Medicare Counselin}      BMI:   Estimated body mass index is 36.33 kg/m  as calculated from the following:    Height as of 6/15/23: 1.753 m (5' 9\").    Weight as of 6/15/23: 111.6 kg (246 lb).   {Weight Management Plan needed for ACO:622523}      He reports that he quit smoking about 23 years ago. His smoking use included cigarettes. He started smoking about 48 years ago. He has a 25.00 pack-year smoking history. He has never used smokeless tobacco.      Appropriate preventive services were discussed with this patient, including applicable screening as appropriate for cardiovascular disease, diabetes, osteopenia/osteoporosis, and glaucoma.  As appropriate for age/gender, discussed screening for colorectal cancer, prostate cancer, breast cancer, and cervical cancer. Checklist reviewing preventive services available has been given to the patient.    Reviewed patients plan of care and provided an AVS. The {CarePlan:147984} for Joe meets the Care Plan requirement. This Care Plan has been established and reviewed with the {PATIENT, FAMILY MEMBER, CAREGIVER:386650}.    {Counseling Resources  [place] Preventive Services Task Force  Cholesterol Screening  Health diet/nutrition  Pooled Cohorts Equation Calculator  USDA's MyPlate  ASA Prophylaxis  Lung CA Screening  Osteoporosis prevention/bone health :713172}  {Prostate Cancer Screening  Consider for men 55-69 per guidance from USPSTF :865498}    Jolynn Cooper MD  [facility] " Risks:  {Medicare required documentation of substance and opioid use disorders screening :819546}

## 2023-08-07 ENCOUNTER — TELEPHONE (OUTPATIENT)
Dept: PHARMACY | Facility: CLINIC | Age: 68
End: 2023-08-07
Payer: COMMERCIAL

## 2023-08-07 NOTE — TELEPHONE ENCOUNTER
MTM referral from: Patient's insurance ([employer] products)    MTM referral outreach attempt #3 on August 7, 2023 at 11:39 AM      Outcome: Left Message w/ appt line    Yazmin Montes, Pharm.D.,[facility]  Board Certified Geriatric Pharmacist  Medication Therapy Management Pharmacist  [phone]

## 2023-08-11 DIAGNOSIS — E11.9 TYPE 2 DIABETES MELLITUS WITHOUT COMPLICATION, WITHOUT LONG-TERM CURRENT USE OF INSULIN (H): ICD-10-CM

## 2023-08-11 RX ORDER — BLOOD SUGAR DIAGNOSTIC
STRIP MISCELLANEOUS
Qty: 200 STRIP | Refills: 0 | Status: SHIPPED | OUTPATIENT
Start: 2023-08-11 | End: 2023-11-20

## 2023-08-17 ENCOUNTER — TELEPHONE (OUTPATIENT)
Dept: FAMILY MEDICINE | Facility: CLINIC | Age: 68
End: 2023-08-17
Payer: COMMERCIAL

## 2023-08-17 NOTE — TELEPHONE ENCOUNTER
Patient calls the clinic, he tried to  his metoprolol script but the pharmacy told him his current dose was discontinued, the cardiologist decreased the dose to now 25 mg daily or half tablet of 50 mg 24 hour tablet daily, see cardiology visit on 6-15-23.    Called the [facility] pharmacy, provided verbal order to pharmacist Armani for Metoprolol Succinate ER 50 mg 24 hour tablet to take a half tablet (25 mg) orally daily for quantity 45 tablets and one refill per NP Yazmin Price with read back verification.       Patient is notified.      TRINI Ndiaye

## 2023-09-01 ENCOUNTER — TELEPHONE (OUTPATIENT)
Dept: ANTICOAGULATION | Facility: CLINIC | Age: 68
End: 2023-09-01
Payer: COMMERCIAL

## 2023-09-07 ENCOUNTER — LAB (OUTPATIENT)
Dept: LAB | Facility: CLINIC | Age: 68
End: 2023-09-07
Payer: COMMERCIAL

## 2023-09-07 ENCOUNTER — ANTICOAGULATION THERAPY VISIT (OUTPATIENT)
Dept: ANTICOAGULATION | Facility: CLINIC | Age: 68
End: 2023-09-07

## 2023-09-07 DIAGNOSIS — I48.91 ATRIAL FIBRILLATION, UNSPECIFIED TYPE (H): ICD-10-CM

## 2023-09-07 LAB — INR BLD: 2.5 (ref 0.9–1.1)

## 2023-09-07 PROCEDURE — 85610 PROTHROMBIN TIME: CPT

## 2023-09-07 PROCEDURE — 36416 COLLJ CAPILLARY BLOOD SPEC: CPT

## 2023-09-07 NOTE — PROGRESS NOTES
ANTICOAGULATION MANAGEMENT     Joe Mas 67 year old male is on warfarin with therapeutic INR result. (Goal INR 2.0-3.0)    Recent labs: (last 7 days)     09/07/23  1256   INR 2.5*       ASSESSMENT     Source(s): Chart Review and Patient/Caregiver Call     Warfarin doses taken: Warfarin taken as instructed  Diet: No new diet changes identified  Medication/supplement changes: None noted  New illness, injury, or hospitalization: No  Signs or symptoms of bleeding or clotting: No  Previous result: Therapeutic last 2(+) visits  Additional findings: None       PLAN     Recommended plan for no diet, medication or health factor changes affecting INR     Dosing Instructions: Continue your current warfarin dose with next INR in 6 weeks       Summary  As of 9/7/2023      Full warfarin instructions:  2.5 mg every Mon, Thu; 5 mg all other days   Next INR check:  10/20/2023               Telephone call with Joe who verbalizes understanding and agrees to plan    Lab visit scheduled    Education provided:   Contact [phone] with any changes, questions or concerns.     Plan made per [facility] anticoagulation protocol    Betty Portillo RN  Anticoagulation Clinic  9/7/2023    _______________________________________________________________________     Anticoagulation Episode Summary       Current INR goal:  2.0-3.0   TTR:  76.8 % (1 y)   Target end date:  Indefinite   Send INR reminders to:  MARIA ELENA YANG    Indications    Atrial fibrillation  unspecified type (H) [I48.91]  Long term current use of anticoagulant therapy [Z79.01]             Comments:               Anticoagulation Care Providers       Provider Role Specialty Phone number    Jolynn Cooper MD Referring Family Medicine [phone]

## 2023-09-11 DIAGNOSIS — E11.9 TYPE 2 DIABETES MELLITUS WITHOUT COMPLICATION, WITHOUT LONG-TERM CURRENT USE OF INSULIN (H): ICD-10-CM

## 2023-09-11 RX ORDER — METFORMIN HCL 500 MG
1000 TABLET, EXTENDED RELEASE 24 HR ORAL 2 TIMES DAILY WITH MEALS
Qty: 360 TABLET | Refills: 1 | Status: SHIPPED | OUTPATIENT
Start: 2023-09-11 | End: 2024-02-06

## 2023-09-11 NOTE — TELEPHONE ENCOUNTER
"Prescription approved per [facility] Refill Protocol.     Requested Prescriptions   Pending Prescriptions Disp Refills    metFORMIN (GLUCOPHAGE XR) 500 MG 24 hr tablet [Pharmacy Med Name: METFORMIN HCL ER 500MG TB24] 360 tablet 1     Sig: TAKE 2 TABLETS (1,000 MG) BY MOUTH 2 TIMES DAILY WITH MEALS.       Biguanide Agents Passed - 9/11/2023  5:01 AM        Passed - Patient is age 10 or older        Passed - Patient has documented A1c within the specified period of time.     If HgbA1C is 8 or greater, it needs to be on file within the past 3 months.  If less than 8, must be on file within the past 6 months.     Recent Labs   Lab Test 08/04/23  0917   A1C 6.8*             Passed - Patient's CR is NOT>1.4 OR Patient's EGFR is NOT<45 within past 12 mos.     Recent Labs   Lab Test 06/07/23  1048 01/21/22  1144 04/09/21  0900   GFRESTIMATED >90   < > 79   GFRESTBLACK  --   --  >90    < > = values in this interval not displayed.       Recent Labs   Lab Test 06/07/23  1048   CR 0.85             Passed - Patient does NOT have a diagnosis of CHF.        Passed - Medication is active on med list        Passed - Recent (6 mo) or future (30 days) visit within the authorizing provider's specialty     Patient had office visit in the last 6 months or has a visit in the next 30 days with authorizing provider or within the authorizing provider's specialty.  See \"Patient Info\" tab in inbasket, or \"Choose Columns\" in Meds & Orders section of the refill encounter.                     Tianna Orourke RN 09/11/23 1:25 PM   "

## 2023-09-27 DIAGNOSIS — E11.8 TYPE 2 DIABETES MELLITUS WITH COMPLICATION, WITHOUT LONG-TERM CURRENT USE OF INSULIN (H): Primary | ICD-10-CM

## 2023-09-28 RX ORDER — LANCETS 33 GAUGE
EACH MISCELLANEOUS
Qty: 200 EACH | Refills: 2 | Status: SHIPPED | OUTPATIENT
Start: 2023-09-28

## 2023-10-16 ENCOUNTER — APPOINTMENT (OUTPATIENT)
Dept: GENERAL RADIOLOGY | Facility: CLINIC | Age: 68
End: 2023-10-16
Attending: EMERGENCY MEDICINE
Payer: COMMERCIAL

## 2023-10-16 ENCOUNTER — HOSPITAL ENCOUNTER (EMERGENCY)
Facility: CLINIC | Age: 68
Discharge: HOME | End: 2023-10-16
Attending: EMERGENCY MEDICINE | Admitting: EMERGENCY MEDICINE
Payer: COMMERCIAL

## 2023-10-16 ENCOUNTER — ALLIED HEALTH/NURSE VISIT (OUTPATIENT)
Dept: FAMILY MEDICINE | Facility: CLINIC | Age: 68
End: 2023-10-16
Payer: COMMERCIAL

## 2023-10-16 VITALS
HEIGHT: 69 IN | BODY MASS INDEX: 37.03 KG/M2 | TEMPERATURE: 96.8 F | RESPIRATION RATE: 22 BRPM | OXYGEN SATURATION: 93 % | SYSTOLIC BLOOD PRESSURE: 138 MMHG | WEIGHT: 250 LBS | HEART RATE: 57 BPM | DIASTOLIC BLOOD PRESSURE: 102 MMHG

## 2023-10-16 VITALS — SYSTOLIC BLOOD PRESSURE: 163 MMHG | DIASTOLIC BLOOD PRESSURE: 94 MMHG | HEART RATE: 62 BPM

## 2023-10-16 DIAGNOSIS — I10 ESSENTIAL HYPERTENSION, BENIGN: Primary | ICD-10-CM

## 2023-10-16 DIAGNOSIS — Z86.79 HISTORY OF HYPERTENSION: ICD-10-CM

## 2023-10-16 DIAGNOSIS — R07.9 CHEST PAIN, UNSPECIFIED TYPE: ICD-10-CM

## 2023-10-16 LAB
ALBUMIN SERPL BCG-MCNC: 4.2 G/DL (ref 3.5–5.2)
ALP SERPL-CCNC: 55 U/L (ref 40–129)
ALT SERPL W P-5'-P-CCNC: 28 U/L (ref 0–70)
ANION GAP SERPL CALCULATED.3IONS-SCNC: 8 MMOL/L (ref 7–15)
AST SERPL W P-5'-P-CCNC: 18 U/L (ref 0–45)
BASO+EOS+MONOS # BLD AUTO: NORMAL 10*3/UL
BASO+EOS+MONOS NFR BLD AUTO: NORMAL %
BASOPHILS # BLD AUTO: 0.1 10E3/UL (ref 0–0.2)
BASOPHILS NFR BLD AUTO: 1 %
BILIRUB SERPL-MCNC: 0.4 MG/DL
BUN SERPL-MCNC: 13.9 MG/DL (ref 8–23)
CALCIUM SERPL-MCNC: 10 MG/DL (ref 8.8–10.2)
CHLORIDE SERPL-SCNC: 101 MMOL/L (ref 98–107)
CREAT SERPL-MCNC: 0.87 MG/DL (ref 0.67–1.17)
D DIMER PPP FEU-MCNC: <0.27 UG/ML FEU (ref 0–0.5)
DEPRECATED HCO3 PLAS-SCNC: 27 MMOL/L (ref 22–29)
EGFRCR SERPLBLD CKD-EPI 2021: >90 ML/MIN/1.73M2
EOSINOPHIL # BLD AUTO: 0.2 10E3/UL (ref 0–0.7)
EOSINOPHIL NFR BLD AUTO: 3 %
ERYTHROCYTE [DISTWIDTH] IN BLOOD BY AUTOMATED COUNT: 12.4 % (ref 10–15)
GLUCOSE SERPL-MCNC: 153 MG/DL (ref 70–99)
HCT VFR BLD AUTO: 44.6 % (ref 40–53)
HGB BLD-MCNC: 15.1 G/DL (ref 13.3–17.7)
HOLD SPECIMEN: NORMAL
IMM GRANULOCYTES # BLD: 0.1 10E3/UL
IMM GRANULOCYTES NFR BLD: 1 %
LYMPHOCYTES # BLD AUTO: 1.8 10E3/UL (ref 0.8–5.3)
LYMPHOCYTES NFR BLD AUTO: 25 %
MCH RBC QN AUTO: 31.6 PG (ref 26.5–33)
MCHC RBC AUTO-ENTMCNC: 33.9 G/DL (ref 31.5–36.5)
MCV RBC AUTO: 93 FL (ref 78–100)
MONOCYTES # BLD AUTO: 0.8 10E3/UL (ref 0–1.3)
MONOCYTES NFR BLD AUTO: 11 %
NEUTROPHILS # BLD AUTO: 4.5 10E3/UL (ref 1.6–8.3)
NEUTROPHILS NFR BLD AUTO: 59 %
NRBC # BLD AUTO: 0 10E3/UL
NRBC BLD AUTO-RTO: 0 /100
NT-PROBNP SERPL-MCNC: 168 PG/ML (ref 0–900)
PLATELET # BLD AUTO: 186 10E3/UL (ref 150–450)
POTASSIUM SERPL-SCNC: 4.3 MMOL/L (ref 3.4–5.3)
PROT SERPL-MCNC: 6.3 G/DL (ref 6.4–8.3)
RBC # BLD AUTO: 4.78 10E6/UL (ref 4.4–5.9)
SODIUM SERPL-SCNC: 136 MMOL/L (ref 135–145)
TROPONIN T SERPL HS-MCNC: 26 NG/L
TROPONIN T SERPL HS-MCNC: 28 NG/L
WBC # BLD AUTO: 7.5 10E3/UL (ref 4–11)

## 2023-10-16 PROCEDURE — 80053 COMPREHEN METABOLIC PANEL: CPT | Performed by: EMERGENCY MEDICINE

## 2023-10-16 PROCEDURE — 99285 EMERGENCY DEPT VISIT HI MDM: CPT | Mod: 25 | Performed by: EMERGENCY MEDICINE

## 2023-10-16 PROCEDURE — 85025 COMPLETE CBC W/AUTO DIFF WBC: CPT | Performed by: EMERGENCY MEDICINE

## 2023-10-16 PROCEDURE — 93005 ELECTROCARDIOGRAM TRACING: CPT | Performed by: EMERGENCY MEDICINE

## 2023-10-16 PROCEDURE — 99284 EMERGENCY DEPT VISIT MOD MDM: CPT | Mod: 25 | Performed by: EMERGENCY MEDICINE

## 2023-10-16 PROCEDURE — 85379 FIBRIN DEGRADATION QUANT: CPT | Performed by: EMERGENCY MEDICINE

## 2023-10-16 PROCEDURE — 83880 ASSAY OF NATRIURETIC PEPTIDE: CPT | Performed by: EMERGENCY MEDICINE

## 2023-10-16 PROCEDURE — 99207 PR NO CHARGE NURSE ONLY: CPT

## 2023-10-16 PROCEDURE — 93010 ELECTROCARDIOGRAM REPORT: CPT | Performed by: EMERGENCY MEDICINE

## 2023-10-16 PROCEDURE — 36415 COLL VENOUS BLD VENIPUNCTURE: CPT | Performed by: EMERGENCY MEDICINE

## 2023-10-16 PROCEDURE — 84484 ASSAY OF TROPONIN QUANT: CPT | Performed by: EMERGENCY MEDICINE

## 2023-10-16 PROCEDURE — 71046 X-RAY EXAM CHEST 2 VIEWS: CPT

## 2023-10-16 ASSESSMENT — ACTIVITIES OF DAILY LIVING (ADL)
ADLS_ACUITY_SCORE: 35

## 2023-10-16 NOTE — ED TRIAGE NOTES
Pt sent from [facility] with chest heaviness with pain into left arm that started yesterday. Pt also c/o some SOB. Pt also concerned because his BP is elevated 166/101, similar at home with his machine.      Triage Assessment (Adult)       Row Name 10/16/23 1131          Triage Assessment    Airway WDL WDL        Respiratory WDL    Respiratory WDL X;rhythm/pattern     Rhythm/Pattern, Respiratory shortness of breath        Cardiac WDL    Cardiac WDL X;chest pain        Chest Pain Assessment    Chest Pain Location midsternal     Character --  heaviness        Cognitive/Neuro/Behavioral WDL    Cognitive/Neuro/Behavioral WDL WDL

## 2023-10-16 NOTE — ED NOTES
"   Pt arrived via triage, ambulatory, in no acute distress.  Pt reports chest heaviness and L arm pain that started yesterday.  Pt did go to Clinic this am and was sent to ED for evaluation.        Pt is morbidly Obese, HTH, sedenatary life, recent stress of death in family.  Recent ablation for A-fib.  On blood thinners.  Pt with COPD and has not been taking his Neb treatment for 2 weeks, due to \"no symptoms\".      Pt placed on full monitors, SB with low voltage noted.  12 lead completed and given to MD.  20g IV started in R AC, labs drawn and sent.  Heart:  Very distant and quiet.  Pulses +3 RR, no edema noted, Abd girth pt reports is per his normal  Lungs:  CTA bila, quiet  Abd:  Morbidly obese, firm, + BS x 4  PLAN:  Will continue to monitor closely and await MD assessment and orders.    "

## 2023-10-16 NOTE — PROGRESS NOTES
Patient presents to the Clinic for concern of hypertension    Patient reports he was feeling off this morning and checked his blood pressure   BP reading was 160's/110's  Patient states he had some chest heaviness and left arm discomfort as well   Symptoms started around 0700 today     See flowsheet for Bps in Clinic   Patient's home BP cuff corresponded with Clinic readings     Patient's heart rate is regular upon auscultation  Lung sounds are diminished and faint expiratory wheezes heard    Patient states chest heaviness is generalized but when he is talking about the pain points to his left chest  Reporting dyspnea with exertion - states this is mostly unchanged from baseline as he has a history of COPD  Reporting pain to posterior left bicep into axilla as well     Patient states his blood pressures have been well controlled prior to today  Also states his COPD has been well controlled with current regimen  Patient states he was using his nebulizer routinely for awhile due to oxygen readings in the low 90's but has not had to use for some time as levels have been good     Patient had a cardiac ablation 4 months ago for A-Fib  Metoprolol dosing was adjusted at that time - no other medication changes       Discussed with Jennifer Rodriguez assessed patient and recommends ED evaluation  Patient okay to drive to ED but was advised if symptoms worsen or change, he will need to stop and call 911 for EMS transport    Patient verbalized understanding and agrees with plan  Patient will go to [place] [facility] for evaluation    Call placed to [place] [facility]  Provided report to Charge Nurse     Edinson Kumari, RN

## 2023-10-16 NOTE — DISCHARGE INSTRUCTIONS
Return if symptoms worsen or new symptoms develop.  Follow-up with primary care physician later this week for recheck and consideration of possible stress test    If chest pain returns or new symptoms develop including shortness of breath focal numbness weakness in any extremity or other symptoms please return for recheck.  Continue current medications.

## 2023-10-17 ENCOUNTER — TELEPHONE (OUTPATIENT)
Dept: ANTICOAGULATION | Facility: CLINIC | Age: 68
End: 2023-10-17

## 2023-10-17 NOTE — TELEPHONE ENCOUNTER
"ANTICOAGULATION  MANAGEMENT: Discharge Review    Joe Mas chart reviewed for anticoagulation continuity of care    Emergency room visit on 10/16/23 for chest heaviness.    Discharge disposition: Home    Results:    No results for input(s): \"INR\", \"[id number]\", \"F2\", \"ALMWH\", \"AAUFH\" in the last 168 hours.  Anticoagulation inpatient management:     not applicable     Anticoagulation discharge instructions:     Warfarin dosing: home regimen continued   Bridging: No   INR goal change: No      Medication changes affecting anticoagulation: No    Additional factors affecting anticoagulation: No     PLAN     Recommend to check INR on 10/20/23    Patient not contacted.  Keep INR as scheduled.    No adjustment to Anticoagulation Calendar was required    Jolynn Johnson RN   "

## 2023-10-18 NOTE — ED PROVIDER NOTES
History     Chief Complaint   Patient presents with     Hypertension     Coming from [facility].  Left sided chest heaviness.  Provider accessed, ok sending via private car     Chest Pain     HPI  Joe Mas is a 67 year old male with past medical history significant for obstructive chronic bronchitis hypertension diabetes type 2 hyperlipidemia atrial fibrillation obstructive sleep apnea who presents to the emergency department complaining of left-sided chest heaviness.  Patient began having left sided chest heaviness and arm pain that started yesterday patient went into the clinic today where he was seen and advised to follow-up with emergency department for further evaluation and care.  Pain is an aching pain that is actually improved since he arrived.  Blood pressure was noted to be elevated in clinic.  Denies any falls denies any shortness of breath at this time he has not had any recent illness denies any headache visual changes abdominal pain back pain focal numbness weakness in extremity or bowel or bladder dysfunction.  He has not had any calf pain rash.    Allergies:  Allergies   Allergen Reactions     Lisinopril Cough       Problem List:    Patient Active Problem List    Diagnosis Date Noted     S/P ablation of atrial fibrillation 08/04/2023     Priority: Medium     2023        Benign neoplasm of cecum 03/13/2023     Priority: Medium     Benign neoplasm of transverse colon 03/13/2023     Priority: Medium     Benign neoplasm of ascending colon 03/13/2023     Priority: Medium     Diverticular disease of large intestine 03/09/2023     Priority: Medium     Hydrocele in adult 05/10/2022     Priority: Medium     Moderate major depression (H) 02/01/2022     Priority: Medium     Quit smoking 04/09/2021     Priority: Medium     a few years ago  30+ pack year hx        JUNG (obstructive sleep apnea) 05/11/2018     Priority: Medium     Moderate to possible severe JUNG, potential for significant hypoxemia and  borderline sustained hypoxemia   - Suspect AHI was actually under-reported given suspected sleep onset was not until ~4am (recording from [place] to 9am).      Home Sleep Apnea Testing - 5/4/2018: 259 lbs 0 oz: AHI 15.6/hr. Supine AHI 15.6/hr.   Oxygen Codey of 81%.  Baseline 88.5%.  Sp02 =< 88% for majority of recording  He slept on his back (11.9%), prone (0%), left (5.8) and right (72.8%) sides.   Analysis time: 483.1 minutes.            Long term current use of anticoagulant therapy 02/27/2018     Priority: Medium     Atrial fibrillation, unspecified type (H) 02/27/2018     Priority: Medium     Morbid obesity (H) 02/27/2018     Priority: Medium     Tubular adenoma of colon 08/16/2017     Priority: Medium     Collected: 8/11/2017   Received: 8/14/2017   Reported: 8/15/2017 17:28   Ordering Phy(s): AL MORELAND     For improved result formatting, select 'View Enhanced Report Format'   under Linked Documents section.     SPECIMEN(S):   A: Colon polyps at 20 cm   B: Colon polyp, ascending     FINAL DIAGNOSIS:   A.  Colon at 20 cm, grossly biopsies:   -Tubular adenoma and fragments of hyperplastic polyp   - Negative for high-grade dysplasia and malignancy.     B.  Right colon, mucosal biopsy:   - Tubular adenoma.   - Negative for high-grade dysplasia and malignancy.        Hyperlipidemia with target LDL less than 100 02/14/2014     Priority: Medium     Diagnosis updated by automated process. Provider to review and confirm.       Type 2 diabetes mellitus with complication, without long-term current use of insulin (H) 10/29/2013     Priority: Medium     Advanced directives, counseling/discussion 11/03/2011     Priority: Medium     Advance Directive Problem List Overview:   Name Relationship Phone    Primary Health Care Agent            Alternative Health Care Agent          Discussed advance care planning with patient; information given to patient to review.     Flavia Duggan CMA, HC Facilitator    11/3/2011           Obstructive chronic bronchitis with exacerbation (H) 2007     Priority: Medium     Essential hypertension, benign 2007     Priority: Medium        Past Medical History:    Past Medical History:   Diagnosis Date     A-fib (H) 2018     COPD (chronic obstructive pulmonary disease) (H)      Diabetes (H)      Hypertension        Past Surgical History:    Past Surgical History:   Procedure Laterality Date     ABDOMEN SURGERY       COLONOSCOPY  2004    Follow up colonoscopy in 5 years     EP ABLATION FOCAL AFIB N/A 2023    Procedure: Ablation Atrial Fibrilation;  Surgeon: Jay Barrientos MD;  Location:  [facility]     HERNIA REPAIR, UMBILICAL       KNEE SURGERY  's    Left       Family History:    Family History   Problem Relation Age of Onset     Osteoporosis Mother      Heart Disease Mother      Neurologic Disorder Father         passed away from a brain tumor age 48     Cerebrovascular Disease Father      Neurologic Disorder Maternal Grandmother         parkinsons     C.A.D. Maternal Grandmother      Diabetes Maternal Grandmother      Alcohol/Drug Maternal Grandfather      Cancer Maternal Grandfather         esphogus     Cancer Paternal Grandfather         lung     Diabetes Sister      Hypertension Sister      Asthma Daughter      Cerebrovascular Disease No family hx of      Breast Cancer No family hx of      Cancer - colorectal No family hx of        Social History:  Marital Status:  Single [1]  Social History     Tobacco Use     Smoking status: Former     Packs/day: 1.00     Years: 25.00     Additional pack years: 0.00     Total pack years: 25.00     Types: Cigarettes     Start date: 1975     Quit date: 2000     Years since quittin.8     Smokeless tobacco: Never     Tobacco comments:     quit smoking    Vaping Use     Vaping Use: Never used   Substance Use Topics     Alcohol use: Yes     Comment: 2-5 beers per month     Drug use: Yes     Types:  "Marijuana        Medications:    albuterol (PROAIR HFA/PROVENTIL HFA/VENTOLIN HFA) 108 (90 Base) MCG/ACT inhaler  atorvastatin (LIPITOR) 20 MG tablet  flecainide (TAMBOCOR) 100 MG tablet  ipratropium - albuterol 0.5 mg/2.5 mg/3 mL (DUONEB) 0.5-2.5 (3) MG/3ML neb solution  JANTOVEN ANTICOAGULANT 5 MG tablet  Lancets (ONETOUCH DELICA PLUS [id number]) MISC  losartan (COZAAR) 100 MG tablet  metFORMIN (GLUCOPHAGE XR) 500 MG 24 hr tablet  metoprolol succinate ER (TOPROL XL) 50 MG 24 hr tablet  mometasone-formoterol (DULERA) 200-5 MCG/ACT inhaler  Omega-3 Fatty Acids (FISH OIL PO)  ONETOUCH ULTRA test strip  Respiratory Therapy Supplies (NEBULIZER/TUBING/MOUTHPIECE) KIT  VITAMIN D PO  warfarin ANTICOAGULANT (COUMADIN) 2.5 MG tablet          Review of Systems  As per HPI.  Physical Exam   BP: (!) 166/101  Pulse: 58  Temp: 96.8  F (36  C)  Resp: 20  Height: 175.3 cm (5' 9\")  Weight: 113.4 kg (250 lb)  SpO2: 95 %      Physical Exam  Vitals and nursing note reviewed.   Constitutional:       General: He is not in acute distress.     Appearance: Normal appearance. He is not ill-appearing, toxic-appearing or diaphoretic.   HENT:      Head: Normocephalic and atraumatic.      Nose: Nose normal.      Mouth/Throat:      Mouth: Mucous membranes are moist.      Pharynx: Oropharynx is clear.   Eyes:      Conjunctiva/sclera: Conjunctivae normal.   Neck:      Comments: There is no JVD noted  Cardiovascular:      Rate and Rhythm: Normal rate and regular rhythm.      Pulses: Normal pulses.      Heart sounds: Normal heart sounds. No murmur heard.  Pulmonary:      Effort: Pulmonary effort is normal.      Breath sounds: Normal breath sounds. No stridor. No wheezing or rhonchi.   Abdominal:      General: Abdomen is flat. There is no distension.      Palpations: Abdomen is soft.      Tenderness: There is no abdominal tenderness.   Musculoskeletal:         General: No swelling or tenderness. Normal range of motion.      Cervical back: Normal range " of motion and neck supple.      Right lower leg: No edema.      Left lower leg: No edema.   Skin:     General: Skin is warm and dry.      Findings: No rash.   Neurological:      General: No focal deficit present.      Mental Status: He is alert and oriented to person, place, and time.      Sensory: No sensory deficit.      Motor: No weakness.      Coordination: Coordination normal.   Psychiatric:         Mood and Affect: Mood normal.         ED Course              ED Course as of 10/17/23 2005   Tue Oct 17, 2023   2001 Troponin T, High Sensitivity(!): 26     Procedures              EKG Interpretation:      Interpreted by Trevin Miller MD  Rhythm: sinus bradycardia  Rate: 50-60  Axis: Normal  Ectopy: none  Conduction: normal  ST Segments/ T Waves: Non-specific ST-T wave changes  Q Waves: none  Comparison to prior: Unchanged from 6/15/23    Clinical Impression: Sinus bradycardia with nonspecific ST-T wave abnormality.      Critical Care time:  none               Labs Ordered and Resulted from Time of ED Arrival to Time of ED Departure   TROPONIN T, HIGH SENSITIVITY - Abnormal       Result Value    Troponin T, High Sensitivity 28 (*)    COMPREHENSIVE METABOLIC PANEL - Abnormal    Sodium 136      Potassium 4.3      Carbon Dioxide (CO2) 27      Anion Gap 8      Urea Nitrogen 13.9      Creatinine 0.87      GFR Estimate >90      Calcium 10.0      Chloride 101      Glucose 153 (*)     Alkaline Phosphatase 55      AST 18      ALT 28      Protein Total 6.3 (*)     Albumin 4.2      Bilirubin Total 0.4     TROPONIN T, HIGH SENSITIVITY - Abnormal    Troponin T, High Sensitivity 26 (*)    D DIMER QUANTITATIVE - Normal    D-Dimer Quantitative <0.27     NT PROBNP INPATIENT - Normal    N terminal Pro BNP Inpatient 168     CBC WITH PLATELETS AND DIFFERENTIAL    WBC Count 7.5      RBC Count 4.78      Hemoglobin 15.1      Hematocrit 44.6      MCV 93      MCH 31.6      MCHC 33.9      RDW 12.4      Platelet Count 186      %  Neutrophils 59      % Lymphocytes 25      % Monocytes 11      Mids % (Monos, Eos, Basos)        % Eosinophils 3      % Basophils 1      % Immature Granulocytes 1      NRBCs per 100 WBC 0      Absolute Neutrophils 4.5      Absolute Lymphocytes 1.8      Absolute Monocytes 0.8      Mids Abs (Monos, Eos, Basos)        Absolute Eosinophils 0.2      Absolute Basophils 0.1      Absolute Immature Granulocytes 0.1      Absolute NRBCs 0.0        Results for orders placed or performed during the hospital encounter of 10/16/23   Chest XR,  PA & LAT    Narrative    XR CHEST 2 VIEWS 10/16/2023 2:37 PM    HISTORY: chest pain    COMPARISON: 5/6/2023 and 11/25/2022      Impression    IMPRESSION: No acute cardiopulmonary process. Old healed left-sided  rib fracture deformity. Mild linear scarring in the right lung base is  stable.    RACHAEL HEDRICK MD         SYSTEM ID:  [id number]        Medications - No data to display    Assessments & Plan (with Medical Decision Making) records were reviewed.  Past medical history medications and allergies were reviewed.  Visit from family practice today was reviewed.  EKG was unchanged from previous.  Labs were obtained.  I independently reviewed and interpreted labs.  Patient's CBC was unremarkable.  Comprehensive metabolic panel without significant abnormality.  Patient's D-dimer was less than 0.27.  proBNP within normal limits.  Patient's initial troponin was 28-second troponin was 26.  Findings discussed in detail with patient.  X-ray was obtained.  I independently reviewed and interpreted imaging studies.  Chest x-ray revealed no obvious acute abnormality.  Old left-sided rib fracture.  Patient's blood pressure improved from previous.  Patient may need adjustments of his blood pressure over the next few days he needs to have this rechecked with his primary care to discuss this with him at this time I do not feel emergent blood pressure reduction is needed.  I do not think this is ACS EKG and  troponins are similar.  Patient is not having pain at this time.  I advised him to follow-up with his primary care and possibly have stress test set up if worsening chest pain shortness of breath weakness altered mental status or other symptoms present he should immediately return for further evaluation and care.  Patient is agreement this plan.     I have reviewed the nursing notes.    I have reviewed the findings, diagnosis, plan and need for follow up with the patient.         Discharge Medication List as of 10/16/2023  4:31 PM          Final diagnoses:   Chest pain, unspecified type   History of hypertension       10/16/2023   [facility] EMERGENCY DEPT       Phonge, Trevin Cardenas MD  10/17/23 2005

## 2023-10-20 ENCOUNTER — LAB (OUTPATIENT)
Dept: LAB | Facility: CLINIC | Age: 68
End: 2023-10-20
Payer: COMMERCIAL

## 2023-10-20 ENCOUNTER — ANTICOAGULATION THERAPY VISIT (OUTPATIENT)
Dept: ANTICOAGULATION | Facility: CLINIC | Age: 68
End: 2023-10-20

## 2023-10-20 DIAGNOSIS — Z79.01 LONG TERM CURRENT USE OF ANTICOAGULANT THERAPY: ICD-10-CM

## 2023-10-20 DIAGNOSIS — I48.91 ATRIAL FIBRILLATION, UNSPECIFIED TYPE (H): ICD-10-CM

## 2023-10-20 DIAGNOSIS — I48.91 ATRIAL FIBRILLATION, UNSPECIFIED TYPE (H): Primary | ICD-10-CM

## 2023-10-20 LAB — INR BLD: 2.6 (ref 0.9–1.1)

## 2023-10-20 PROCEDURE — 36416 COLLJ CAPILLARY BLOOD SPEC: CPT

## 2023-10-20 PROCEDURE — 85610 PROTHROMBIN TIME: CPT

## 2023-10-20 NOTE — PROGRESS NOTES
ANTICOAGULATION MANAGEMENT     Joe Mas 67 year old male is on warfarin with therapeutic INR result. (Goal INR 2.0-3.0)    Recent labs: (last 7 days)     10/20/23  1141   INR 2.6*       ASSESSMENT     Source(s): Chart Review  Previous INR was Therapeutic last 2(+) visits  Medication, diet, health changes since last INR chart reviewed; none identified         PLAN     Recommended plan for no diet, medication or health factor changes affecting INR     Dosing Instructions: Continue your current warfarin dose with next INR in 6 weeks       Summary  As of 10/20/2023      Full warfarin instructions:  2.5 mg every Mon, Thu; 5 mg all other days   Next INR check:  12/1/2023               Detailed voice message left for Joe with dosing instructions and follow up date.     Contact [phone] to schedule and with any changes, questions or concerns.     Education provided:   Please call back if any changes to your diet, medications or how you've been taking warfarin  Goal range and lab monitoring: goal range and significance of current result  Contact [phone] with any changes, questions or concerns.     Plan made per [facility] anticoagulation protocol    Makayla Wilson RN  Anticoagulation Clinic  10/20/2023    _______________________________________________________________________     Anticoagulation Episode Summary       Current INR goal:  2.0-3.0   TTR:  83.4% (1 y)   Target end date:  Indefinite   Send INR reminders to:  MARIA ELENA YANG    Indications    Atrial fibrillation  unspecified type (H) [I48.91]  Long term current use of anticoagulant therapy [Z79.01]             Comments:               Anticoagulation Care Providers       Provider Role Specialty Phone number    Jolynn Cooper MD Referring Family Medicine [phone]

## 2023-11-18 DIAGNOSIS — E11.9 TYPE 2 DIABETES MELLITUS WITHOUT COMPLICATION, WITHOUT LONG-TERM CURRENT USE OF INSULIN (H): ICD-10-CM

## 2023-11-20 RX ORDER — BLOOD SUGAR DIAGNOSTIC
STRIP MISCELLANEOUS
Qty: 200 STRIP | Refills: 2 | Status: SHIPPED | OUTPATIENT
Start: 2023-11-20

## 2023-12-05 ENCOUNTER — VIRTUAL VISIT (OUTPATIENT)
Dept: CARDIOLOGY | Facility: CLINIC | Age: 68
End: 2023-12-05
Payer: COMMERCIAL

## 2023-12-05 VITALS
BODY MASS INDEX: 36.77 KG/M2 | DIASTOLIC BLOOD PRESSURE: 81 MMHG | HEART RATE: 67 BPM | WEIGHT: 249 LBS | SYSTOLIC BLOOD PRESSURE: 134 MMHG

## 2023-12-05 DIAGNOSIS — I48.0 PAROXYSMAL ATRIAL FIBRILLATION (H): Primary | ICD-10-CM

## 2023-12-05 PROCEDURE — 99213 OFFICE O/P EST LOW 20 MIN: CPT | Mod: 95 | Performed by: INTERNAL MEDICINE

## 2023-12-05 NOTE — PROGRESS NOTES
Joe is a 68 year old who is being evaluated via a billable video visit.      How would you like to obtain your AVS? MyChart  If the video visit is dropped, the invitation should be resent by: Text to cell phone: [phone]  Will anyone else be joining your video visit? No        Video-Visit Details    Type of service:  Video Visit   Video Start Time: 11:24 AM  Video End Time:11:34 AM    Originating Location (pt. Location): Home    Distant Location (provider location):  On-site  Platform used for Video Visit: [employer]  A total of 30 minutes was spent with clinic visit, including chart review, including if available echo and cath images, and ECG, Holter, Event and coordinating care    General:  no apparent distress, normal body habitus, sitting upright.  ENT/Mouth:  membranes moist, no nasal discharge.  Normal head shape, no apparent injury or laceration.  Eyes:  no scleral icterus, normal conjunctivae.  No observed jaundice.  Neck:  no apparent neck swelling.   Chest/Lungs:  No breathing difficulty while speaking.  No audible wheezing.  No cough during conversation.  Cardiovascular:  No obviously elevated jugular venous pressure.  No apparent edema bilaterally in LE.   Abdomen:  no obvious abdominal distention.   Extremities:  no apparent cyanosis.  Skin:  no xanthelasma.  No facial lacerations.  Neurologic:  Normal arm motion bilateral, no tremors.    Psychiatric:  Alert, calm demeanor  Delightful pt with symptomatic flecainide resistance paroxysmal AF, s/p PVI last June by Dr. Barrientos. Doing well since without AF recurrence as documented by ECG and by history. Overall, doing well and please with how things are going. Too early to  long term success as pt is still on Flecainide. Will have pt stop it and reassess in 6-12 months. Continue with warfarin for now.    Nick Wallis MD

## 2023-12-05 NOTE — LETTER
12/5/2023    Jolynn Cooper MD  [address]  [facility] [address]    RE: Joe Mas       Dear Colleague,     I had the pleasure of seeing Joe Mas in the [facility].  Joe is a 68 year old who is being evaluated via a billable video visit.      How would you like to obtain your AVS? MyChart  If the video visit is dropped, the invitation should be resent by: Text to cell phone: [phone]  Will anyone else be joining your video visit? No        Video-Visit Details    Type of service:  Video Visit   Video Start Time: 11:24 AM  Video End Time:11:34 AM    Originating Location (pt. Location): Home    Distant Location (provider location):  On-site  Platform used for Video Visit: [employer]  A total of 30 minutes was spent with clinic visit, including chart review, including if available echo and cath images, and ECG, Holter, Event and coordinating care    General:  no apparent distress, normal body habitus, sitting upright.  ENT/Mouth:  membranes moist, no nasal discharge.  Normal head shape, no apparent injury or laceration.  Eyes:  no scleral icterus, normal conjunctivae.  No observed jaundice.  Neck:  no apparent neck swelling.   Chest/Lungs:  No breathing difficulty while speaking.  No audible wheezing.  No cough during conversation.  Cardiovascular:  No obviously elevated jugular venous pressure.  No apparent edema bilaterally in LE.   Abdomen:  no obvious abdominal distention.   Extremities:  no apparent cyanosis.  Skin:  no xanthelasma.  No facial lacerations.  Neurologic:  Normal arm motion bilateral, no tremors.    Psychiatric:  Alert, calm demeanor  Delightful pt with symptomatic flecainide resistance paroxysmal AF, s/p PVI last June by Dr. Barrientos. Doing well since without AF recurrence as documented by ECG and by history. Overall, doing well and please with how things are going. Too early to  long term success as pt is still on Flecainide. Will have pt stop it and  reassess in 6-12 months. Continue with warfarin for now.    Nick Wallis MD       Thank you for allowing me to participate in the care of your patient.      Sincerely,     Nick Oneill MD     [facility]  cc:   No referring provider defined for this encounter.

## 2023-12-10 ENCOUNTER — TRANSFERRED RECORDS (OUTPATIENT)
Dept: MULTI SPECIALTY CLINIC | Facility: CLINIC | Age: 68
End: 2023-12-10

## 2023-12-15 ENCOUNTER — ANTICOAGULATION THERAPY VISIT (OUTPATIENT)
Dept: ANTICOAGULATION | Facility: CLINIC | Age: 68
End: 2023-12-15

## 2023-12-15 ENCOUNTER — LAB (OUTPATIENT)
Dept: LAB | Facility: CLINIC | Age: 68
End: 2023-12-15
Payer: COMMERCIAL

## 2023-12-15 DIAGNOSIS — I48.91 ATRIAL FIBRILLATION, UNSPECIFIED TYPE (H): Primary | ICD-10-CM

## 2023-12-15 DIAGNOSIS — Z79.01 LONG TERM CURRENT USE OF ANTICOAGULANT THERAPY: ICD-10-CM

## 2023-12-15 DIAGNOSIS — I48.91 ATRIAL FIBRILLATION, UNSPECIFIED TYPE (H): ICD-10-CM

## 2023-12-15 LAB — INR BLD: 2.3 (ref 0.9–1.1)

## 2023-12-15 PROCEDURE — 36416 COLLJ CAPILLARY BLOOD SPEC: CPT

## 2023-12-15 PROCEDURE — 85610 PROTHROMBIN TIME: CPT

## 2023-12-15 NOTE — PROGRESS NOTES
ANTICOAGULATION MANAGEMENT     Joe Mas 68 year old male is on warfarin with therapeutic INR result. (Goal INR 2.0-3.0)    Recent labs: (last 7 days)     12/15/23  1134   INR 2.3*       ASSESSMENT     Source(s): Chart Review and Patient/Caregiver Call     Warfarin doses taken: Warfarin taken as instructed  Diet: No new diet changes identified  Medication/supplement changes: None noted  New illness, injury, or hospitalization: No  Signs or symptoms of bleeding or clotting: No  Previous result: Therapeutic last 2(+) visits  Additional findings: None       PLAN     Recommended plan for no diet, medication or health factor changes affecting INR     Dosing Instructions: Continue your current warfarin dose with next INR in 6 weeks       Summary  As of 12/15/2023      Full warfarin instructions:  2.5 mg every Mon, Thu; 5 mg all other days   Next INR check:  2/6/2024               Telephone call with Joe who verbalizes understanding and agrees to plan    Lab visit scheduled    Education provided:   Contact [phone] with any changes, questions or concerns.     Plan made per [facility] anticoagulation protocol    Palma Adam RN  Anticoagulation Clinic  12/15/2023    _______________________________________________________________________     Anticoagulation Episode Summary       Current INR goal:  2.0-3.0   TTR:  88.9% (1 y)   Target end date:  Indefinite   Send INR reminders to:  MARIA ELENA YANG    Indications    Atrial fibrillation  unspecified type (H) [I48.91]  Long term current use of anticoagulant therapy [Z79.01]             Comments:               Anticoagulation Care Providers       Provider Role Specialty Phone number    Jolynn Cooper MD Referring Family Medicine [phone]

## 2023-12-20 LAB — RETINOPATHY: NORMAL

## 2023-12-31 DIAGNOSIS — I48.0 PAROXYSMAL ATRIAL FIBRILLATION (H): ICD-10-CM

## 2023-12-31 DIAGNOSIS — E78.2 MIXED HYPERLIPIDEMIA: ICD-10-CM

## 2023-12-31 DIAGNOSIS — I10 ESSENTIAL HYPERTENSION, BENIGN: ICD-10-CM

## 2023-12-31 DIAGNOSIS — J44.1 OBSTRUCTIVE CHRONIC BRONCHITIS WITH EXACERBATION (H): Primary | ICD-10-CM

## 2024-01-02 RX ORDER — LOSARTAN POTASSIUM 100 MG/1
100 TABLET ORAL DAILY
Qty: 90 TABLET | Refills: 0 | Status: SHIPPED | OUTPATIENT
Start: 2024-01-02 | End: 2024-02-06

## 2024-01-02 RX ORDER — ATORVASTATIN CALCIUM 20 MG/1
20 TABLET, FILM COATED ORAL DAILY
Qty: 90 TABLET | Refills: 0 | Status: SHIPPED | OUTPATIENT
Start: 2024-01-02 | End: 2024-02-06

## 2024-01-02 RX ORDER — NEBULIZER AND COMPRESSOR
EACH MISCELLANEOUS SEE ADMIN INSTRUCTIONS
Qty: 1 EACH | Refills: 1 | Status: SHIPPED | OUTPATIENT
Start: 2024-01-02

## 2024-01-08 RX ORDER — FLECAINIDE ACETATE 100 MG/1
100 TABLET ORAL 2 TIMES DAILY
Qty: 180 TABLET | Refills: 3 | Status: SHIPPED | OUTPATIENT
Start: 2024-01-08 | End: 2024-02-06

## 2024-01-16 ENCOUNTER — DOCUMENTATION ONLY (OUTPATIENT)
Dept: ANTICOAGULATION | Facility: CLINIC | Age: 69
End: 2024-01-16
Payer: COMMERCIAL

## 2024-01-16 DIAGNOSIS — I48.91 ATRIAL FIBRILLATION, UNSPECIFIED TYPE (H): Primary | ICD-10-CM

## 2024-01-16 NOTE — PROGRESS NOTES
ANTICOAGULATION CLINIC REFERRAL RENEWAL REQUEST       An annual renewal order is required for all patients referred to [facility].?  Please review and sign the pended referral order for Joe Mas.       ANTICOAGULATION SUMMARY      Warfarin indication(s)   Atrial Fibrillation    Mechanical heart valve present?  NO       Current goal range   INR: 2.0-3.0     Goal appropriate for indication? Goal INR 2-3, standard for indication(s) above     Time in Therapeutic Range (TTR)  (Goal > 60%) 88.9%       Office visit with referring provider's group within last year yes on 8/4/23       Palma Adam RN  [facility]

## 2024-01-21 DIAGNOSIS — J44.1 OBSTRUCTIVE CHRONIC BRONCHITIS WITH EXACERBATION (H): ICD-10-CM

## 2024-01-21 DIAGNOSIS — I48.91 ATRIAL FIBRILLATION, UNSPECIFIED TYPE (H): ICD-10-CM

## 2024-01-22 ENCOUNTER — TELEPHONE (OUTPATIENT)
Dept: CARDIOLOGY | Facility: CLINIC | Age: 69
End: 2024-01-22
Payer: COMMERCIAL

## 2024-01-22 DIAGNOSIS — I48.0 PAROXYSMAL ATRIAL FIBRILLATION (H): Primary | ICD-10-CM

## 2024-01-22 RX ORDER — IPRATROPIUM BROMIDE AND ALBUTEROL SULFATE 2.5; .5 MG/3ML; MG/3ML
SOLUTION RESPIRATORY (INHALATION)
Qty: 360 ML | Refills: 1 | Status: SHIPPED | OUTPATIENT
Start: 2024-01-22 | End: 2024-05-01

## 2024-01-22 RX ORDER — METOPROLOL SUCCINATE 50 MG/1
25 TABLET, EXTENDED RELEASE ORAL DAILY
Qty: 45 TABLET | Refills: 3 | Status: SHIPPED | OUTPATIENT
Start: 2024-01-22 | End: 2024-08-13

## 2024-01-22 RX ORDER — WARFARIN SODIUM 5 MG/1
2.5-5 TABLET ORAL DAILY
Qty: 90 TABLET | Refills: 1 | Status: SHIPPED | OUTPATIENT
Start: 2024-01-22 | End: 2024-07-25

## 2024-01-22 RX ORDER — ALBUTEROL SULFATE 90 UG/1
AEROSOL, METERED RESPIRATORY (INHALATION)
Qty: 18 G | Refills: 1 | Status: SHIPPED | OUTPATIENT
Start: 2024-01-22 | End: 2024-02-27

## 2024-01-22 NOTE — TELEPHONE ENCOUNTER
ANTICOAGULATION MANAGEMENT:  Medication Refill    Anticoagulation Summary  As of 12/15/2023      Warfarin maintenance plan:  2.5 mg (5 mg x 0.5) every Mon, Thu; 5 mg (5 mg x 1) all other days   Next INR check:  2/6/2024   Target end date:  Indefinite    Indications    Atrial fibrillation  unspecified type (H) [I48.91]  Long term current use of anticoagulant therapy [Z79.01]                 Anticoagulation Care Providers       Provider Role Specialty Phone number    Jolynn Cooper MD Referring Family Medicine [phone]            Refill Criteria    Visit with referring provider/group: Meets criteria: office visit within referring provider group in the last 1 year on 8/4/23    [facility] referral signed last signed: 01/16/2024; within last year: Yes    Lab monitoring not exceeding 2 weeks overdue: Yes    Joe meets all criteria for refill. Rx instructions and quantity supplied updated to match patient's current dosing plan. Warfarin 90 day supply with 1 refill granted per [facility] protocol     Palma Adam RN  Anticoagulation Clinic

## 2024-01-29 ENCOUNTER — TELEPHONE (OUTPATIENT)
Dept: ANTICOAGULATION | Facility: CLINIC | Age: 69
End: 2024-01-29
Payer: COMMERCIAL

## 2024-01-29 DIAGNOSIS — Z79.01 LONG TERM CURRENT USE OF ANTICOAGULANT THERAPY: ICD-10-CM

## 2024-01-29 DIAGNOSIS — I48.91 ATRIAL FIBRILLATION, UNSPECIFIED TYPE (H): Primary | ICD-10-CM

## 2024-01-29 NOTE — TELEPHONE ENCOUNTER
Incoming fax from [facility] requesting 4-day hold of warfarin for upcoming colonoscopy on 2/23/24. Routing to [facility] to review for warfarin interruption.     Sal Ferris RN

## 2024-02-06 ENCOUNTER — OFFICE VISIT (OUTPATIENT)
Dept: FAMILY MEDICINE | Facility: CLINIC | Age: 69
End: 2024-02-06
Payer: COMMERCIAL

## 2024-02-06 ENCOUNTER — ANTICOAGULATION THERAPY VISIT (OUTPATIENT)
Dept: ANTICOAGULATION | Facility: CLINIC | Age: 69
End: 2024-02-06

## 2024-02-06 ENCOUNTER — MYC MEDICAL ADVICE (OUTPATIENT)
Dept: ANTICOAGULATION | Facility: CLINIC | Age: 69
End: 2024-02-06

## 2024-02-06 VITALS
RESPIRATION RATE: 16 BRPM | WEIGHT: 244.5 LBS | HEART RATE: 97 BPM | DIASTOLIC BLOOD PRESSURE: 82 MMHG | SYSTOLIC BLOOD PRESSURE: 130 MMHG | OXYGEN SATURATION: 97 % | TEMPERATURE: 97.2 F | BODY MASS INDEX: 36.11 KG/M2

## 2024-02-06 DIAGNOSIS — I10 ESSENTIAL HYPERTENSION, BENIGN: ICD-10-CM

## 2024-02-06 DIAGNOSIS — I48.91 ATRIAL FIBRILLATION, UNSPECIFIED TYPE (H): ICD-10-CM

## 2024-02-06 DIAGNOSIS — R39.11 URINARY HESITANCY: ICD-10-CM

## 2024-02-06 DIAGNOSIS — Z23 NEED FOR VACCINATION: ICD-10-CM

## 2024-02-06 DIAGNOSIS — F32.1 MODERATE MAJOR DEPRESSION (H): ICD-10-CM

## 2024-02-06 DIAGNOSIS — E78.2 MIXED HYPERLIPIDEMIA: ICD-10-CM

## 2024-02-06 DIAGNOSIS — J44.1 OBSTRUCTIVE CHRONIC BRONCHITIS WITH EXACERBATION (H): ICD-10-CM

## 2024-02-06 DIAGNOSIS — E66.01 MORBID OBESITY (H): ICD-10-CM

## 2024-02-06 DIAGNOSIS — Z12.5 SCREENING FOR MALIGNANT NEOPLASM OF PROSTATE: ICD-10-CM

## 2024-02-06 DIAGNOSIS — I48.91 ATRIAL FIBRILLATION, UNSPECIFIED TYPE (H): Primary | ICD-10-CM

## 2024-02-06 DIAGNOSIS — E11.8 TYPE 2 DIABETES MELLITUS WITH COMPLICATION, WITHOUT LONG-TERM CURRENT USE OF INSULIN (H): Primary | ICD-10-CM

## 2024-02-06 DIAGNOSIS — Z79.01 LONG TERM CURRENT USE OF ANTICOAGULANT THERAPY: ICD-10-CM

## 2024-02-06 LAB
CHOLEST SERPL-MCNC: 166 MG/DL
CREAT UR-MCNC: 220.7 MG/DL
FASTING STATUS PATIENT QL REPORTED: YES
HBA1C MFR BLD: 6.3 % (ref 0–5.6)
HDLC SERPL-MCNC: 58 MG/DL
HOLD SPECIMEN: NORMAL
HOLD SPECIMEN: NORMAL
INR BLD: 2.6 (ref 0.9–1.1)
LDLC SERPL CALC-MCNC: 78 MG/DL
MICROALBUMIN UR-MCNC: 13.5 MG/L
MICROALBUMIN/CREAT UR: 6.12 MG/G CR (ref 0–17)
NONHDLC SERPL-MCNC: 108 MG/DL
PSA SERPL DL<=0.01 NG/ML-MCNC: 2.53 NG/ML (ref 0–4.5)
TRIGL SERPL-MCNC: 149 MG/DL

## 2024-02-06 PROCEDURE — 99214 OFFICE O/P EST MOD 30 MIN: CPT | Mod: 25 | Performed by: FAMILY MEDICINE

## 2024-02-06 PROCEDURE — 82570 ASSAY OF URINE CREATININE: CPT | Performed by: FAMILY MEDICINE

## 2024-02-06 PROCEDURE — 80061 LIPID PANEL: CPT | Performed by: FAMILY MEDICINE

## 2024-02-06 PROCEDURE — 85610 PROTHROMBIN TIME: CPT | Performed by: FAMILY MEDICINE

## 2024-02-06 PROCEDURE — G0103 PSA SCREENING: HCPCS | Performed by: FAMILY MEDICINE

## 2024-02-06 PROCEDURE — 36415 COLL VENOUS BLD VENIPUNCTURE: CPT | Performed by: FAMILY MEDICINE

## 2024-02-06 PROCEDURE — 90480 ADMN SARSCOV2 VAC 1/ONLY CMP: CPT | Performed by: FAMILY MEDICINE

## 2024-02-06 PROCEDURE — 91320 SARSCV2 VAC 30MCG TRS-SUC IM: CPT | Performed by: FAMILY MEDICINE

## 2024-02-06 PROCEDURE — 82043 UR ALBUMIN QUANTITATIVE: CPT | Performed by: FAMILY MEDICINE

## 2024-02-06 PROCEDURE — 83036 HEMOGLOBIN GLYCOSYLATED A1C: CPT | Performed by: FAMILY MEDICINE

## 2024-02-06 RX ORDER — PREDNISONE 20 MG/1
TABLET ORAL
Qty: 11 TABLET | Refills: 0 | Status: SHIPPED | OUTPATIENT
Start: 2024-02-06 | End: 2024-05-13

## 2024-02-06 RX ORDER — MOMETASONE FUROATE AND FORMOTEROL FUMARATE DIHYDRATE 200; 5 UG/1; UG/1
2 AEROSOL RESPIRATORY (INHALATION) 2 TIMES DAILY
Qty: 39 G | Refills: 3 | Status: SHIPPED | OUTPATIENT
Start: 2024-02-06 | End: 2024-08-13

## 2024-02-06 RX ORDER — RESPIRATORY SYNCYTIAL VIRUS VACCINE 120MCG/0.5
KIT INTRAMUSCULAR
COMMUNITY
Start: 2023-11-29 | End: 2024-08-13

## 2024-02-06 RX ORDER — METFORMIN HCL 500 MG
500 TABLET, EXTENDED RELEASE 24 HR ORAL 2 TIMES DAILY WITH MEALS
COMMUNITY
Start: 2024-02-06

## 2024-02-06 RX ORDER — ATORVASTATIN CALCIUM 20 MG/1
20 TABLET, FILM COATED ORAL DAILY
Qty: 90 TABLET | Refills: 4 | Status: SHIPPED | OUTPATIENT
Start: 2024-02-06

## 2024-02-06 RX ORDER — LOSARTAN POTASSIUM 100 MG/1
100 TABLET ORAL DAILY
Qty: 90 TABLET | Refills: 4 | Status: SHIPPED | OUTPATIENT
Start: 2024-02-06

## 2024-02-06 NOTE — PATIENT INSTRUCTIONS
Lab only appointment in 3 months for A1c    If >7, make virtual appointment to discuss    If <7, see me in three more months.

## 2024-02-06 NOTE — PROGRESS NOTES
ANTICOAGULATION MANAGEMENT     Jeo Mas 68 year old male is on warfarin with therapeutic INR result. (Goal INR 2.0-3.0)    Recent labs: (last 7 days)     02/06/24  1401   INR 2.6*       ASSESSMENT     Source(s): Chart Review and Patient/Caregiver Call     Warfarin doses taken: Warfarin taken as instructed  Diet: No new diet changes identified  Medication/supplement changes:  2/6/24 Prednisone for 7 days (40mg x4 days then 20mg x3 days)  New illness, injury, or hospitalization: 2/6 bronchitis, some wheezing at office visit today  Signs or symptoms of bleeding or clotting: No  Previous result: Therapeutic last 2(+) visits  Additional findings: Upcoming surgery/procedure colonoscopy scheduled on either 2/22 or 2/23, message has already been sent to [facility] , confirmed with patient that colonoscopy is on 2/23/24        PLAN     Recommended plan for temporary change(s) affecting INR     Dosing Instructions: Continue your current warfarin dose with next INR in 5-7 days       Summary  As of 2/6/2024      Full warfarin instructions:  2.5 mg every Mon, Thu; 5 mg all other days   Next INR check:  2/13/2024               Detailed voice message left for Joe with dosing instructions and follow up date.   Sent [employer] message with dosing and follow up instructions  1608: Spoke with patient and he scheduled INR check for 2/13 and colonoscopy scheduled on 2/23/24    Contact [phone] to schedule and with any changes, questions or concerns.     Education provided:   Goal range and lab monitoring: goal range and significance of current result  Contact [phone] with any changes, questions or concerns.     Plan made per [facility] anticoagulation protocol    Jolynn Johnson, RN  Anticoagulation Clinic  2/6/2024    _______________________________________________________________________     Anticoagulation Episode Summary       Current INR goal:  2.0-3.0   TTR:  88.9% (1 y)   Target end date:  Indefinite   Send INR reminders to:   [facility]    Indications    Atrial fibrillation  unspecified type (H) [I48.91]  Long term current use of anticoagulant therapy [Z79.01]             Comments:               Anticoagulation Care Providers       Provider Role Specialty Phone number    Jolynn Cooper MD Referring Family Medicine [phone]

## 2024-02-06 NOTE — PROGRESS NOTES
Assessment & Plan     (E11.8) Type 2 diabetes mellitus with complication, without long-term current use of insulin (H)  (primary encounter diagnosis)  Comment: he is doing well. Got very motivated to exercise and eat well this year. Is making good progress. Referral made for diabetic education to discuss diabetic diet. He will get an A1c done in 3 months and see me in 6 months if still at goal at 3 months. The patient indicates understanding of these issues and agrees with the plan.   Plan: Lipid panel reflex to direct LDL Non-fasting,         Albumin Random Urine Quantitative with Creat         Ratio, HEMOGLOBIN A1C, Adult Diabetes Education         Referral, Hemoglobin A1c            (E78.2) Mixed hyperlipidemia  Comment: discussed and refilled   Plan: atorvastatin (LIPITOR) 20 MG tablet            (J44.1) Obstructive chronic bronchitis with exacerbation (H)  Comment: he is wheezing a bit today. Continue current meds and treat with prednisone burst for a week. He declines antibiotics but will let me know if symptoms do not improve. The patient indicates understanding of these issues and agrees with the plan.   Plan: mometasone-formoterol (DULERA) 200-5 MCG/ACT         inhaler, predniSONE (DELTASONE) 20 MG tablet            (F32.1) Moderate major depression (H)  Comment: doing well. Grieving his mom   Plan:     (I48.91) Atrial fibrillation, unspecified type (H)  Comment: on anticoag   Plan:     (E66.01) Morbid obesity (H)  Comment: he is working hard on good changes   Plan:     (I10) Essential hypertension, benign  Comment: refilled for him   Plan: losartan (COZAAR) 100 MG tablet            (R39.11) Urinary hesitancy  Comment: he would like to see urology. Check psa today   Plan: Adult Urology  Referral, PSA, screen            (Z23) Need for vaccination  Comment: discussed   Plan: COVID-19 12+ (2023-24) (PFIZER)            (Z12.5) Screening for malignant neoplasm of prostate  Comment: discussed  "  Plan: PSA, screen             BMI  Estimated body mass index is 36.11 kg/m  as calculated from the following:    Height as of 10/16/23: 1.753 m (5' 9\").    Weight as of this encounter: 110.9 kg (244 lb 8 oz).   Weight management plan: Discussed healthy diet and exercise guidelines  Blood sugar testing frequency justification:  Patient modifying lifestyle changes (diet, exercise) with blood sugars    Regular exercise    Isaias Diaz is a 68 year old, presenting for the following health issues:  Diabetes      2/6/2024    10:34 AM   Additional Questions   Roomed by NELLIE Arredondo   Accompanied by self     Via the Health Maintenance questionnaire, the patient has reported the following services have been completed -Eye Exam, this information has been sent to the abstraction team.  History of Present Illness       Diabetes:   He presents for follow up of diabetes.  He is checking home blood glucose three times daily.   He checks blood glucose before meals and at bedtime.  Blood glucose is never over 200 and never under 70. He is aware of hypoglycemia symptoms including shakiness.   He is concerned about low blood sugar, several less than 70 in the past few weeks.    He is not experiencing numbness or burning in feet, excessive thirst, blurry vision, weight changes or redness, sores or blisters on feet. The patient has had a diabetic eye exam in the last 12 months. Eye exam performed on 12/2023. [facility]        He eats 2-3 servings of fruits and vegetables daily.He consumes 0 sweetened beverage(s) daily.He exercises with enough effort to increase his heart rate 30 to 60 minutes per day.  He exercises with enough effort to increase his heart rate 6 days per week.   He is taking medications regularly.     Metformin- pt changed dose due to low blood sugars.     Metformin 500 daily and 500 bid every other day  -has been doing this for 2-3 weeks     If BG is around 100 at night - he " will skip evening dose     Had illness beginning of January, mostly sinus symptoms.   Symptoms have moved into chest, wheezing, always has SOB.   States he has been doing his duo nebs at home   Coughing is intermittent, still has some nasal congestion  Symptoms are slowly improving everyday     Wants to discuss his weight also, he is walking 2 miles per day.  Increasing protein and veggies but weight is going up   Weight was down     Wt Readings from Last 4 Encounters:   02/06/24 110.9 kg (244 lb 8 oz)   12/05/23 112.9 kg (249 lb)   10/16/23 113.4 kg (250 lb)   08/04/23 112.4 kg (247 lb 14.4 oz)     Has been using his neb      Lab Results   Component Value Date    A1C 6.3 02/06/2024    A1C 6.8 08/04/2023    A1C 6.4 02/03/2023    A1C 6.9 08/08/2022    A1C 7.6 01/21/2022    A1C 6.8 04/09/2021    A1C 6.7 08/18/2020    A1C 7.0 01/14/2020    A1C 6.6 07/05/2019    A1C 8.1 01/29/2019           Objective    /82   Pulse 97   Temp 97.2  F (36.2  C) (Tympanic)   Resp 16   Wt 110.9 kg (244 lb 8 oz)   SpO2 97%   BMI 36.11 kg/m    Body mass index is 36.11 kg/m .  Physical Exam   GENERAL: alert and no distress  EYES: Eyes grossly normal to inspection, PERRL and conjunctivae and sclerae normal  HENT: ear canals and TM's normal, nose and mouth without ulcers or lesions  NECK: no adenopathy, no asymmetry, masses, or scars  RESP: lungs clear to auscultation - no rales, rhonchi or wheezes and expiratory wheezes bibasilar  CV: regular rate and rhythm, normal S1 S2, no S3 or S4, no murmur, click or rub, no peripheral edema  MS: no gross musculoskeletal defects noted, no edema  NEURO: Normal strength and tone, mentation intact and speech normal  PSYCH: mentation appears normal, affect normal/bright            Signed Electronically by: Jolynn Cooper MD

## 2024-02-12 NOTE — TELEPHONE ENCOUNTER
Received a VM from [facility] Nurse asking for the status of the Bridge and Hold order for this patient. Colonoscopy scheduled 2/23/24.    Yoselin Dove, RN  Anticoagulation Nurse - [facility]

## 2024-02-13 ENCOUNTER — LAB (OUTPATIENT)
Dept: LAB | Facility: CLINIC | Age: 69
End: 2024-02-13
Payer: COMMERCIAL

## 2024-02-13 ENCOUNTER — ANTICOAGULATION THERAPY VISIT (OUTPATIENT)
Dept: ANTICOAGULATION | Facility: CLINIC | Age: 69
End: 2024-02-13

## 2024-02-13 DIAGNOSIS — I48.91 ATRIAL FIBRILLATION, UNSPECIFIED TYPE (H): Primary | ICD-10-CM

## 2024-02-13 DIAGNOSIS — Z79.01 LONG TERM CURRENT USE OF ANTICOAGULANT THERAPY: ICD-10-CM

## 2024-02-13 DIAGNOSIS — I48.91 ATRIAL FIBRILLATION, UNSPECIFIED TYPE (H): ICD-10-CM

## 2024-02-13 LAB — INR BLD: 3.6 (ref 0.9–1.1)

## 2024-02-13 PROCEDURE — 85610 PROTHROMBIN TIME: CPT

## 2024-02-13 PROCEDURE — 36415 COLL VENOUS BLD VENIPUNCTURE: CPT

## 2024-02-13 NOTE — PROGRESS NOTES
ANTICOAGULATION MANAGEMENT     Joe Mas 68 year old male is on warfarin with supratherapeutic INR result. (Goal INR 2.0-3.0)    Recent labs: (last 7 days)     02/13/24  1122   INR 3.6*       ASSESSMENT     Source(s): Chart Review and Patient/Caregiver Call     Warfarin doses taken: Warfarin taken as instructed-Already took today's 5 mg dose  Diet: No new diet changes identified  Medication/supplement changes:  2/6/24 Prednisone for 7 days (40mg x4 days then 20mg x3 days)   New illness, injury, or hospitalization: No-office visit 2/6, obstructive bronchitis, feeling much better  Signs or symptoms of bleeding or clotting: No  Previous result: Therapeutic last 2(+) visits  Additional findings: Upcoming surgery/procedure colonoscopy scheduled on 2/23, message has already been sent to [facility]. Informed Joe we will likely call him at the end of this week to discuss warfarin hold directions for procedure next Friday.       PLAN     Recommended plan for temporary change(s) affecting INR     Dosing Instructions: Tomorrow hold dose then continue your current warfarin dose with next INR in 1 week after your Colonoscopy      Summary  As of 2/13/2024      Full warfarin instructions:  2/14: Hold; Otherwise 2.5 mg every Mon, Thu; 5 mg all other days   Next INR check:  3/1/2024               Telephone call with Joe who verbalizes understanding and agrees to plan    Lab visit scheduled    Education provided:   Goal range and lab monitoring: goal range and significance of current result and Importance of following up at instructed interval  Symptom monitoring: monitoring for bleeding signs and symptoms  Contact [phone] with any changes, questions or concerns.     Plan made per [facility] anticoagulation protocol    Makayla Wilson RN  Anticoagulation Clinic  2/13/2024    _______________________________________________________________________     Anticoagulation Episode Summary       Current INR goal:  2.0-3.0   TTR:  87.8% (1 y)    Target end date:  Indefinite   Send INR reminders to:  [facility]    Indications    Atrial fibrillation  unspecified type (H) [I48.91]  Long term current use of anticoagulant therapy [Z79.01]             Comments:               Anticoagulation Care Providers       Provider Role Specialty Phone number    Jolynn Cooper MD Referring Family Medicine [phone]

## 2024-02-15 NOTE — TELEPHONE ENCOUNTER
Halie calling from [facility] again wondering about orders for colonoscopy on 2/23. She states they need them before the weekend.   Ariella Irizarry RN

## 2024-02-15 NOTE — TELEPHONE ENCOUNTER
"WALLY-PROCEDURAL ANTICOAGULATION  MANAGEMENT    ASSESSMENT     Warfarin interruption plan for Colonoscopy on 2/23/24.    Indication for Anticoagulation: Atrial Fibrillation    [id number] = 3 (Hypertension, Age 65-74, and Diabetes)    Past procedure management  3/2023-Colonoscopy- hold without bridge  17 polyps 4-8 mm    Wally-Procedure Risk stratification for thromboembolism: low (2022 Chest guidelines)    AFIB: 2022 CHEST Perioperative Management guidelines recommends against bridging for patients with atrial fibrillation except in high risk stratification patients.  POLYPECTOMY: 2022 CHEST Perioperative Management guidelines suggest no bridging during warfarin interruption for colonoscopy with anticipated polypectomy    RECOMMENDATION     Pre-Procedure:  Hold warfarin for 4 days, until after procedure starting: Monday 2/19   No Bridge    Post-Procedure:  Resume warfarin dose if okay with provider doing procedure on night of procedure, 2/23 PM: 7.5 mg daily x 2 then resume current dose  Recheck INR ~ 7 days after resuming warfarin     Plan routed to referring provider for approval  ?   Emiliana Rich, [facility]    SUBJECTIVE/OBJECTIVE     Joe Mas, a 68 year old male    Goal INR Range: 2.0-3.0     Wt Readings from Last 3 Encounters:   02/06/24 110.9 kg (244 lb 8 oz)   12/05/23 112.9 kg (249 lb)   10/16/23 113.4 kg (250 lb)      Ideal body weight: 70.7 kg (155 lb 13.8 oz)  Adjusted ideal body weight: 86.8 kg (191 lb 5.1 oz)     Estimated body mass index is 36.11 kg/m  as calculated from the following:    Height as of 10/16/23: 1.753 m (5' 9\").    Weight as of 2/6/24: 110.9 kg (244 lb 8 oz).    Lab Results   Component Value Date    INR 3.6 (H) 02/13/2024    INR 2.6 (H) 02/06/2024    INR 2.3 (H) 12/15/2023     Lab Results   Component Value Date    HGB 15.1 10/16/2023    HCT 44.6 10/16/2023     10/16/2023     Lab Results   Component Value Date    CR 0.87 10/16/2023    CR 0.85 06/07/2023    CR 0.9 06/05/2023 "     Estimated Creatinine Clearance: 99.8 mL/min (based on SCr of 0.87 mg/dL).

## 2024-02-15 NOTE — TELEPHONE ENCOUNTER
Called and left detailed message and sent mychart message regarding Warfarin hold for upcoming colonoscopy.  Kashif FELIZ

## 2024-02-15 NOTE — TELEPHONE ENCOUNTER
WALLY-PROCEDURAL ANTICOAGULATION MANAGEMENT    Returned call to [facility] ([phone]). Spoke to TRINI Ambrose and relayed pre-procedure orders:    Okay to hold warfarin 4 days prior to procedure  No bridge    Emiliana Rich, [facility] Anticoagulation

## 2024-02-23 ENCOUNTER — TRANSFERRED RECORDS (OUTPATIENT)
Dept: HEALTH INFORMATION MANAGEMENT | Facility: CLINIC | Age: 69
End: 2024-02-23
Payer: COMMERCIAL

## 2024-02-26 ENCOUNTER — PATIENT OUTREACH (OUTPATIENT)
Dept: GASTROENTEROLOGY | Facility: CLINIC | Age: 69
End: 2024-02-26
Payer: COMMERCIAL

## 2024-02-27 DIAGNOSIS — J44.1 OBSTRUCTIVE CHRONIC BRONCHITIS WITH EXACERBATION (H): ICD-10-CM

## 2024-02-27 RX ORDER — ALBUTEROL SULFATE 90 UG/1
AEROSOL, METERED RESPIRATORY (INHALATION)
Qty: 18 G | Refills: 1 | Status: SHIPPED | OUTPATIENT
Start: 2024-02-27 | End: 2024-07-11

## 2024-03-01 ENCOUNTER — LAB (OUTPATIENT)
Dept: LAB | Facility: CLINIC | Age: 69
End: 2024-03-01
Payer: COMMERCIAL

## 2024-03-01 ENCOUNTER — ANTICOAGULATION THERAPY VISIT (OUTPATIENT)
Dept: ANTICOAGULATION | Facility: CLINIC | Age: 69
End: 2024-03-01

## 2024-03-01 DIAGNOSIS — I48.91 ATRIAL FIBRILLATION, UNSPECIFIED TYPE (H): Primary | ICD-10-CM

## 2024-03-01 DIAGNOSIS — Z79.01 LONG TERM CURRENT USE OF ANTICOAGULANT THERAPY: ICD-10-CM

## 2024-03-01 DIAGNOSIS — I48.91 ATRIAL FIBRILLATION, UNSPECIFIED TYPE (H): ICD-10-CM

## 2024-03-01 LAB — INR BLD: 2.9 (ref 0.9–1.1)

## 2024-03-01 PROCEDURE — 36416 COLLJ CAPILLARY BLOOD SPEC: CPT

## 2024-03-01 PROCEDURE — 85610 PROTHROMBIN TIME: CPT

## 2024-03-01 NOTE — PROGRESS NOTES
ANTICOAGULATION MANAGEMENT     Joe Mas 68 year old male is on warfarin with therapeutic INR result. (Goal INR 2.0-3.0)    Recent labs: (last 7 days)     03/01/24  1156   INR 2.9*       ASSESSMENT     Source(s): Chart Review and Patient/Caregiver Call     Warfarin doses taken: Held for 4 days  recently which may be affecting INR  Diet: No new diet changes identified  Medication/supplement changes: None noted  New illness, injury, or hospitalization: No  Signs or symptoms of bleeding or clotting: No  Previous result: Supratherapeutic-1 day hold  Additional findings: Colonoscopy 2/23/24, 4 day hold then 7.5 mg x2 days.       PLAN     Recommended plan for temporary change(s) affecting INR     Dosing Instructions: Continue your current warfarin dose with next INR in 2-3 weeks       Summary  As of 3/1/2024      Full warfarin instructions:  2.5 mg every Mon, Thu; 5 mg all other days   Next INR check:  3/22/2024               Telephone call with Joe who verbalizes understanding and agrees to plan    Patient elected to schedule next visit 3/22/24    Education provided:   Goal range and lab monitoring: goal range and significance of current result  Contact [phone] with any changes, questions or concerns.     Plan made per [facility] anticoagulation protocol    Makayla Wilson RN  Anticoagulation Clinic  3/1/2024    _______________________________________________________________________     Anticoagulation Episode Summary       Current INR goal:  2.0-3.0   TTR:  85.3% (1 y)   Target end date:  Indefinite   Send INR reminders to:  [facility]    Indications    Atrial fibrillation  unspecified type (H) [I48.91]  Long term current use of anticoagulant therapy [Z79.01]             Comments:               Anticoagulation Care Providers       Provider Role Specialty Phone number    Jolynn Cooper MD Referring Family Medicine [phone]

## 2024-03-22 ENCOUNTER — LAB (OUTPATIENT)
Dept: LAB | Facility: CLINIC | Age: 69
End: 2024-03-22
Payer: COMMERCIAL

## 2024-03-22 ENCOUNTER — ANTICOAGULATION THERAPY VISIT (OUTPATIENT)
Dept: ANTICOAGULATION | Facility: CLINIC | Age: 69
End: 2024-03-22

## 2024-03-22 DIAGNOSIS — Z79.01 LONG TERM CURRENT USE OF ANTICOAGULANT THERAPY: ICD-10-CM

## 2024-03-22 DIAGNOSIS — I48.91 ATRIAL FIBRILLATION, UNSPECIFIED TYPE (H): ICD-10-CM

## 2024-03-22 DIAGNOSIS — I48.91 ATRIAL FIBRILLATION, UNSPECIFIED TYPE (H): Primary | ICD-10-CM

## 2024-03-22 LAB — INR BLD: 3.3 (ref 0.9–1.1)

## 2024-03-22 PROCEDURE — 85610 PROTHROMBIN TIME: CPT

## 2024-03-22 PROCEDURE — 36416 COLLJ CAPILLARY BLOOD SPEC: CPT

## 2024-03-22 NOTE — PROGRESS NOTES
ANTICOAGULATION MANAGEMENT     Joe Mas 68 year old male is on warfarin with supratherapeutic INR result. (Goal INR 2.0-3.0)    Recent labs: (last 7 days)     03/22/24  1138   INR 3.3*       ASSESSMENT     Source(s): Chart Review and Patient/Caregiver Call     Warfarin doses taken: Warfarin taken as instructed, already took dose this AM  Diet: No new diet changes identified  Medication/supplement changes:  2/6/24 Prednisone for 7 days    New illness, injury, or hospitalization: 2/23/24 colonoscopy  Signs or symptoms of bleeding or clotting: No  Previous result: Therapeutic last visit; previously outside of goal range  Additional findings: None       PLAN     Recommended plan for no diet, medication or health factor changes affecting INR     Dosing Instructions: partial hold then continue your current warfarin dose with next INR in 2 weeks.  Patient didn't want to decrease dosing overall and only wanted to do partial hold tomorrow then back to normal schedule that has worked in the past.  Discussed decreasing the dosing next time if INR is above 3.0.         Summary  As of 3/22/2024      Full warfarin instructions:  3/23: 2.5 mg; Otherwise 2.5 mg every Mon, Thu; 5 mg all other days   Next INR check:  4/5/2024               Telephone call with Joe who verbalizes understanding and agrees to plan    Patient offered & declined to schedule next visit    Education provided:   Goal range and lab monitoring: goal range and significance of current result  Contact [phone] with any changes, questions or concerns.     Plan made per [facility] anticoagulation protocol    Jolynn Johnson, RN  Anticoagulation Clinic  3/22/2024    _______________________________________________________________________     Anticoagulation Episode Summary       Current INR goal:  2.0-3.0   TTR:  85.7% (1 y)   Target end date:  Indefinite   Send INR reminders to:  [facility]    Indications    Atrial fibrillation  unspecified type (H)  [I48.91]  Long term current use of anticoagulant therapy [Z79.01]             Comments:               Anticoagulation Care Providers       Provider Role Specialty Phone number    Jolynn Cooper MD Referring Family Medicine [phone]

## 2024-04-05 ENCOUNTER — ANTICOAGULATION THERAPY VISIT (OUTPATIENT)
Dept: ANTICOAGULATION | Facility: CLINIC | Age: 69
End: 2024-04-05

## 2024-04-05 ENCOUNTER — LAB (OUTPATIENT)
Dept: LAB | Facility: CLINIC | Age: 69
End: 2024-04-05
Payer: COMMERCIAL

## 2024-04-05 DIAGNOSIS — I48.91 ATRIAL FIBRILLATION, UNSPECIFIED TYPE (H): Primary | ICD-10-CM

## 2024-04-05 DIAGNOSIS — Z79.01 LONG TERM CURRENT USE OF ANTICOAGULANT THERAPY: ICD-10-CM

## 2024-04-05 DIAGNOSIS — I48.91 ATRIAL FIBRILLATION, UNSPECIFIED TYPE (H): ICD-10-CM

## 2024-04-05 LAB — INR BLD: 2.5 (ref 0.9–1.1)

## 2024-04-05 PROCEDURE — 85610 PROTHROMBIN TIME: CPT

## 2024-04-05 PROCEDURE — 36416 COLLJ CAPILLARY BLOOD SPEC: CPT

## 2024-04-05 NOTE — PROGRESS NOTES
ANTICOAGULATION MANAGEMENT     Joe Mas 68 year old male is on warfarin with therapeutic INR result. (Goal INR 2.0-3.0)    Recent labs: (last 7 days)     04/05/24  1143   INR 2.5*       ASSESSMENT     Source(s): Chart Review and Patient/Caregiver Call     Warfarin doses taken: Warfarin taken as instructed  Diet: No new diet changes identified  Medication/supplement changes: None noted  New illness, injury, or hospitalization: No  Signs or symptoms of bleeding or clotting: No  Previous result: Supratherapeutic  Additional findings: None       PLAN     Recommended plan for no diet, medication or health factor changes affecting INR     Dosing Instructions: Continue your current warfarin dose with next INR in 3 weeks       Summary  As of 4/5/2024      Full warfarin instructions:  2.5 mg every Mon, Thu; 5 mg all other days   Next INR check:  4/26/2024               Telephone call with Joe who verbalizes understanding and agrees to dosing plan    Patient elected to schedule next visit 5/10/24    Education provided:   Symptom monitoring: monitoring for bleeding signs and symptoms, monitoring for clotting signs and symptoms, and when to seek medical attention/emergency care  Contact [phone] with any changes, questions or concerns.     Plan made per [facility] anticoagulation protocol    Palma Adam RN  Anticoagulation Clinic  4/5/2024    _______________________________________________________________________     Anticoagulation Episode Summary       Current INR goal:  2.0-3.0   TTR:  84.4% (1 y)   Target end date:  Indefinite   Send INR reminders to:  [facility]    Indications    Atrial fibrillation  unspecified type (H) [I48.91]  Long term current use of anticoagulant therapy [Z79.01]             Comments:               Anticoagulation Care Providers       Provider Role Specialty Phone number    Jolynn Cooper MD Referring Family Medicine [phone]

## 2024-05-01 DIAGNOSIS — J44.1 OBSTRUCTIVE CHRONIC BRONCHITIS WITH EXACERBATION (H): ICD-10-CM

## 2024-05-01 RX ORDER — IPRATROPIUM BROMIDE AND ALBUTEROL SULFATE 2.5; .5 MG/3ML; MG/3ML
SOLUTION RESPIRATORY (INHALATION)
Qty: 360 ML | Refills: 1 | Status: SHIPPED | OUTPATIENT
Start: 2024-05-01 | End: 2024-09-04

## 2024-05-08 ENCOUNTER — TRANSFERRED RECORDS (OUTPATIENT)
Dept: MULTI SPECIALTY CLINIC | Facility: CLINIC | Age: 69
End: 2024-05-08
Payer: COMMERCIAL

## 2024-05-08 LAB — RETINOPATHY: NORMAL

## 2024-05-10 ENCOUNTER — LAB (OUTPATIENT)
Dept: LAB | Facility: CLINIC | Age: 69
End: 2024-05-10
Payer: COMMERCIAL

## 2024-05-10 ENCOUNTER — ANTICOAGULATION THERAPY VISIT (OUTPATIENT)
Dept: ANTICOAGULATION | Facility: CLINIC | Age: 69
End: 2024-05-10

## 2024-05-10 DIAGNOSIS — I48.91 ATRIAL FIBRILLATION, UNSPECIFIED TYPE (H): Primary | ICD-10-CM

## 2024-05-10 DIAGNOSIS — Z79.01 LONG TERM CURRENT USE OF ANTICOAGULANT THERAPY: ICD-10-CM

## 2024-05-10 DIAGNOSIS — I48.91 ATRIAL FIBRILLATION, UNSPECIFIED TYPE (H): ICD-10-CM

## 2024-05-10 LAB — INR BLD: 2.7 (ref 0.9–1.1)

## 2024-05-10 PROCEDURE — 85610 PROTHROMBIN TIME: CPT

## 2024-05-10 PROCEDURE — 36416 COLLJ CAPILLARY BLOOD SPEC: CPT

## 2024-05-10 NOTE — PROGRESS NOTES
ANTICOAGULATION MANAGEMENT     Joe Mas 68 year old male is on warfarin with therapeutic INR result. (Goal INR 2.0-3.0)    Recent labs: (last 7 days)     05/10/24  1148   INR 2.7*       ASSESSMENT     Source(s): Chart Review and Patient/Caregiver Call     Warfarin doses taken: Warfarin taken as instructed  Diet: No new diet changes identified  Medication/supplement changes: None noted  New illness, injury, or hospitalization: No  Signs or symptoms of bleeding or clotting: No  Previous result: Therapeutic last visit; previously outside of goal range  Additional findings: None       PLAN     Recommended plan for no diet, medication or health factor changes affecting INR     Dosing Instructions: Continue your current warfarin dose with next INR in 4 weeks       Summary  As of 5/10/2024      Full warfarin instructions:  2.5 mg every Mon, Thu; 5 mg all other days   Next INR check:  6/7/2024               Telephone call with Joe who verbalizes understanding and agrees to plan    Patient elected to schedule next visit in 6 weeks on 6/21/24    Education provided:   Contact [phone] with any changes, questions or concerns.     Plan made per [facility] anticoagulation protocol    Palma Adam RN  Anticoagulation Clinic  5/10/2024    _______________________________________________________________________     Anticoagulation Episode Summary       Current INR goal:  2.0-3.0   TTR:  85.8% (1 y)   Target end date:  Indefinite   Send INR reminders to:  [facility]    Indications    Atrial fibrillation  unspecified type (H) [I48.91]  Long term current use of anticoagulant therapy [Z79.01]             Comments:               Anticoagulation Care Providers       Provider Role Specialty Phone number    Jolynn Cooper MD Referring Family Medicine [phone]

## 2024-05-13 ENCOUNTER — TELEPHONE (OUTPATIENT)
Dept: ANTICOAGULATION | Facility: CLINIC | Age: 69
End: 2024-05-13

## 2024-05-13 ENCOUNTER — HOSPITAL ENCOUNTER (EMERGENCY)
Facility: CLINIC | Age: 69
Discharge: HOME | End: 2024-05-13
Attending: EMERGENCY MEDICINE | Admitting: EMERGENCY MEDICINE
Payer: COMMERCIAL

## 2024-05-13 VITALS
TEMPERATURE: 97.7 F | SYSTOLIC BLOOD PRESSURE: 135 MMHG | OXYGEN SATURATION: 96 % | HEIGHT: 69 IN | WEIGHT: 246 LBS | HEART RATE: 70 BPM | BODY MASS INDEX: 36.43 KG/M2 | RESPIRATION RATE: 18 BRPM | DIASTOLIC BLOOD PRESSURE: 97 MMHG

## 2024-05-13 DIAGNOSIS — Z79.01 LONG TERM CURRENT USE OF ANTICOAGULANT THERAPY: ICD-10-CM

## 2024-05-13 DIAGNOSIS — J36 PERITONSILLAR ABSCESS: ICD-10-CM

## 2024-05-13 DIAGNOSIS — J02.9 PHARYNGITIS, UNSPECIFIED ETIOLOGY: ICD-10-CM

## 2024-05-13 DIAGNOSIS — I48.91 ATRIAL FIBRILLATION, UNSPECIFIED TYPE (H): Primary | ICD-10-CM

## 2024-05-13 LAB
FLUAV RNA SPEC QL NAA+PROBE: NEGATIVE
FLUBV RNA RESP QL NAA+PROBE: NEGATIVE
GROUP A STREP BY PCR: NOT DETECTED
RSV RNA SPEC NAA+PROBE: NEGATIVE
SARS-COV-2 RNA RESP QL NAA+PROBE: NEGATIVE

## 2024-05-13 PROCEDURE — 99284 EMERGENCY DEPT VISIT MOD MDM: CPT | Performed by: EMERGENCY MEDICINE

## 2024-05-13 PROCEDURE — 250N000012 HC RX MED GY IP 250 OP 636 PS 637: Performed by: EMERGENCY MEDICINE

## 2024-05-13 PROCEDURE — 87637 SARSCOV2&INF A&B&RSV AMP PRB: CPT | Performed by: EMERGENCY MEDICINE

## 2024-05-13 PROCEDURE — 250N000013 HC RX MED GY IP 250 OP 250 PS 637: Performed by: EMERGENCY MEDICINE

## 2024-05-13 PROCEDURE — 87651 STREP A DNA AMP PROBE: CPT | Performed by: EMERGENCY MEDICINE

## 2024-05-13 RX ORDER — PREDNISONE 20 MG/1
TABLET ORAL
Qty: 10 TABLET | Refills: 0 | Status: SHIPPED | OUTPATIENT
Start: 2024-05-13 | End: 2024-08-13

## 2024-05-13 RX ADMIN — DEXAMETHASONE 10 MG: 4 TABLET ORAL at 03:45

## 2024-05-13 RX ADMIN — AMOXICILLIN AND CLAVULANATE POTASSIUM 1 TABLET: 875; 125 TABLET, FILM COATED ORAL at 03:45

## 2024-05-13 ASSESSMENT — COLUMBIA-SUICIDE SEVERITY RATING SCALE - C-SSRS
6. HAVE YOU EVER DONE ANYTHING, STARTED TO DO ANYTHING, OR PREPARED TO DO ANYTHING TO END YOUR LIFE?: NO
2. HAVE YOU ACTUALLY HAD ANY THOUGHTS OF KILLING YOURSELF IN THE PAST MONTH?: NO
1. IN THE PAST MONTH, HAVE YOU WISHED YOU WERE DEAD OR WISHED YOU COULD GO TO SLEEP AND NOT WAKE UP?: NO

## 2024-05-13 NOTE — ED TRIAGE NOTES
Pt presents with left sided sore throat and swollen nodes that began Saturday. Pt endorses low grade fevers.      Triage Assessment (Adult)       Row Name 05/13/24 0313          Triage Assessment    Airway WDL WDL        Respiratory WDL    Respiratory WDL WDL        Skin Circulation/Temperature WDL    Skin Circulation/Temperature WDL WDL        Cardiac WDL    Cardiac WDL WDL        Peripheral/Neurovascular WDL    Peripheral Neurovascular WDL WDL        Cognitive/Neuro/Behavioral WDL    Cognitive/Neuro/Behavioral WDL WDL

## 2024-05-13 NOTE — DISCHARGE INSTRUCTIONS
Take antibiotics as prescribed.    Follow-up with clinic provider if not improving.    Return or be seen if new or worsening symptoms develop.

## 2024-05-13 NOTE — TELEPHONE ENCOUNTER
ANTICOAGULATION  MANAGEMENT: Discharge Review    Joe Mas chart reviewed for anticoagulation continuity of care    Emergency room visit on 5/13/24 for pharyngitis, peritonsillar abscess.    Discharge disposition: Home    Results:  Recent labs: (last 7 days)     05/10/24  1148   INR 2.7*     Anticoagulation inpatient management:     not applicable     Anticoagulation discharge instructions:     Warfarin dosing: home regimen continued   Bridging: No   INR goal change: No      Medication changes affecting anticoagulation: Yes: pred (x5 days) and augmentin (x7 days), received decadron 10mg PO and first dose of augmentin in the ED    Additional factors affecting anticoagulation: Yes: infection     PLAN     No adjustment to anticoagulation dosing plan needed  Recommend to check INR on Weds/Thurs this week.    Spoke with Joe-- discussed interaction risk with medications started in ED; agreed to check sooner, appt made for 5/17/24    Anticoagulation Calendar updated  Priority updated to high for medication interactions.      Palma Adam RN

## 2024-05-13 NOTE — ED PROVIDER NOTES
ED Provider Note  [facility]      History     Chief Complaint   Patient presents with    Pharyngitis     HPI  Joe Mas is a 68 year old male who presents to the emergency department with concerns regarding sore throat.  This is primarily left-sided.  It began on Saturday morning, now 2 mornings ago.  No fever.  Pain with swallowing.  No prior similar symptoms.  No head or neck surgeries.  No significant cough        Independent Historian:        Review of External Notes:          Allergies:  Allergies   Allergen Reactions    Lisinopril Cough       Problem List:    Patient Active Problem List    Diagnosis Date Noted    Urinary hesitancy 02/06/2024     Priority: Medium    S/P ablation of atrial fibrillation 08/04/2023     Priority: Medium     2023       Benign neoplasm of cecum 03/13/2023     Priority: Medium    Benign neoplasm of transverse colon 03/13/2023     Priority: Medium    Benign neoplasm of ascending colon 03/13/2023     Priority: Medium    Diverticular disease of large intestine 03/09/2023     Priority: Medium    Hydrocele in adult 05/10/2022     Priority: Medium    Moderate major depression (H) 02/01/2022     Priority: Medium    Quit smoking 04/09/2021     Priority: Medium     a few years ago  30+ pack year hx       JUNG (obstructive sleep apnea) 05/11/2018     Priority: Medium     Moderate to possible severe JUNG, potential for significant hypoxemia and borderline sustained hypoxemia   - Suspect AHI was actually under-reported given suspected sleep onset was not until ~4am (recording from [place] to 9am).      Home Sleep Apnea Testing - 5/4/2018: 259 lbs 0 oz: AHI 15.6/hr. Supine AHI 15.6/hr.   Oxygen Codey of 81%.  Baseline 88.5%.  Sp02 =< 88% for majority of recording  He slept on his back (11.9%), prone (0%), left (5.8) and right (72.8%) sides.   Analysis time: 483.1 minutes.           Long term current use of anticoagulant therapy 02/27/2018     Priority: Medium    Atrial  fibrillation, unspecified type (H) 02/27/2018     Priority: Medium    Morbid obesity (H) 02/27/2018     Priority: Medium    Tubular adenoma of colon 08/16/2017     Priority: Medium     Collected: 8/11/2017   Received: 8/14/2017   Reported: 8/15/2017 17:28   Ordering Phy(s): AL MORELAND     For improved result formatting, select 'View Enhanced Report Format'   under Linked Documents section.     SPECIMEN(S):   A: Colon polyps at 20 cm   B: Colon polyp, ascending     FINAL DIAGNOSIS:   A.  Colon at 20 cm, grossly biopsies:   -Tubular adenoma and fragments of hyperplastic polyp   - Negative for high-grade dysplasia and malignancy.     B.  Right colon, mucosal biopsy:   - Tubular adenoma.   - Negative for high-grade dysplasia and malignancy.       Hyperlipidemia with target LDL less than 100 02/14/2014     Priority: Medium     Diagnosis updated by automated process. Provider to review and confirm.      Type 2 diabetes mellitus with complication, without long-term current use of insulin (H) 10/29/2013     Priority: Medium    Advanced directives, counseling/discussion 11/03/2011     Priority: Medium     Advance Directive Problem List Overview:   Name Relationship Phone    Primary Health Care Agent            Alternative Health Care Agent          Discussed advance care planning with patient; information given to patient to review.     Flavia Duggan [facility], HC Facilitator    11/3/2011         Mixed hyperlipidemia 02/22/2007     Priority: Medium    Obstructive chronic bronchitis with exacerbation (H) 02/22/2007     Priority: Medium    Essential hypertension, benign 02/22/2007     Priority: Medium        Past Medical History:    Past Medical History:   Diagnosis Date    A-fib (H) 02/27/2018    COPD (chronic obstructive pulmonary disease) (H)     Diabetes (H)     Hypertension        Past Surgical History:    Past Surgical History:   Procedure Laterality Date    ABDOMEN SURGERY      COLONOSCOPY  11/12/2004    Follow up  colonoscopy in 5 years    EP ABLATION FOCAL AFIB N/A 2023    Procedure: Ablation Atrial Fibrilation;  Surgeon: Jay Barrientos MD;  Location:  HEART CARDIAC CATH LAB    HERNIA REPAIR, UMBILICAL  2001    KNEE SURGERY  1960's    Left       Family History:    Family History   Problem Relation Age of Onset    Osteoporosis Mother     Heart Disease Mother     Neurologic Disorder Father         passed away from a brain tumor age 48    Cerebrovascular Disease Father     Neurologic Disorder Maternal Grandmother         parkinsons    C.A.D. Maternal Grandmother     Diabetes Maternal Grandmother     Alcohol/Drug Maternal Grandfather     Cancer Maternal Grandfather         esphogus    Cancer Paternal Grandfather         lung    Diabetes Sister     Hypertension Sister     Asthma Daughter     Cerebrovascular Disease No family hx of     Breast Cancer No family hx of     Cancer - colorectal No family hx of        Social History:  Marital Status:  Single [1]  Social History     Tobacco Use    Smoking status: Former     Current packs/day: 0.00     Average packs/day: 1 pack/day for 25.0 years (25.0 ttl pk-yrs)     Types: Cigarettes     Start date: 1975     Quit date: 2000     Years since quittin.3    Smokeless tobacco: Never    Tobacco comments:     quit smoking    Vaping Use    Vaping status: Never Used   Substance Use Topics    Alcohol use: Yes     Comment: 2-5 beers per month    Drug use: Yes     Types: Marijuana        Medications:    amoxicillin-clavulanate (AUGMENTIN) 875-125 MG tablet  ABRYSVO injection  albuterol (PROAIR HFA/PROVENTIL HFA/VENTOLIN HFA) 108 (90 Base) MCG/ACT inhaler  atorvastatin (LIPITOR) 20 MG tablet  blood glucose (ONETOUCH ULTRA) test strip  ipratropium - albuterol 0.5 mg/2.5 mg/3 mL (DUONEB) 0.5-2.5 (3) MG/3ML neb solution  Lancets (ONETOUCH DELICA PLUS [id number]) MISC  losartan (COZAAR) 100 MG tablet  metFORMIN (GLUCOPHAGE XR) 500 MG 24 hr tablet  metoprolol succinate ER  "(TOPROL XL) 50 MG 24 hr tablet  mometasone-formoterol (DULERA) 200-5 MCG/ACT inhaler  Nebulizers (INNOSPIRE ESSENCE NEBULIZER) MISC  Omega-3 Fatty Acids (FISH OIL PO)  predniSONE (DELTASONE) 20 MG tablet  Respiratory Therapy Supplies (NEBULIZER/TUBING/MOUTHPIECE) KIT  VITAMIN D PO  warfarin ANTICOAGULANT (JANTOVEN ANTICOAGULANT) 5 MG tablet          Review of Systems  A medically appropriate review of systems was performed with pertinent positives and negatives noted in the HPI, and all other systems negative.    Physical Exam   Patient Vitals for the past 24 hrs:   BP Temp Temp src Pulse Resp SpO2 Height Weight   05/13/24 0311 (!) 160/91 97.7  F (36.5  C) Oral 70 20 97 % 1.753 m (5' 9\") 111.6 kg (246 lb)          Physical Exam  General: alert and in no acute distress on arrival  Head: atraumatic, normocephalic  Oropharynx: Prominent left peritonsillar region.  No significant uvular deviation.  Right peritonsillar area normal.  No significant exudate  Lungs:  nonlabored  CV:  extremities warm and perfused  Abd: nondistended  Skin: no rashes, no diaphoresis and skin color normal  Neuro: Patient awake, alert, speech is fluent,   Psychiatric: affect/mood normal,        ED Course                 Procedures                           No results found for this or any previous visit (from the past 24 hour(s)).    MEDICATIONS GIVEN IN THE EMERGENCY DEPARTMENT:  Medications   amoxicillin-clavulanate (AUGMENTIN) 875-125 MG per tablet 1 tablet (has no administration in time range)   dexAMETHasone (DECADRON) tablet 10 mg (has no administration in time range)           Independent Interpretation (X-rays, CTs, rhythm strip):  None    Consultations/Discussion of Management or Tests:  None       Social Determinants of Health affecting care:         Assessments & Plan (with Medical Decision Making)  68 year old male who presents to the Emergency Department for evaluation of sore throat.  Present over the past 2 days.  Does have " primarily left peritonsillar area swelling.  No exudate.  There is left anterior cervical and submandibular lymphadenopathy that is present.  Given this finding, I feel that this likely represents peritonsillar abscess.  No signs of Jigar's angina.  Symptoms controlled with Tylenol and ibuprofen at home.  Given the appearance of the oropharynx, I feel it is reasonable for treatment with Augmentin, and patient encouraged to follow-up with primary care provider, and return instructions discussed if new or worsening symptoms develop.  Patient has been tolerating food, and liquids.  No fever, and no other significant external signs of cellulitis.  Regardless we will treat conservatively with Augmentin, and encourage follow-up as needed.       I have reviewed the nursing notes.    I have reviewed the findings, diagnosis, plan and need for follow up with the patient.       Critical Care time:  none      NEW PRESCRIPTIONS STARTED AT TODAY'S ER VISIT  New Prescriptions    AMOXICILLIN-CLAVULANATE (AUGMENTIN) 875-125 MG TABLET    Take 1 tablet by mouth 2 times daily for 10 days       Final diagnoses:   Pharyngitis, unspecified etiology   Peritonsillar abscess       5/13/2024   [facility] EMERGENCY DEPT       Jaison, Trevin Ahuja MD  05/13/24 0340

## 2024-05-17 ENCOUNTER — LAB (OUTPATIENT)
Dept: LAB | Facility: CLINIC | Age: 69
End: 2024-05-17
Payer: COMMERCIAL

## 2024-05-17 ENCOUNTER — ANTICOAGULATION THERAPY VISIT (OUTPATIENT)
Dept: ANTICOAGULATION | Facility: CLINIC | Age: 69
End: 2024-05-17

## 2024-05-17 DIAGNOSIS — Z79.01 LONG TERM CURRENT USE OF ANTICOAGULANT THERAPY: ICD-10-CM

## 2024-05-17 DIAGNOSIS — I48.91 ATRIAL FIBRILLATION, UNSPECIFIED TYPE (H): ICD-10-CM

## 2024-05-17 DIAGNOSIS — I48.91 ATRIAL FIBRILLATION, UNSPECIFIED TYPE (H): Primary | ICD-10-CM

## 2024-05-17 LAB — INR BLD: 3.5 (ref 0.9–1.1)

## 2024-05-17 PROCEDURE — 85610 PROTHROMBIN TIME: CPT

## 2024-05-17 PROCEDURE — 36416 COLLJ CAPILLARY BLOOD SPEC: CPT

## 2024-05-17 NOTE — PROGRESS NOTES
ANTICOAGULATION MANAGEMENT     Joe Mas 68 year old male is on warfarin with supratherapeutic INR result. (Goal INR 2.0-3.0)    Recent labs: (last 7 days)     05/17/24  1126   INR 3.5*       ASSESSMENT     Source(s): Chart Review and Patient/Caregiver Call     Warfarin doses taken: Warfarin taken as instructed-Already took today's dose. Has been taking 2.5 mg Tue/Fri NOT Mon/Thur ([facility] calendar updated)  Diet:  sore throat/infection may be affecting diet and INR-not eating much  Medication/supplement changes: Prednisone x5 days and Augmentin x7 days started 5/13/24 due to peritonsillar abscess and pharyngitis.  New illness, injury, or hospitalization: No  Signs or symptoms of bleeding or clotting: No  Previous result: Therapeutic last 2(+) visits  Additional findings: He's feeling much better, today was last day of Prednisone.       PLAN     Recommended plan for temporary change(s) affecting INR     Dosing Instructions: Tomorrow hold dose then continue your current warfarin dose with next INR in 5-7 days       Summary  As of 5/17/2024      Full warfarin instructions:  5/18: Hold; Otherwise 2.5 mg every Tue, Fri; 5 mg all other days   Next INR check:  5/24/2024               Telephone call with Joe who verbalizes understanding and agrees to plan    Lab visit scheduled    Education provided:   Please call back if any changes to your diet, medications or how you've been taking warfarin  Goal range and lab monitoring: goal range and significance of current result and Importance of following up at instructed interval  Symptom monitoring: monitoring for bleeding signs and symptoms  Contact [phone] with any changes, questions or concerns.     Plan made per [facility] anticoagulation protocol    Makayla Wilson RN  Anticoagulation Clinic  5/17/2024    _______________________________________________________________________     Anticoagulation Episode Summary       Current INR goal:  2.0-3.0   TTR:  84.6% (1 y)   Target  end date:  Indefinite   Send INR reminders to:  MARIA ELENA YANG    Indications    Atrial fibrillation  unspecified type (H) [I48.91]  Long term current use of anticoagulant therapy [Z79.01]             Comments:               Anticoagulation Care Providers       Provider Role Specialty Phone number    Jolynn Cooper MD Referring Family Medicine [phone]

## 2024-05-18 ENCOUNTER — HOSPITAL ENCOUNTER (EMERGENCY)
Facility: CLINIC | Age: 69
Discharge: HOME | End: 2024-05-18
Attending: EMERGENCY MEDICINE | Admitting: EMERGENCY MEDICINE
Payer: COMMERCIAL

## 2024-05-18 VITALS
HEART RATE: 74 BPM | SYSTOLIC BLOOD PRESSURE: 113 MMHG | RESPIRATION RATE: 16 BRPM | DIASTOLIC BLOOD PRESSURE: 67 MMHG | OXYGEN SATURATION: 94 % | TEMPERATURE: 96.2 F

## 2024-05-18 DIAGNOSIS — J02.9 PHARYNGITIS, UNSPECIFIED ETIOLOGY: ICD-10-CM

## 2024-05-18 PROCEDURE — 99283 EMERGENCY DEPT VISIT LOW MDM: CPT | Performed by: EMERGENCY MEDICINE

## 2024-05-18 PROCEDURE — 99282 EMERGENCY DEPT VISIT SF MDM: CPT | Performed by: EMERGENCY MEDICINE

## 2024-05-18 ASSESSMENT — ACTIVITIES OF DAILY LIVING (ADL)
ADLS_ACUITY_SCORE: 33
ADLS_ACUITY_SCORE: 35

## 2024-05-18 NOTE — ED PROVIDER NOTES
History     Chief Complaint   Patient presents with    Ent Problem     HPI  Joe Mas is a 68 year old male with history notable for atrial fibrillation on warfarin, obstructive sleep apnea, obesity, seen in this department 5 days ago and treated for suspected peritonsillar abscess with Augmentin and short course of prednisone.  Started feeling significant improvement after starting prednisone finished this yesterday and now woke this morning with worsening pain in his throat.  No change in his voice.  No trismus.  No trouble handling secretions.  No neck discomfort with rotation or flexion.  No fevers.  Severe pain this morning but has improved through the course of the morning and while waiting here in the emergency department.  Patient originally presented to urgent care and then was sent to ED for further evaluation.  Of note, patient reports that he was seen in coagulation clinic and INR is supratherapeutic so is holding current dose of warfarin.    The patient's PMHx, Surgical Hx, Allergies, and Medications were all reviewed with the patient.    Allergies:  Allergies   Allergen Reactions    Lisinopril Cough       Problem List:    Patient Active Problem List    Diagnosis Date Noted    Urinary hesitancy 02/06/2024     Priority: Medium    S/P ablation of atrial fibrillation 08/04/2023     Priority: Medium     2023       Benign neoplasm of cecum 03/13/2023     Priority: Medium    Benign neoplasm of transverse colon 03/13/2023     Priority: Medium    Benign neoplasm of ascending colon 03/13/2023     Priority: Medium    Diverticular disease of large intestine 03/09/2023     Priority: Medium    Hydrocele in adult 05/10/2022     Priority: Medium    Moderate major depression (H) 02/01/2022     Priority: Medium    Quit smoking 04/09/2021     Priority: Medium     a few years ago  30+ pack year hx       JUNG (obstructive sleep apnea) 05/11/2018     Priority: Medium     Moderate to possible severe JUNG, potential for  significant hypoxemia and borderline sustained hypoxemia   - Suspect AHI was actually under-reported given suspected sleep onset was not until ~4am (recording from [place] to 9am).      Home Sleep Apnea Testing - 5/4/2018: 259 lbs 0 oz: AHI 15.6/hr. Supine AHI 15.6/hr.   Oxygen Codey of 81%.  Baseline 88.5%.  Sp02 =< 88% for majority of recording  He slept on his back (11.9%), prone (0%), left (5.8) and right (72.8%) sides.   Analysis time: 483.1 minutes.           Long term current use of anticoagulant therapy 02/27/2018     Priority: Medium    Atrial fibrillation, unspecified type (H) 02/27/2018     Priority: Medium    Morbid obesity (H) 02/27/2018     Priority: Medium    Tubular adenoma of colon 08/16/2017     Priority: Medium     Collected: 8/11/2017   Received: 8/14/2017   Reported: 8/15/2017 17:28   Ordering Phy(s): AL MORELAND     For improved result formatting, select 'View Enhanced Report Format'   under Linked Documents section.     SPECIMEN(S):   A: Colon polyps at 20 cm   B: Colon polyp, ascending     FINAL DIAGNOSIS:   A.  Colon at 20 cm, grossly biopsies:   -Tubular adenoma and fragments of hyperplastic polyp   - Negative for high-grade dysplasia and malignancy.     B.  Right colon, mucosal biopsy:   - Tubular adenoma.   - Negative for high-grade dysplasia and malignancy.       Hyperlipidemia with target LDL less than 100 02/14/2014     Priority: Medium     Diagnosis updated by automated process. Provider to review and confirm.      Type 2 diabetes mellitus with complication, without long-term current use of insulin (H) 10/29/2013     Priority: Medium    Advanced directives, counseling/discussion 11/03/2011     Priority: Medium     Advance Directive Problem List Overview:   Name Relationship Phone    Primary Health Care Agent            Alternative Health Care Agent          Discussed advance care planning with patient; information given to patient to review.     Flavia Duggan, CMA, HC  Facilitator    11/3/2011         Mixed hyperlipidemia 2007     Priority: Medium    Obstructive chronic bronchitis with exacerbation (H) 2007     Priority: Medium    Essential hypertension, benign 2007     Priority: Medium        Past Medical History:    Past Medical History:   Diagnosis Date    A-fib (H) 2018    COPD (chronic obstructive pulmonary disease) (H)     Diabetes (H)     Hypertension        Past Surgical History:    Past Surgical History:   Procedure Laterality Date    ABDOMEN SURGERY      COLONOSCOPY  2004    Follow up colonoscopy in 5 years    EP ABLATION FOCAL AFIB N/A 2023    Procedure: Ablation Atrial Fibrilation;  Surgeon: Jay Barrientos MD;  Location:  HEART CARDI[facility] CATH LAB    HERNIA REPAIR, UMBILICAL      KNEE SURGERY  1960's    Left       Family History:    Family History   Problem Relation Age of Onset    Osteoporosis Mother     Heart Disease Mother     Neurologic Disorder Father         passed away from a brain tumor age 48    Cerebrovascular Disease Father     Neurologic Disorder Maternal Grandmother         parkinsons    C.A.D. Maternal Grandmother     Diabetes Maternal Grandmother     Alcohol/Drug Maternal Grandfather     Cancer Maternal Grandfather         esphogus    Cancer Paternal Grandfather         lung    Diabetes Sister     Hypertension Sister     Asthma Daughter     Cerebrovascular Disease No family hx of     Breast Cancer No family hx of     Cancer - colorectal No family hx of        Social History:  Marital Status:  Single [1]  Social History     Tobacco Use    Smoking status: Former     Current packs/day: 0.00     Average packs/day: 1 pack/day for 25.0 years (25.0 ttl pk-yrs)     Types: Cigarettes     Start date: 1975     Quit date: 2000     Years since quittin.3    Smokeless tobacco: Never    Tobacco comments:     quit smoking    Vaping Use    Vaping status: Never Used   Substance Use Topics    Alcohol use: Yes      Comment: 2-5 beers per month    Drug use: Yes     Types: Marijuana        Medications:    ABRYSVO injection  albuterol (PROAIR HFA/PROVENTIL HFA/VENTOLIN HFA) 108 (90 Base) MCG/ACT inhaler  amoxicillin-clavulanate (AUGMENTIN) 875-125 MG tablet  atorvastatin (LIPITOR) 20 MG tablet  blood glucose (ONETOUCH ULTRA) test strip  ipratropium - albuterol 0.5 mg/2.5 mg/3 mL (DUONEB) 0.5-2.5 (3) MG/3ML neb solution  Lancets (ONETOUCH DELICA PLUS [id number]) MISC  losartan (COZAAR) 100 MG tablet  metFORMIN (GLUCOPHAGE XR) 500 MG 24 hr tablet  metoprolol succinate ER (TOPROL XL) 50 MG 24 hr tablet  mometasone-formoterol (DULERA) 200-5 MCG/ACT inhaler  Nebulizers (INNOSPIRE ESSENCE NEBULIZER) MISC  Omega-3 Fatty Acids (FISH OIL PO)  predniSONE (DELTASONE) 20 MG tablet  Respiratory Therapy Supplies (NEBULIZER/TUBING/MOUTHPIECE) KIT  VITAMIN D PO  warfarin ANTICOAGULANT (JANTOVEN ANTICOAGULANT) 5 MG tablet          Review of Systems  Pertinent positives and negatives mentioned in HPI    Physical Exam   BP: 113/67  Pulse: 74  Temp: (!) 96.2  F (35.7  C)  Resp: 16  SpO2: 94 %    GEN: Awake, alert, and cooperative.  Resting comfortably.  HENT: Mild erythema of posterior oropharynx without edema.  Uvula midline.  No trismus.  No sublingual fullness.  No neck discomfort with passive rotation or flexion.  Left-sided submandibular adenopathy.   EYES: EOM intact. Conjunctiva clear. No discharge.   NECK: Symmetric, freely mobile.  See HEENT above  CV : Extremities warm and well perfused.  PULM: Normal effort. Speaking in full sentences.  NEURO: Normal speech. Following commands.  Answering questions and interacting appropriately.   EXT: No gross deformity.   INT: Warm. No diaphoresis. Normal color.     ED Course        Procedures                 Critical Care time:  none               No results found for this or any previous visit (from the past 24 hour(s)).    Medications - No data to display    Assessments & Plan (with Medical Decision  Making)   68 year old male with past medical history notable for JUNG, atrial fibrillation on warfarin with recent supratherapeutic reading in INR clinic, currently being treated for suspected peritonsillar abscess with Augmentin and burst dose prednisone.  Patient has been his prednisone and had increasing pain when he awoke this morning.  Since he has been here in the department pain has improved.  On examination I do not see any convincing signs of peritonsillar abscess and uvula is midline.  No signs of Jigar's.  Very low suspicion for deep space infection given lack of fever, well appearance and no discomfort with passive neck rotation and flexion.  No discomfort with extension.  No trismus.  No sublingual fullness.  I think he is likely experiencing lack of benefit of steroids which improved his symptoms.  Given his clinical picture I do not think additional steroids necessary.  He was under the impression the steroids were helping his infection and became concerned about this.  We discussed how the prednisone helps his symptoms and inflammation.  Continue to take Augmentin and follow-up in clinic if not improved in 3 to 4 days.  ED return precautions were discussed.         I have reviewed the nursing notes.         New Prescriptions    No medications on file       Final diagnoses:   Pharyngitis, unspecified etiology     Jason Verde MD        5/18/2024   [facility] EMERGENCY DEPT    Disclaimer: This note consists of words and symbols derived from keyboarding and dictation using voice recognition software.  As a result, there may be errors that have gone undetected.  Please consider this when interpreting information found in this note.               Jason Verde MD  05/18/24 2390

## 2024-05-18 NOTE — ED NOTES
Patient reports that he was seen within the last 7 days for a peritonsillar abscess, Augmentin was started and prednisone reports that he is not feeling better and woke up this morning with worsening pain on his left side of his throat and left ear.  Patient reports that he was advised to return if his symptoms have not improved or if they worsen patient is here, recommended that patient be seen in the emergency department is he is in the midst of outpatient treatment and reports that his symptoms are not improving.  Patient is agreeable to this plan     Uma Ahuja, J LUIS CNP  05/18/24 1116

## 2024-05-18 NOTE — DISCHARGE INSTRUCTIONS
Your throat seems to be improving.     Continue Augment as originally prescribed.     Tylenol for pain control.  No ibuprofen or NSAIDs as you know due to use of warfarin.    I expect you throat to improve in the next 3-4 days. If not follow up in clinic. Return to ED if you have trouble swallowing, trouble opening your mouth more than 2 finger breaths as we discussed, change in your voice or comfortable handling your secretions trouble eating drinking or other new symptoms you find concerning.    I hope you feel better soon.

## 2024-05-18 NOTE — ED NOTES
Left tonsillar pain.  Patient was treated for a tonsillar abscess and just completed prednisone on Friday.  Patient still on antibiotics.  Patient stated that he felt better until he stopped the prednisone.

## 2024-05-20 ENCOUNTER — DOCUMENTATION ONLY (OUTPATIENT)
Dept: ANTICOAGULATION | Facility: CLINIC | Age: 69
End: 2024-05-20
Payer: COMMERCIAL

## 2024-05-20 NOTE — PROGRESS NOTES
ANTICOAGULATION  MANAGEMENT: Discharge Review    Joe Mas chart reviewed for anticoagulation continuity of care    Emergency room visit on 5/18/ for pharyngitis, ENT problem.    Discharge disposition: Home    Results:    Recent labs: (last 7 days)     05/17/24  1126   INR 3.5*     Anticoagulation inpatient management:     not applicable     Anticoagulation discharge instructions:     Warfarin dosing: home regimen continued   Bridging: No   INR goal change: No      Medication changes affecting anticoagulation: Yes: Was on Prednisone,  finishing up antibiotic. See [facility] documentation from 5/17 with plan for supra result.    Additional factors affecting anticoagulation: Yes: diet off due to throat pain     PLAN     No adjustment to anticoagulation plan needed-follow plan made 5/17.    Patient not contacted    No adjustment to Anticoagulation Calendar was required    Makayla Wilson RN

## 2024-05-24 ENCOUNTER — LAB (OUTPATIENT)
Dept: LAB | Facility: CLINIC | Age: 69
End: 2024-05-24
Payer: COMMERCIAL

## 2024-05-24 ENCOUNTER — ANTICOAGULATION THERAPY VISIT (OUTPATIENT)
Dept: ANTICOAGULATION | Facility: CLINIC | Age: 69
End: 2024-05-24

## 2024-05-24 DIAGNOSIS — I48.91 ATRIAL FIBRILLATION, UNSPECIFIED TYPE (H): ICD-10-CM

## 2024-05-24 DIAGNOSIS — I48.91 ATRIAL FIBRILLATION, UNSPECIFIED TYPE (H): Primary | ICD-10-CM

## 2024-05-24 DIAGNOSIS — Z79.01 LONG TERM CURRENT USE OF ANTICOAGULANT THERAPY: ICD-10-CM

## 2024-05-24 LAB — INR BLD: 3.7 (ref 0.9–1.1)

## 2024-05-24 PROCEDURE — 85610 PROTHROMBIN TIME: CPT

## 2024-05-24 PROCEDURE — 36416 COLLJ CAPILLARY BLOOD SPEC: CPT

## 2024-05-24 NOTE — PROGRESS NOTES
ANTICOAGULATION MANAGEMENT     Joe Mas 68 year old male is on warfarin with supratherapeutic INR result. (Goal INR 2.0-3.0)    Recent labs: (last 7 days)     05/24/24  1014   INR 3.7*       ASSESSMENT     Source(s): Chart Review and Patient/Caregiver Call     Warfarin doses taken: Warfarin taken as instructed  Diet: No new diet changes identified back to normal diet now  Medication/supplement changes:  5/13 Prednisone 40mg daily for 5 days, 5/13-5/23 Augmentin for 10 days, finished yesterday  New illness, injury, or hospitalization: 5/13 and 5/18 ER visits for ENT problem, feeling better now   Signs or symptoms of bleeding or clotting: No  Previous result: Supratherapeutic  Additional findings: None       PLAN     Recommended plan for temporary change(s) affecting INR     Dosing Instructions: hold dose then continue your current warfarin dose with next INR in 1-2 weeks. Patient is due for smaller 2.5mg dose today and he would prefer to hold a total of 5mg so he will hold dose today then take half dose tomorrow then back to normal schedule.         Summary  As of 5/24/2024      Full warfarin instructions:  5/24: Hold; 5/25: 2.5 mg; Otherwise 2.5 mg every Tue, Fri; 5 mg all other days   Next INR check:  6/7/2024               Telephone call with Joe who verbalizes understanding and agrees to plan    Lab visit scheduled    Education provided:   Goal range and lab monitoring: goal range and significance of current result  Contact [phone] with any changes, questions or concerns.     Plan made per [facility] anticoagulation protocol    Jolynn Johnson RN  Anticoagulation Clinic  5/24/2024    _______________________________________________________________________     Anticoagulation Episode Summary       Current INR goal:  2.0-3.0   TTR:  82.7% (1 y)   Target end date:  Indefinite   Send INR reminders to:  MARIA ELENA YANG    Indications    Atrial fibrillation  unspecified type (H) [I48.91]  Long term current use  of anticoagulant therapy [Z79.01]             Comments:               Anticoagulation Care Providers       Provider Role Specialty Phone number    Jolynn Cooper MD Referring Family Medicine [phone]

## 2024-06-07 ENCOUNTER — LAB (OUTPATIENT)
Dept: LAB | Facility: CLINIC | Age: 69
End: 2024-06-07
Payer: COMMERCIAL

## 2024-06-07 ENCOUNTER — ANTICOAGULATION THERAPY VISIT (OUTPATIENT)
Dept: ANTICOAGULATION | Facility: CLINIC | Age: 69
End: 2024-06-07

## 2024-06-07 DIAGNOSIS — I48.91 ATRIAL FIBRILLATION, UNSPECIFIED TYPE (H): ICD-10-CM

## 2024-06-07 DIAGNOSIS — Z79.01 LONG TERM CURRENT USE OF ANTICOAGULANT THERAPY: ICD-10-CM

## 2024-06-07 DIAGNOSIS — I48.91 ATRIAL FIBRILLATION, UNSPECIFIED TYPE (H): Primary | ICD-10-CM

## 2024-06-07 LAB — INR BLD: 3.4 (ref 0.9–1.1)

## 2024-06-07 PROCEDURE — 36416 COLLJ CAPILLARY BLOOD SPEC: CPT

## 2024-06-07 PROCEDURE — 85610 PROTHROMBIN TIME: CPT

## 2024-06-07 NOTE — PROGRESS NOTES
ANTICOAGULATION MANAGEMENT     Joe Mas 68 year old male is on warfarin with supratherapeutic INR result. (Goal INR 2.0-3.0)    Recent labs: (last 7 days)     06/07/24  1124   INR 3.4*       ASSESSMENT     Source(s): Chart Review and Patient/Caregiver Call     Warfarin doses taken: Warfarin taken as instructed  Diet:  Not eating as much and is losing weight may be affecting diet and INR  Medication/supplement changes: None noted  New illness, injury, or hospitalization: No  Signs or symptoms of bleeding or clotting: No  Previous result: Supratherapeutic  Additional findings: None       PLAN     Recommended plan for ongoing change(s) affecting INR     Dosing Instructions: decrease your warfarin dose (8.3% change) with next INR in 2 weeks       Summary  As of 6/7/2024      Full warfarin instructions:  2.5 mg every Mon, Wed, Fri; 5 mg all other days   Next INR check:  6/21/2024               Telephone call with Joe who verbalizes understanding and agrees to plan and who agrees to plan and repeated back plan correctly    Lab visit scheduled    Education provided:   Please call back if any changes to your diet, medications or how you've been taking warfarin  Goal range and lab monitoring: goal range and significance of current result  Contact [phone] with any changes, questions or concerns.     Plan made per [facility] anticoagulation protocol    Makayla Wilson RN  Anticoagulation Clinic  6/7/2024    _______________________________________________________________________     Anticoagulation Episode Summary       Current INR goal:  2.0-3.0   TTR:  79.7% (1 y)   Target end date:  Indefinite   Send INR reminders to:  MARIA ELENA YANG    Indications    Atrial fibrillation  unspecified type (H) [I48.91]  Long term current use of anticoagulant therapy [Z79.01]             Comments:               Anticoagulation Care Providers       Provider Role Specialty Phone number    Jolynn Cooper MD Referring Family Medicine  [phone]

## 2024-06-21 ENCOUNTER — ANTICOAGULATION THERAPY VISIT (OUTPATIENT)
Dept: ANTICOAGULATION | Facility: CLINIC | Age: 69
End: 2024-06-21

## 2024-06-21 ENCOUNTER — LAB (OUTPATIENT)
Dept: LAB | Facility: CLINIC | Age: 69
End: 2024-06-21
Payer: COMMERCIAL

## 2024-06-21 DIAGNOSIS — I48.91 ATRIAL FIBRILLATION, UNSPECIFIED TYPE (H): ICD-10-CM

## 2024-06-21 DIAGNOSIS — I48.91 ATRIAL FIBRILLATION, UNSPECIFIED TYPE (H): Primary | ICD-10-CM

## 2024-06-21 DIAGNOSIS — Z79.01 LONG TERM CURRENT USE OF ANTICOAGULANT THERAPY: ICD-10-CM

## 2024-06-21 LAB — INR BLD: 2.2 (ref 0.9–1.1)

## 2024-06-21 PROCEDURE — 36416 COLLJ CAPILLARY BLOOD SPEC: CPT

## 2024-06-21 PROCEDURE — 85610 PROTHROMBIN TIME: CPT

## 2024-06-21 NOTE — PROGRESS NOTES
ANTICOAGULATION MANAGEMENT     Joe Mas 68 year old male is on warfarin with therapeutic INR result. (Goal INR 2.0-3.0)    Recent labs: (last 7 days)     06/21/24  1125   INR 2.2*       ASSESSMENT     Source(s): Chart Review and Patient/Caregiver Call     Warfarin doses taken: Warfarin taken as instructed  Diet: No new diet changes identified  Medication/supplement changes: None noted  New illness, injury, or hospitalization: No  Signs or symptoms of bleeding or clotting: No  Previous result: Supratherapeutic decreased by 8.3%  Additional findings: None       PLAN     Recommended plan for no diet, medication or health factor changes affecting INR     Dosing Instructions: Continue your current warfarin dose with next INR in 3 weeks       Summary  As of 6/21/2024      Full warfarin instructions:  2.5 mg every Mon, Wed, Fri; 5 mg all other days   Next INR check:  7/12/2024               Telephone call with Joe who verbalizes understanding and agrees to plan    Lab visit scheduled    Education provided:   Goal range and lab monitoring: goal range and significance of current result  Contact [phone]  with any changes, questions or concerns.     Plan made per [facility] anticoagulation protocol    Jolynn Johnson RN  Anticoagulation Clinic  6/21/2024    _______________________________________________________________________     Anticoagulation Episode Summary       Current INR goal:  2.0-3.0   TTR:  78.9% (1 y)   Target end date:  Indefinite   Send INR reminders to:  MARIA ELENA YANG    Indications    Atrial fibrillation  unspecified type (H) [I48.91]  Long term current use of anticoagulant therapy [Z79.01]             Comments:               Anticoagulation Care Providers       Provider Role Specialty Phone number    Jolynn Cooper MD Referring Family Medicine [phone]

## 2024-07-11 DIAGNOSIS — J44.1 OBSTRUCTIVE CHRONIC BRONCHITIS WITH EXACERBATION (H): ICD-10-CM

## 2024-07-11 RX ORDER — ALBUTEROL SULFATE 90 UG/1
AEROSOL, METERED RESPIRATORY (INHALATION)
Qty: 18 G | Refills: 9 | Status: SHIPPED | OUTPATIENT
Start: 2024-07-11

## 2024-07-12 ENCOUNTER — ANTICOAGULATION THERAPY VISIT (OUTPATIENT)
Dept: ANTICOAGULATION | Facility: CLINIC | Age: 69
End: 2024-07-12

## 2024-07-12 ENCOUNTER — LAB (OUTPATIENT)
Dept: LAB | Facility: CLINIC | Age: 69
End: 2024-07-12
Payer: COMMERCIAL

## 2024-07-12 DIAGNOSIS — Z79.01 LONG TERM CURRENT USE OF ANTICOAGULANT THERAPY: ICD-10-CM

## 2024-07-12 DIAGNOSIS — I48.91 ATRIAL FIBRILLATION, UNSPECIFIED TYPE (H): ICD-10-CM

## 2024-07-12 DIAGNOSIS — I48.91 ATRIAL FIBRILLATION, UNSPECIFIED TYPE (H): Primary | ICD-10-CM

## 2024-07-12 LAB — INR BLD: 2.6 (ref 0.9–1.1)

## 2024-07-12 PROCEDURE — 85610 PROTHROMBIN TIME: CPT

## 2024-07-12 PROCEDURE — 36416 COLLJ CAPILLARY BLOOD SPEC: CPT

## 2024-07-12 NOTE — PROGRESS NOTES
ANTICOAGULATION MANAGEMENT     Joe Mas 68 year old male is on warfarin with therapeutic INR result. (Goal INR 2.0-3.0)    Recent labs: (last 7 days)     07/12/24  1125   INR 2.6*       ASSESSMENT     Source(s): Chart Review and Patient/Caregiver Call     Warfarin doses taken: Warfarin taken as instructed  Diet: No new diet changes identified  Medication/supplement changes: None noted  New illness, injury, or hospitalization: No  Signs or symptoms of bleeding or clotting: No  Previous result: Therapeutic last visit; previously outside of goal range  Additional findings: None       PLAN     Recommended plan for no diet, medication or health factor changes affecting INR     Dosing Instructions: Continue your current warfarin dose with next INR in 4 weeks       Summary  As of 7/12/2024      Full warfarin instructions:  2.5 mg every Mon, Wed, Fri; 5 mg all other days   Next INR check:  8/9/2024               Telephone call with Joe who verbalizes understanding and agrees to plan    Check at provider office visit    Education provided: Please call back if any changes to your diet, medications or how you've been taking warfarin  Contact [phone] with any changes, questions or concerns.     Plan made per [facility] anticoagulation protocol    Makayla Wilson RN  Anticoagulation Clinic  7/12/2024    _______________________________________________________________________     Anticoagulation Episode Summary       Current INR goal:  2.0-3.0   TTR:  80.9% (1 y)   Target end date:  Indefinite   Send INR reminders to:  MARIA ELENA YANG    Indications    Atrial fibrillation  unspecified type (H) [I48.91]  Long term current use of anticoagulant therapy [Z79.01]             Comments:               Anticoagulation Care Providers       Provider Role Specialty Phone number    Jolynn Cooper MD Referring Family Medicine [phone]

## 2024-07-25 DIAGNOSIS — I48.91 ATRIAL FIBRILLATION, UNSPECIFIED TYPE (H): ICD-10-CM

## 2024-07-25 RX ORDER — WARFARIN SODIUM 5 MG/1
TABLET ORAL
Qty: 90 TABLET | Refills: 1 | Status: SHIPPED | OUTPATIENT
Start: 2024-07-25 | End: 2024-07-26

## 2024-07-25 NOTE — TELEPHONE ENCOUNTER
ANTICOAGULATION MANAGEMENT:  Medication Refill    Anticoagulation Summary  As of 7/12/2024      Warfarin maintenance plan:  2.5 mg (5 mg x 0.5) every Mon, Wed, Fri; 5 mg (5 mg x 1) all other days   Next INR check:  8/9/2024   Target end date:  Indefinite    Indications    Atrial fibrillation  unspecified type (H) [I48.91]  Long term current use of anticoagulant therapy [Z79.01]                 Anticoagulation Care Providers       Provider Role Specialty Phone number    Jolynn Cooper MD Referring Family Medicine [phone]            Refill Criteria    Visit with referring provider/group: Meets criteria: office visit within referring provider group in the last 1 year on 2/6/24    [facility] referral last signed: 01/16/2024; within last year: Yes    Lab monitoring not exceeding 2 weeks overdue: Yes    Joe meets all criteria for refill. Rx instructions and quantity match patient's current dosing plan. Warfarin 90 day supply with 1 refill granted per [facility] protocol     Palma Adam RN  Anticoagulation Clinic

## 2024-07-26 DIAGNOSIS — I48.91 ATRIAL FIBRILLATION, UNSPECIFIED TYPE (H): ICD-10-CM

## 2024-07-26 RX ORDER — WARFARIN SODIUM 5 MG/1
TABLET ORAL
Qty: 90 TABLET | Refills: 1 | Status: SHIPPED | OUTPATIENT
Start: 2024-07-26

## 2024-07-26 NOTE — TELEPHONE ENCOUNTER
ANTICOAGULATION MANAGEMENT:  Medication Refill    Anticoagulation Summary  As of 7/12/2024      Warfarin maintenance plan:  2.5 mg (5 mg x 0.5) every Mon, Wed, Fri; 5 mg (5 mg x 1) all other days   Next INR check:  8/9/2024   Target end date:  Indefinite    Indications    Atrial fibrillation  unspecified type (H) [I48.91]  Long term current use of anticoagulant therapy [Z79.01]                 Anticoagulation Care Providers       Provider Role Specialty Phone number    Jolynn Cooper MD Referring Family Medicine [phone]            Refill Criteria    Visit with referring provider/group: Meets criteria: office visit within referring provider group in the last 1 year on 2/6/24    [facility] referral last signed: 01/16/2024; within last year: Yes    Lab monitoring not exceeding 2 weeks overdue: Yes    Joe meets all criteria for refill. Rx instructions and quantity match patient's current dosing plan. Warfarin 90 day supply with 1 refill granted per [facility] protocol     Palma Adam RN  Anticoagulation Clinic

## 2024-08-08 SDOH — HEALTH STABILITY: PHYSICAL HEALTH: ON AVERAGE, HOW MANY MINUTES DO YOU ENGAGE IN EXERCISE AT THIS LEVEL?: 30 MIN

## 2024-08-08 SDOH — HEALTH STABILITY: PHYSICAL HEALTH: ON AVERAGE, HOW MANY DAYS PER WEEK DO YOU ENGAGE IN MODERATE TO STRENUOUS EXERCISE (LIKE A BRISK WALK)?: 2 DAYS

## 2024-08-08 ASSESSMENT — SOCIAL DETERMINANTS OF HEALTH (SDOH): HOW OFTEN DO YOU GET TOGETHER WITH FRIENDS OR RELATIVES?: MORE THAN THREE TIMES A WEEK

## 2024-08-13 ENCOUNTER — OFFICE VISIT (OUTPATIENT)
Dept: FAMILY MEDICINE | Facility: CLINIC | Age: 69
End: 2024-08-13
Attending: FAMILY MEDICINE
Payer: COMMERCIAL

## 2024-08-13 ENCOUNTER — LAB (OUTPATIENT)
Dept: LAB | Facility: CLINIC | Age: 69
End: 2024-08-13
Payer: COMMERCIAL

## 2024-08-13 VITALS
HEART RATE: 82 BPM | RESPIRATION RATE: 18 BRPM | SYSTOLIC BLOOD PRESSURE: 130 MMHG | OXYGEN SATURATION: 93 % | WEIGHT: 245.9 LBS | BODY MASS INDEX: 36.31 KG/M2 | DIASTOLIC BLOOD PRESSURE: 84 MMHG | TEMPERATURE: 97 F

## 2024-08-13 DIAGNOSIS — Z86.79 S/P ABLATION OF ATRIAL FIBRILLATION: ICD-10-CM

## 2024-08-13 DIAGNOSIS — E11.8 TYPE 2 DIABETES MELLITUS WITH COMPLICATION, WITHOUT LONG-TERM CURRENT USE OF INSULIN (H): ICD-10-CM

## 2024-08-13 DIAGNOSIS — I10 ESSENTIAL HYPERTENSION, BENIGN: ICD-10-CM

## 2024-08-13 DIAGNOSIS — I48.0 PAROXYSMAL ATRIAL FIBRILLATION (H): ICD-10-CM

## 2024-08-13 DIAGNOSIS — J44.1 OBSTRUCTIVE CHRONIC BRONCHITIS WITH EXACERBATION (H): ICD-10-CM

## 2024-08-13 DIAGNOSIS — G47.09 OTHER INSOMNIA: ICD-10-CM

## 2024-08-13 DIAGNOSIS — Z87.891 PERSONAL HISTORY OF TOBACCO USE: ICD-10-CM

## 2024-08-13 DIAGNOSIS — Z98.890 S/P ABLATION OF ATRIAL FIBRILLATION: ICD-10-CM

## 2024-08-13 DIAGNOSIS — R35.0 URINARY FREQUENCY: ICD-10-CM

## 2024-08-13 DIAGNOSIS — Z00.00 ENCOUNTER FOR MEDICARE ANNUAL WELLNESS EXAM: Primary | ICD-10-CM

## 2024-08-13 LAB
ANION GAP SERPL CALCULATED.3IONS-SCNC: 13 MMOL/L (ref 7–15)
BUN SERPL-MCNC: 14.4 MG/DL (ref 8–23)
CALCIUM SERPL-MCNC: 10.1 MG/DL (ref 8.8–10.4)
CHLORIDE SERPL-SCNC: 100 MMOL/L (ref 98–107)
CREAT SERPL-MCNC: 0.98 MG/DL (ref 0.67–1.17)
EGFRCR SERPLBLD CKD-EPI 2021: 84 ML/MIN/1.73M2
GLUCOSE SERPL-MCNC: 165 MG/DL (ref 70–99)
HBA1C MFR BLD: 6.8 % (ref 0–5.6)
HCO3 SERPL-SCNC: 27 MMOL/L (ref 22–29)
HOLD SPECIMEN: NORMAL
POTASSIUM SERPL-SCNC: 4.6 MMOL/L (ref 3.4–5.3)
SODIUM SERPL-SCNC: 140 MMOL/L (ref 135–145)

## 2024-08-13 PROCEDURE — 83036 HEMOGLOBIN GLYCOSYLATED A1C: CPT

## 2024-08-13 PROCEDURE — 36415 COLL VENOUS BLD VENIPUNCTURE: CPT

## 2024-08-13 PROCEDURE — 99214 OFFICE O/P EST MOD 30 MIN: CPT | Mod: 25 | Performed by: FAMILY MEDICINE

## 2024-08-13 PROCEDURE — 80048 BASIC METABOLIC PNL TOTAL CA: CPT

## 2024-08-13 PROCEDURE — G0439 PPPS, SUBSEQ VISIT: HCPCS | Performed by: FAMILY MEDICINE

## 2024-08-13 PROCEDURE — 84443 ASSAY THYROID STIM HORMONE: CPT

## 2024-08-13 PROCEDURE — G0296 VISIT TO DETERM LDCT ELIG: HCPCS | Performed by: FAMILY MEDICINE

## 2024-08-13 RX ORDER — MOMETASONE FUROATE AND FORMOTEROL FUMARATE DIHYDRATE 200; 5 UG/1; UG/1
2 AEROSOL RESPIRATORY (INHALATION) 2 TIMES DAILY
Qty: 39 G | Refills: 3 | Status: SHIPPED | OUTPATIENT
Start: 2024-08-13

## 2024-08-13 RX ORDER — TAMSULOSIN HYDROCHLORIDE 0.4 MG/1
0.4 CAPSULE ORAL DAILY
Qty: 90 CAPSULE | Refills: 1 | Status: SHIPPED | OUTPATIENT
Start: 2024-08-13

## 2024-08-13 RX ORDER — TRAZODONE HYDROCHLORIDE 50 MG/1
50-100 TABLET, FILM COATED ORAL AT BEDTIME
Qty: 90 TABLET | Refills: 0 | Status: SHIPPED | OUTPATIENT
Start: 2024-08-13 | End: 2024-09-17

## 2024-08-13 RX ORDER — METOPROLOL SUCCINATE 50 MG/1
25 TABLET, EXTENDED RELEASE ORAL DAILY
Qty: 45 TABLET | Refills: 3 | Status: SHIPPED | OUTPATIENT
Start: 2024-08-13 | End: 2024-09-26

## 2024-08-13 ASSESSMENT — ANXIETY QUESTIONNAIRES
2. NOT BEING ABLE TO STOP OR CONTROL WORRYING: NOT AT ALL
IF YOU CHECKED OFF ANY PROBLEMS ON THIS QUESTIONNAIRE, HOW DIFFICULT HAVE THESE PROBLEMS MADE IT FOR YOU TO DO YOUR WORK, TAKE CARE OF THINGS AT HOME, OR GET ALONG WITH OTHER PEOPLE: NOT DIFFICULT AT ALL
5. BEING SO RESTLESS THAT IT IS HARD TO SIT STILL: NOT AT ALL
1. FEELING NERVOUS, ANXIOUS, OR ON EDGE: NOT AT ALL
GAD7 TOTAL SCORE: 0
7. FEELING AFRAID AS IF SOMETHING AWFUL MIGHT HAPPEN: NOT AT ALL
6. BECOMING EASILY ANNOYED OR IRRITABLE: NOT AT ALL
3. WORRYING TOO MUCH ABOUT DIFFERENT THINGS: NOT AT ALL
GAD7 TOTAL SCORE: 0

## 2024-08-13 ASSESSMENT — PATIENT HEALTH QUESTIONNAIRE - PHQ9
SUM OF ALL RESPONSES TO PHQ QUESTIONS 1-9: 0
5. POOR APPETITE OR OVEREATING: NOT AT ALL
10. IF YOU CHECKED OFF ANY PROBLEMS, HOW DIFFICULT HAVE THESE PROBLEMS MADE IT FOR YOU TO DO YOUR WORK, TAKE CARE OF THINGS AT HOME, OR GET ALONG WITH OTHER PEOPLE: NOT DIFFICULT AT ALL
SUM OF ALL RESPONSES TO PHQ QUESTIONS 1-9: 0

## 2024-08-13 NOTE — PROGRESS NOTES
Preventive Care Visit  [facility] TREY Cooper MD, Family Medicine  Aug 13, 2024      Assessment & Plan     (Z00.00) Encounter for Medicare annual wellness exam  (primary encounter diagnosis)  Comment: We discussed  exercise 30mins/day, importance of sleep and healthy diet. Reviewed etoh recommendations and recommended cutting back. Discussed screening and vaccinations     (E11.8) Type 2 diabetes mellitus with complication, without long-term current use of insulin (H)  Comment: no change in meds at this time. Recheck A1c in three months.   Plan: HEMOGLOBIN A1C            (I48.0) Paroxysmal atrial fibrillation (H)  Comment: recurrent. Zio monitor ordered and cards referral placed. Increase metoprolol from 25 to 50 day. The patient indicates understanding of these issues and agrees with the plan.   Plan: INR point of care (finger stick)- Recommended         for same day result, Adult Cardiology Eval          Referral, metoprolol succinate ER         (TOPROL XL) 50 MG 24 hr tablet, ZIO PATCH MAIL         OUT, TSH with free T4 reflex            (J44.1) Obstructive chronic bronchitis with exacerbation (H)  Comment: med refilled. Symptoms improved while on dulera. Just quit smoking!   Plan: mometasone-formoterol (DULERA) 200-5 MCG/ACT         inhaler            (Z87.891) Personal history of tobacco use  Comment: congratulated on quitting last month! Discussed risks/benefits of screening. The patient indicates understanding of these issues and agrees with the plan.   Plan: Prof fee: Shared Decision Making for Lung         Cancer Screening, CT Chest Lung Cancer Scrn Low        Dose wo            (G47.09) Other insomnia  Comment: discussed that etoh is not a good choice for sleep. Will try trazodone - Discussed risks/benefits/side effects with the patient. The patient indicates understanding of these issues and agrees with the plan.   Plan: traZODone (DESYREL) 50 MG tablet         "    (R35.0) Urinary frequency  Comment: psa was normal this spring. Start trial of flomax and send him to urology. The patient indicates understanding of these issues and agrees with the plan.   Plan: Adult Urology  Referral, tamsulosin         (FLOMAX) 0.4 MG capsule            (I10) Essential hypertension, benign  Comment: stable - check labs   Plan: Basic metabolic panel  (Ca, Cl, CO2, Creat,         Gluc, K, Na, BUN)            (Z98.890,  Z86.79) S/P ablation of atrial fibrillation  Comment: ordered zio to confirm recurrence   Plan: ZIO PATCH MAIL OUT            Patient has been advised of split billing requirements and indicates understanding: Yes        BMI  Estimated body mass index is 36.31 kg/m  as calculated from the following:    Height as of 5/13/24: 1.753 m (5' 9\").    Weight as of this encounter: 111.5 kg (245 lb 14.4 oz).       Counseling  Appropriate preventive services were addressed with this patient via screening, questionnaire, or discussion as appropriate for fall prevention, nutrition, physical activity, Tobacco-use cessation, weight loss and cognition.  Checklist reviewing preventive services available has been given to the patient.  Reviewed patient's diet, addressing concerns and/or questions.   He is at risk for lack of exercise and has been provided with information to increase physical activity for the benefit of his well-being.   Discussed possible causes of fatigue.     Regular exercise    Isaias Diaz is a 68 year old, presenting for the following:  Physical        8/13/2024    10:04 AM   Additional Questions   Roomed by NELLIE Arredondo   Accompanied by self         Health Care Directive  Patient does not have a Health Care Directive or Living Will: Discussed advance care planning with patient; information given to patient to review.    HPI    Has an increase in pulse that seems to occur at night, keeps him aware.    Over a hundred beats/minute at night  feels his afib has " recurred. Had ablation last summer  Wants to know if he should restart flecainide.    Has been having an extra alcoholic beverage at night to help him sleep better.   On metoprolol 25mg   Was on 50mg/day   Ablation - June 4th of 2023    Smoking:  Quit cold turkey 1.5 months   Quit in the past but had restarted    Etoh : 3-4 shots every 3-4 nights    Waking up 3-4 x /night to urinate   Didn't see urology - was referred in the past   Psa normal 2/2024    Diabetes :  On metformin 500  two tabs/day   Lab Results   Component Value Date    A1C 6.8 08/13/2024    A1C 6.3 02/06/2024    A1C 6.8 08/04/2023    A1C 6.4 02/03/2023    A1C 6.9 08/08/2022    A1C 6.8 04/09/2021    A1C 6.7 08/18/2020    A1C 7.0 01/14/2020    A1C 6.6 07/05/2019    A1C 8.1 01/29/2019 8/8/2024   General Health   How would you rate your overall physical health? (!) FAIR   Feel stress (tense, anxious, or unable to sleep) Not at all            8/8/2024   Nutrition   Diet: Low salt    Low fat/cholesterol    Diabetic    Carbohydrate counting       Multiple values from one day are sorted in reverse-chronological order         8/8/2024   Exercise   Days per week of moderate/strenous exercise 2 days   Average minutes spent exercising at this level 30 min      (!) EXERCISE CONCERN      8/8/2024   Social Factors   Frequency of gathering with friends or relatives More than three times a week   Worry food won't last until get money to buy more No   Food not last or not have enough money for food? No   Do you have housing? (Housing is defined as stable permanent housing and does not include staying ouside in a car, in a tent, in an abandoned building, in an overnight shelter, or couch-surfing.) Yes   Are you worried about losing your housing? No   Lack of transportation? No   Unable to get utilities (heat,electricity)? Yes   Want help with housing or utility concern? No      (!) FINANCIAL RESOURCE STRAIN CONCERN      8/8/2024   Fall Risk   Fallen 2 or more  times in the past year? No   Trouble with walking or balance? No             2024   Activities of Daily Living- Home Safety   Needs help with the following daily activites None of the above   Safety concerns in the home None of the above            2024   Dental   Dentist two times every year? Yes            2024   Hearing Screening   Hearing concerns? None of the above            2024   Driving Risk Screening   Patient/family members have concerns about driving No            2024   General Alertness/Fatigue Screening   Have you been more tired than usual lately? (!) YES            2024   Urinary Incontinence Screening   Bothered by leaking urine in past 6 months No            2024   TB Screening   Were you born outside of the [place]? No          Today's PHQ-9 Score:       2024    10:03 AM   PHQ-9 SCORE   PHQ-9 Total Score MyChart 0   PHQ-9 Total Score 0         2024   Substance Use   Alcohol more than 3/day or more than 7/wk No   Do you have a current opioid prescription? No   How severe/bad is pain from 1 to 10? 0/10 (No Pain)   Do you use any other substances recreationally? (!) CANNABIS PRODUCTS        Social History     Tobacco Use    Smoking status: Former     Current packs/day: 0.00     Average packs/day: 1 pack/day for 25.0 years (25.0 ttl pk-yrs)     Types: Cigarettes     Start date: 1975     Quit date: 2000     Years since quittin.6    Smokeless tobacco: Never    Tobacco comments:     quit smoking    Vaping Use    Vaping status: Never Used   Substance Use Topics    Alcohol use: Yes     Comment: 2-5 beers per month    Drug use: Yes     Types: Marijuana       Last PSA:   Prostate Specific Antigen Screen   Date Value Ref Range Status   2024 2.53 0.00 - 4.50 ng/mL Final   2022 2.10 0.00 - 4.00 ug/L Final     ASCVD Risk   The 10-year ASCVD risk score (Sophie MARES, et al., 2019) is: 22.6%    Values used to calculate the score:      Age: 68  years      Sex: Male      Is Non- : No      Diabetic: Yes      Tobacco smoker: No      Systolic Blood Pressure: 113 mmHg      Is BP treated: Yes      HDL Cholesterol: 58 mg/dL      Total Cholesterol: 166 mg/dL          Reviewed and updated as needed this visit by Provider                    Current providers sharing in care for this patient include:  Patient Care Team:  Jolynn Cooper MD as PCP - General (Family Practice)  Jolynn Cooper MD as Assigned PCP  Fidel Moise MD as MD (Urology)  No Ref-Primary, Physician  Yazmin Montes RPH as Pharmacist (Pharmacist)  Nick aWllis MD as MD (Cardiovascular Disease)  Nick Wallis MD as Assigned Heart and Vascular Provider    The following health maintenance items are reviewed in Epic and correct as of today:  Health Maintenance   Topic Date Due    COVID-19 Vaccine (8 - 2023-24 season) 06/06/2024    DIABETIC FOOT EXAM  08/04/2024    A1C  08/06/2024    INFLUENZA VACCINE (1) 09/01/2024    Pneumococcal Vaccine: 65+ Years (3 of 3 - PPSV23 or PCV20) 10/07/2024    BMP  10/16/2024    LIPID  02/06/2025    MICROALBUMIN  02/06/2025    ANNUAL REVIEW OF HM ORDERS  02/06/2025    PHQ-9  02/13/2025    EYE EXAM  05/08/2025    MEDICARE ANNUAL WELLNESS VISIT  08/13/2025    FALL RISK ASSESSMENT  08/13/2025    COLORECTAL CANCER SCREENING  02/23/2027    ADVANCE CARE PLANNING  08/04/2028    DTAP/TDAP/TD IMMUNIZATION (3 - Td or Tdap) 02/01/2032    SPIROMETRY  Completed    HEPATITIS C SCREENING  Completed    COPD ACTION PLAN  Completed    DEPRESSION ACTION PLAN  Completed    ZOSTER IMMUNIZATION  Completed    RSV VACCINE (Pregnancy & 60+)  Completed    AORTIC ANEURYSM SCREENING (SYSTEM ASSIGNED)  Completed    IPV IMMUNIZATION  Aged Out    HPV IMMUNIZATION  Aged Out    MENINGITIS IMMUNIZATION  Aged Out    RSV MONOCLONAL ANTIBODY  Aged Out         Review of Systems  Constitutional, neuro, ENT, endocrine, pulmonary, cardiac, gastrointestinal, genitourinary,  "musculoskeletal, integument and psychiatric systems are negative, except as otherwise noted.         Objective    Exam  Wt 111.5 kg (245 lb 14.4 oz)   BMI 36.31 kg/m     Estimated body mass index is 36.31 kg/m  as calculated from the following:    Height as of 5/13/24: 1.753 m (5' 9\").    Weight as of this encounter: 111.5 kg (245 lb 14.4 oz).    Physical Exam  GENERAL: alert and no distress  EYES: Eyes grossly normal to inspection, PERRL and conjunctivae and sclerae normal  HENT: ear canals and TM's normal, nose and mouth without ulcers or lesions  NECK: no adenopathy, no asymmetry, masses, or scars  RESP: lungs clear to auscultation - no rales, rhonchi or wheezes  CV: irregularly irregular rhythm and no peripheral edema  ABDOMEN: soft, nontender, no hepatosplenomegaly, no masses and bowel sounds normal  MS: no gross musculoskeletal defects noted, no edema  SKIN: no suspicious lesions or rashes  NEURO: Normal strength and tone, mentation intact and speech normal  PSYCH: mentation appears normal, affect normal/bright         No data to display                       Signed Electronically by: Jolynn oCoper MD    Lung Cancer Screening Shared Decision Making Visit     Joe Mas, a 68 year old male, is eligible for lung cancer screening    History   Smoking Status    Former    Packs/day: 1.00    Years: 25.00    Types: Cigarettes    Start date: 1/1/1975    Quit date: 1/1/2000   Smokeless Tobacco    Never       I have discussed with patient the risks and benefits of screening for lung cancer with low-dose CT.     The risks include:    radiation exposure: one low dose chest CT has as much ionizing radiation as about 15 chest x-rays, or 6 months of background radiation living in [place]      false positives: most findings/nodules are NOT cancer, but some might still require additional diagnostic evaluation, including biopsy    over-diagnosis: some slow growing cancers that might never have been clinically " significant will be detected and treated unnecessarily     The benefit of early detection of lung cancer is contingent upon adherence to annual screening or more frequent follow up if indicated.     Furthermore, to benefit from screening, Joe must be willing and able to undergo diagnostic procedures, if indicated. Although no specific guide is available for determining severity of comorbidities, it is reasonable to withhold screening in patients who have greater mortality risk from other diseases.     We did discuss that the best way to prevent lung cancer is to not smoke.    Some patients may value a numeric estimation of lung cancer risk when evaluating if lung cancer screening is right for them, here is one calculator:    ShouldIScreen

## 2024-08-13 NOTE — PATIENT INSTRUCTIONS
Patient Education   Preventive Care Advice   This is general advice given by our system to help you stay healthy. However, your care team may have specific advice just for you. Please talk to your care team about your preventive care needs.  Nutrition  Eat 5 or more servings of fruits and vegetables each day.  Try wheat bread, brown rice and whole grain pasta (instead of white bread, rice, and pasta).  Get enough calcium and vitamin D. Check the label on foods and aim for 100% of the RDA (recommended daily allowance).  Lifestyle  Exercise at least 150 minutes each week  (30 minutes a day, 5 days a week).  Do muscle strengthening activities 2 days a week. These help control your weight and prevent disease.  No smoking.  Wear sunscreen to prevent skin cancer.  Have a dental exam and cleaning every 6 months.  Yearly exams  See your health care team every year to talk about:  Any changes in your health.  Any medicines your care team has prescribed.  Preventive care, family planning, and ways to prevent chronic diseases.  Shots (vaccines)   HPV shots (up to age 26), if you've never had them before.  Hepatitis B shots (up to age 59), if you've never had them before.  COVID-19 shot: Get this shot when it's due.  Flu shot: Get a flu shot every year.  Tetanus shot: Get a tetanus shot every 10 years.  Pneumococcal, hepatitis A, and RSV shots: Ask your care team if you need these based on your risk.  Shingles shot (for age 50 and up)  General health tests  Diabetes screening:  Starting at age 35, Get screened for diabetes at least every 3 years.  If you are younger than age 35, ask your care team if you should be screened for diabetes.  Cholesterol test: At age 39, start having a cholesterol test every 5 years, or more often if advised.  Bone density scan (DEXA): At age 50, ask your care team if you should have this scan for osteoporosis (brittle bones).  Hepatitis C: Get tested at least once in your life.  STIs (sexually  transmitted infections)  Before age 24: Ask your care team if you should be screened for STIs.  After age 24: Get screened for STIs if you're at risk. You are at risk for STIs (including HIV) if:  You are sexually active with more than one person.  You don't use condoms every time.  You or a partner was diagnosed with a sexually transmitted infection.  If you are at risk for HIV, ask about PrEP medicine to prevent HIV.  Get tested for HIV at least once in your life, whether you are at risk for HIV or not.  Cancer screening tests  Cervical cancer screening: If you have a cervix, begin getting regular cervical cancer screening tests starting at age 21.  Breast cancer scan (mammogram): If you've ever had breasts, begin having regular mammograms starting at age 40. This is a scan to check for breast cancer.  Colon cancer screening: It is important to start screening for colon cancer at age 45.  Have a colonoscopy test every 10 years (or more often if you're at risk) Or, ask your provider about stool tests like a FIT test every year or Cologuard test every 3 years.  To learn more about your testing options, visit:   .  For help making a decision, visit:   https://bit.ly/dd09086.  Prostate cancer screening test: If you have a prostate, ask your care team if a prostate cancer screening test (PSA) at age 55 is right for you.  Lung cancer screening: If you are a current or former smoker ages 50 to 80, ask your care team if ongoing lung cancer screenings are right for you.  For informational purposes only. Not to replace the advice of your health care provider. Copyright   2023 [facility]. All rights reserved. Clinically reviewed by the [facility]. Futon 776480 - REV 01/24.  Learning About Sleeping Well  What does sleeping well mean?     Sleeping well means getting enough sleep to feel good and stay healthy. How much sleep is enough varies among people.  The number of hours you  sleep and how you feel when you wake up are both important. If you do not feel refreshed, you probably need more sleep. Another sign of not getting enough sleep is feeling tired during the day.  Experts recommend that adults get at least 7 or more hours of sleep per day. Children and older adults need more sleep.  Why is getting enough sleep important?  Getting enough quality sleep is a basic part of good health. When your sleep suffers, your physical health, mood, and your thoughts can suffer too. You may find yourself feeling more grumpy or stressed. Not getting enough sleep also can lead to serious problems, including injury, accidents, anxiety, and depression.  What might cause poor sleeping?  Many things can cause sleep problems, including:  Changes to your sleep schedule.  Stress. Stress can be caused by fear about a single event, such as giving a speech. Or you may have ongoing stress, such as worry about work or school.  Depression, anxiety, and other mental or emotional conditions.  Changes in your sleep habits or surroundings. This includes changes that happen where you sleep, such as noise, light, or sleeping in a different bed. It also includes changes in your sleep pattern, such as having jet lag or working a late shift.  Health problems, such as pain, breathing problems, and restless legs syndrome.  Lack of regular exercise.  Using alcohol, nicotine, or caffeine before bed.  How can you help yourself?  Here are some tips that may help you sleep more soundly and wake up feeling more refreshed.  Your sleeping area   Use your bedroom only for sleeping and sex. A bit of light reading may help you fall asleep. But if it doesn't, do your reading elsewhere in the house. Try not to use your TV, computer, smartphone, or tablet while you are in bed.  Be sure your bed is big enough to stretch out comfortably, especially if you have a sleep partner.  Keep your bedroom quiet, dark, and cool. Use curtains, blinds,  "or a sleep mask to block out light. To block out noise, use earplugs, soothing music, or a \"white noise\" machine.  Your evening and bedtime routine   Create a relaxing bedtime routine. You might want to take a warm shower or bath, or listen to soothing music.  Go to bed at the same time every night. And get up at the same time every morning, even if you feel tired.  What to avoid   Limit caffeine (coffee, tea, caffeinated sodas) during the day, and don't have any for at least 6 hours before bedtime.  Avoid drinking alcohol before bedtime. Alcohol can cause you to wake up more often during the night.  Try not to smoke or use tobacco, especially in the evening. Nicotine can keep you awake.  Limit naps during the day, especially close to bedtime.  Avoid lying in bed awake for too long. If you can't fall asleep or if you wake up in the middle of the night and can't get back to sleep within about 20 minutes, get out of bed and go to another room until you feel sleepy.  Avoid taking medicine right before bed that may keep you awake or make you feel hyper or energized. Your doctor can tell you if your medicine may do this and if you can take it earlier in the day.  If you can't sleep   Imagine yourself in a peaceful, pleasant scene. Focus on the details and feelings of being in a place that is relaxing.  Get up and do a quiet or boring activity until you feel sleepy.  Avoid drinking any liquids before going to bed to help prevent waking up often to use the bathroom.  Where can you learn more?  Go to https://www.Conduct[employer].net/patiented  Enter J942 in the search box to learn more about \"Learning About Sleeping Well.\"  Current as of: July 10, 2023  Content Version: 14.1 2006-2024 [employer].   Care instructions adapted under license by your healthcare professional. If you have questions about a medical condition or this instruction, always ask your healthcare professional. [employer] disclaims " any warranty or liability for your use of this information.    Substance Use Disorder: Care Instructions  Overview     You can improve your life and health by stopping your use of alcohol or drugs. When you don't drink or use drugs, you may feel and sleep better. You may get along better with your family, friends, and coworkers. There are medicines and programs that can help with substance use disorder.  How can you care for yourself at home?  Here are some ways to help you stay sober and prevent relapse.  If you have been given medicine to help keep you sober or reduce your cravings, be sure to take it exactly as prescribed.  Talk to your doctor about programs that can help you stop using drugs or drinking alcohol.  Do not keep alcohol or drugs in your home.  Plan ahead. Think about what you'll say if other people ask you to drink or use drugs. Try not to spend time with people who drink or use drugs.  Use the time and money spent on drinking or drugs to do something that's important to you.  Preventing a relapse  Have a plan to deal with relapse. Learn to recognize changes in your thinking that lead you to drink or use drugs. Get help before you start to drink or use drugs again.  Try to stay away from situations, friends, or places that may lead you to drink or use drugs.  If you feel the need to drink alcohol or use drugs again, seek help right away. Call a trusted friend or family member. Some people get support from organizations such as Narcotics Anonymous or [employer] or from treatment facilities.  If you relapse, get help as soon as you can. Some people make a plan with another person that outlines what they want that person to do for them if they relapse. The plan usually includes how to handle the relapse and who to notify in case of relapse.  Don't give up. Remember that a relapse doesn't mean that you have failed. Use the experience to learn the triggers that lead you to drink or use drugs. Then  "quit again. Recovery is a lifelong process. Many people have several relapses before they are able to quit for good.  Follow-up care is a key part of your treatment and safety. Be sure to make and go to all appointments, and call your doctor if you are having problems. It's also a good idea to know your test results and keep a list of the medicines you take.  When should you call for help?   Call 911  anytime you think you may need emergency care. For example, call if you or someone else:    Has overdosed or has withdrawal signs. Be sure to tell the emergency workers that you are or someone else is using or trying to quit using drugs. Overdose or withdrawal signs may include:  Losing consciousness.  Seizure.  Seeing or hearing things that aren't there (hallucinations).     Is thinking or talking about suicide or harming others.   Where to get help 24 hours a day, 7 days a week   If you or someone you know talks about suicide, self-harm, a mental health crisis, a substance use crisis, or any other kind of emotional distress, get help right away. You can:    Call the Suicide and Crisis Lifeline at 988.     Call 1-726-080-TALK (1-810.658.2702).     Text HOME to 309545 to access the Crisis Text Line.   Consider saving these numbers in your phone.  Go to [employer] for more information or to chat online.  Call your doctor now or seek immediate medical care if:    You are having withdrawal symptoms. These may include nausea or vomiting, sweating, shakiness, and anxiety.   Watch closely for changes in your health, and be sure to contact your doctor if:    You have a relapse.     You need more help or support to stop.   Where can you learn more?  Go to https://www.healthMobilepolice.net/patiented  Enter H573 in the search box to learn more about \"Substance Use Disorder: Care Instructions.\"  Current as of: November 15, 2023               Content Version: 14.0    6307-0180 [employer].   Care instructions adapted " under license by your healthcare professional. If you have questions about a medical condition or this instruction, always ask your healthcare professional. [employer] disclaims any warranty or liability for your use of this information.         Lung Cancer Screening   Frequently Asked Questions  If you are at high-risk for lung cancer, getting screened with low-dose computed tomography (LDCT) every year can help save your life. This handout offers answers to some of the most common questions about lung cancer screening. If you have other questions, please call [phone] ([phone]).     What is it?  Lung cancer screening uses special X-ray technology to create an image of your lung tissue. The exam is quick and easy and takes less than 10 seconds. We don t give you any medicine or use any needles. You can eat before and after the exam. You don t need to change your clothes as long as the clothing on your chest doesn t contain metal. But, you do need to be able to hold your breath for at least 6 seconds during the exam.    What is the goal of lung cancer screening?  The goal of lung cancer screening is to save lives. Many times, lung cancer is not found until a person starts having physical symptoms. Lung cancer screening can help detect lung cancer in the earliest stages when it may be easier to treat.    Who should be screened for lung cancer?  We suggest lung cancer screening for anyone who is at high-risk for lung cancer. You are in the high-risk group if you:      are between the ages of 55 and 79, and    have smoked at least 1 pack of cigarettes a day for 20 or more years, and    still smoke or have quit within the past 15 years.    However, if you have a new cough or shortness of breath, you should talk to your doctor before being screened.    Why does it matter if I have symptoms?  Certain symptoms can be a sign that you have a condition in your lungs that should be checked and  treated by your doctor. These symptoms include fever, chest pain, a new or changing cough, shortness of breath that you have never felt before, coughing up blood or unexplained weight loss. Having any of these symptoms can greatly affect the results of lung cancer screening.       Should all smokers get an LDCT lung cancer screening exam?  It depends. Lung cancer screening is for a very specific group of men and women who have a history of heavy smoking over a long period of time (see  Who should be screened for lung cancer  above).  I am in the high-risk group, but have been diagnosed with cancer in the past. Is LDCT lung cancer screening right for me?  In some cases, you should not have LDCT lung screening, such as when your doctor is already following your cancer with CT scan studies. Your doctor will help you decide if LDCT lung screening is right for you.  Do I need to have a screening exam every year?  Yes. If you are in the high-risk group described earlier, you should get an LDCT lung cancer screening exam every year until you are 79, or are no longer willing or able to undergo screening and possible procedures to diagnose and treat lung cancer.  How effective is LDCT at preventing death from lung cancer?  Studies have shown that LDCT lung cancer screening can lower the risk of death from lung cancer by 20 percent in people who are at high-risk.  What are the risks?  There are some risks and limitations of LDCT lung cancer screening. We want to make sure you understand the risks and benefits, so please let us know if you have any questions. Your doctor may want to talk with you more about these risks.    Radiation exposure: As with any exam that uses radiation, there is a very small increased risk of cancer. The amount of radiation in LDCT is small--about the same amount a person would get from a mammogram. Your doctor orders the exam when he or she feels the potential benefits outweigh the risks.    False  negatives: No test is perfect, including LDCT. It is possible that you may have a medical condition, including lung cancer, that is not found during your exam. This is called a false negative result.    False positives and more testing: LDCT very often finds something in the lung that could be cancer, but in fact is not. This is called a false positive result. False positive tests often cause anxiety. To make sure these findings are not cancer, you may need to have more tests. These tests will be done only if you give us permission. Sometimes patients need a treatment that can have side effects, such as a biopsy. For more information on false positives, see  What can I expect from the results?     Findings not related to lung cancer: Your LDCT exam also takes pictures of areas of your body next to your lungs. In a very small number of cases, the CT scan will show an abnormal finding in one of these areas, such as your kidneys, adrenal glands, liver or thyroid. This finding may not be serious, but you may need more tests. Your doctor can help you decide what other tests you may need, if any.  What can I expect from the results?  About 1 out of 4 LDCT exams will find something that may need more tests. Most of the time, these findings are lung nodules. Lung nodules are very small collections of tissue in the lung. These nodules are very common, and the vast majority--more than 97 percent--are not cancer (benign). Most are normal lymph nodes or small areas of scarring from past infections.  But, if a small lung nodule is found to be cancer, the cancer can be cured more than 90 percent of the time. To know if the nodule is cancer, we may need to get more images before your next yearly screening exam. If the nodule has suspicious features (for example, it is large, has an odd shape or grows over time), we will refer you to a specialist for further testing.  Will my doctor also get the results?  Yes. Your doctor will get  a copy of your results.  Is it okay to keep smoking now that there s a cancer screening exam?  No. Tobacco is one of the strongest cancer-causing agents. It causes not only lung cancer, but other cancers and cardiovascular (heart) diseases as well. The damage caused by smoking builds over time. This means that the longer you smoke, the higher your risk of disease. While it is never too late to quit, the sooner you quit, the better.  Where can I find help to quit smoking?  The best way to prevent lung cancer is to stop smoking. If you have already quit smoking, congratulations and keep it up! For help on quitting smoking, please call [employer] at 6-139-QUITNOW (1-482.694.5978) or the American [employer] Society at 1-370.201.1986 to find local resources near you.  One-on-one health coaching:  If you d prefer to work individually with a health care provider on tobacco cessation, we offer:      Medication Therapy Management:  Our specially trained pharmacists work closely with you and your doctor to help you quit smoking.  Call [phone] or [phone] (toll free).

## 2024-08-14 ENCOUNTER — ANTICOAGULATION THERAPY VISIT (OUTPATIENT)
Dept: ANTICOAGULATION | Facility: CLINIC | Age: 69
End: 2024-08-14

## 2024-08-14 ENCOUNTER — ORDERS ONLY (AUTO-RELEASED) (OUTPATIENT)
Dept: FAMILY MEDICINE | Facility: CLINIC | Age: 69
End: 2024-08-14

## 2024-08-14 ENCOUNTER — APPOINTMENT (OUTPATIENT)
Dept: LAB | Facility: CLINIC | Age: 69
End: 2024-08-14
Payer: COMMERCIAL

## 2024-08-14 ENCOUNTER — MYC MEDICAL ADVICE (OUTPATIENT)
Dept: FAMILY MEDICINE | Facility: CLINIC | Age: 69
End: 2024-08-14

## 2024-08-14 DIAGNOSIS — I48.0 PAROXYSMAL ATRIAL FIBRILLATION (H): ICD-10-CM

## 2024-08-14 DIAGNOSIS — I48.91 ATRIAL FIBRILLATION, UNSPECIFIED TYPE (H): Primary | ICD-10-CM

## 2024-08-14 DIAGNOSIS — Z98.890 S/P ABLATION OF ATRIAL FIBRILLATION: ICD-10-CM

## 2024-08-14 DIAGNOSIS — Z79.01 LONG TERM CURRENT USE OF ANTICOAGULANT THERAPY: ICD-10-CM

## 2024-08-14 DIAGNOSIS — Z86.79 S/P ABLATION OF ATRIAL FIBRILLATION: ICD-10-CM

## 2024-08-14 LAB
INR BLD: 3 (ref 0.9–1.1)
TSH SERPL DL<=0.005 MIU/L-ACNC: 3.02 UIU/ML (ref 0.3–4.2)

## 2024-08-14 PROCEDURE — 85610 PROTHROMBIN TIME: CPT | Performed by: FAMILY MEDICINE

## 2024-08-14 PROCEDURE — 36416 COLLJ CAPILLARY BLOOD SPEC: CPT | Performed by: FAMILY MEDICINE

## 2024-08-14 NOTE — PROGRESS NOTES
ANTICOAGULATION MANAGEMENT     Joe Mas 68 year old male is on warfarin with therapeutic INR result. (Goal INR 2.0-3.0)    Recent labs: (last 7 days)     08/14/24  1342   INR 3.0*       ASSESSMENT     Source(s): Chart Review and Patient/Caregiver Call     Warfarin doses taken: Warfarin taken as instructed  Diet: No new diet changes identified  Medication/supplement changes: None noted  New illness, injury, or hospitalization: No  Signs or symptoms of bleeding or clotting: No  Previous result: Therapeutic last 2(+) visits  Additional findings: None       PLAN     Recommended plan for no diet, medication or health factor changes affecting INR     Dosing Instructions: Continue your current warfarin dose with next INR in 5 weeks       Summary  As of 8/14/2024      Full warfarin instructions:  2.5 mg every Mon, Wed, Fri; 5 mg all other days   Next INR check:  9/18/2024               Telephone call with Joe who verbalizes understanding and agrees to plan    Lab visit scheduled    Education provided: Contact [phone] with any changes, questions or concerns.     Plan made per [facility] anticoagulation protocol    Palma Adam RN  Anticoagulation Clinic  8/14/2024    _______________________________________________________________________     Anticoagulation Episode Summary       Current INR goal:  2.0-3.0   TTR:  80.9% (1 y)   Target end date:  Indefinite   Send INR reminders to:  MARIA ELENA YANG    Indications    Atrial fibrillation  unspecified type (H) [I48.91]  Long term current use of anticoagulant therapy [Z79.01]             Comments:               Anticoagulation Care Providers       Provider Role Specialty Phone number    Jolynn Cooper MD Referring Family Medicine [phone]

## 2024-08-29 ENCOUNTER — HOSPITAL ENCOUNTER (OUTPATIENT)
Dept: CT IMAGING | Facility: CLINIC | Age: 69
Discharge: HOME | End: 2024-08-29
Attending: FAMILY MEDICINE | Admitting: FAMILY MEDICINE
Payer: COMMERCIAL

## 2024-08-29 DIAGNOSIS — Z87.891 PERSONAL HISTORY OF TOBACCO USE: ICD-10-CM

## 2024-08-29 PROCEDURE — 71271 CT THORAX LUNG CANCER SCR C-: CPT

## 2024-08-30 ENCOUNTER — TELEPHONE (OUTPATIENT)
Dept: FAMILY MEDICINE | Facility: CLINIC | Age: 69
End: 2024-08-30
Payer: COMMERCIAL

## 2024-08-30 NOTE — TELEPHONE ENCOUNTER
Jose G with [facility] calling with incidental finding on recent CT chest lung cancer screen done yesterday:      impression #2:    2. Significant Incidental Finding(s):  Category S: Yes. coronary  artery calcium moderate or severe      Forwarding to PCP for review.    Brigid Small RN

## 2024-09-02 DIAGNOSIS — J44.1 OBSTRUCTIVE CHRONIC BRONCHITIS WITH EXACERBATION (H): ICD-10-CM

## 2024-09-04 RX ORDER — IPRATROPIUM BROMIDE AND ALBUTEROL SULFATE 2.5; .5 MG/3ML; MG/3ML
SOLUTION RESPIRATORY (INHALATION)
Qty: 360 ML | Refills: 1 | Status: SHIPPED | OUTPATIENT
Start: 2024-09-04

## 2024-09-06 PROCEDURE — 93248 EXT ECG>7D<15D REV&INTERPJ: CPT | Performed by: INTERNAL MEDICINE

## 2024-09-17 DIAGNOSIS — G47.09 OTHER INSOMNIA: ICD-10-CM

## 2024-09-17 RX ORDER — TRAZODONE HYDROCHLORIDE 50 MG/1
TABLET, FILM COATED ORAL
Qty: 90 TABLET | Refills: 3 | Status: SHIPPED | OUTPATIENT
Start: 2024-09-17

## 2024-09-19 ENCOUNTER — LAB (OUTPATIENT)
Dept: LAB | Facility: CLINIC | Age: 69
End: 2024-09-19
Payer: COMMERCIAL

## 2024-09-19 ENCOUNTER — ANTICOAGULATION THERAPY VISIT (OUTPATIENT)
Dept: ANTICOAGULATION | Facility: CLINIC | Age: 69
End: 2024-09-19

## 2024-09-19 DIAGNOSIS — I48.91 ATRIAL FIBRILLATION, UNSPECIFIED TYPE (H): Primary | ICD-10-CM

## 2024-09-19 DIAGNOSIS — Z79.01 LONG TERM CURRENT USE OF ANTICOAGULANT THERAPY: ICD-10-CM

## 2024-09-19 DIAGNOSIS — I48.91 ATRIAL FIBRILLATION, UNSPECIFIED TYPE (H): ICD-10-CM

## 2024-09-19 LAB — INR BLD: 2.2 (ref 0.9–1.1)

## 2024-09-19 PROCEDURE — 85610 PROTHROMBIN TIME: CPT

## 2024-09-19 PROCEDURE — 36416 COLLJ CAPILLARY BLOOD SPEC: CPT

## 2024-09-19 NOTE — PROGRESS NOTES
ANTICOAGULATION MANAGEMENT     Joe Mas 68 year old male is on warfarin with therapeutic INR result. (Goal INR 2.0-3.0)    Recent labs: (last 7 days)     09/19/24  1442   INR 2.2*       ASSESSMENT     Warfarin Lab Questionnaire    Warfarin Doses Last 7 Days      9/18/2024     7:21 PM   Dose in Tablet or Mg   TAB or MG? milligram (mg)     Pt Rptd Dose SUNDAY MONDAY TUESDAY WED THURS FRIDAY SATURDAY 9/18/2024   7:21 PM 1 0.5 1 0.5 1 0.5 1         9/18/2024   Warfarin Lab Questionnaire   Missed doses within past 14 days? No   Changes in diet or alcohol within past 14 days? No   Medication changes since last result? No   Injuries or illness since last result? No   New shortness of breath, severe headaches or sudden changes in vision since last result? No   Abnormal bleeding since last result? No   Upcoming surgery, procedure? No        Previous result: Therapeutic last 2(+) visits  Additional findings: None       PLAN     Recommended plan for no diet, medication or health factor changes affecting INR     Dosing Instructions: Continue your current warfarin dose with next INR in 6 weeks       Summary  As of 9/19/2024      Full warfarin instructions:  2.5 mg every Mon, Wed, Fri; 5 mg all other days   Next INR check:  10/31/2024               Telephone call with Joe who verbalizes understanding and agrees to plan    Lab visit scheduled    Education provided: Please call back if any changes to your diet, medications or how you've been taking warfarin    Plan made per [facility] anticoagulation protocol    Steve Gary RN  9/19/2024  Anticoagulation Clinic  [facility] for routing messages: maxim YANG  [facility] patient phone line: [phone]        _______________________________________________________________________     Anticoagulation Episode Summary       Current INR goal:  2.0-3.0   TTR:  80.9% (1 y)   Target end date:  Indefinite   Send INR reminders to:  MARIA ELENA YANG    Indications    Atrial  fibrillation  unspecified type (H) [I48.91]  Long term current use of anticoagulant therapy [Z79.01]             Comments:               Anticoagulation Care Providers       Provider Role Specialty Phone number    Jolynn Cooper MD Referring Family Medicine [phone]

## 2024-09-25 ENCOUNTER — TELEPHONE (OUTPATIENT)
Dept: FAMILY MEDICINE | Facility: CLINIC | Age: 69
End: 2024-09-25
Payer: COMMERCIAL

## 2024-09-25 DIAGNOSIS — I48.0 PAROXYSMAL ATRIAL FIBRILLATION (H): ICD-10-CM

## 2024-09-25 NOTE — TELEPHONE ENCOUNTER
This patient has a Rx for Metoprolol succ 50mg, taking 1/2 tablet daily.  He states that he is now on 1 tablet daily.  Can we get a new Rx with correct directions abd quantities, please?     Thank you,  Seymour Perry [facility] PharmacyTechnician [facility]

## 2024-09-26 RX ORDER — METOPROLOL SUCCINATE 50 MG/1
50 TABLET, EXTENDED RELEASE ORAL DAILY
Qty: 90 TABLET | Refills: 3 | Status: SHIPPED | OUTPATIENT
Start: 2024-09-26 | End: 2024-09-30

## 2024-09-27 NOTE — PROGRESS NOTES
"[facility]    I had the pleasure of seeing Joe when he came for follow up of AF.  This 68 year old sees Dr. Wallis for his history of:    HTN  Paroxysmal AFib -   chronic therapy with flecainide/metoprolol, s/p AFib ablation (PVI, CTI) with Dr. Barrientos 6/2023 for refractory arrhythmias.  Flecainide stopped 12/2023.  ZioPatch 8/2024 with 34% p AFib burden  Aortic root dilatation -   echo 2023 with root 3.9 cm, asc ao 3.4 cm  Mild JUNG - not using CPAP  Dyslipidemia      Last Visit & Interval History:  Yazmin saw Joe 6/2023 at which time he was doing well following his ablation.  He saw Dr. Wallis virtually (as Dr. Barrientos had moved) 12/2023 and noted no recurrent AF.  He recommended discontinuation of flecainide with reassessment in 6 to 12 months.  Warfarin was continued.    Saw PCP 8/2024.  He had just quit smoking but had noted increased alcohol use used for sleep, and concern for recurrent AF.  Dr. Cooper recommended increasing metoprolol XL with updated ZioPatch, which showed 34% AF burden.  I am meeting him today.    Today's Visit:  Joe noted that initially after stopping flecainide he did really well, but in March 20204, noted \"%AFib\" documented on his watch started increasing in frequency.  With this, he noted difficulty falling asleep feeling his palpitations were keeping him awake.  He self medicated with alcohol, and noted episodes continued to worsen.  He spoke with PCP 8/2024 and sleep aid was Rx'd with recommendations for decreased EtOH and increased Metoprolol XL to 50 mg daily.  Updated ZioPatch recommended.    Unfortunately, updated ZioPatch showed about 34% AF burden, with RVR.  On his own, he opted to restart flecainide 100 mg daily at night and notes that AFib burden decreased and that sleeping is much easier.    Reviewed that when he is in AF, feels very \"restless\" with some mild palpitations.  No dizziness or lightheadedness.  More AMANDA.  No CP/tightness.          VITALS:  Vitals: BP " "135/85   Pulse 62   Resp 16   Ht 1.753 m (5' 9\")   Wt 108.4 kg (239 lb)   SpO2 94%   BMI 35.29 kg/m      Diagnostic Testing:  EKG today, which I overread, showed SR 60 bpm.  QRS duration 102 ms on metoprolol XL 50 mg daily and flecainide 100 mg daily  ZioPatch 8/21-9/3/2024 showed SR with pAFib. In SR, avg HR 79 bpm, range  bpm.  34% PAF burden, longest episode 5h13m, avg HR 1140 bpm ().  No pauses.  2.8% PACs.  1.7% PVCs.  Symptom triggers included AF and PVCs.  On metoprolol XL 50 mg daily  CTA Heart 6/2023 normal PV anatomy.  No clot.  Ascending aorta borderline dilated 3.5 x 3.4.  CAC. Non-cardiac portion showed no significant findings.  Echocardiogram 4/2023-LVEF 60 to 65%.  Mild-moderate LVH.  No RWMA.  No significant valve abnormalities, difficult windows.    Plan:  Increase flecainide to 100 mg BID  Continue warfarin and Metoproll XL 50 mg daily  Nuclear stress test on medications (CAC, RFs for CAD, AMANDA)  See Dr. Wallis in 2-3 months to assess need for repeat ablation vs continued AAD therapy  Limit EtOH    Assessment/Plan:    Recurrent paroxysmal AF  As above, had been on flecainide, ultimately undergoing PVI/CTI ablation with Dr. Barrientos 6/2023  Flecainide stopped by Dr. Wallis 12/2023  Now with recurrent AF starting 3/2024, up to 34% on most recent ZioPatch.  HR too high with average over 100 bpm, even after metoprolol XL increased to 50 mg daily  Restart flecainide but just at 100 mg daily at night to \"help sleep\" 5 days ago. As above, EKG with acceptable/stable WRS duration    Remains on AC with warfarin.  [id number] 3 (HTN, age, DM)    PLAN:  Continue AAD but increase flecainide to correct dose 100 mg BID  Continue Metoprolol XL 50 mg daily  Discussed option of repeat ablation and at this time, will try AAD and see Dr. Wallis in 2-3 m to discuss repeat ablation    CAC  Noted on CT angiogram prior to procedure 6/2023 and CT lung cancer screening.  Echo 4/2023 with normal LVEF  No CP noted " but notes AMANDA, especially when in AFib.  Reviewed RFs for CAD given age, tobacco hx, HTN, borderline DM, FHx (brother just had stents a few years ago) and  okaylipids    PLAN:  Nuclear stress test on medications  Continue ARB, statin, BB.          Hollie Bernstein PA-C, MSPAS      Orders Placed This Encounter   Procedures    Follow-Up with Cardiology EP    EKG 12-lead complete w/read - Clinics (performed today)     Orders Placed This Encounter   Medications    DISCONTD: flecainide (TAMBOCOR) 100 MG tablet     Sig: Take 100 mg by mouth daily.    metoprolol succinate ER (TOPROL XL) 50 MG 24 hr tablet     Sig: Take 1 tablet (50 mg) by mouth daily.     Dispense:  90 tablet     Refill:  3     Replaces 1/2 tab daily (this may be a duplicate prescription). Pt needs to have filled today as old supply was depleted    flecainide (TAMBOCOR) 100 MG tablet     Sig: Take 1 tablet (100 mg) by mouth 2 times daily.     Dispense:  180 tablet     Refill:  3     Medications Discontinued During This Encounter   Medication Reason    metoprolol succinate ER (TOPROL XL) 50 MG 24 hr tablet Reorder (No AVS)    flecainide (TAMBOCOR) 100 MG tablet Reorder (No AVS)         Encounter Diagnoses   Name Primary?    Paroxysmal atrial fibrillation (H)     Encounter for monitoring flecainide therapy Yes    AMANDA (dyspnea on exertion)     Coronary artery calcification        CURRENT MEDICATIONS:  Current Outpatient Medications   Medication Sig Dispense Refill    albuterol (PROAIR HFA/PROVENTIL HFA/VENTOLIN HFA) 108 (90 Base) MCG/ACT inhaler INHALE TWO PUFFS INTO THE LUNGS EVERY 6 HOURS AS NEEDED FOR SHORTNESS OF BREATH 18 g 9    atorvastatin (LIPITOR) 20 MG tablet Take 1 tablet (20 mg) by mouth daily 90 tablet 4    blood glucose (ONETOUCH ULTRA) test strip USE TO TEST BLOOD SUGAR TWO TIMES A  strip 2    flecainide (TAMBOCOR) 100 MG tablet Take 1 tablet (100 mg) by mouth 2 times daily. 180 tablet 3    ipratropium - albuterol 0.5 mg/2.5 mg/3 mL (DUONEB)  "0.5-2.5 (3) MG/3ML neb solution NEBULIZE THE CONTENTS OF 1 VIAL EVERY 6 HOURS AS NEEDED FOR SHORTNESS OF BREATH OR WHEEZING 360 mL 1    JANTOVEN ANTICOAGULANT 5 MG tablet TAKE ONE-HALF TO ONE TABLET BY MOUTH ONCE DAILY. ADJUST DOSE PER INR AS DIRECTED BY ACC 90 tablet 1    Lancets (ONETOUCH DELICA PLUS [id number]) MISC USE TO TEST BLOOD SUGAR TWICE DAILY 200 each 2    losartan (COZAAR) 100 MG tablet Take 1 tablet (100 mg) by mouth daily 90 tablet 4    metFORMIN (GLUCOPHAGE XR) 500 MG 24 hr tablet Take 1 tablet (500 mg) by mouth 2 times daily (with meals)      metoprolol succinate ER (TOPROL XL) 50 MG 24 hr tablet Take 1 tablet (50 mg) by mouth daily. 90 tablet 3    mometasone-formoterol (DULERA) 200-5 MCG/ACT inhaler Inhale 2 puffs into the lungs 2 times daily 39 g 3    Nebulizers (INNOSPIRE ESSENCE NEBULIZER) MISC USE AS DIRECTED 1 each 1    Omega-3 Fatty Acids (FISH OIL PO) 500 mg fatty acids. 2,000 mg      Respiratory Therapy Supplies (NEBULIZER/TUBING/MOUTHPIECE) KIT Use as directed 1 kit 1    tamsulosin (FLOMAX) 0.4 MG capsule Take 1 capsule (0.4 mg) by mouth daily 90 capsule 1    traZODone (DESYREL) 50 MG tablet TAKE ONE OR TWO TABLETS BY MOUTH EVERY NIGHT AT BEDTIME 90 tablet 3    VITAMIN D PO Take 1 tablet by mouth daily         ALLERGIES     Allergies   Allergen Reactions    Lisinopril Cough         Review of Systems:  Skin:        Eyes:       ENT:       Respiratory:  Positive for dyspnea on exertion;shortness of breath;dyspnea at rest  Cardiovascular:    Positive for;chest pain;lightheadedness;dizziness  Gastroenterology:      Genitourinary:       Musculoskeletal:       Neurologic:       Psychiatric:       Heme/Lymph/Imm:       Endocrine:         Physical Exam:  Vitals: /85   Pulse 62   Resp 16   Ht 1.753 m (5' 9\")   Wt 108.4 kg (239 lb)   SpO2 94%   BMI 35.29 kg/m      Constitutional:  cooperative, alert and oriented, well developed, well nourished, in no acute distress        Skin:  warm and " dry to the touch;no apparent skin lesions or masses noted        Head:  normocephalic, no masses or lesions        Eyes:  pupils equal and round        ENT:  no pallor or cyanosis, dentition good        Neck:  JVP normal        Chest:  normal breath sounds, clear to auscultation, normal A-P diameter, normal symmetry, normal respiratory excursion, no use of accessory muscles        Cardiac: regular rhythm;normal S1 and S2;no murmurs, gallops or rubs detected                  Abdomen:  not assessed this visit        Vascular: not assessed this visit                                      Extremities and Back:  no deformities, clubbing, cyanosis, erythema observed;no edema        Neurological:  no gross motor deficits            PAST MEDICAL HISTORY:  Past Medical History:   Diagnosis Date    A-fib (H) 02/27/2018    COPD (chronic obstructive pulmonary disease) (H)     Diabetes (H)     Hypertension        PAST SURGICAL HISTORY:  Past Surgical History:   Procedure Laterality Date    ABDOMEN SURGERY      COLONOSCOPY  11/12/2004    Follow up colonoscopy in 5 years    EP ABLATION FOCAL AFIB N/A 6/7/2023    Procedure: Ablation Atrial Fibrilation;  Surgeon: Jay Barrientos MD;  Location:  [facility]    HERNIA REPAIR, UMBILICAL  2001    KNEE SURGERY  1960's    Left       FAMILY HISTORY:  Family History   Problem Relation Age of Onset    Osteoporosis Mother     Heart Disease Mother     Neurologic Disorder Father         passed away from a brain tumor age 48    Cerebrovascular Disease Father     Neurologic Disorder Maternal Grandmother         parkinsons    C.A.D. Maternal Grandmother     Diabetes Maternal Grandmother     Alcohol/Drug Maternal Grandfather     Cancer Maternal Grandfather         esphogus    Cancer Paternal Grandfather         lung    Diabetes Sister     Hypertension Sister     Asthma Daughter     Cerebrovascular Disease No family hx of     Breast Cancer No family hx of     Cancer - colorectal No  family hx of        SOCIAL HISTORY:  Social History     Socioeconomic History    Marital status: Single     Spouse name: None    Number of children: 2    Years of education: 12+    Highest education level: None   Occupational History     Employer: [employer]   Tobacco Use    Smoking status: Former     Current packs/day: 0.00     Average packs/day: 1 pack/day for 25.0 years (25.0 ttl pk-yrs)     Types: Cigarettes     Start date: 1975     Quit date: 2000     Years since quittin.7    Smokeless tobacco: Never    Tobacco comments:     quit smoking    Vaping Use    Vaping status: Never Used   Substance and Sexual Activity    Alcohol use: Yes     Comment: 2-5 beers per month    Drug use: Yes     Types: Marijuana    Sexual activity: Not Currently     Partners: Female     Birth control/protection: Condom   Other Topics Concern    Parent/sibling w/ CABG, MI or angioplasty before 65F 55M? Yes     Comment: Brother     Social Determinants of Health     Financial Resource Strain: High Risk (2024)    Financial Resource Strain     Within the past 12 months, have you or your family members you live with been unable to get utilities (heat, electricity) when it was really needed?: Yes   Food Insecurity: Low Risk  (2024)    Food Insecurity     Within the past 12 months, did you worry that your food would run out before you got money to buy more?: No     Within the past 12 months, did the food you bought just not last and you didn t have money to get more?: No   Transportation Needs: Low Risk  (2024)    Transportation Needs     Within the past 12 months, has lack of transportation kept you from medical appointments, getting your medicines, non-medical meetings or appointments, work, or from getting things that you need?: No   Physical Activity: Insufficiently Active (2024)    Exercise Vital Sign     Days of Exercise per Week: 2 days     Minutes of Exercise per Session: 30 min   Stress: No Stress Concern  Present (8/8/2024)    [employer] [place] [employer] - Occupational Stress Questionnaire     Feeling of Stress : Not at all   Social Connections: Unknown (8/8/2024)    Social Connection and Isolation Panel [NHANES]     Frequency of Social Gatherings with Friends and Family: More than three times a week   Interpersonal Safety: Low Risk  (8/13/2024)    Interpersonal Safety     Do you feel physically and emotionally safe where you currently live?: Yes     Within the past 12 months, have you been hit, slapped, kicked or otherwise physically hurt by someone?: No     Within the past 12 months, have you been humiliated or emotionally abused in other ways by your partner or ex-partner?: No   Housing Stability: Low Risk  (8/8/2024)    Housing Stability     Do you have housing? : Yes     Are you worried about losing your housing?: No

## 2024-09-30 ENCOUNTER — TELEPHONE (OUTPATIENT)
Dept: FAMILY MEDICINE | Facility: CLINIC | Age: 69
End: 2024-09-30

## 2024-09-30 ENCOUNTER — OFFICE VISIT (OUTPATIENT)
Dept: CARDIOLOGY | Facility: CLINIC | Age: 69
End: 2024-09-30
Payer: COMMERCIAL

## 2024-09-30 VITALS
SYSTOLIC BLOOD PRESSURE: 135 MMHG | BODY MASS INDEX: 35.4 KG/M2 | HEIGHT: 69 IN | DIASTOLIC BLOOD PRESSURE: 85 MMHG | OXYGEN SATURATION: 94 % | RESPIRATION RATE: 16 BRPM | WEIGHT: 239 LBS | HEART RATE: 62 BPM

## 2024-09-30 DIAGNOSIS — Z51.81 ENCOUNTER FOR MONITORING FLECAINIDE THERAPY: Primary | ICD-10-CM

## 2024-09-30 DIAGNOSIS — R06.09 DOE (DYSPNEA ON EXERTION): ICD-10-CM

## 2024-09-30 DIAGNOSIS — I25.10 CORONARY ARTERY CALCIFICATION: ICD-10-CM

## 2024-09-30 DIAGNOSIS — I48.0 PAROXYSMAL ATRIAL FIBRILLATION (H): ICD-10-CM

## 2024-09-30 DIAGNOSIS — Z79.899 ENCOUNTER FOR MONITORING FLECAINIDE THERAPY: Primary | ICD-10-CM

## 2024-09-30 PROCEDURE — 93000 ELECTROCARDIOGRAM COMPLETE: CPT | Performed by: PHYSICIAN ASSISTANT

## 2024-09-30 PROCEDURE — 99214 OFFICE O/P EST MOD 30 MIN: CPT | Performed by: PHYSICIAN ASSISTANT

## 2024-09-30 RX ORDER — FLECAINIDE ACETATE 100 MG/1
100 TABLET ORAL DAILY
COMMUNITY
End: 2024-09-30

## 2024-09-30 RX ORDER — METOPROLOL SUCCINATE 50 MG/1
50 TABLET, EXTENDED RELEASE ORAL DAILY
Qty: 90 TABLET | Refills: 3 | Status: SHIPPED | OUTPATIENT
Start: 2024-09-30

## 2024-09-30 RX ORDER — FLECAINIDE ACETATE 100 MG/1
100 TABLET ORAL 2 TIMES DAILY
Qty: 180 TABLET | Refills: 3 | Status: SHIPPED | OUTPATIENT
Start: 2024-09-30

## 2024-09-30 NOTE — TELEPHONE ENCOUNTER
Reason for Call:  Other prescription    Detailed comments:   PATIENT REPORTING THAT HE IS IN AFIB AND ON A NEW MEDICATION     flecainide (TAMBOCOR) 100 MG tablet [12372]  2X DAY    Phone Number Patient can be reached at: Cell number on file:    Telephone Information:   Mobile [phone]       Best Time: ASAP    Can we leave a detailed message on this number? YES    Call taken on 9/30/2024 at 1:42 PM by Ann Ewing

## 2024-09-30 NOTE — TELEPHONE ENCOUNTER
There is no interaction with warfarin and flecainide.   Patient notified to continue previous plan.  Will follow-up with patient after 11/1/24 INR check.    Palma Adam RN  [facility]  [phone]

## 2024-09-30 NOTE — LETTER
"9/30/2024    Jolynn Cooper MD  [address] [facility] [address]    RE: Joe Mas       Dear Colleague,     I had the pleasure of seeing Joe Mas in the [facility].  [facility]    I had the pleasure of seeing Joe when he came for follow up of AF.  This 68 year old sees Dr. Wallis for his history of:    HTN  Paroxysmal AFib -   chronic therapy with flecainide/metoprolol, s/p AFib ablation (PVI, CTI) with Dr. Barrientos 6/2023 for refractory arrhythmias.  Flecainide stopped 12/2023.  ZioPatch 8/2024 with 34% p AFib burden  Aortic root dilatation -   echo 2023 with root 3.9 cm, asc ao 3.4 cm  Mild JUNG - not using CPAP  Dyslipidemia      Last Visit & Interval History:  Yazmin saw Joe 6/2023 at which time he was doing well following his ablation.  He saw Dr. Wallis virtually (as Dr. Barrientos had moved) 12/2023 and noted no recurrent AF.  He recommended discontinuation of flecainide with reassessment in 6 to 12 months.  Warfarin was continued.    Saw PCP 8/2024.  He had just quit smoking but had noted increased alcohol use used for sleep, and concern for recurrent AF.  Dr. Cooper recommended increasing metoprolol XL with updated ZioPatch, which showed 34% AF burden.  I am meeting him today.    Today's Visit:  Joe noted that initially after stopping flecainide he did really well, but in March 20204, noted \"%AFib\" documented on his watch started increasing in frequency.  With this, he noted difficulty falling asleep feeling his palpitations were keeping him awake.  He self medicated with alcohol, and noted episodes continued to worsen.  He spoke with PCP 8/2024 and sleep aid was Rx'd with recommendations for decreased EtOH and increased Metoprolol XL to 50 mg daily.  Updated ZioPatch recommended.    Unfortunately, updated ZioPatch showed about 34% AF burden, with RVR.  On his own, he opted to restart flecainide 100 mg daily at night and notes that AFib burden decreased and " "that sleeping is much easier.    Reviewed that when he is in AF, feels very \"restless\" with some mild palpitations.  No dizziness or lightheadedness.  More AMANDA.  No CP/tightness.          VITALS:  Vitals: /85   Pulse 62   Resp 16   Ht 1.753 m (5' 9\")   Wt 108.4 kg (239 lb)   SpO2 94%   BMI 35.29 kg/m      Diagnostic Testing:  EKG today, which I overread, showed SR 60 bpm.  QRS duration 102 ms on metoprolol XL 50 mg daily and flecainide 100 mg daily  ZioPatch 8/21-9/3/2024 showed SR with pAFib. In SR, avg HR 79 bpm, range  bpm.  34% PAF burden, longest episode 5h13m, avg HR 1140 bpm ().  No pauses.  2.8% PACs.  1.7% PVCs.  Symptom triggers included AF and PVCs.  On metoprolol XL 50 mg daily  CTA Heart 6/2023 normal PV anatomy.  No clot.  Ascending aorta borderline dilated 3.5 x 3.4.  CAC. Non-cardiac portion showed no significant findings.  Echocardiogram 4/2023-LVEF 60 to 65%.  Mild-moderate LVH.  No RWMA.  No significant valve abnormalities, difficult windows.    Plan:  Increase flecainide to 100 mg BID  Continue warfarin and Metoproll XL 50 mg daily  Nuclear stress test on medications (CAC, RFs for CAD, AMANDA)  See Dr. Wallis in 2-3 months to assess need for repeat ablation vs continued AAD therapy  Limit EtOH    Assessment/Plan:    Recurrent paroxysmal AF  As above, had been on flecainide, ultimately undergoing PVI/CTI ablation with Dr. Barrientos 6/2023  Flecainide stopped by Dr. Wallis 12/2023  Now with recurrent AF starting 3/2024, up to 34% on most recent ZioPatch.  HR too high with average over 100 bpm, even after metoprolol XL increased to 50 mg daily  Restart flecainide but just at 100 mg daily at night to \"help sleep\" 5 days ago. As above, EKG with acceptable/stable WRS duration    Remains on AC with warfarin.  [id number] 3 (HTN, age, DM)    PLAN:  Continue AAD but increase flecainide to correct dose 100 mg BID  Continue Metoprolol XL 50 mg daily  Discussed option of repeat ablation and at " this time, will try AAD and see Dr. Wallis in 2-3 m to discuss repeat ablation    CAC  Noted on CT angiogram prior to procedure 6/2023 and CT lung cancer screening.  Echo 4/2023 with normal LVEF  No CP noted but notes AMANDA, especially when in AFib.  Reviewed RFs for CAD given age, tobacco hx, HTN, borderline DM, FHx (brother just had stents a few years ago) and  okaylipids    PLAN:  Nuclear stress test on medications  Continue ARB, statin, BB.          Hollie Bernstein PA-C, MSPAS      Orders Placed This Encounter   Procedures     Follow-Up with Cardiology EP     EKG 12-lead complete w/read - Clinics (performed today)     Orders Placed This Encounter   Medications     DISCONTD: flecainide (TAMBOCOR) 100 MG tablet     Sig: Take 100 mg by mouth daily.     metoprolol succinate ER (TOPROL XL) 50 MG 24 hr tablet     Sig: Take 1 tablet (50 mg) by mouth daily.     Dispense:  90 tablet     Refill:  3     Replaces 1/2 tab daily (this may be a duplicate prescription). Pt needs to have filled today as old supply was depleted     flecainide (TAMBOCOR) 100 MG tablet     Sig: Take 1 tablet (100 mg) by mouth 2 times daily.     Dispense:  180 tablet     Refill:  3     Medications Discontinued During This Encounter   Medication Reason     metoprolol succinate ER (TOPROL XL) 50 MG 24 hr tablet Reorder (No AVS)     flecainide (TAMBOCOR) 100 MG tablet Reorder (No AVS)         Encounter Diagnoses   Name Primary?     Paroxysmal atrial fibrillation (H)      Encounter for monitoring flecainide therapy Yes     AMANDA (dyspnea on exertion)      Coronary artery calcification        CURRENT MEDICATIONS:  Current Outpatient Medications   Medication Sig Dispense Refill     albuterol (PROAIR HFA/PROVENTIL HFA/VENTOLIN HFA) 108 (90 Base) MCG/ACT inhaler INHALE TWO PUFFS INTO THE LUNGS EVERY 6 HOURS AS NEEDED FOR SHORTNESS OF BREATH 18 g 9     atorvastatin (LIPITOR) 20 MG tablet Take 1 tablet (20 mg) by mouth daily 90 tablet 4     blood glucose (ONETOUCH  ULTRA) test strip USE TO TEST BLOOD SUGAR TWO TIMES A  strip 2     flecainide (TAMBOCOR) 100 MG tablet Take 1 tablet (100 mg) by mouth 2 times daily. 180 tablet 3     ipratropium - albuterol 0.5 mg/2.5 mg/3 mL (DUONEB) 0.5-2.5 (3) MG/3ML neb solution NEBULIZE THE CONTENTS OF 1 VIAL EVERY 6 HOURS AS NEEDED FOR SHORTNESS OF BREATH OR WHEEZING 360 mL 1     JANTOVEN ANTICOAGULANT 5 MG tablet TAKE ONE-HALF TO ONE TABLET BY MOUTH ONCE DAILY. ADJUST DOSE PER INR AS DIRECTED BY ACC 90 tablet 1     Lancets (ONETOUCH DELICA PLUS [id number]) MISC USE TO TEST BLOOD SUGAR TWICE DAILY 200 each 2     losartan (COZAAR) 100 MG tablet Take 1 tablet (100 mg) by mouth daily 90 tablet 4     metFORMIN (GLUCOPHAGE XR) 500 MG 24 hr tablet Take 1 tablet (500 mg) by mouth 2 times daily (with meals)       metoprolol succinate ER (TOPROL XL) 50 MG 24 hr tablet Take 1 tablet (50 mg) by mouth daily. 90 tablet 3     mometasone-formoterol (DULERA) 200-5 MCG/ACT inhaler Inhale 2 puffs into the lungs 2 times daily 39 g 3     Nebulizers (LonoCloudIRE ESSENCE NEBULIZER) MISC USE AS DIRECTED 1 each 1     Omega-3 Fatty Acids (FISH OIL PO) 500 mg fatty acids. 2,000 mg       Respiratory Therapy Supplies (NEBULIZER/TUBING/MOUTHPIECE) KIT Use as directed 1 kit 1     tamsulosin (FLOMAX) 0.4 MG capsule Take 1 capsule (0.4 mg) by mouth daily 90 capsule 1     traZODone (DESYREL) 50 MG tablet TAKE ONE OR TWO TABLETS BY MOUTH EVERY NIGHT AT BEDTIME 90 tablet 3     VITAMIN D PO Take 1 tablet by mouth daily         ALLERGIES     Allergies   Allergen Reactions     Lisinopril Cough         Review of Systems:  Skin:        Eyes:       ENT:       Respiratory:  Positive for dyspnea on exertion;shortness of breath;dyspnea at rest  Cardiovascular:    Positive for;chest pain;lightheadedness;dizziness  Gastroenterology:      Genitourinary:       Musculoskeletal:       Neurologic:       Psychiatric:       Heme/Lymph/Imm:       Endocrine:         Physical Exam:  Vitals: BP  "135/85   Pulse 62   Resp 16   Ht 1.753 m (5' 9\")   Wt 108.4 kg (239 lb)   SpO2 94%   BMI 35.29 kg/m      Constitutional:  cooperative, alert and oriented, well developed, well nourished, in no acute distress        Skin:  warm and dry to the touch;no apparent skin lesions or masses noted        Head:  normocephalic, no masses or lesions        Eyes:  pupils equal and round        ENT:  no pallor or cyanosis, dentition good        Neck:  JVP normal        Chest:  normal breath sounds, clear to auscultation, normal A-P diameter, normal symmetry, normal respiratory excursion, no use of accessory muscles        Cardiac: regular rhythm;normal S1 and S2;no murmurs, gallops or rubs detected                  Abdomen:  not assessed this visit        Vascular: not assessed this visit                                      Extremities and Back:  no deformities, clubbing, cyanosis, erythema observed;no edema        Neurological:  no gross motor deficits            PAST MEDICAL HISTORY:  Past Medical History:   Diagnosis Date     A-fib (H) 02/27/2018     COPD (chronic obstructive pulmonary disease) (H)      Diabetes (H)      Hypertension        PAST SURGICAL HISTORY:  Past Surgical History:   Procedure Laterality Date     ABDOMEN SURGERY       COLONOSCOPY  11/12/2004    Follow up colonoscopy in 5 years     EP ABLATION FOCAL AFIB N/A 6/7/2023    Procedure: Ablation Atrial Fibrilation;  Surgeon: Jay Barrientos MD;  Location:  HEART CARDIAC CATH LAB     HERNIA REPAIR, UMBILICAL  2001     KNEE SURGERY  1960's    Left       FAMILY HISTORY:  Family History   Problem Relation Age of Onset     Osteoporosis Mother      Heart Disease Mother      Neurologic Disorder Father         passed away from a brain tumor age 48     Cerebrovascular Disease Father      Neurologic Disorder Maternal Grandmother         parkinsons     C.A.D. Maternal Grandmother      Diabetes Maternal Grandmother      Alcohol/Drug Maternal Grandfather      " Cancer Maternal Grandfather         esphogus     Cancer Paternal Grandfather         lung     Diabetes Sister      Hypertension Sister      Asthma Daughter      Cerebrovascular Disease No family hx of      Breast Cancer No family hx of      Cancer - colorectal No family hx of        SOCIAL HISTORY:  Social History     Socioeconomic History     Marital status: Single     Spouse name: None     Number of children: 2     Years of education: 12+     Highest education level: None   Occupational History     Employer: [employer]   Tobacco Use     Smoking status: Former     Current packs/day: 0.00     Average packs/day: 1 pack/day for 25.0 years (25.0 ttl pk-yrs)     Types: Cigarettes     Start date: 1975     Quit date: 2000     Years since quittin.7     Smokeless tobacco: Never     Tobacco comments:     quit smoking    Vaping Use     Vaping status: Never Used   Substance and Sexual Activity     Alcohol use: Yes     Comment: 2-5 beers per month     Drug use: Yes     Types: Marijuana     Sexual activity: Not Currently     Partners: Female     Birth control/protection: Condom   Other Topics Concern     Parent/sibling w/ CABG, MI or angioplasty before 65F 55M? Yes     Comment: Brother     Social Determinants of Health     Financial Resource Strain: High Risk (2024)    Financial Resource Strain      Within the past 12 months, have you or your family members you live with been unable to get utilities (heat, electricity) when it was really needed?: Yes   Food Insecurity: Low Risk  (2024)    Food Insecurity      Within the past 12 months, did you worry that your food would run out before you got money to buy more?: No      Within the past 12 months, did the food you bought just not last and you didn t have money to get more?: No   Transportation Needs: Low Risk  (2024)    Transportation Needs      Within the past 12 months, has lack of transportation kept you from medical appointments, getting your  medicines, non-medical meetings or appointments, work, or from getting things that you need?: No   Physical Activity: Insufficiently Active (8/8/2024)    Exercise Vital Sign      Days of Exercise per Week: 2 days      Minutes of Exercise per Session: 30 min   Stress: No Stress Concern Present (8/8/2024)    [employer] [employer] - Occupational Stress Questionnaire      Feeling of Stress : Not at all   Social Connections: Unknown (8/8/2024)    Social Connection and Isolation Panel [NHANES]      Frequency of Social Gatherings with Friends and Family: More than three times a week   Interpersonal Safety: Low Risk  (8/13/2024)    Interpersonal Safety      Do you feel physically and emotionally safe where you currently live?: Yes      Within the past 12 months, have you been hit, slapped, kicked or otherwise physically hurt by someone?: No      Within the past 12 months, have you been humiliated or emotionally abused in other ways by your partner or ex-partner?: No   Housing Stability: Low Risk  (8/8/2024)    Housing Stability      Do you have housing? : Yes      Are you worried about losing your housing?: No               Thank you for allowing me to participate in the care of your patient.      Sincerely,     Mariela Bernstein PA-C     [facility]  cc:   Jolynn Cooper MD  [address] [address]

## 2024-09-30 NOTE — PATIENT INSTRUCTIONS
Joe - it was nice to meet you today!    At your visit today we reviewed:    EKG today looked good! Normal rhythm  BP looks great  Reviewed ZioPatch showing 34% AFib burden despite ablation in 2023.  Restarted flecaindie b/c of this     Medication Changes:    Refill for Metoprolol XL 50 mg DAILY sent in  Restart flecainide 100 mg TWICE a day! New Rx sent in  Continue warfarin    Recommendations:    Let INR RNs know you're back on flecainide    Get updated stress test given calcification noted on CT scan of lungs  Stay ON Metoprolol and flecainide for test.   We'll call with results    Limit alcohol    Follow-up:    See Dr. Wallis for cardiology follow up in 3 months but CALL Cardiology nurses Estrella & Monica @ [phone] for any issues, questions or concerns in the interim.      To schedule a future appointment, we kindly ask that you call cardiology scheduling at [phone] three months prior to requested visit date.    Important Phone Numbers for [facility]):    [place] Estrella Anderson: [phone]  Cardiology [phone]  [place] [facility] [phone]  [facility] [phone]  [facility] [facility]: [phone]

## 2024-10-14 ENCOUNTER — HOSPITAL ENCOUNTER (OUTPATIENT)
Dept: NUCLEAR MEDICINE | Facility: CLINIC | Age: 69
Setting detail: NUCLEAR MEDICINE
Discharge: HOME | End: 2024-10-14
Attending: PHYSICIAN ASSISTANT
Payer: COMMERCIAL

## 2024-10-14 ENCOUNTER — HOSPITAL ENCOUNTER (OUTPATIENT)
Dept: CARDIOLOGY | Facility: CLINIC | Age: 69
Setting detail: NUCLEAR MEDICINE
Discharge: HOME | End: 2024-10-14
Attending: PHYSICIAN ASSISTANT
Payer: COMMERCIAL

## 2024-10-14 VITALS — BODY MASS INDEX: 35.4 KG/M2 | WEIGHT: 239 LBS | HEIGHT: 69 IN

## 2024-10-14 DIAGNOSIS — Z79.899 ENCOUNTER FOR MONITORING FLECAINIDE THERAPY: ICD-10-CM

## 2024-10-14 DIAGNOSIS — R06.09 DOE (DYSPNEA ON EXERTION): ICD-10-CM

## 2024-10-14 DIAGNOSIS — I25.10 CORONARY ARTERY CALCIFICATION: ICD-10-CM

## 2024-10-14 DIAGNOSIS — I48.0 PAROXYSMAL ATRIAL FIBRILLATION (H): ICD-10-CM

## 2024-10-14 DIAGNOSIS — Z51.81 ENCOUNTER FOR MONITORING FLECAINIDE THERAPY: ICD-10-CM

## 2024-10-14 LAB
CV STRESS MAX HR HE: 68
NUC STRESS EJECTION FRACTION: 61 %
RATE PRESSURE PRODUCT: 8296
STRESS ECHO BASELINE DIASTOLIC HE: 70
STRESS ECHO BASELINE HR: 51 BPM
STRESS ECHO BASELINE SYSTOLIC BP: 118
STRESS ECHO CALCULATED PERCENT HR: 45 %
STRESS ECHO LAST STRESS DIASTOLIC BP: 60
STRESS ECHO LAST STRESS SYSTOLIC BP: 122
STRESS ECHO TARGET HR: 152

## 2024-10-14 PROCEDURE — 78452 HT MUSCLE IMAGE SPECT MULT: CPT

## 2024-10-14 PROCEDURE — 343N000001 HC RX 343 MED OP 636: Performed by: PHYSICIAN ASSISTANT

## 2024-10-14 PROCEDURE — A9500 TC99M SESTAMIBI: HCPCS | Performed by: PHYSICIAN ASSISTANT

## 2024-10-14 PROCEDURE — 78452 HT MUSCLE IMAGE SPECT MULT: CPT | Mod: 26 | Performed by: INTERNAL MEDICINE

## 2024-10-14 PROCEDURE — 250N000011 HC RX IP 250 OP 636: Performed by: PHYSICIAN ASSISTANT

## 2024-10-14 PROCEDURE — 93018 CV STRESS TEST I&R ONLY: CPT | Performed by: INTERNAL MEDICINE

## 2024-10-14 PROCEDURE — 93016 CV STRESS TEST SUPVJ ONLY: CPT | Performed by: STUDENT IN AN ORGANIZED HEALTH CARE EDUCATION/TRAINING PROGRAM

## 2024-10-14 PROCEDURE — 93017 CV STRESS TEST TRACING ONLY: CPT

## 2024-10-14 RX ORDER — REGADENOSON 0.08 MG/ML
0.4 INJECTION, SOLUTION INTRAVENOUS ONCE
Status: COMPLETED | OUTPATIENT
Start: 2024-10-14 | End: 2024-10-14

## 2024-10-14 RX ADMIN — Medication 33 MILLICURIE: at 14:04

## 2024-10-14 RX ADMIN — REGADENOSON 0.4 MG: 0.08 INJECTION, SOLUTION INTRAVENOUS at 13:58

## 2024-10-14 RX ADMIN — Medication 10.4 MILLICURIE: at 12:38

## 2024-10-25 ENCOUNTER — TELEPHONE (OUTPATIENT)
Dept: FAMILY MEDICINE | Facility: CLINIC | Age: 69
End: 2024-10-25
Payer: COMMERCIAL

## 2024-10-25 DIAGNOSIS — E11.8 TYPE 2 DIABETES MELLITUS WITH COMPLICATION, WITHOUT LONG-TERM CURRENT USE OF INSULIN (H): Primary | ICD-10-CM

## 2024-10-25 RX ORDER — METFORMIN HYDROCHLORIDE 500 MG/1
500 TABLET, EXTENDED RELEASE ORAL 2 TIMES DAILY WITH MEALS
Qty: 180 TABLET | Refills: 3 | Status: SHIPPED | OUTPATIENT
Start: 2024-10-25

## 2024-11-01 ENCOUNTER — ANTICOAGULATION THERAPY VISIT (OUTPATIENT)
Dept: ANTICOAGULATION | Facility: CLINIC | Age: 69
End: 2024-11-01

## 2024-11-01 ENCOUNTER — LAB (OUTPATIENT)
Dept: LAB | Facility: CLINIC | Age: 69
End: 2024-11-01
Payer: COMMERCIAL

## 2024-11-01 DIAGNOSIS — I48.91 ATRIAL FIBRILLATION, UNSPECIFIED TYPE (H): Primary | ICD-10-CM

## 2024-11-01 DIAGNOSIS — I48.91 ATRIAL FIBRILLATION, UNSPECIFIED TYPE (H): ICD-10-CM

## 2024-11-01 DIAGNOSIS — Z79.01 LONG TERM CURRENT USE OF ANTICOAGULANT THERAPY: ICD-10-CM

## 2024-11-01 LAB — INR BLD: 2 (ref 0.9–1.1)

## 2024-11-01 PROCEDURE — 85610 PROTHROMBIN TIME: CPT

## 2024-11-01 PROCEDURE — 36416 COLLJ CAPILLARY BLOOD SPEC: CPT

## 2024-11-01 NOTE — PROGRESS NOTES
ANTICOAGULATION MANAGEMENT     Joe Mas 69 year old male is on warfarin with therapeutic INR result. (Goal INR 2.0-3.0)    Recent labs: (last 7 days)     11/01/24  1126   INR 2.0*       ASSESSMENT     Warfarin Lab Questionnaire    Warfarin Doses Last 7 Days      11/1/2024    11:03 AM   Dose in Tablet or Mg   TAB or MG? tablet (tab)     Pt Rptd Dose SUNDAY MONDAY TUESDAY WED THURS FRIDAY SATURDAY 11/1/2024  11:03 AM 1 0.5 1 0.5 1 0.5 1         11/1/2024   Warfarin Lab Questionnaire   Missed doses within past 14 days? No   Changes in diet or alcohol within past 14 days? No   Medication changes since last result? No   Injuries or illness since last result? No   New shortness of breath, severe headaches or sudden changes in vision since last result? No   Abnormal bleeding since last result? No   Upcoming surgery, procedure? No   Best number to call with results? [phone]        Previous result: Therapeutic last 2(+) visits  Additional findings: None       PLAN     Recommended plan for no diet, medication or health factor changes affecting INR     Dosing Instructions: Continue your current warfarin dose with next INR in 6 weeks       Summary  As of 11/1/2024      Full warfarin instructions:  2.5 mg every Mon, Wed, Fri; 5 mg all other days   Next INR check:  12/13/2024               Telephone call with Joe who verbalizes understanding and agrees to plan    Lab visit scheduled    Education provided: Please call back if any changes to your diet, medications or how you've been taking warfarin  Goal range and lab monitoring: goal range and significance of current result  Contact [phone] with any changes, questions or concerns.     Plan made per [facility] anticoagulation protocol    Makayla Wilson RN  11/1/2024  Anticoagulation Clinic  Axial for routing messages: maxim YANG  [facility] patient phone line: [phone]        _______________________________________________________________________      Anticoagulation Episode Summary       Current INR goal:  2.0-3.0   TTR:  80.9% (1 y)   Target end date:  Indefinite   Send INR reminders to:  MARIA ELENA YANG    Indications    Atrial fibrillation  unspecified type (H) [I48.91]  Long term current use of anticoagulant therapy [Z79.01]             Comments:  --             Anticoagulation Care Providers       Provider Role Specialty Phone number    Jolynn Cooper MD Referring Family Medicine [phone]

## 2024-11-15 ENCOUNTER — OFFICE VISIT (OUTPATIENT)
Dept: FAMILY MEDICINE | Facility: CLINIC | Age: 69
End: 2024-11-15
Payer: COMMERCIAL

## 2024-11-15 VITALS
RESPIRATION RATE: 18 BRPM | SYSTOLIC BLOOD PRESSURE: 124 MMHG | OXYGEN SATURATION: 93 % | HEART RATE: 56 BPM | WEIGHT: 233.2 LBS | BODY MASS INDEX: 34.44 KG/M2 | DIASTOLIC BLOOD PRESSURE: 74 MMHG | TEMPERATURE: 97 F

## 2024-11-15 DIAGNOSIS — R39.11 URINARY HESITANCY: ICD-10-CM

## 2024-11-15 DIAGNOSIS — F32.1 MODERATE MAJOR DEPRESSION (H): ICD-10-CM

## 2024-11-15 DIAGNOSIS — Z98.890 S/P ABLATION OF ATRIAL FIBRILLATION: ICD-10-CM

## 2024-11-15 DIAGNOSIS — E11.9 TYPE 2 DIABETES MELLITUS WITHOUT COMPLICATION, WITHOUT LONG-TERM CURRENT USE OF INSULIN (H): Primary | ICD-10-CM

## 2024-11-15 DIAGNOSIS — I48.91 ATRIAL FIBRILLATION, UNSPECIFIED TYPE (H): ICD-10-CM

## 2024-11-15 DIAGNOSIS — Z87.891 QUIT SMOKING: ICD-10-CM

## 2024-11-15 DIAGNOSIS — G47.09 OTHER INSOMNIA: ICD-10-CM

## 2024-11-15 DIAGNOSIS — Z86.79 S/P ABLATION OF ATRIAL FIBRILLATION: ICD-10-CM

## 2024-11-15 LAB
EST. AVERAGE GLUCOSE BLD GHB EST-MCNC: 134 MG/DL
HBA1C MFR BLD: 6.3 % (ref 0–5.6)
HOLD SPECIMEN: NORMAL
HOLD SPECIMEN: NORMAL

## 2024-11-15 PROCEDURE — G2211 COMPLEX E/M VISIT ADD ON: HCPCS | Performed by: FAMILY MEDICINE

## 2024-11-15 PROCEDURE — 99214 OFFICE O/P EST MOD 30 MIN: CPT | Performed by: FAMILY MEDICINE

## 2024-11-15 PROCEDURE — 36415 COLL VENOUS BLD VENIPUNCTURE: CPT | Performed by: FAMILY MEDICINE

## 2024-11-15 PROCEDURE — G0009 ADMIN PNEUMOCOCCAL VACCINE: HCPCS | Performed by: FAMILY MEDICINE

## 2024-11-15 PROCEDURE — 83036 HEMOGLOBIN GLYCOSYLATED A1C: CPT | Performed by: FAMILY MEDICINE

## 2024-11-15 PROCEDURE — 90677 PCV20 VACCINE IM: CPT | Performed by: FAMILY MEDICINE

## 2024-11-15 RX ORDER — LANCETS 33 GAUGE
1 EACH MISCELLANEOUS DAILY
Qty: 200 EACH | Refills: 3 | Status: SHIPPED | OUTPATIENT
Start: 2024-11-15

## 2024-11-15 RX ORDER — BLOOD SUGAR DIAGNOSTIC
STRIP MISCELLANEOUS
Qty: 200 STRIP | Refills: 2 | Status: SHIPPED | OUTPATIENT
Start: 2024-11-15

## 2024-11-15 NOTE — PROGRESS NOTES
Assessment & Plan     (E11.9) Type 2 diabetes mellitus without complication, without long-term current use of insulin (H)  (primary encounter diagnosis)  Comment: doing well - has good control. Continue current meds. See me in 6 months. The patient indicates understanding of these issues and agrees with the plan.   Plan: blood glucose (ONETOUCH ULTRA) test strip            (G47.09) Other insomnia  Comment: He is sleeping much better using trazodone and THC gummies. Hoping to get medical marijuana approval.  Will ask him to follow-up with a colleague who is certified. Also discussed options for over the counter marijuana use/dosing.      (I48.91) Atrial fibrillation, unspecified type (H)  Comment: Now he is back on flecainade and metoprolol and this is working well for the recurrent afib. He is feeling good.  He is considering doing a repeat ablation but not sure.   Plan:     (R39.11) Urinary hesitancy  Comment: the flomax is working well for him. He hasn't made urology appointment yet.   Plan:     (Z98.890,  Z86.79) S/P ablation of atrial fibrillation  Comment: ablated in the past and now has recurred.     (Z87.891) Quit smoking  Comment: congratulated him again. He is still not smoking   Plan:     (F32.1) Moderate major depression (H)  Comment: doing well. Feeling good in general.   Plan:             Isaias Diaz is a 69 year old, presenting for the following health issues:  Diabetes        11/15/2024    11:05 AM   Additional Questions   Roomed by NELLIE Arredondo   Accompanied by self     History of Present Illness       Reason for visit:  A1c check   He is taking medications regularly.     Last visit 8/13/24 - dawno ordered and cards referral placed  - afib 34% of the time  Increased metoprolol and restarted flecainide 100 bid    9/30/24 - cardiology visit   Hr running 60-70 now   He is considering a second ablation of a-fib     Smoking - quit this fall. And has continued     Sleep - started trazodone this  fall  - and then added thc/cbd gummy - sleeping well now, making him feel so much better   on average 7.5 hrs for the last 3 months.   Wants to talk about cannabis medical card.     Urinary frequency  Started him on flomax at our last visit and it has helped   He didn't make urology appointment     Diabetes   Lab Results   Component Value Date    A1C 6.3 11/15/2024    A1C 6.8 08/13/2024    A1C 6.3 02/06/2024    A1C 6.8 08/04/2023    A1C 6.4 02/03/2023    A1C 6.8 04/09/2021    A1C 6.7 08/18/2020    A1C 7.0 01/14/2020    A1C 6.6 07/05/2019    A1C 8.1 01/29/2019     Wt Readings from Last 10 Encounters:   11/15/24 105.8 kg (233 lb 3.2 oz)   10/14/24 108.4 kg (239 lb)   09/30/24 108.4 kg (239 lb)   08/13/24 111.5 kg (245 lb 14.4 oz)   05/13/24 111.6 kg (246 lb)   02/06/24 110.9 kg (244 lb 8 oz)   12/05/23 112.9 kg (249 lb)   10/16/23 113.4 kg (250 lb)   08/04/23 112.4 kg (247 lb 14.4 oz)   06/15/23 111.6 kg (246 lb)         Review of Systems  Constitutional, HEENT, cardiovascular, pulmonary, gi and gu systems are negative, except as otherwise noted.      Objective    /74   Pulse 56   Temp 97  F (36.1  C) (Tympanic)   Resp 18   Wt 105.8 kg (233 lb 3.2 oz)   SpO2 93%   BMI 34.44 kg/m    Body mass index is 34.44 kg/m .  Physical Exam   GENERAL: alert and no distress  EYES: Eyes grossly normal to inspection, PERRL and conjunctivae and sclerae normal  NECK: no adenopathy, no asymmetry, masses, or scars  RESP: lungs clear to auscultation - no rales, rhonchi or wheezes  CV: regular rate and rhythm, normal S1 S2, no S3 or S4, no murmur, click or rub, no peripheral edema  MS: no gross musculoskeletal defects noted, no edema  NEURO: Normal strength and tone, mentation intact and speech normal  PSYCH: mentation appears normal, affect normal/bright          Signed Electronically by: Jolynn Cooper MD

## 2024-11-15 NOTE — Clinical Note
This is the patient I am hoping you will certify for medical marijuana... sorry to ask... thank you!

## 2024-11-17 ENCOUNTER — TELEPHONE (OUTPATIENT)
Dept: FAMILY MEDICINE | Facility: CLINIC | Age: 69
End: 2024-11-17
Payer: COMMERCIAL

## 2024-11-17 NOTE — TELEPHONE ENCOUNTER
This is okay for a video visit with me to discuss medical cannabis.  Please call pt to schedule    Dr. Makayla Oh DO

## 2024-11-18 NOTE — TELEPHONE ENCOUNTER
LANDRY for pt to schedule appt with Adriano for Video visit to discuss medical cannabis  Asked to call or use "[employer]hart to schedule.

## 2024-11-25 ENCOUNTER — VIRTUAL VISIT (OUTPATIENT)
Dept: FAMILY MEDICINE | Facility: CLINIC | Age: 69
End: 2024-11-25
Payer: COMMERCIAL

## 2024-11-25 DIAGNOSIS — G47.00 INSOMNIA, UNSPECIFIED TYPE: Primary | ICD-10-CM

## 2024-11-25 PROCEDURE — 99213 OFFICE O/P EST LOW 20 MIN: CPT | Mod: 95 | Performed by: FAMILY MEDICINE

## 2024-11-25 NOTE — PROGRESS NOTES
"Joe is a 69 year old who is being evaluated via a billable video visit.    How would you like to obtain your AVS? MyChart  If the video visit is dropped, the invitation should be resent by: Text to cell phone: [phone]  Will anyone else be joining your video visit? No        ICD-10-CM    1. Insomnia, unspecified type  G47.00         Long standing, both onset and maintenance concerns.  Has been self managing with over the counter THC gummies with benefit.  Would prefer to have a medical card and obtain through a dispensary.      Reviewed process for certification.  Online info completed.  Pt will reach out to office of cannabis if he does not hear from them in 2 business days.    Subjective   Joe is a 69 year old, presenting for the following health issues:  Consult (For medical cannabis )      Consult for medical cannabis       Has tried some THC gummies that have been helpful    Feels like he is only sleeping 5-6 hours per night.  Averaging 7.5 hours/night with gummies and \"feels great\".    Has both difficulty falling asleep and staying asleep.  Has been an issue for a few years.  Has tried various sleep hygiene measures without benefit      Has a newer diagnosis of OISA (borderline) and did not tolerate CPAP, hopefully cannabis will help him to tolerate better.    No h/o drug use.      Chronic diseases managed by Dr Cooper.  No contraindications to cannabis          Objective           Vitals:  No vitals were obtained today due to virtual visit.    Physical Exam   GENERAL: alert and no distress  EYES: Eyes grossly normal to inspection.  No discharge or erythema, or obvious scleral/conjunctival abnormalities.  RESP: No audible wheeze, cough, or visible cyanosis.    SKIN: Visible skin clear. No significant rash, abnormal pigmentation or lesions.  NEURO: Cranial nerves grossly intact.  Mentation and speech appropriate for age.  PSYCH: Appropriate affect, tone, and pace of words    EPic reviewed      Video-Visit " Details    Type of service:  Video Visit   Video End Time:  Originating Location (pt. Location): Home    Distant Location (provider location):  On-site  Platform used for Video Visit: Tommy  Signed Electronically by: Makayla Oh DO

## 2024-12-03 ENCOUNTER — VIRTUAL VISIT (OUTPATIENT)
Dept: CARDIOLOGY | Facility: CLINIC | Age: 69
End: 2024-12-03
Attending: PHYSICIAN ASSISTANT
Payer: COMMERCIAL

## 2024-12-03 VITALS — SYSTOLIC BLOOD PRESSURE: 130 MMHG | WEIGHT: 230 LBS | DIASTOLIC BLOOD PRESSURE: 84 MMHG | BODY MASS INDEX: 33.97 KG/M2

## 2024-12-03 DIAGNOSIS — I48.0 PAROXYSMAL ATRIAL FIBRILLATION (H): ICD-10-CM

## 2024-12-03 NOTE — PROGRESS NOTES
Joe is a 69 year old who is being evaluated via a billable video visit.    How would you like to obtain your AVS? Perfect[employer]  If the video visit is dropped, the invitation should be resent by: Text to cell phone: [phone]  Will anyone else be joining your video visit? No    Video-Visit Details    Type of service:  Video Visit. Start time 1045 am  Video End Time:10:52 AM  Originating Location (pt. Location): Home    Distant Location (provider location):  On-site  Platform used for Video Visit: Unable to complete video visit    Pt and I could hear each other but not see each other.     Delightful pt with symptomatic pAF despite being on Flecainide, s/p PVI and CTI ablation in 6/23 by Dr. Barrientos who was doing well when I last him a year ago. I stopped Flecainide at that time and unfortunately since then has had more AF up to 30% of the time. He has been on Warfarin. Less AF since resuming Flecainide. Discussed that AF ablation has about 75-85% success rate at best. Second ablation would increase that rate. Pt is interested in having another ablation sometimes in May. Will cont with current meds. See Hollie in April.  Hopefully, will have PFA avail at that time.

## 2024-12-03 NOTE — LETTER
12/3/2024    Jolynn Copoer MD  [address] [facility] [address]    RE: Joe Mas       Dear Colleague,     I had the pleasure of seeing Joe Mas in the [facility].  Joe is a 69 year old who is being evaluated via a billable video visit.    How would you like to obtain your AVS? MyChart  If the video visit is dropped, the invitation should be resent by: Text to cell phone: [phone]  Will anyone else be joining your video visit? No    Video-Visit Details    Type of service:  Video Visit. Start time 1045 am  Video End Time:10:52 AM  Originating Location (pt. Location): Home    Distant Location (provider location):  On-site  Platform used for Video Visit: Unable to complete video visit    Pt and I could hear each other but not see each other.     Delightful pt with symptomatic pAF despite being on Flecainide, s/p PVI and CTI ablation in 6/23 by Dr. Barrientos who was doing well when I last him a year ago. I stopped Flecainide at that time and unfortunately since then has had more AF up to 30% of the time. He has been on Warfarin. Less AF since resuming Flecainide. Discussed that AF ablation has about 75-85% success rate at best. Second ablation would increase that rate. Pt is interested in having another ablation sometimes in May. Will cont with current meds. See Hollie in April.  Hopefully, will have PFA avail at that time.        Thank you for allowing me to participate in the care of your patient.      Sincerely,     Nick Oneill MD     [facility]  cc:   Mariela Bernstein PA-C  [address]  [place] [address]

## 2024-12-21 DIAGNOSIS — J44.1 OBSTRUCTIVE CHRONIC BRONCHITIS WITH EXACERBATION (H): ICD-10-CM

## 2024-12-23 RX ORDER — IPRATROPIUM BROMIDE AND ALBUTEROL SULFATE 2.5; .5 MG/3ML; MG/3ML
SOLUTION RESPIRATORY (INHALATION)
Qty: 360 ML | Refills: 2 | Status: SHIPPED | OUTPATIENT
Start: 2024-12-23

## 2025-01-22 ENCOUNTER — DOCUMENTATION ONLY (OUTPATIENT)
Dept: ANTICOAGULATION | Facility: CLINIC | Age: 70
End: 2025-01-22
Payer: COMMERCIAL

## 2025-01-22 DIAGNOSIS — R35.0 URINARY FREQUENCY: ICD-10-CM

## 2025-01-22 DIAGNOSIS — I48.91 ATRIAL FIBRILLATION, UNSPECIFIED TYPE (H): ICD-10-CM

## 2025-01-22 DIAGNOSIS — I48.91 ATRIAL FIBRILLATION, UNSPECIFIED TYPE (H): Primary | ICD-10-CM

## 2025-01-22 NOTE — TELEPHONE ENCOUNTER
Requested Prescriptions   Pending Prescriptions Disp Refills    tamsulosin (FLOMAX) 0.4 MG capsule [Pharmacy Med Name: TAMSULOSIN HCL 0.4MG CAPS] 90 capsule 1     Sig: TAKE ONE CAPSULE BY MOUTH ONCE DAILY       Alpha Blockers Failed - 1/22/2025  2:30 PM        Failed - Medication indicated for associated diagnosis     Medication is associated with one or more of the following diagnoses:  Benign prostatic hyperplasia  Disorder of the urinary system  Erectile dysfunction  Neurogenic bladder  Overactive bladder  Raynaud s  Ureteral stent-related symptoms  Urinary retention  Posttraumatic Stress Disorder  Lower urinary tract symptoms  Hypertensive disorder  Urinary frequency due to benign prostatic hypertrophy          Passed - Most recent blood pressure under 140/90 in past 12 months     BP Readings from Last 3 Encounters:   12/03/24 130/84   11/15/24 124/74   09/30/24 135/85       No data recorded            Passed - Patient does not have Tadalafil, Vardenafil, or Sildenafil on their medication list        Passed - Medication is active on med list        Passed - Recent (12 mo) or future (90 days) visit within the authorizing provider's specialty     The patient must have completed an in-person or virtual visit within the past 12 months or has a future visit scheduled within the next 90 days with the authorizing provider s specialty.  Urgent care and e-visits do not qualify as an office visit for this protocol.          Passed - Patient is 18 years of age or older          Julie Behrendt RN

## 2025-01-22 NOTE — PROGRESS NOTES
ANTICOAGULATION CLINIC REFERRAL RENEWAL REQUEST       An annual renewal order is required for all patients referred to [facility].?  Please review and sign the pended referral order for Joe Mas.       ANTICOAGULATION SUMMARY      Warfarin indication(s)   Atrial Fibrillation    Mechanical heart valve present?  NO       Current goal range   INR: 2.0-3.0     Goal appropriate for indication? Goal INR 2-3, standard for indication(s) above     Time in Therapeutic Range (TTR)  (Goal > 60%) 80.9%       Office visit with referring provider's group within last year yes on 11/15/24       Jolynn Johnson RN  [facility]

## 2025-01-23 RX ORDER — TAMSULOSIN HYDROCHLORIDE 0.4 MG/1
0.4 CAPSULE ORAL DAILY
Qty: 90 CAPSULE | Refills: 1 | Status: SHIPPED | OUTPATIENT
Start: 2025-01-23

## 2025-01-23 RX ORDER — WARFARIN SODIUM 5 MG/1
TABLET ORAL
Qty: 80 TABLET | Refills: 1 | Status: SHIPPED | OUTPATIENT
Start: 2025-01-23

## 2025-01-23 NOTE — TELEPHONE ENCOUNTER
ANTICOAGULATION MANAGEMENT:  Medication Refill    Anticoagulation Summary  As of 12/13/2024      Warfarin maintenance plan:  2.5 mg (5 mg x 0.5) every Mon, Wed, Fri; 5 mg (5 mg x 1) all other days   Next INR check:  1/24/2025   Target end date:  Indefinite    Indications    Atrial fibrillation  unspecified type (H) [I48.91]  Long term current use of anticoagulant therapy [Z79.01]                 Anticoagulation Care Providers       Provider Role Specialty Phone number    Jolynn Cooper MD Referring Family Medicine [phone]            Refill Criteria    Visit with referring provider/group: Meets criteria: visit within referring provider group in the last 15 months on 11/15/24    [facility] referral last signed: 01/23/2025; within last year:  Yes    Lab monitoring is up to date (not exceeding 2 weeks overdue): Yes    Joe meets all criteria for refill. Rx instructions and quantity supplied updated to match patient's current dosing plan. Warfarin 90 day supply with 1 refill granted per [facility] protocol     Jolynn Johnson RN  Anticoagulation Clinic

## 2025-02-25 ENCOUNTER — VIRTUAL VISIT (OUTPATIENT)
Dept: PHARMACY | Facility: CLINIC | Age: 70
End: 2025-02-25
Payer: COMMERCIAL

## 2025-02-25 DIAGNOSIS — G47.00 INSOMNIA, UNSPECIFIED TYPE: ICD-10-CM

## 2025-02-25 DIAGNOSIS — I10 ESSENTIAL HYPERTENSION, BENIGN: ICD-10-CM

## 2025-02-25 DIAGNOSIS — J44.1 OBSTRUCTIVE CHRONIC BRONCHITIS WITH EXACERBATION (H): Primary | ICD-10-CM

## 2025-02-25 DIAGNOSIS — E11.9 DIABETES TYPE 2, CONTROLLED (H): ICD-10-CM

## 2025-02-25 DIAGNOSIS — E11.9 CONTROLLED TYPE 2 DIABETES MELLITUS WITHOUT COMPLICATION, WITHOUT LONG-TERM CURRENT USE OF INSULIN (H): ICD-10-CM

## 2025-02-25 DIAGNOSIS — R39.11 URINARY HESITANCY: ICD-10-CM

## 2025-02-25 DIAGNOSIS — I48.91 ATRIAL FIBRILLATION, UNSPECIFIED TYPE (H): ICD-10-CM

## 2025-02-25 DIAGNOSIS — Z78.9 TAKES DIETARY SUPPLEMENTS: ICD-10-CM

## 2025-02-25 DIAGNOSIS — E78.2 MIXED HYPERLIPIDEMIA: ICD-10-CM

## 2025-02-25 PROCEDURE — 99605 MTMS BY PHARM NP 15 MIN: CPT | Mod: 93 | Performed by: PHARMACIST

## 2025-02-25 PROCEDURE — 99607 MTMS BY PHARM ADDL 15 MIN: CPT | Mod: 93 | Performed by: PHARMACIST

## 2025-02-25 RX ORDER — FLUTICASONE FUROATE, UMECLIDINIUM BROMIDE AND VILANTEROL TRIFENATATE 200; 62.5; 25 UG/1; UG/1; UG/1
1 POWDER RESPIRATORY (INHALATION) DAILY
Qty: 60 EACH | Refills: 1 | Status: SHIPPED | OUTPATIENT
Start: 2025-02-25

## 2025-02-25 NOTE — LETTER
_  Medication List        Prepared on: Feb 25, 2025     Bring your Medication List when you go to the doctor, hospital, or   emergency room. And, share it with your family or caregivers.     Note any changes to how you take your medications.  Cross out medications when you no longer use them.    Medication How I take it Why I use it Prescriber   albuterol (PROAIR HFA/PROVENTIL HFA/VENTOLIN HFA) 108 (90 Base) MCG/ACT inhaler INHALE TWO PUFFS INTO THE LUNGS EVERY 6 HOURS AS NEEDED FOR SHORTNESS OF BREATH Obstructive Chronic Bronchitis with Exacerbation (H) Jolynn Cooper MD   atorvastatin (LIPITOR) 20 MG tablet Take 1 tablet (20 mg) by mouth daily Mixed Hyperlipidemia Jolynn Cooper MD   cholecalciferol (VITAMIN D3) 125 mcg (5000 units) capsule Take 125 mcg by mouth twice a week. General Health   Patient Reported   flecainide (TAMBOCOR) 100 MG tablet Take 1 tablet (100 mg) by mouth 2 times daily. Paroxysmal Atrial Fibrillation (H) Mariela Bernstein PA-C   Fluticasone-Umeclidin-Vilanterol (TRELEGY ELLIPTA) 200-62.5-25 MCG/ACT oral inhaler Inhale 1 puff into the lungs daily. Obstructive Chronic Bronchitis with Exacerbation (H) Jolynn Cooper MD   ipratropium - albuterol 0.5 mg/2.5 mg/3 mL (DUONEB) 0.5-2.5 (3) MG/3ML neb solution NEBULIZE THE CONTENTS OF 1 VIAL EVERY 6 HOURS AS NEEDED FOR SHORTNESS OF BREATH OR WHEEZING Obstructive Chronic Bronchitis with Exacerbation (H) Jolynn Cooper MD   losartan (COZAAR) 100 MG tablet Take 1 tablet (100 mg) by mouth daily Essential Hypertension, Benign Jolynn Cooepr MD   metFORMIN (GLUCOPHAGE XR) 500 MG 24 hr tablet Take 1 tablet (500 mg) by mouth 2 times daily (with meals). Type 2 diabetes mellitus with complication, without long-term current use of insulin (H) Jolynn Cooper MD   metoprolol succinate ER (TOPROL XL) 50 MG 24 hr tablet Take 1 tablet (50 mg) by mouth daily. Paroxysmal Atrial Fibrillation (H) Mariela Bernstein PA-C   tamsulosin  (FLOMAX) 0.4 MG capsule TAKE ONE CAPSULE BY MOUTH ONCE DAILY Urinary Frequency Jolynn Cooper MD   traZODone (DESYREL) 50 MG tablet TAKE ONE OR TWO TABLETS BY MOUTH EVERY NIGHT AT BEDTIME Other insomnia Jolynn Cooper MD   warfarin ANTICOAGULANT (JANTOVEN ANTICOAGULANT) 5 MG tablet 2.5mg Monday,Wednesday,Friday and 5mg all other days per INR clinic Atrial fibrillation, unspecified type (H) Jolynn Cooper MD         Add new medications, over-the-counter drugs, herbals, vitamins, or  minerals in the blank rows below.    Medication How I take it Why I use it Prescriber                                      Allergies:      - Lisinopril - Cough        Side effects I have had:      Not on File        Other Information:              My notes and questions:

## 2025-02-25 NOTE — LETTER
February 25, 2025  Joe Mas  [address] [facility] [address]    Dear Bal Mas, [facility]     Thank you for talking with me on Feb 25, 2025 about your health and medications. As a follow-up to our conversation, I have included two documents:      Your Recommended To-Do List has steps you should take to get the best results from your medications.  Your Medication List will help you keep track of your medications and how to take them.    If you want to talk about these documents, please call Brittany Ott RPH at phone: [phone], Monday-Friday 8-4:30pm.    I look forward to working with you and your doctors to make sure your medications work well for you.    Sincerely,  Brittany Ott RPH  [facility] Pharmacist, [facility]

## 2025-02-25 NOTE — LETTER
"Recommended To-Do List      Prepared on: Feb 25, 2025       You can get the best results from your medications by completing the items on this \"To-Do List.\"      Bring your To-Do List when you go to your doctor. And, share it with your family or caregivers.    My To-Do List:  What we talked about: What I should do:   A medication that may be more effective for you    Change the medication you are taking from Dulera to Trelegy Ellipta          What we talked about: What I should do:   Your medication dosage being too high    Decrease your dosage of VITAMIN D PO          What we talked about: What I should do:                     "

## 2025-02-25 NOTE — PROGRESS NOTES
Medication Therapy Management (MTM) Encounter    ASSESSMENT:                            Medication Adherence/Access: No issues identified.    Hyperlipidemia stable    COPD needing albuterol more often - making him shaky. Discussed trying Xopenex which may be better tolerated - also discussed addition of LAMA. Patient would like to try Telegy Ellipta - sent prescription to his pharmacy - based dose on current steroid use. He has a couple of months of Dulera left - wants to finish these first. Addition of LAMA may decrease need for albuterol.     Hypertension/Afib stable    Urinary Hesitancy stable    Type 2 Diabetes stable    Insomnia stable    Supplements decrease vitamin D dose (see Plan) - consider getting a lab, patient to discuss with PCP.     PLAN:                            1. Change to Trelegy Ellipta once you are finished with your Dulera, sent a prescription to patient's pharmacy. Monitor need for albuterol and shakiness - consider switching to Xopenex if continuing to be a problem.     2. Decrease vitamin D lab to twice weekly - consider getting a vitamin D lab - patient to discuss with PCP.     Follow-up: Tuesday, May 20th at 2:00 PM    SUBJECTIVE/OBJECTIVE:                          Joe Mas is a 69 year old male seen for an initial visit. He was referred to me from insurance.      Reason for visit: Comprehensive Medication Review (CMR).    Allergies/ADRs: Reviewed in chart  Past Medical History: Reviewed in chart  Tobacco: He reports that he quit smoking about 25 years ago. His smoking use included cigarettes. He started smoking about 50 years ago. He has a 25 pack-year smoking history. He has never used smokeless tobacco.  Alcohol: less now - was drinking a couple of drinks to help him sleep with atrial fibrillation at night. Now taking CBD gummies at bedtime and getting 8 hours of sleep. Now drinking 2-3 drinks twice a week.     Medication Adherence/Access: no issues reported.    Hyperlipidemia    Atorvastatin 20 mg daily    Patient reports no current medication side effects.    Recent Labs   Lab Test 01/24/25  1130 02/06/24  1023   CHOL 152 166   HDL 63 58   LDL 74 78   TRIG 73 149     COPD   Albuterol inhaler as needed   Duonebs as needed  Dulera 200-5 mcg/actuation 2 puffs twice daily    Using albuterol once in the morning, using Duonebs if congested - if sick using 4-5 times a day. States albuterol makes him jumpy if he uses too much or Duonebs too much.     Patient rinses their mouth after using steroid inhaler.    Patient reports no current medication side effects.    Patient reports the following symptoms: increased need of albuterol.    Hypertension/Afib   Flecainide 100 mg twice daily  Losartan 100 mg daily  Metoprolol XL 50 mg daily  Warfarin as directed     At home: 130/80, 137/82, 120/72, 152/84, 144/88. Pulse 68. Followed by cardiology - having an ablation in a couple of months. Tolerating medications well.     BP Readings from Last 3 Encounters:   12/03/24 130/84   11/15/24 124/74   09/30/24 135/85     Urinary Hesitancy   Tamsulosin 0.4 mg once daily    Working a lot better - due for appointment with urology, needs to call.     Type 2 Diabetes   Metformin  mg twice daily    Patient reports no current medication side effects.  Blood sugar monitoring: blood sugars  - mornings are around 90 and after eating around 150.   Current diabetes symptoms: none  Eye exam: up to date  Foot exam: up to date    Hemoglobin A1C   Date Value Ref Range Status   11/15/2024 6.3 (H) 0.0 - 5.6 % Final     Comment:     Normal <5.7%   Prediabetes 5.7-6.4%    Diabetes 6.5% or higher     Note: Adopted from ADA consensus guidelines.   04/09/2021 6.8 (H) 0 - 5.6 % Final     Comment:     Normal <5.7% Prediabetes 5.7-6.4%  Diabetes 6.5% or higher - adopted from ADA   consensus guidelines.       GFR Estimate   Date Value Ref Range Status   08/13/2024 84 >60 mL/min/1.73m2 Final     Comment:     eGFR calculated  using 2021 CKD-EPI equation.   04/09/2021 79 >60 mL/min/[1.73_m2] Final     Comment:     Non  GFR Calc  Starting 12/18/2018, serum creatinine based estimated GFR (eGFR) will be   calculated using the Chronic Kidney Disease Epidemiology Collaboration   (CKD-EPI) equation.       GFR, ESTIMATED POCT   Date Value Ref Range Status   06/05/2023 >60 >60 mL/min/1.73m2 Final     Insomnia  Trazodone  mg at bedtime     Hasn't been taking much lately - doesn't need since taking CBD gummies.     Supplements  Vitamin D 5000 units daily    No vitamin D lab found in Epic.  ----------------      I spent 30 minutes with this patient today. All changes were made via collaborative practice agreement with Jolynn Cooper MD.     A summary of these recommendations was sent via [employer].    Brittany Ott, PharmD  Medication Therapy Management     Telemedicine Visit Details  The patient's medications can be safely assessed via a telemedicine encounter.  Type of service:  Telephone visit  Originating Location (pt. Location): Home    Distant Location (provider location):  On-site  Start Time:  2:00 PM  End Time: 2:30 PM     Medication Therapy Recommendations  Obstructive chronic bronchitis with exacerbation (H)   1 Current Medication: mometasone-formoterol (DULERA) 200-5 MCG/ACT inhaler (Discontinued)   Current Medication Sig: Inhale 2 puffs into the lungs 2 times daily   Rationale: More effective medication available - Ineffective medication - Effectiveness   Recommendation: Change Medication - Trelegy Ellipta 200-62.5-25 MCG/ACT Aepb   Status: Accepted per CPA   Identified Date: 2/25/2025 Completed Date: 2/25/2025         Takes dietary supplements   1 Current Medication: VITAMIN D PO (Discontinued)   Current Medication Sig: Take 1 tablet by mouth daily   Rationale: Dose too high - Dosage too high - Safety   Recommendation: Decrease Dose   Status: Accepted - no CPA Needed   Identified Date: 2/25/2025 Completed  Date: 2/25/2025

## 2025-02-26 NOTE — PATIENT INSTRUCTIONS
"Recommendations from today's MTM visit:                                                    MTM (medication therapy management) is a service provided by a clinical pharmacist designed to help you get the most of out of your medicines.   Today we reviewed what your medicines are for, how to know if they are working, that your medicines are safe and how to make your medicine regimen as easy as possible.      Joe,    It was a pleasure talking with you! We discussed the followin. Change to Trelegy Ellipta once you are finished with your Dulera, I sent a prescription to your pharmacy. Monitor your need for albuterol and shakiness - consider switching to Xopenex if continuing to be a problem.     2. Decrease vitamin D lab to twice weekly - consider getting a vitamin D lab - discuss with Jolynn Cooper MD.      Follow-up: Tuesday, May 20th at 2:00 PM    It was great speaking with you today.  I value your experience and would be very thankful for your time in providing feedback in our clinic survey. In the next few days, you may receive an email or text message from [employer] with a link to a survey related to your  clinical pharmacist.\"     To schedule another MTM appointment, please call the clinic directly or you may call the MTM scheduling line at [phone] or toll-free at [phone].     My Clinical Pharmacist's contact information:                                                      Please feel free to contact me with any questions or concerns you have.      Keep up the good work!!    Brittany Ott, PharmD  [phone] in clinic on Tuesday and Wednesday        "

## 2025-03-12 ENCOUNTER — TRANSFERRED RECORDS (OUTPATIENT)
Dept: MULTI SPECIALTY CLINIC | Facility: CLINIC | Age: 70
End: 2025-03-12

## 2025-03-12 LAB — RETINOPATHY: NORMAL

## 2025-03-17 DIAGNOSIS — G47.09 OTHER INSOMNIA: ICD-10-CM

## 2025-03-17 RX ORDER — TRAZODONE HYDROCHLORIDE 50 MG/1
TABLET ORAL
Qty: 90 TABLET | Refills: 3 | Status: SHIPPED | OUTPATIENT
Start: 2025-03-17

## 2025-04-07 NOTE — PROGRESS NOTES
[facility]    I had the pleasure of seeing Joe when he came for follow up of AF.  This 68 year old sees Dr. Wallis for his history of:    HTN  Paroxysmal AFib -   chronic therapy with flecainide/metoprolol, s/p AFib ablation (PVI, CTI) with Dr. Barrientos 6/2023 for refractory arrhythmias.  Flecainide stopped 12/2023.  ZioPatch 8/2024 with 34% p AFib burden  Aortic root dilatation -   echo 2023 with root 3.9 cm, asc ao 3.4 cm  Mild JUNG - not using CPAP  Dyslipidemia      Last Visit & Interval History:  I saw Joe 9/2024 at which time he reported he had initially done well after stopping flecainide, but started having increasing episodes of AF based on his watch.  Zio patch had shown 34% AF burden with RVR.  I asked him to increase flecainide to 100 mg BID (he'd self-administered it daily) and had him meet with Dr. Wallis (virtual, 12/2024).  They discussed options for treatment of AFib, including proceeding with repeat ablation, possibly with PFA.  They opted for no medication changes in the meantime with follow-up in a few months to review.      Today's Visit:  Joe is doing well! No recurrent AFib based on symptoms or his watch, which he wears religiously. Denies CP, SOB but hasn't been getting as much exercise this winter d/t the cold.    No c/o palpitations, dizziness, lightheadedness. No c/o edema, orthopnea, PND.    He really feels flecainide is working well to control AFib.       VITALS:  Vitals: /85 (BP Location: Right arm, Patient Position: Sitting)   Pulse 65   Resp 16   Wt 105.2 kg (232 lb)   SpO2 95%   BMI 34.26 kg/m      Diagnostic Testing:  EKG today, which I overread, showed SB 57 bpm. PRWP. Low voltag.  ms on Metoprolol XL 50 mg daily and flecainide 100 mg daily  Nuc Stress Test 10/2024 no ischemia/infarction  ZioPatch 8/21-9/3/2024 showed SR with pAFib. In SR, avg HR 79 bpm, range  bpm.  34% PAF burden, longest episode 5h13m, avg HR 1140 bpm ().  No  pauses.  2.8% PACs.  1.7% PVCs.  Symptom triggers included AF and PVCs.  On metoprolol XL 50 mg daily  CTA Heart 6/2023 normal PV anatomy.  No clot.  Ascending aorta borderline dilated 3.5 x 3.4.  CAC. Non-cardiac portion showed no significant findings.  Echocardiogram 4/2023-LVEF 60 to 65%.  Mild-moderate LVH.  No RWMA.  No significant valve abnormalities, difficult windows.    Plan:  6 m follow-up     Assessment/Plan:    Recurrent paroxysmal AF  As above, had been on flecainide, ultimately undergoing PVI/CTI ablation with Dr. Barrientos 6/2023  Flecainide stopped 12/2023 but had recurrent AF starting 3/2024, up to 34% on most recent ZioPatch 8/2024    Remains on flecainide 100 mg BID and Metoprolol XL 50 mg daily  EKG today continues with SR/SB. QRS duration acceptable    Remains on AC with warfarin.  [id number] 3 (HTN, age, DM).  Last hemoglobin 10/2023 normal 15.1 g/dL    PLAN:  Discussed options of proceeding with repeat AFib ablation (PFA) vs continued monitoring. As he's doing so well without recurrence based on symptoms and watch alerts, he'd like to continue to monitor  Agreed he'd send me an update ~[facility] and again 4th of July with update. If opts to proceed with repeat AFib ablation, will arrange phone/video visit to update H/P    Continue Metoprolol XL 50 mg daily and flecainide 100 mg BID at this time    2/ CAC  Noted on CT angiogram prior to procedure 6/2023 and CT lung cancer screening.  Echo 4/2023 with normal LVEF  Nuclear stress test 10/2024 with no ischemia/infarction  No CP noted but notes AMANDA, especially when in AFib.  Reviewed RFs for CAD given age, tobacco hx, HTN, borderline DM, FHx (brother just had stents a few years ago) and  okaylipids    PLAN:  Continue risk factor modification          Hollie Bernstein PA-C, MSPAS      Orders Placed This Encounter   Procedures    Follow-Up with Cardiology JENNIE    EKG 12-lead complete w/read - Clinics     No orders of the defined types were placed in this  encounter.    There are no discontinued medications.        Encounter Diagnosis   Name Primary?    Paroxysmal atrial fibrillation (H)          CURRENT MEDICATIONS:  Current Outpatient Medications   Medication Sig Dispense Refill    albuterol (PROAIR HFA/PROVENTIL HFA/VENTOLIN HFA) 108 (90 Base) MCG/ACT inhaler INHALE TWO PUFFS INTO THE LUNGS EVERY 6 HOURS AS NEEDED FOR SHORTNESS OF BREATH 18 g 9    atorvastatin (LIPITOR) 20 MG tablet Take 1 tablet (20 mg) by mouth daily 90 tablet 4    blood glucose (ONETOUCH ULTRA) test strip USE TO TEST BLOOD SUGAR TWO TIMES A  strip 2    cholecalciferol (VITAMIN D3) 125 mcg (5000 units) capsule Take 125 mcg by mouth twice a week.      flecainide (TAMBOCOR) 100 MG tablet Take 1 tablet (100 mg) by mouth 2 times daily. 180 tablet 3    Fluticasone-Umeclidin-Vilanterol (TRELEGY ELLIPTA) 200-62.5-25 MCG/ACT oral inhaler Inhale 1 puff into the lungs daily. 60 each 1    ipratropium - albuterol 0.5 mg/2.5 mg/3 mL (DUONEB) 0.5-2.5 (3) MG/3ML neb solution NEBULIZE THE CONTENTS OF 1 VIAL EVERY 6 HOURS AS NEEDED FOR SHORTNESS OF BREATH OR WHEEZING 360 mL 2    Lancets (ONETOUCH DELICA PLUS [id number]) MISC 1 Lancet by Submucosal route daily. - use to test blood sugar daily 200 each 3    losartan (COZAAR) 100 MG tablet Take 1 tablet (100 mg) by mouth daily 90 tablet 4    metFORMIN (GLUCOPHAGE XR) 500 MG 24 hr tablet Take 1 tablet (500 mg) by mouth 2 times daily (with meals). 180 tablet 3    metoprolol succinate ER (TOPROL XL) 50 MG 24 hr tablet Take 1 tablet (50 mg) by mouth daily. 90 tablet 3    Nebulizers (GiveLoopIRE ESSENCE NEBULIZER) MISC USE AS DIRECTED 1 each 1    Respiratory Therapy Supplies (NEBULIZER/TUBING/MOUTHPIECE) KIT Use as directed 1 kit 1    tamsulosin (FLOMAX) 0.4 MG capsule TAKE ONE CAPSULE BY MOUTH ONCE DAILY 90 capsule 1    traZODone (DESYREL) 50 MG tablet TAKE ONE OR TWO TABLETS BY MOUTH EVERY NIGHT AT BEDTIME 90 tablet 3    warfarin ANTICOAGULANT (JANTOVEN  ANTICOAGULANT) 5 MG tablet 2.5mg Monday,Wednesday,Friday and 5mg all other days per INR clinic 80 tablet 1       ALLERGIES     Allergies   Allergen Reactions    Lisinopril Cough         Review of Systems:  Skin:        Eyes:       ENT:       Respiratory:  Negative shortness of breath, dyspnea on exertion, dyspnea at rest  Cardiovascular:  Negative, chest pain, palpitations, edema, syncope or near-syncope, fatigue Positive for, dizziness, lightheadedness  Gastroenterology:      Genitourinary:       Musculoskeletal:       Neurologic:       Psychiatric:       Heme/Lymph/Imm:       Endocrine:         Physical Exam:  Vitals: /85 (BP Location: Right arm, Patient Position: Sitting)   Pulse 65   Resp 16   Wt 105.2 kg (232 lb)   SpO2 95%   BMI 34.26 kg/m      Constitutional:           Skin:           Head:           Eyes:           ENT:           Neck:           Chest:           Cardiac:                    Abdomen:           Vascular:                                        Extremities and Back:           Neurological:               PAST MEDICAL HISTORY:  Past Medical History:   Diagnosis Date    A-fib (H) 02/27/2018    COPD (chronic obstructive pulmonary disease) (H)     Diabetes (H)     Hypertension        PAST SURGICAL HISTORY:  Past Surgical History:   Procedure Laterality Date    ABDOMEN SURGERY      COLONOSCOPY  11/12/2004    Follow up colonoscopy in 5 years    EP ABLATION FOCAL AFIB N/A 6/7/2023    Procedure: Ablation Atrial Fibrilation;  Surgeon: Jay Barrientos MD;  Location:  HEART CARDIAC CATH LAB    HERNIA REPAIR, UMBILICAL  2001    KNEE SURGERY  1960's    Left       FAMILY HISTORY:  Family History   Problem Relation Age of Onset    Osteoporosis Mother     Heart Disease Mother     Neurologic Disorder Father         passed away from a brain tumor age 48    Cerebrovascular Disease Father     Neurologic Disorder Maternal Grandmother         parkinsons    C.A.D. Maternal Grandmother     Diabetes  Maternal Grandmother     Alcohol/Drug Maternal Grandfather     Cancer Maternal Grandfather         esphogus    Cancer Paternal Grandfather         lung    Diabetes Sister     Hypertension Sister     Asthma Daughter     Cerebrovascular Disease No family hx of     Breast Cancer No family hx of     Cancer - colorectal No family hx of        SOCIAL HISTORY:  Social History     Socioeconomic History    Marital status: Single     Spouse name: None    Number of children: 2    Years of education: 12+    Highest education level: None   Occupational History     Employer: [employer]   Tobacco Use    Smoking status: Former     Current packs/day: 0.00     Average packs/day: 1 pack/day for 25.0 years (25.0 ttl pk-yrs)     Types: Cigarettes     Start date: 1975     Quit date: 2000     Years since quittin.2    Smokeless tobacco: Never    Tobacco comments:     quit smoking    Vaping Use    Vaping status: Never Used   Substance and Sexual Activity    Alcohol use: Yes     Comment: 2-5 beers per month    Drug use: Yes     Types: Marijuana    Sexual activity: Not Currently     Partners: Female     Birth control/protection: Condom   Other Topics Concern    Parent/sibling w/ CABG, MI or angioplasty before 65F 55M? Yes     Comment: Brother     Social Drivers of Health     Financial Resource Strain: High Risk (2024)    Financial Resource Strain     Within the past 12 months, have you or your family members you live with been unable to get utilities (heat, electricity) when it was really needed?: Yes   Food Insecurity: Low Risk  (2024)    Food Insecurity     Within the past 12 months, did you worry that your food would run out before you got money to buy more?: No     Within the past 12 months, did the food you bought just not last and you didn t have money to get more?: No   Transportation Needs: Low Risk  (2024)    Transportation Needs     Within the past 12 months, has lack of transportation kept you from medical  appointments, getting your medicines, non-medical meetings or appointments, work, or from getting things that you need?: No   Physical Activity: Insufficiently Active (8/8/2024)    Exercise Vital Sign     Days of Exercise per Week: 2 days     Minutes of Exercise per Session: 30 min   Stress: No Stress Concern Present (8/8/2024)    [employer] [place] [employer] - Occupational Stress Questionnaire     Feeling of Stress : Not at all   Social Connections: Unknown (8/8/2024)    Social Connection and Isolation Panel [NHANES]     Frequency of Social Gatherings with Friends and Family: More than three times a week   Interpersonal Safety: Low Risk  (8/13/2024)    Interpersonal Safety     Do you feel physically and emotionally safe where you currently live?: Yes     Within the past 12 months, have you been hit, slapped, kicked or otherwise physically hurt by someone?: No     Within the past 12 months, have you been humiliated or emotionally abused in other ways by your partner or ex-partner?: No   Housing Stability: Low Risk  (8/8/2024)    Housing Stability     Do you have housing? : Yes     Are you worried about losing your housing?: No

## 2025-04-08 ENCOUNTER — OFFICE VISIT (OUTPATIENT)
Dept: CARDIOLOGY | Facility: CLINIC | Age: 70
End: 2025-04-08
Attending: INTERNAL MEDICINE
Payer: COMMERCIAL

## 2025-04-08 VITALS
WEIGHT: 232 LBS | HEART RATE: 65 BPM | RESPIRATION RATE: 16 BRPM | DIASTOLIC BLOOD PRESSURE: 85 MMHG | SYSTOLIC BLOOD PRESSURE: 134 MMHG | OXYGEN SATURATION: 95 % | BODY MASS INDEX: 34.26 KG/M2

## 2025-04-08 DIAGNOSIS — I48.0 PAROXYSMAL ATRIAL FIBRILLATION (H): ICD-10-CM

## 2025-04-08 PROCEDURE — 3075F SYST BP GE 130 - 139MM HG: CPT | Performed by: PHYSICIAN ASSISTANT

## 2025-04-08 PROCEDURE — 99214 OFFICE O/P EST MOD 30 MIN: CPT | Performed by: PHYSICIAN ASSISTANT

## 2025-04-08 PROCEDURE — 3079F DIAST BP 80-89 MM HG: CPT | Performed by: PHYSICIAN ASSISTANT

## 2025-04-08 PROCEDURE — 93000 ELECTROCARDIOGRAM COMPLETE: CPT | Performed by: PHYSICIAN ASSISTANT

## 2025-04-08 NOTE — PATIENT INSTRUCTIONS
Joe - it was nice to see you today!     At your visit today we reviewed:    EKG today showed normal rhythm  Flecainide seems to be working well - no recurrent AFib based on symptoms or Apple Watch     Medication Changes:    NONE    Recommendations:    Continue to limit alcohol  Options are to continue flecainide 100 mg twice a day and see if this is enough to keep you out of AFib OR go ahead with repeat ablation (PFA)    3. Agreed that for now, will stay on the flecainide and monitor with the watch and symptoms  4. ~[facility] Day (late May) send update on MyChart or call us [phone] and give me update on:   Any more AFib?   Want to do ablation b/c of more AFib or just to get it done?    Follow-up:    See me for cardiology follow up in 6 monhts but CALL Cardiology nurses Estrella & Monica @ [phone] for any issues, questions or concerns in the interim.      To schedule a future appointment, we kindly ask that you call cardiology scheduling at [phone] three months prior to requested visit date.    Important Phone Numbers for [facility]):    [place] [facility]: [phone]  Cardiology [phone]  [place] [facility] [phone]  [facility] [phone]  [place] [facility]: [phone]

## 2025-04-08 NOTE — LETTER
4/8/2025    Jolynn Cooper MD  [address] [facility] [address]    RE: Joe Mas       Dear Colleague,     I had the pleasure of seeing Joe Mas in the [facility].  [facility]    I had the pleasure of seeing Joe when he came for follow up of AF.  This 68 year old sees Dr. Wallis for his history of:    HTN  Paroxysmal AFib -   chronic therapy with flecainide/metoprolol, s/p AFib ablation (PVI, CTI) with Dr. Barrientos 6/2023 for refractory arrhythmias.  Flecainide stopped 12/2023.  ZioPatch 8/2024 with 34% p AFib burden  Aortic root dilatation -   echo 2023 with root 3.9 cm, asc ao 3.4 cm  Mild JUNG - not using CPAP  Dyslipidemia      Last Visit & Interval History:  I saw Joe 9/2024 at which time he reported he had initially done well after stopping flecainide, but started having increasing episodes of AF based on his watch.  Zio patch had shown 34% AF burden with RVR.  I asked him to increase flecainide to 100 mg BID (he'd self-administered it daily) and had him meet with Dr. Wallis (virtual, 12/2024).  They discussed options for treatment of AFib, including proceeding with repeat ablation, possibly with PFA.  They opted for no medication changes in the meantime with follow-up in a few months to review.      Today's Visit:  Joe is doing well! No recurrent AFib based on symptoms or his watch, which he wears religiously. Denies CP, SOB but hasn't been getting as much exercise this winter d/t the cold.    No c/o palpitations, dizziness, lightheadedness. No c/o edema, orthopnea, PND.    He really feels flecainide is working well to control AFib.       VITALS:  Vitals: /85 (BP Location: Right arm, Patient Position: Sitting)   Pulse 65   Resp 16   Wt 105.2 kg (232 lb)   SpO2 95%   BMI 34.26 kg/m      Diagnostic Testing:  EKG today, which I overread, showed SB 57 bpm. PRWP. Low voltag.  ms on Metoprolol XL 50 mg daily and flecainide 100 mg daily  Nuc  Stress Test 10/2024 no ischemia/infarction  ZioPatch 8/21-9/3/2024 showed SR with pAFib. In SR, avg HR 79 bpm, range  bpm.  34% PAF burden, longest episode 5h13m, avg HR 1140 bpm ().  No pauses.  2.8% PACs.  1.7% PVCs.  Symptom triggers included AF and PVCs.  On metoprolol XL 50 mg daily  CTA Heart 6/2023 normal PV anatomy.  No clot.  Ascending aorta borderline dilated 3.5 x 3.4.  CAC. Non-cardiac portion showed no significant findings.  Echocardiogram 4/2023-LVEF 60 to 65%.  Mild-moderate LVH.  No RWMA.  No significant valve abnormalities, difficult windows.    Plan:  6 m follow-up     Assessment/Plan:    Recurrent paroxysmal AF  As above, had been on flecainide, ultimately undergoing PVI/CTI ablation with Dr. Barrientos 6/2023  Flecainide stopped 12/2023 but had recurrent AF starting 3/2024, up to 34% on most recent ZioPatch 8/2024    Remains on flecainide 100 mg BID and Metoprolol XL 50 mg daily  EKG today continues with SR/SB. QRS duration acceptable    Remains on AC with warfarin.  [id number] 3 (HTN, age, DM).  Last hemoglobin 10/2023 normal 15.1 g/dL    PLAN:  Discussed options of proceeding with repeat AFib ablation (PFA) vs continued monitoring. As he's doing so well without recurrence based on symptoms and watch alerts, he'd like to continue to monitor  Agreed he'd send me an update ~[facility] and again 4th of July with update. If opts to proceed with repeat AFib ablation, will arrange phone/video visit to update H/P    Continue Metoprolol XL 50 mg daily and flecainide 100 mg BID at this time    2/ CAC  Noted on CT angiogram prior to procedure 6/2023 and CT lung cancer screening.  Echo 4/2023 with normal LVEF  Nuclear stress test 10/2024 with no ischemia/infarction  No CP noted but notes AMANDA, especially when in AFib.  Reviewed RFs for CAD given age, tobacco hx, HTN, borderline DM, FHx (brother just had stents a few years ago) and  okaylipids    PLAN:  Continue risk factor  modification          Hollie Bernstein PA-C, MSPAS      Orders Placed This Encounter   Procedures     Follow-Up with Cardiology JENNIE     EKG 12-lead complete w/read - Clinics     No orders of the defined types were placed in this encounter.    There are no discontinued medications.        Encounter Diagnosis   Name Primary?     Paroxysmal atrial fibrillation (H)          CURRENT MEDICATIONS:  Current Outpatient Medications   Medication Sig Dispense Refill     albuterol (PROAIR HFA/PROVENTIL HFA/VENTOLIN HFA) 108 (90 Base) MCG/ACT inhaler INHALE TWO PUFFS INTO THE LUNGS EVERY 6 HOURS AS NEEDED FOR SHORTNESS OF BREATH 18 g 9     atorvastatin (LIPITOR) 20 MG tablet Take 1 tablet (20 mg) by mouth daily 90 tablet 4     blood glucose (ONETOUCH ULTRA) test strip USE TO TEST BLOOD SUGAR TWO TIMES A  strip 2     cholecalciferol (VITAMIN D3) 125 mcg (5000 units) capsule Take 125 mcg by mouth twice a week.       flecainide (TAMBOCOR) 100 MG tablet Take 1 tablet (100 mg) by mouth 2 times daily. 180 tablet 3     Fluticasone-Umeclidin-Vilanterol (TRELEGY ELLIPTA) 200-62.5-25 MCG/ACT oral inhaler Inhale 1 puff into the lungs daily. 60 each 1     ipratropium - albuterol 0.5 mg/2.5 mg/3 mL (DUONEB) 0.5-2.5 (3) MG/3ML neb solution NEBULIZE THE CONTENTS OF 1 VIAL EVERY 6 HOURS AS NEEDED FOR SHORTNESS OF BREATH OR WHEEZING 360 mL 2     Lancets (ONETOUCH DELICA PLUS [id number]) MISC 1 Lancet by Submucosal route daily. - use to test blood sugar daily 200 each 3     losartan (COZAAR) 100 MG tablet Take 1 tablet (100 mg) by mouth daily 90 tablet 4     metFORMIN (GLUCOPHAGE XR) 500 MG 24 hr tablet Take 1 tablet (500 mg) by mouth 2 times daily (with meals). 180 tablet 3     metoprolol succinate ER (TOPROL XL) 50 MG 24 hr tablet Take 1 tablet (50 mg) by mouth daily. 90 tablet 3     Nebulizers (INNOSPIRE ESSENCE NEBULIZER) MISC USE AS DIRECTED 1 each 1     Respiratory Therapy Supplies (NEBULIZER/TUBING/MOUTHPIECE) KIT Use as directed 1 kit 1      tamsulosin (FLOMAX) 0.4 MG capsule TAKE ONE CAPSULE BY MOUTH ONCE DAILY 90 capsule 1     traZODone (DESYREL) 50 MG tablet TAKE ONE OR TWO TABLETS BY MOUTH EVERY NIGHT AT BEDTIME 90 tablet 3     warfarin ANTICOAGULANT (JANTOVEN ANTICOAGULANT) 5 MG tablet 2.5mg Monday,Wednesday,Friday and 5mg all other days per INR clinic 80 tablet 1       ALLERGIES     Allergies   Allergen Reactions     Lisinopril Cough         Review of Systems:  Skin:        Eyes:       ENT:       Respiratory:  Negative shortness of breath, dyspnea on exertion, dyspnea at rest  Cardiovascular:  Negative, chest pain, palpitations, edema, syncope or near-syncope, fatigue Positive for, dizziness, lightheadedness  Gastroenterology:      Genitourinary:       Musculoskeletal:       Neurologic:       Psychiatric:       Heme/Lymph/Imm:       Endocrine:         Physical Exam:  Vitals: /85 (BP Location: Right arm, Patient Position: Sitting)   Pulse 65   Resp 16   Wt 105.2 kg (232 lb)   SpO2 95%   BMI 34.26 kg/m      Constitutional:           Skin:           Head:           Eyes:           ENT:           Neck:           Chest:           Cardiac:                    Abdomen:           Vascular:                                        Extremities and Back:           Neurological:               PAST MEDICAL HISTORY:  Past Medical History:   Diagnosis Date     A-fib (H) 02/27/2018     COPD (chronic obstructive pulmonary disease) (H)      Diabetes (H)      Hypertension        PAST SURGICAL HISTORY:  Past Surgical History:   Procedure Laterality Date     ABDOMEN SURGERY       COLONOSCOPY  11/12/2004    Follow up colonoscopy in 5 years     EP ABLATION FOCAL AFIB N/A 6/7/2023    Procedure: Ablation Atrial Fibrilation;  Surgeon: Jay Barrientos MD;  Location:  HEART CARDIAC CATH LAB     HERNIA REPAIR, UMBILICAL  2001     KNEE SURGERY  1960's    Left       FAMILY HISTORY:  Family History   Problem Relation Age of Onset     Osteoporosis Mother       Heart Disease Mother      Neurologic Disorder Father         passed away from a brain tumor age 48     Cerebrovascular Disease Father      Neurologic Disorder Maternal Grandmother         parkinsons     C.A.D. Maternal Grandmother      Diabetes Maternal Grandmother      Alcohol/Drug Maternal Grandfather      Cancer Maternal Grandfather         esphogus     Cancer Paternal Grandfather         lung     Diabetes Sister      Hypertension Sister      Asthma Daughter      Cerebrovascular Disease No family hx of      Breast Cancer No family hx of      Cancer - colorectal No family hx of        SOCIAL HISTORY:  Social History     Socioeconomic History     Marital status: Single     Spouse name: None     Number of children: 2     Years of education: 12+     Highest education level: None   Occupational History     Employer: [employer]   Tobacco Use     Smoking status: Former     Current packs/day: 0.00     Average packs/day: 1 pack/day for 25.0 years (25.0 ttl pk-yrs)     Types: Cigarettes     Start date: 1975     Quit date: 2000     Years since quittin.2     Smokeless tobacco: Never     Tobacco comments:     quit smoking    Vaping Use     Vaping status: Never Used   Substance and Sexual Activity     Alcohol use: Yes     Comment: 2-5 beers per month     Drug use: Yes     Types: Marijuana     Sexual activity: Not Currently     Partners: Female     Birth control/protection: Condom   Other Topics Concern     Parent/sibling w/ CABG, MI or angioplasty before 65F 55M? Yes     Comment: Brother     Social Drivers of Health     Financial Resource Strain: High Risk (2024)    Financial Resource Strain      Within the past 12 months, have you or your family members you live with been unable to get utilities (heat, electricity) when it was really needed?: Yes   Food Insecurity: Low Risk  (2024)    Food Insecurity      Within the past 12 months, did you worry that your food would run out before you got money to buy  more?: No      Within the past 12 months, did the food you bought just not last and you didn t have money to get more?: No   Transportation Needs: Low Risk  (8/8/2024)    Transportation Needs      Within the past 12 months, has lack of transportation kept you from medical appointments, getting your medicines, non-medical meetings or appointments, work, or from getting things that you need?: No   Physical Activity: Insufficiently Active (8/8/2024)    Exercise Vital Sign      Days of Exercise per Week: 2 days      Minutes of Exercise per Session: 30 min   Stress: No Stress Concern Present (8/8/2024)    [employer] [place] [employer] - Occupational Stress Questionnaire      Feeling of Stress : Not at all   Social Connections: Unknown (8/8/2024)    Social Connection and Isolation Panel [NHANES]      Frequency of Social Gatherings with Friends and Family: More than three times a week   Interpersonal Safety: Low Risk  (8/13/2024)    Interpersonal Safety      Do you feel physically and emotionally safe where you currently live?: Yes      Within the past 12 months, have you been hit, slapped, kicked or otherwise physically hurt by someone?: No      Within the past 12 months, have you been humiliated or emotionally abused in other ways by your partner or ex-partner?: No   Housing Stability: Low Risk  (8/8/2024)    Housing Stability      Do you have housing? : Yes      Are you worried about losing your housing?: No               Thank you for allowing me to participate in the care of your patient.      Sincerely,     HOMER Mukherjee [facility]  cc:   Nick Oneill MD  [address] [place] [address]  [place] [id number]

## 2025-04-15 DIAGNOSIS — E78.2 MIXED HYPERLIPIDEMIA: ICD-10-CM

## 2025-04-15 DIAGNOSIS — I10 ESSENTIAL HYPERTENSION, BENIGN: ICD-10-CM

## 2025-04-15 RX ORDER — ATORVASTATIN CALCIUM 20 MG/1
20 TABLET, FILM COATED ORAL DAILY
Qty: 90 TABLET | Refills: 1 | Status: SHIPPED | OUTPATIENT
Start: 2025-04-15

## 2025-04-15 RX ORDER — LOSARTAN POTASSIUM 100 MG/1
100 TABLET ORAL DAILY
Qty: 90 TABLET | Refills: 1 | Status: SHIPPED | OUTPATIENT
Start: 2025-04-15

## 2025-04-24 DIAGNOSIS — J44.1 OBSTRUCTIVE CHRONIC BRONCHITIS WITH EXACERBATION (H): ICD-10-CM

## 2025-04-24 RX ORDER — FLUTICASONE FUROATE, UMECLIDINIUM BROMIDE AND VILANTEROL TRIFENATATE 200; 62.5; 25 UG/1; UG/1; UG/1
1 POWDER RESPIRATORY (INHALATION) DAILY
Qty: 60 EACH | Refills: 1 | Status: SHIPPED | OUTPATIENT
Start: 2025-04-24

## 2025-05-09 ENCOUNTER — RESULTS FOLLOW-UP (OUTPATIENT)
Dept: ANTICOAGULATION | Facility: CLINIC | Age: 70
End: 2025-05-09

## 2025-05-20 ENCOUNTER — VIRTUAL VISIT (OUTPATIENT)
Dept: PHARMACY | Facility: CLINIC | Age: 70
End: 2025-05-20
Payer: COMMERCIAL

## 2025-05-20 DIAGNOSIS — R68.89 VIVID DREAM: ICD-10-CM

## 2025-05-20 DIAGNOSIS — J44.1 OBSTRUCTIVE CHRONIC BRONCHITIS WITH EXACERBATION (H): Primary | ICD-10-CM

## 2025-05-20 PROCEDURE — 99606 MTMS BY PHARM EST 15 MIN: CPT | Mod: 93 | Performed by: PHARMACIST

## 2025-05-20 NOTE — PATIENT INSTRUCTIONS
"Recommendations from today's MTM visit:                                                         Joe,    It was a pleasure talking with you! We discussed the followin. Metoprolol has post marketing risk for nightmares. We discussed you waiting until you have a second ablation - most likely metoprolol dose will be lowered again. Otherwise follow up with cardiology for possible medication change. Also monitor the days that you have alcohol to see if dreaming coincides with alcohol consumption.     Follow-up: yearly    It was great speaking with you today.  I value your experience and would be very thankful for your time in providing feedback in our clinic survey. In the next few days, you may receive an email or text message from [facility] [email or web address] with a link to a survey related to your  clinical pharmacist.\"     To schedule another MTM appointment, please call the clinic directly or you may call the MTM scheduling line at [phone] or toll-free at [phone].     My Clinical Pharmacist's contact information:                                                      Please feel free to contact me with any questions or concerns you have.      Keep up the good work!!    Brittany Ott, PharmD  [phone] in clinic on Tuesday and Wednesday        "

## 2025-05-20 NOTE — PROGRESS NOTES
Medication Therapy Management (MTM) Encounter    ASSESSMENT:                            Medication Adherence/Access: No issues identified.    COPD stable    Dreaming metoprolol has post marketing risk for nightmares. Patient feels he can wait until he has an ablation and most likely metoprolol dose will be lowered again. Otherwise he will follow up with cardiology for possible medication change. Asked him to monitor the days that he has alcohol to see if dreaming coincides with alcohol consumption.     PLAN:                            1. Metoprolol has post marketing risk for nightmares. We discussed you waiting until you have a second ablation - most likely metoprolol dose will be lowered again. Otherwise follow up with cardiology for possible medication change. Also monitor the days that you have alcohol to see if dreaming coincides with alcohol consumption.     Follow-up: yearly    SUBJECTIVE/OBJECTIVE:                          Joe Mas is a 69 year old male seen for a follow-up visit from 2/25/2025.       Reason for visit: follow up MTM visit.    Tobacco: He reports that he quit smoking about 25 years ago. His smoking use included cigarettes. He started smoking about 50 years ago. He has a 25 pack-year smoking history. He has never used smokeless tobacco.  Alcohol: 2 drinks every 3 days or longer.     Medication Adherence/Access: no issues reported.    COPD   Albuterol inhaler as needed .   Duonebs as needed  Trelegy Ellipta 200-62.5-25 mcg/actuation one puff once daily    States breathing is improved with change from Dulera to Trelegy Ellipta. Has been on Trelegy Ellipta about 2 months. Using albuterol inhaler as needed in the evening, especially if pollen count is high.      Patient rinses their mouth after using steroid inhaler.    Patient reports no current medication side effects.    Patient reports the following symptoms: none    Dreaming  Patient states having vivid nightmares - he is wondering if any  of his medications may be contributing. Metoprolol dose increased 8/13/2024. Patient may be getting a second ablation. Followed by cardiology - sees yearly.   ----------------    I spent 15 minutes with this patient today. All changes were made via collaborative practice agreement with Jolynn Cooper MD.     A summary of these recommendations was sent via [employer].    Brittany Ott, PharmD  Medication Therapy Management     Telemedicine Visit Details  The patient's medications can be safely assessed via a telemedicine encounter.  Type of service:  Telephone visit  Originating Location (pt. Location): Home    Distant Location (provider location):  On-site  Start Time: 2:00 PM  End Time: 2:15 PM     Medication Therapy Recommendations  No medication therapy recommendations to display

## 2025-05-31 ENCOUNTER — HEALTH MAINTENANCE LETTER (OUTPATIENT)
Age: 70
End: 2025-05-31

## 2025-06-20 ENCOUNTER — RESULTS FOLLOW-UP (OUTPATIENT)
Dept: ANTICOAGULATION | Facility: CLINIC | Age: 70
End: 2025-06-20

## 2025-06-25 DIAGNOSIS — J44.1 OBSTRUCTIVE CHRONIC BRONCHITIS WITH EXACERBATION (H): ICD-10-CM

## 2025-06-25 RX ORDER — FLUTICASONE FUROATE, UMECLIDINIUM BROMIDE AND VILANTEROL TRIFENATATE 200; 62.5; 25 UG/1; UG/1; UG/1
1 POWDER RESPIRATORY (INHALATION) DAILY
Qty: 60 EACH | Refills: 1 | Status: SHIPPED | OUTPATIENT
Start: 2025-06-25

## 2025-06-28 NOTE — TELEPHONE ENCOUNTER
"flecainide (TAMBOCOR) 100 MG tablet      Last Written Prescription Date:  6/19/18  Last Fill Quantity: 60,   # refills: 6  Last Office Visit: 7/31/18  Future Office visit:       Requested Prescriptions   Pending Prescriptions Disp Refills     flecainide (TAMBOCOR) 100 MG tablet 180 tablet 6     Sig: Take 1 tablet (100 mg) by mouth 2 times daily Take the first dose three days before your scheduled stress test.    Anti Arrhythmic Agents Protocol Failed - 1/15/2019 12:40 PM       Failed - CBC on file in past year    Recent Labs   Lab Test 10/04/11   WBC 8.6   RBC 5.17   HGB 16.1   HCT 48.2                   Failed - ALT on file in past year    Recent Labs   Lab Test 02/17/15  0759   ALT 59            Failed - Medication needs approval from authorizing provider    Please forward this request to the authorizing provider for approval.          Passed - Lipid Panel on file in past year    Recent Labs   Lab Test 02/27/18  1030  02/17/15  0759   CHOL 156   < > 203*   TRIG 168*   < > 193*   HDL 53   < > 65   LDL 69   < > 99   NHDL 103   < >  --    VLDL  --   --  39*   CHOLHDLRATIO  --   --  3.1    < > = values in this interval not displayed.              Passed - Serum Creatinine on file in past year    Recent Labs   Lab Test 02/27/18  1030   CR 0.98            Passed - Serum Sodium on file in past year    Recent Labs   Lab Test 02/27/18  1030                Passed - Serum Potassium on file in past year    Recent Labs   Lab Test 02/27/18  1030   POTASSIUM 3.9            Passed - Patient is 18 years of age or older       Passed - Recent (6 mo) or future (30 days) visit with authorizing provider's specialty    Patient had office visit in the last 6 months or has a visit in the next 30 days with authorizing provider.  See \"Patient Info\" tab in inbasket, or \"Choose Columns\" in Meds & Orders section of the refill encounter.              "
Patient notified of med order and to make a diabetic check appointment next month. He agreed with plan.  Vern Conley RN    
Refilled - please let him know that he is due for a diabetes appointment next month.  cgb  
Routing refill request to provider for review/approval because:  Labs not current:    Bailey Ruiz RN         
Opt out

## 2025-07-15 ENCOUNTER — LAB (OUTPATIENT)
Dept: LAB | Facility: CLINIC | Age: 70
End: 2025-07-15
Payer: COMMERCIAL

## 2025-07-15 ENCOUNTER — ANTICOAGULATION THERAPY VISIT (OUTPATIENT)
Dept: ANTICOAGULATION | Facility: CLINIC | Age: 70
End: 2025-07-15

## 2025-07-15 DIAGNOSIS — I48.91 ATRIAL FIBRILLATION, UNSPECIFIED TYPE (H): ICD-10-CM

## 2025-07-15 DIAGNOSIS — I48.91 ATRIAL FIBRILLATION, UNSPECIFIED TYPE (H): Primary | ICD-10-CM

## 2025-07-15 DIAGNOSIS — Z79.01 LONG TERM CURRENT USE OF ANTICOAGULANT THERAPY: ICD-10-CM

## 2025-07-15 DIAGNOSIS — R35.0 URINARY FREQUENCY: ICD-10-CM

## 2025-07-15 LAB — INR BLD: 1.6 (ref 0.9–1.1)

## 2025-07-15 PROCEDURE — 36415 COLL VENOUS BLD VENIPUNCTURE: CPT

## 2025-07-15 PROCEDURE — 85610 PROTHROMBIN TIME: CPT

## 2025-07-15 NOTE — PROGRESS NOTES
ANTICOAGULATION MANAGEMENT     Joe Mas 69 year old male is on warfarin with subtherapeutic INR result. (Goal INR 2.0-3.0)    Recent labs: (last 7 days)     07/15/25  1416   INR 1.6*       ASSESSMENT     Warfarin Lab Questionnaire    Warfarin Doses Last 7 Days      7/15/2025    11:21 AM   Dose in Tablet or Mg   TAB or MG? tablet (tab)     Pt Rptd Dose MECHE MONDAY TUESDAY WED THURS FRIDAY SATURDAY   7/15/2025  11:21 AM 1 0.5 1 0.5 1 0.5 0.5         7/15/2025   Warfarin Lab Questionnaire   Missed doses within past 14 days? No   Changes in diet or alcohol within past 14 days? Increased greens-ongoing   Medication changes since last result? No   Injuries or illness since last result? No   New shortness of breath, severe headaches or sudden changes in vision since last result? No   Abnormal bleeding since last result? No   Upcoming surgery, procedure? No     Previous result: Supratherapeutic decreased by 9.1%  Additional findings: None       PLAN     Recommended plan for ongoing change(s) affecting INR     Dosing Instructions: Increase your warfarin dose (10% change) with next INR in 2 weeks       Summary  As of 7/15/2025      Full warfarin instructions:  2.5 mg every Mon, Wed, Fri; 5 mg all other days   Next INR check:  8/1/2025               Telephone call with Joe who verbalizes understanding and agrees to plan    Lab visit scheduled    Education provided: Goal range and lab monitoring: goal range and significance of current result  Contact [phone] with any changes, questions or concerns.     Plan made per [facility] anticoagulation protocol    Jolynn Johnson, RN  7/15/2025  Anticoagulation Clinic  CLEAR for routing messages: maxim YANG  [facility] patient phone line: [phone]        _______________________________________________________________________     Anticoagulation Episode Summary       Current INR goal:  2.0-3.0   TTR:  74.3% (1 y)   Target end date:  Indefinite   Send INR reminders to:   [facility]    Indications    Atrial fibrillation  unspecified type (H) [I48.91]  Long term current use of anticoagulant therapy [Z79.01]             Comments:  --             Anticoagulation Care Providers       Provider Role Specialty Phone number    Joylnn Cooper MD Referring Family Medicine [phone]

## 2025-07-16 RX ORDER — TAMSULOSIN HYDROCHLORIDE 0.4 MG/1
0.4 CAPSULE ORAL DAILY
Qty: 90 CAPSULE | Refills: 1 | Status: SHIPPED | OUTPATIENT
Start: 2025-07-16

## 2025-08-05 DIAGNOSIS — I48.91 ATRIAL FIBRILLATION, UNSPECIFIED TYPE (H): ICD-10-CM

## 2025-08-05 RX ORDER — WARFARIN SODIUM 5 MG/1
TABLET ORAL
Qty: 80 TABLET | Refills: 1 | Status: SHIPPED | OUTPATIENT
Start: 2025-08-05

## 2025-08-08 ENCOUNTER — TELEPHONE (OUTPATIENT)
Dept: FAMILY MEDICINE | Facility: CLINIC | Age: 70
End: 2025-08-08
Payer: COMMERCIAL

## 2025-08-08 DIAGNOSIS — J44.1 OBSTRUCTIVE CHRONIC BRONCHITIS WITH EXACERBATION (H): ICD-10-CM

## 2025-08-18 RX ORDER — NEBULIZER AND COMPRESSOR
1 EACH MISCELLANEOUS SEE ADMIN INSTRUCTIONS
Qty: 1 EACH | Refills: 1 | OUTPATIENT
Start: 2025-08-18

## 2025-08-25 ENCOUNTER — PATIENT OUTREACH (OUTPATIENT)
Dept: CARE COORDINATION | Facility: CLINIC | Age: 70
End: 2025-08-25
Payer: COMMERCIAL

## 2025-08-27 ENCOUNTER — PATIENT OUTREACH (OUTPATIENT)
Dept: CARE COORDINATION | Facility: CLINIC | Age: 70
End: 2025-08-27
Payer: COMMERCIAL

## (undated) DEVICE — CATH THERMOCOOL SMARTTOUCH SF FJ CURVE

## (undated) DEVICE — PACK EP SRG PROC LF DISP SAN32EPFSR

## (undated) DEVICE — CATH EP CATH EP REPRO DAIG RSPN FX CRV DX EP C

## (undated) DEVICE — DEFIB PRO-PADZ LVP LQD GEL ADULT 8900-2105-01

## (undated) DEVICE — CATH TRANSSEPTAL VERSACROSS 45D 63X230CM VXSK0032

## (undated) DEVICE — PATCH CARTO 3 EXTERNAL REFERENCE 3D MAPPING CREFP6

## (undated) DEVICE — RAD INTRODUCER KIT MICRO 5FRX10CM .018 NITINOL G/W

## (undated) DEVICE — CATH EP 7FR X 115CM DECANAV CA

## (undated) DEVICE — CATH SOUNDSTAR 8FRX90CM 10439011

## (undated) DEVICE — CATHETER OCTARAY LONG SPLINE CURVE F 3-3-3-3-3 D160906

## (undated) DEVICE — INTRODUCER SHEATH FAST-CATH 9FRX12CM 406116

## (undated) DEVICE — TUBE SET SMARKABLATE IRRIGATION

## (undated) RX ORDER — HEPARIN SODIUM 200 [USP'U]/100ML
INJECTION, SOLUTION INTRAVENOUS
Status: DISPENSED
Start: 2023-06-07

## (undated) RX ORDER — HEPARIN SODIUM 1000 [USP'U]/ML
INJECTION, SOLUTION INTRAVENOUS; SUBCUTANEOUS
Status: DISPENSED
Start: 2023-06-07

## (undated) RX ORDER — PROTAMINE SULFATE 10 MG/ML
INJECTION, SOLUTION INTRAVENOUS
Status: DISPENSED
Start: 2023-06-07

## (undated) RX ORDER — FENTANYL CITRATE 50 UG/ML
INJECTION, SOLUTION INTRAMUSCULAR; INTRAVENOUS
Status: DISPENSED
Start: 2023-06-07

## (undated) RX ORDER — LIDOCAINE HYDROCHLORIDE 10 MG/ML
INJECTION, SOLUTION EPIDURAL; INFILTRATION; INTRACAUDAL; PERINEURAL
Status: DISPENSED
Start: 2023-06-07

## (undated) RX ORDER — REGADENOSON 0.08 MG/ML
INJECTION, SOLUTION INTRAVENOUS
Status: DISPENSED
Start: 2024-10-14

## (undated) RX ORDER — LIDOCAINE HYDROCHLORIDE 10 MG/ML
INJECTION, SOLUTION EPIDURAL; INFILTRATION; INTRACAUDAL; PERINEURAL
Status: DISPENSED
Start: 2017-08-11